# Patient Record
Sex: MALE | Race: WHITE | NOT HISPANIC OR LATINO | Employment: OTHER | URBAN - METROPOLITAN AREA
[De-identification: names, ages, dates, MRNs, and addresses within clinical notes are randomized per-mention and may not be internally consistent; named-entity substitution may affect disease eponyms.]

---

## 2023-10-02 ENCOUNTER — HOSPITAL ENCOUNTER (EMERGENCY)
Facility: HOSPITAL | Age: 83
Discharge: LEFT BEFORE DISCHARGE | End: 2023-10-02
Attending: EMERGENCY MEDICINE
Payer: MEDICARE

## 2023-10-02 VITALS
DIASTOLIC BLOOD PRESSURE: 83 MMHG | OXYGEN SATURATION: 92 % | RESPIRATION RATE: 20 BRPM | SYSTOLIC BLOOD PRESSURE: 148 MMHG | HEIGHT: 68 IN | HEART RATE: 71 BPM | TEMPERATURE: 97.5 F | BODY MASS INDEX: 23.49 KG/M2 | WEIGHT: 155 LBS

## 2023-10-02 DIAGNOSIS — R68.83 CHILLS: Primary | ICD-10-CM

## 2023-10-02 DIAGNOSIS — M54.50 MIDLINE LOW BACK PAIN WITHOUT SCIATICA, UNSPECIFIED CHRONICITY: ICD-10-CM

## 2023-10-02 PROCEDURE — 99281 EMR DPT VST MAYX REQ PHY/QHP: CPT | Performed by: EMERGENCY MEDICINE

## 2023-10-02 PROCEDURE — 99284 EMERGENCY DEPT VISIT MOD MDM: CPT | Performed by: EMERGENCY MEDICINE

## 2023-10-02 PROCEDURE — 99283 EMERGENCY DEPT VISIT LOW MDM: CPT | Mod: 25

## 2023-10-02 ASSESSMENT — COLUMBIA-SUICIDE SEVERITY RATING SCALE - C-SSRS
1. IN THE PAST MONTH, HAVE YOU WISHED YOU WERE DEAD OR WISHED YOU COULD GO TO SLEEP AND NOT WAKE UP?: NO
2. HAVE YOU ACTUALLY HAD ANY THOUGHTS OF KILLING YOURSELF?: NO
6. HAVE YOU EVER DONE ANYTHING, STARTED TO DO ANYTHING, OR PREPARED TO DO ANYTHING TO END YOUR LIFE?: NO

## 2023-10-02 ASSESSMENT — PAIN DESCRIPTION - DESCRIPTORS: DESCRIPTORS: ACHING

## 2023-10-02 ASSESSMENT — PAIN - FUNCTIONAL ASSESSMENT: PAIN_FUNCTIONAL_ASSESSMENT: 0-10

## 2023-10-02 ASSESSMENT — PAIN SCALES - GENERAL
PAINLEVEL_OUTOF10: 3
PAINLEVEL_OUTOF10: 0 - NO PAIN

## 2023-10-02 ASSESSMENT — LIFESTYLE VARIABLES
EVER FELT BAD OR GUILTY ABOUT YOUR DRINKING: NO
HAVE PEOPLE ANNOYED YOU BY CRITICIZING YOUR DRINKING: NO
EVER HAD A DRINK FIRST THING IN THE MORNING TO STEADY YOUR NERVES TO GET RID OF A HANGOVER: NO
HAVE YOU EVER FELT YOU SHOULD CUT DOWN ON YOUR DRINKING: NO

## 2023-10-02 ASSESSMENT — PAIN DESCRIPTION - LOCATION: LOCATION: HEAD

## 2023-10-02 NOTE — ED PROVIDER NOTES
"HPI   Chief Complaint   Patient presents with    Chills     Generalized weakness       HPI     Jose Guardado is a 83-year-old male with a past medical history of hypertension, type 2 diabetes mellitus, stroke currently on blood thinners who presents to the ED via EMS with complaints of chills.  En route to the ED, EMS reports that patient was in no acute distress and vitals were stable.  BP was 148/98.  Patient reports that while going to bed last night he experienced episodes of chills followed by episodes of feeling too hot.  Patient also reports a \"funny feeling\" in his low-back.  States the sensation in his low back feels like cold pressure is being applied. Sensation does not radiate. Reports 2 episodes of diarrhea that occurred last night.  No blood noted in stool.  Denies headaches, fevers, nausea, vomiting, chest pain, shortness of breath, dizziness and swelling in legs and ankles.  Patient denies any urinary symptoms.  Following his arrival, patient reports that he is feeling a lot better.          No data recorded                Patient History   Past Medical History:   Diagnosis Date    Dizziness and giddiness 12/21/2021    Episodic lightheadedness    Other chest pain 12/21/2021    Atypical chest pain    Other chest pain 12/21/2021    Chest pain, musculoskeletal    Personal history of other diseases of male genital organs 11/30/2021    History of erectile dysfunction    Personal history of other specified conditions 05/14/2018    History of urinary incontinence     Past Surgical History:   Procedure Laterality Date    MR HEAD ANGIO WO IV CONTRAST  2/24/2021    MR HEAD ANGIO WO IV CONTRAST 2/24/2021 [facility]    MR HEAD ANGIO WO IV CONTRAST  4/24/2021    MR HEAD ANGIO WO IV CONTRAST 4/24/2021 [facility]    MR HEAD ANGIO WO IV CONTRAST  11/18/2022    MR HEAD ANGIO WO IV CONTRAST 11/18/2022 [facility]    MR HEAD ANGIO WO IV CONTRAST  3/22/2023    MR HEAD ANGIO WO IV CONTRAST [facility] "    MR NECK ANGIO WO IV CONTRAST  2/24/2021    MR NECK ANGIO WO IV CONTRAST 2/24/2021 [facility]    MR NECK ANGIO WO IV CONTRAST  4/24/2021    MR NECK ANGIO WO IV CONTRAST 4/24/2021 [facility]    MR NECK ANGIO WO IV CONTRAST  11/18/2022    MR NECK ANGIO WO IV CONTRAST 11/18/2022 [facility]    MR NECK ANGIO WO IV CONTRAST  3/22/2023    MR NECK ANGIO WO IV CONTRAST [facility]    OTHER SURGICAL HISTORY  05/14/2018    Incision Of Mastoid    OTHER SURGICAL HISTORY  11/30/2021    Complete colonoscopy    OTHER SURGICAL HISTORY  11/30/2021    Esophagogastroduodenoscopy    OTHER SURGICAL HISTORY  11/30/2021    Mastoidectomy    OTHER SURGICAL HISTORY  11/30/2021    Renal lithotripsy    OTHER SURGICAL HISTORY  11/30/2021    Tonsillectomy with adenoidectomy    TONSILLECTOMY  05/14/2018    Tonsillectomy     No family history on file.  Social History     Tobacco Use    Smoking status: Not on file    Smokeless tobacco: Not on file   Substance Use Topics    Alcohol use: Not on file    Drug use: Not on file       Physical Exam   ED Triage Vitals [10/02/23 0912]   Temp Heart Rate Resp BP   36.4 °C (97.5 °F) 72 18 156/77      SpO2 Temp Source Heart Rate Source Patient Position   95 % Temporal Monitor --      BP Location FiO2 (%)     -- --       REVIEW OF SYSTEMS    Constitutional:      Denies: Fever, fatigue     Reports: None  Neuro:      Denies: Headache     Reports: None  HEENT:      Denies: vision change     Reports: None  Cardiovascular:      Denies: Chest pain     Reports: None  Pulmonary:      Denies: shortness of breath, cough     Reports: None  Gastrointestinal:      Denies: Abdominal pain, nausea, vomiting, constipation, bright red stools     Reports: diarrhea,   Genitourinary:      Denies: Flank pain, painful urination, frequent urination, discharge     Reports: None  Musculoskeletal:     Denies: None      Reports: back pain  I    Physical Exam  Constitutional:       General: He is not in acute  distress.     Appearance: Normal appearance. He is not ill-appearing.   HENT:      Head: Normocephalic and atraumatic.      Right Ear: Tympanic membrane, ear canal and external ear normal.      Left Ear: Tympanic membrane, ear canal and external ear normal.      Nose: Nose normal.      Mouth/Throat:      Mouth: Mucous membranes are moist.      Pharynx: No oropharyngeal exudate or posterior oropharyngeal erythema.   Eyes:      Extraocular Movements: Extraocular movements intact.      Conjunctiva/sclera: Conjunctivae normal.      Pupils: Pupils are equal, round, and reactive to light.   Cardiovascular:      Rate and Rhythm: Normal rate and regular rhythm.      Pulses: Normal pulses.      Heart sounds: No murmur heard.     No gallop.   Pulmonary:      Effort: Pulmonary effort is normal.      Breath sounds: No wheezing, rhonchi or rales.   Abdominal:      General: Bowel sounds are normal. There is no distension.      Palpations: Abdomen is soft. There is no mass.      Tenderness: There is no abdominal tenderness. There is no guarding or rebound.   Musculoskeletal:      Cervical back: Neck supple.   Lymphadenopathy:      Cervical: No cervical adenopathy.   Skin:     General: Skin is warm and dry.      Capillary Refill: Capillary refill takes less than 2 seconds.   Neurological:      Mental Status: He is alert. Mental status is at baseline.   Psychiatric:         Mood and Affect: Mood normal.         Behavior: Behavior normal.         ED Course & MDM   Diagnoses as of 10/02/23 1105   Chills   Midline low back pain without sciatica, unspecified chronicity       Medical Decision Making  Jose Guardado is a 44-year-old male with a past medical history of hypertension, type 2 diabetes mellitus, stroke currently on blood thinners who presents to the ED via EMS with complaints of chills.  Patient also reports midline low back pain that does not radiate.  Upon arrival to the ED, patient was in no acute distress and stable.   Patient denies any other symptoms.  We did discuss the need to order CBC, CMP, urinalysis, blood culture sepsis rule out.  COVID swab was also ordered.  Discussed need for chest x-ray, troponin, Mg for cardiac rule out.  Patient reports he is feeling better and does not want to undergo labs or imaging at this time.  Patient left AMA.    The patient was seen by the resident/fellow.  I have personally performed a substantive portion of the encounter.  I have seen and examined the patient; agree with the workup, evaluation, MDM, management and diagnosis.  The care plan has been discussed with the resident; I have reviewed the resident's note and agree with the documented findings.      EKG: Ventricular rate 73.  .  QTc 409.  No ST changes.    83-year-old male to emerged part with chief complaint of chills and back pain that started last night.  He had a mild headache.  He did after episodes of diarrhea.  No blood.  No fevers.  No nausea or vomiting.  No chest pain or shortness of breath.  No urinary issues.  He has had multiple episodes in the past.  Attributes them to anxiety.  He does have hypertension, stroke, diabetes.  He is on Plavix.    Heart is regular lungs are clear.  Abdomen is soft and nontender.  He is awake and alert and oriented to person place and time.  He is in no distress.    We did discuss doing lab work and imaging to work him up for his symptoms and the patient decided he does not want to do this.  Patient states that he feels much better now and does not want evaluated.    Patient is awake and alert and has decisional capacity and comprehension.  He is signing out AGAINST MEDICAL ADVICE.  His family member is here with him.      Dispo: Patient reports he Is feeling better and was discharged AMA.       Procedure  Procedures     Peyton Mackey MD  Resident  10/02/23 1325       Rajiv Joy, DO  10/04/23 1215       Rajiv Joy, DO  10/04/23 1215

## 2023-10-08 PROBLEM — N52.9 ERECTILE DYSFUNCTION: Status: ACTIVE | Noted: 2023-10-08

## 2023-10-08 PROBLEM — D22.5 MELANOCYTIC NEVI OF TRUNK: Status: ACTIVE | Noted: 2022-09-06

## 2023-10-08 PROBLEM — R47.01: Status: ACTIVE | Noted: 2023-10-08

## 2023-10-08 PROBLEM — R90.82 WHITE MATTER DISEASE, UNSPECIFIED: Status: ACTIVE | Noted: 2023-10-08

## 2023-10-08 PROBLEM — I10 BENIGN ESSENTIAL HYPERTENSION: Status: ACTIVE | Noted: 2023-10-08

## 2023-10-08 PROBLEM — G40.909 SEIZURE DISORDER (MULTI): Status: ACTIVE | Noted: 2023-10-08

## 2023-10-08 PROBLEM — R06.02 SHORTNESS OF BREATH: Status: ACTIVE | Noted: 2023-10-08

## 2023-10-08 PROBLEM — E78.5 HYPERLIPIDEMIA: Status: ACTIVE | Noted: 2023-10-08

## 2023-10-08 PROBLEM — I10 HTN (HYPERTENSION): Status: ACTIVE | Noted: 2023-10-08

## 2023-10-08 PROBLEM — Z86.73 OLD CEREBELLAR INFARCT WITHOUT LATE EFFECT: Status: ACTIVE | Noted: 2023-10-08

## 2023-10-08 PROBLEM — K57.32 DIVERTICULITIS, COLON: Status: ACTIVE | Noted: 2023-10-08

## 2023-10-08 PROBLEM — N20.0 CALCIUM KIDNEY STONE: Status: ACTIVE | Noted: 2023-10-08

## 2023-10-08 PROBLEM — I67.9 CEREBROVASCULAR SMALL VESSEL DISEASE: Status: ACTIVE | Noted: 2023-10-08

## 2023-10-08 PROBLEM — E11.9 DM TYPE 2 (DIABETES MELLITUS, TYPE 2) (MULTI): Status: ACTIVE | Noted: 2023-10-08

## 2023-10-08 PROBLEM — L82.1 OTHER SEBORRHEIC KERATOSIS: Status: ACTIVE | Noted: 2022-09-06

## 2023-10-08 PROBLEM — Z86.79 HISTORY OF ORTHOSTATIC HYPOTENSION: Status: ACTIVE | Noted: 2023-10-08

## 2023-10-08 PROBLEM — G89.29 CHRONIC LOW BACK PAIN: Status: ACTIVE | Noted: 2023-10-08

## 2023-10-08 PROBLEM — L81.4 OTHER MELANIN HYPERPIGMENTATION: Status: ACTIVE | Noted: 2022-09-06

## 2023-10-08 PROBLEM — M19.90 OSTEOARTHRITIS: Status: ACTIVE | Noted: 2023-10-08

## 2023-10-08 PROBLEM — M54.50 CHRONIC LOW BACK PAIN: Status: ACTIVE | Noted: 2023-10-08

## 2023-10-08 PROBLEM — J92.0 ASBESTOS-INDUCED PLEURAL PLAQUE: Status: ACTIVE | Noted: 2023-02-23

## 2023-10-08 PROBLEM — I51.89 DIASTOLIC DYSFUNCTION: Status: ACTIVE | Noted: 2023-10-08

## 2023-10-08 PROBLEM — K21.9 GASTROESOPHAGEAL REFLUX DISEASE: Status: ACTIVE | Noted: 2023-10-08

## 2023-10-08 PROBLEM — R41.3 MEMORY IMPAIRMENT: Status: ACTIVE | Noted: 2023-10-08

## 2023-10-08 PROBLEM — M19.90 ARTHRITIS: Status: ACTIVE | Noted: 2023-10-08

## 2023-10-08 PROBLEM — E03.9 HYPOTHYROIDISM: Status: ACTIVE | Noted: 2023-10-08

## 2023-10-08 PROBLEM — D18.01 HEMANGIOMA OF SKIN AND SUBCUTANEOUS TISSUE: Status: ACTIVE | Noted: 2022-09-06

## 2023-10-08 PROBLEM — K63.5 COLON POLYP: Status: ACTIVE | Noted: 2023-10-08

## 2023-10-08 PROBLEM — B02.9 SHINGLES: Status: ACTIVE | Noted: 2023-10-08

## 2023-10-08 PROBLEM — E55.9 VITAMIN D DEFICIENCY: Status: ACTIVE | Noted: 2023-02-23

## 2023-10-08 PROBLEM — G25.0 TREMOR, ESSENTIAL: Status: ACTIVE | Noted: 2023-02-23

## 2023-10-08 PROBLEM — M70.71 BURSITIS OF RIGHT HIP: Status: ACTIVE | Noted: 2023-10-08

## 2023-10-08 PROBLEM — R41.0 CONFUSION AND DISORIENTATION: Status: ACTIVE | Noted: 2023-10-08

## 2023-10-08 PROBLEM — Z86.73 HISTORY OF TIA (TRANSIENT ISCHEMIC ATTACK): Status: ACTIVE | Noted: 2023-10-08

## 2023-10-08 PROBLEM — E66.3 OVERWEIGHT WITH BODY MASS INDEX (BMI) OF 25 TO 25.9 IN ADULT: Status: ACTIVE | Noted: 2023-10-08

## 2023-10-08 PROBLEM — N40.0 BPH (BENIGN PROSTATIC HYPERPLASIA): Status: ACTIVE | Noted: 2023-10-08

## 2023-10-08 PROBLEM — R47.01 EXPRESSIVE APHASIA: Status: ACTIVE | Noted: 2023-10-08

## 2023-10-08 PROBLEM — I83.899 VARICOSE VEINS OF LEG WITH SWELLING: Status: ACTIVE | Noted: 2023-10-08

## 2023-10-08 PROBLEM — R56.9: Status: ACTIVE | Noted: 2023-10-08

## 2023-10-08 PROBLEM — M54.12 CERVICAL RADICULOPATHY: Status: ACTIVE | Noted: 2023-10-08

## 2023-10-08 RX ORDER — PANTOPRAZOLE SODIUM 40 MG/1
1 TABLET, DELAYED RELEASE ORAL EVERY OTHER DAY
COMMUNITY
End: 2023-10-23 | Stop reason: ALTCHOICE

## 2023-10-08 RX ORDER — ATORVASTATIN CALCIUM 80 MG/1
1 TABLET, FILM COATED ORAL NIGHTLY
COMMUNITY
End: 2023-10-09 | Stop reason: SDUPTHER

## 2023-10-08 RX ORDER — LOSARTAN POTASSIUM 25 MG/1
1 TABLET ORAL DAILY
COMMUNITY
End: 2023-10-09 | Stop reason: SDUPTHER

## 2023-10-08 RX ORDER — BLOOD SUGAR DIAGNOSTIC
STRIP MISCELLANEOUS 2 TIMES DAILY
COMMUNITY
End: 2024-01-11 | Stop reason: WASHOUT

## 2023-10-08 RX ORDER — LANCETS
2 EACH MISCELLANEOUS DAILY
COMMUNITY
End: 2024-02-02 | Stop reason: ENTERED-IN-ERROR

## 2023-10-08 RX ORDER — THERMOMETER, TEMPLE ELECTRONIC
EACH MISCELLANEOUS
COMMUNITY
End: 2024-01-11 | Stop reason: WASHOUT

## 2023-10-08 RX ORDER — PIOGLITAZONEHYDROCHLORIDE 15 MG/1
1 TABLET ORAL DAILY
COMMUNITY
End: 2023-10-09 | Stop reason: SDUPTHER

## 2023-10-08 RX ORDER — BUSPIRONE HYDROCHLORIDE 15 MG/1
1 TABLET ORAL 3 TIMES DAILY PRN
COMMUNITY
End: 2023-12-15 | Stop reason: SDUPTHER

## 2023-10-08 RX ORDER — LEVOTHYROXINE SODIUM 100 UG/1
1 TABLET ORAL
COMMUNITY
End: 2023-10-09 | Stop reason: SDUPTHER

## 2023-10-08 RX ORDER — GLIMEPIRIDE 1 MG/1
1 TABLET ORAL
COMMUNITY
End: 2023-10-09

## 2023-10-08 RX ORDER — FINASTERIDE 5 MG/1
1 TABLET, FILM COATED ORAL NIGHTLY
COMMUNITY
End: 2023-10-09 | Stop reason: SDUPTHER

## 2023-10-08 RX ORDER — CLOPIDOGREL BISULFATE 75 MG/1
1 TABLET ORAL DAILY
Status: ON HOLD | COMMUNITY
End: 2024-05-02

## 2023-10-08 RX ORDER — BLOOD-GLUCOSE,RECEIVER,CONT
EACH MISCELLANEOUS ONCE
COMMUNITY
End: 2024-02-02 | Stop reason: ENTERED-IN-ERROR

## 2023-10-08 RX ORDER — FLASH GLUCOSE SCANNING READER
EACH MISCELLANEOUS
COMMUNITY
End: 2024-01-11 | Stop reason: WASHOUT

## 2023-10-08 RX ORDER — METFORMIN HYDROCHLORIDE 500 MG/1
1 TABLET ORAL
COMMUNITY
End: 2024-01-11 | Stop reason: SDUPTHER

## 2023-10-08 RX ORDER — FLUTICASONE PROPIONATE 50 MCG
1 SPRAY, SUSPENSION (ML) NASAL 2 TIMES DAILY PRN
COMMUNITY
End: 2024-04-23 | Stop reason: ENTERED-IN-ERROR

## 2023-10-08 RX ORDER — PEN NEEDLE, DIABETIC 30 GX3/16"
NEEDLE, DISPOSABLE MISCELLANEOUS DAILY
COMMUNITY
End: 2024-02-02 | Stop reason: ENTERED-IN-ERROR

## 2023-10-09 ENCOUNTER — OFFICE VISIT (OUTPATIENT)
Dept: PRIMARY CARE | Facility: CLINIC | Age: 83
End: 2023-10-09
Payer: MEDICARE

## 2023-10-09 VITALS
TEMPERATURE: 96.5 F | HEART RATE: 76 BPM | SYSTOLIC BLOOD PRESSURE: 128 MMHG | WEIGHT: 165 LBS | HEIGHT: 67 IN | DIASTOLIC BLOOD PRESSURE: 58 MMHG | BODY MASS INDEX: 25.9 KG/M2

## 2023-10-09 DIAGNOSIS — E55.9 VITAMIN D DEFICIENCY: ICD-10-CM

## 2023-10-09 DIAGNOSIS — I10 BENIGN ESSENTIAL HYPERTENSION: Primary | ICD-10-CM

## 2023-10-09 DIAGNOSIS — F41.9 ANXIETY: ICD-10-CM

## 2023-10-09 DIAGNOSIS — E78.5 HYPERLIPIDEMIA, UNSPECIFIED HYPERLIPIDEMIA TYPE: ICD-10-CM

## 2023-10-09 DIAGNOSIS — Z79.4 TYPE 2 DIABETES MELLITUS WITHOUT COMPLICATION, WITH LONG-TERM CURRENT USE OF INSULIN (MULTI): ICD-10-CM

## 2023-10-09 DIAGNOSIS — E11.9 TYPE 2 DIABETES MELLITUS WITHOUT COMPLICATION, WITH LONG-TERM CURRENT USE OF INSULIN (MULTI): ICD-10-CM

## 2023-10-09 DIAGNOSIS — E03.9 HYPOTHYROIDISM, UNSPECIFIED TYPE: ICD-10-CM

## 2023-10-09 PROCEDURE — 1160F RVW MEDS BY RX/DR IN RCRD: CPT | Performed by: FAMILY MEDICINE

## 2023-10-09 PROCEDURE — 1159F MED LIST DOCD IN RCRD: CPT | Performed by: FAMILY MEDICINE

## 2023-10-09 PROCEDURE — 1036F TOBACCO NON-USER: CPT | Performed by: FAMILY MEDICINE

## 2023-10-09 PROCEDURE — 3078F DIAST BP <80 MM HG: CPT | Performed by: FAMILY MEDICINE

## 2023-10-09 PROCEDURE — 1126F AMNT PAIN NOTED NONE PRSNT: CPT | Performed by: FAMILY MEDICINE

## 2023-10-09 PROCEDURE — 99214 OFFICE O/P EST MOD 30 MIN: CPT | Performed by: FAMILY MEDICINE

## 2023-10-09 PROCEDURE — 3074F SYST BP LT 130 MM HG: CPT | Performed by: FAMILY MEDICINE

## 2023-10-09 RX ORDER — QUETIAPINE FUMARATE 25 MG/1
25 TABLET, FILM COATED ORAL NIGHTLY
COMMUNITY
Start: 2023-01-09 | End: 2023-10-19 | Stop reason: SDUPTHER

## 2023-10-09 RX ORDER — ESOMEPRAZOLE MAGNESIUM 40 MG/1
40 CAPSULE, DELAYED RELEASE ORAL
COMMUNITY
End: 2024-02-02 | Stop reason: ENTERED-IN-ERROR

## 2023-10-09 RX ORDER — SILDENAFIL 50 MG/1
50 TABLET, FILM COATED ORAL AS NEEDED
COMMUNITY
Start: 2023-07-24 | End: 2024-03-18 | Stop reason: ENTERED-IN-ERROR

## 2023-10-09 RX ORDER — GLIMEPIRIDE 2 MG/1
2 TABLET ORAL DAILY
Qty: 90 TABLET | Refills: 3 | Status: SHIPPED | OUTPATIENT
Start: 2023-10-09 | End: 2024-04-17 | Stop reason: SDUPTHER

## 2023-10-09 RX ORDER — LOSARTAN POTASSIUM 25 MG/1
25 TABLET ORAL DAILY
Qty: 90 TABLET | Refills: 3 | Status: ON HOLD | OUTPATIENT
Start: 2023-10-09 | End: 2024-05-02

## 2023-10-09 RX ORDER — MUPIROCIN CALCIUM 20 MG/G
1 CREAM TOPICAL
COMMUNITY
Start: 2012-02-02 | End: 2023-10-23 | Stop reason: ALTCHOICE

## 2023-10-09 RX ORDER — PIOGLITAZONEHYDROCHLORIDE 15 MG/1
15 TABLET ORAL DAILY
Qty: 90 TABLET | Refills: 3 | Status: SHIPPED | OUTPATIENT
Start: 2023-10-09 | End: 2024-04-17 | Stop reason: SDUPTHER

## 2023-10-09 RX ORDER — DOXYCYCLINE 100 MG/1
100 CAPSULE ORAL EVERY 12 HOURS
COMMUNITY
Start: 2023-06-30 | End: 2023-10-23 | Stop reason: ALTCHOICE

## 2023-10-09 RX ORDER — METFORMIN HYDROCHLORIDE 500 MG/1
500 TABLET, EXTENDED RELEASE ORAL DAILY
COMMUNITY
Start: 2022-12-01 | End: 2023-10-23 | Stop reason: SDUPTHER

## 2023-10-09 RX ORDER — HYDROXYZINE HYDROCHLORIDE 25 MG/1
25 TABLET, FILM COATED ORAL 3 TIMES DAILY PRN
COMMUNITY
Start: 2022-12-08 | End: 2024-02-02 | Stop reason: ENTERED-IN-ERROR

## 2023-10-09 RX ORDER — INSULIN GLARGINE 100 [IU]/ML
12 INJECTION, SOLUTION SUBCUTANEOUS NIGHTLY PRN
COMMUNITY
Start: 2022-12-23 | End: 2024-02-02 | Stop reason: ENTERED-IN-ERROR

## 2023-10-09 RX ORDER — ALBUTEROL SULFATE 90 UG/1
2 AEROSOL, METERED RESPIRATORY (INHALATION) EVERY 4 HOURS PRN
COMMUNITY
Start: 2023-06-30 | End: 2024-03-18 | Stop reason: ENTERED-IN-ERROR

## 2023-10-09 RX ORDER — ATORVASTATIN CALCIUM 80 MG/1
80 TABLET, FILM COATED ORAL NIGHTLY
Qty: 90 TABLET | Refills: 3 | Status: ON HOLD | OUTPATIENT
Start: 2023-10-09 | End: 2024-05-02

## 2023-10-09 RX ORDER — LEVOTHYROXINE SODIUM 100 UG/1
100 TABLET ORAL
Qty: 90 TABLET | Refills: 3 | Status: ON HOLD | OUTPATIENT
Start: 2023-10-09 | End: 2024-05-02

## 2023-10-09 RX ORDER — BRIVARACETAM 100 MG/1
100 TABLET, FILM COATED ORAL 2 TIMES DAILY
COMMUNITY
Start: 2022-12-16 | End: 2023-10-19 | Stop reason: ALTCHOICE

## 2023-10-09 RX ORDER — DICLOFENAC SODIUM 75 MG/1
75 TABLET, DELAYED RELEASE ORAL DAILY
COMMUNITY
Start: 2022-10-13 | End: 2024-01-11 | Stop reason: WASHOUT

## 2023-10-09 RX ORDER — FINASTERIDE 5 MG/1
5 TABLET, FILM COATED ORAL NIGHTLY
Qty: 90 TABLET | Refills: 3 | Status: ON HOLD | OUTPATIENT
Start: 2023-10-09 | End: 2024-05-02

## 2023-10-09 RX ORDER — NAPROXEN SODIUM 220 MG/1
81 TABLET, FILM COATED ORAL DAILY
COMMUNITY
Start: 2023-06-30 | End: 2024-01-11 | Stop reason: WASHOUT

## 2023-10-09 RX ORDER — TRAZODONE HYDROCHLORIDE 50 MG/1
0.5 TABLET ORAL NIGHTLY
COMMUNITY
Start: 2022-12-30 | End: 2023-10-19 | Stop reason: ALTCHOICE

## 2023-10-09 RX ORDER — FLASH GLUCOSE SENSOR
1 KIT MISCELLANEOUS
COMMUNITY
Start: 2023-07-01 | End: 2024-01-11 | Stop reason: WASHOUT

## 2023-10-09 RX ORDER — GLIMEPIRIDE 2 MG/1
2 TABLET ORAL DAILY
COMMUNITY
Start: 2023-04-30 | End: 2023-10-09

## 2023-10-09 NOTE — PROGRESS NOTES
"     /58 (BP Location: Left arm, Patient Position: Sitting, BP Cuff Size: Adult)   Pulse 76   Temp 35.8 °C (96.5 °F) (Skin)   Ht 1.702 m (5' 7\")   Wt 74.8 kg (165 lb)   BMI 25.84 kg/m²     Past Medical History:   Diagnosis Date    Dizziness and giddiness 12/21/2021    Episodic lightheadedness    Other chest pain 12/21/2021    Atypical chest pain    Other chest pain 12/21/2021    Chest pain, musculoskeletal    Personal history of other diseases of male genital organs 11/30/2021    History of erectile dysfunction    Personal history of other specified conditions 05/14/2018    History of urinary incontinence       Patient Active Problem List   Diagnosis    Aphasic seizure (CMS/HCC)    Arthritis    Osteoarthritis    Asbestos-induced pleural plaque    Benign essential hypertension    BPH (benign prostatic hyperplasia)    Bursitis of right hip    Calcium kidney stone    Cerebrovascular small vessel disease    Cervical radiculopathy    Cervical spondylosis without myelopathy    Chronic low back pain    Colon polyp    Diverticulitis, colon    Confusion and disorientation    Diastolic dysfunction    HTN (hypertension)    History of TIA (transient ischemic attack)    History of orthostatic hypotension    Hemangioma of skin and subcutaneous tissue    Gastroesophageal reflux disease    Expressive aphasia    Erectile dysfunction    DM type 2 (diabetes mellitus, type 2) (CMS/HCC)    Disorder of bursae of shoulder region    Memory impairment    Hyperlipidemia    Hypothyroidism    Melanocytic nevi of trunk    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    Other seborrheic keratosis    Other melanin hyperpigmentation    Seizure disorder (CMS/HCC)    Shingles    Shortness of breath    Tremor, essential    Varicose veins of leg with swelling    Vitamin D deficiency    White matter disease, unspecified    Overweight with body mass index (BMI) of 25 to 25.9 in adult       Current Outpatient Medications   Medication Sig " Dispense Refill    atorvastatin (Lipitor) 80 mg tablet Take 1 tablet (80 mg) by mouth once daily at bedtime.      blood-glucose sensor (DEXCOM G7 SENSOR MISC) 1 sensor every 10 days      brivaracetam (Briviact) 50 mg tablet tablet Take 1 tablet (50 mg) by mouth 2 times a day.      busPIRone (Buspar) 15 mg tablet Take 1 tablet (15 mg) by mouth 3 times a day as needed (anger).      clopidogrel (Plavix) 75 mg tablet Take 1 tablet (75 mg) by mouth once daily.      Dexcom G4 platinum  (Dexcom G7 ) misc 1 time.      diclofenac (Voltaren) 75 mg EC tablet Take 1 tablet (75 mg) by mouth once daily.      esomeprazole (NexIUM) 40 mg DR capsule Take 1 capsule (40 mg) by mouth once daily.      finasteride (Proscar) 5 mg tablet Take 1 tablet (5 mg) by mouth once daily at bedtime.      fluticasone (Flonase) 50 mcg/actuation nasal spray Administer 1 spray into each nostril 2 times a day.      FreeStyle Doug 2 Sensor kit Inject 1 each under the skin twice daily.      FreeStyle Doug reader (FreeStyle Doug 2 [place]) misc Use device to take blood glucose levels 10 times daily      glimepiride (Amaryl) 2 mg tablet Take 1 tablet (2 mg) by mouth once daily.      insulin glargine (Lantus) 100 unit/mL (3 mL) pen Inject 300 Units under the skin once daily at bedtime.      insulin glargine,hum.rec.anlog (LANTUS SOLOSTAR U-100 INSULIN SUBQ) Inject 16 Units under the skin once daily.      lancets (Accu-Chek Fastclix Lancet Drum) misc 2 Lancets once daily.      levothyroxine (Synthroid, Levoxyl) 100 mcg tablet Take 1 tablet (100 mcg) by mouth once daily in the morning. Take before meals.      losartan (Cozaar) 25 mg tablet Take 1 tablet (25 mg) by mouth once daily.      metFORMIN (Glucophage) 500 mg tablet Take 1 tablet (500 mg) by mouth. With breakfast      pantoprazole (ProtoNix) 40 mg EC tablet Take 1 tablet (40 mg) by mouth every other day.      pioglitazone (Actos) 15 mg tablet Take 1 tablet (15 mg) by mouth once daily.    "   albuterol 90 mcg/actuation inhaler Inhale 2 puffs every 4 hours if needed for wheezing.      aspirin 81 mg chewable tablet Chew 1 tablet (81 mg) once daily.      blood sugar diagnostic (Accu-Chek Guide test strips) strip 2 times a day.      blood sugar diagnostic (FreeStyle Precision Esvin Strips) strip 2 times a day. When prompted to      Briviact 100 mg tablet tablet Take 1 tablet (100 mg) by mouth 2 times a day.      doxycycline (Vibramycin) 100 mg capsule Take 1 capsule (100 mg) by mouth every 12 hours.      flash glucose sensor (FREESTYLE BUSHRA 2 SENSOR [facility] [place], [place]) Use one sensor for 14 days for blood glucose readings      glimepiride (Amaryl) 1 mg tablet Take 1 tablet (1 mg) by mouth. Before breakfast      guaiFENesin (Mucus Relief ER) 1,200 mg tablet extended release 12hr Take by mouth.      hydrOXYzine HCL (Atarax) 25 mg tablet Take 1 tablet (25 mg) by mouth 3 times a day as needed.      metFORMIN  mg 24 hr tablet Take 1 tablet (500 mg) by mouth once daily.      mupirocin (Bactroban) 2 % cream Apply 1 Application topically once daily.      pen needle, diabetic 32 gauge x 5/32\" needle once daily. Use to inject lantus for diabetes mellitus      QUEtiapine (SEROquel) 25 mg tablet Take 1 tablet (25 mg) by mouth once daily at bedtime. Take 1 to 2 Tablets by mouth at Bedtime if needed for sleep.      sildenafil (Viagra) 50 mg tablet Take 1 tablet (50 mg) by mouth if needed.      traZODone (Desyrel) 50 mg tablet Take 0.5 tablets (25 mg) by mouth once daily at bedtime.       No current facility-administered medications for this visit.       CC/HPI/ASSESSMENT/PLAN    CC follow-up medication    HPI patient 83-year-old male suffers of hypertension hyperlipidemia hypothyroidism vitamin D deficiency type 2 diabetes, patient had a stroke last year causing difficulty with his speech.  Patient experiencing more anxiety as well as bad dreams since the stroke.  He is seeing neurology next week.  He was given medicine " previously by neurology that seemed to help with the dreams.  With respect to anxiety patient's son notes if he takes BuSpar his anxiety is improved and his overall mood is better.  Patient had diabetic like to have blood work done.  Medications reviewed.  Denies chest pain short of breath.  Blood pressure under good control.  Medication list reviewed.    Exam calm eyes no jaundice neck supple lungs CTA CV RRR Ext no edema    A/P 1.  Hypertension stable 2 hyperlipidemia stable 3 hypothyroidism stable 4 vitamin D deficiency stable 5 type 2 diabetes stable.  Extensive blood work is ordered.  Medicines are refilled.  #6 anxiety.  I encouraged patient to take the buspirone twice daily.  Extensive blood work is ordered.  Medicines refilled.  Follow-up 3 months    There are no diagnoses linked to this encounter.

## 2023-10-10 LAB
NON-UH HIE A/G RATIO: 1.3
NON-UH HIE ALB: 3.9 G/DL (ref 3.4–5)
NON-UH HIE ALK PHOS: 105 UNIT/L (ref 46–116)
NON-UH HIE BASO COUNT: 0.01 X1000 (ref 0–0.2)
NON-UH HIE BASOS %: 0.2 %
NON-UH HIE BILIRUBIN, TOTAL: 0.2 MG/DL (ref 0.2–1)
NON-UH HIE BUN/CREAT RATIO: 15.5
NON-UH HIE BUN: 17 MG/DL (ref 9–23)
NON-UH HIE CALCIUM: 9.3 MG/DL (ref 8.7–10.4)
NON-UH HIE CALCULATED LDL CHOLESTEROL: 41 MG/DL (ref 60–130)
NON-UH HIE CALCULATED OSMOLALITY: 289 MOSM/KG (ref 275–295)
NON-UH HIE CHLORIDE: 101 MMOL/L (ref 98–107)
NON-UH HIE CHOLESTEROL: 127 MG/DL (ref 100–200)
NON-UH HIE CO2, VENOUS: 30 MMOL/L (ref 20–31)
NON-UH HIE CREATININE: 1.1 MG/DL (ref 0.6–1.1)
NON-UH HIE DIFF?: NO
NON-UH HIE EOS COUNT: 0.32 X1000 (ref 0–0.5)
NON-UH HIE EOSIN %: 5.9 %
NON-UH HIE GFR AA: >60
NON-UH HIE GLOBULIN: 3 G/DL
NON-UH HIE GLOMERULAR FILTRATION RATE: >60 ML/MIN/1.73M?
NON-UH HIE GLUCOSE: 308 MG/DL (ref 74–106)
NON-UH HIE GOT: 22 UNIT/L (ref 15–37)
NON-UH HIE GPT: 18 UNIT/L (ref 10–49)
NON-UH HIE HCT: 40.9 % (ref 41–52)
NON-UH HIE HDL CHOLESTEROL: 38 MG/DL (ref 40–60)
NON-UH HIE HGB A1C: 7.8 %
NON-UH HIE HGB: 13.7 G/DL (ref 13.5–17.5)
NON-UH HIE INSTR WBC: 5.5
NON-UH HIE K: 4.5 MMOL/L (ref 3.5–5.1)
NON-UH HIE LYMPH %: 29.1 %
NON-UH HIE LYMPH COUNT: 1.6 X1000 (ref 1.2–4.8)
NON-UH HIE MCH: 30.3 PG (ref 27–34)
NON-UH HIE MCHC: 33.4 G/DL (ref 32–37)
NON-UH HIE MCV: 90.8 FL (ref 80–100)
NON-UH HIE MONO %: 9 %
NON-UH HIE MONO COUNT: 0.49 X1000 (ref 0.1–1)
NON-UH HIE MPV: 7.6 FL (ref 7.4–10.4)
NON-UH HIE NA: 138 MMOL/L (ref 135–145)
NON-UH HIE NEUTROPHIL %: 55.8 %
NON-UH HIE NEUTROPHIL COUNT (ANC): 3.06 X1000 (ref 1.4–8.8)
NON-UH HIE NUCLEATED RBC: 0 /100WBC
NON-UH HIE PLATELET: 201 X10 (ref 150–450)
NON-UH HIE RBC: 4.51 X10 (ref 4.7–6.1)
NON-UH HIE RDW: 15.5 % (ref 11.5–14.5)
NON-UH HIE TOTAL CHOL/HDL CHOL RATIO: 3.3
NON-UH HIE TOTAL PROTEIN: 6.9 G/DL (ref 5.7–8.2)
NON-UH HIE TRIGLYCERIDES: 239 MG/DL (ref 30–150)
NON-UH HIE TSH: 2.4 UIU/ML (ref 0.55–4.78)
NON-UH HIE VIT D 25: 28 NG/ML
NON-UH HIE WBC: 5.5 X10 (ref 4.5–11)

## 2023-10-11 DIAGNOSIS — Z86.73 HISTORY OF TIA (TRANSIENT ISCHEMIC ATTACK): ICD-10-CM

## 2023-10-11 DIAGNOSIS — Z95.818 STATUS POST PLACEMENT OF IMPLANTABLE LOOP RECORDER: Primary | ICD-10-CM

## 2023-10-12 ENCOUNTER — HOSPITAL ENCOUNTER (OUTPATIENT)
Dept: CARDIOLOGY | Facility: HOSPITAL | Age: 83
Discharge: HOME | End: 2023-10-12
Payer: MEDICARE

## 2023-10-12 ENCOUNTER — TELEPHONE (OUTPATIENT)
Dept: PRIMARY CARE | Facility: CLINIC | Age: 83
End: 2023-10-12
Payer: MEDICARE

## 2023-10-12 DIAGNOSIS — Z86.73 HISTORY OF TIA (TRANSIENT ISCHEMIC ATTACK): ICD-10-CM

## 2023-10-12 DIAGNOSIS — Z95.818 STATUS POST PLACEMENT OF IMPLANTABLE LOOP RECORDER: ICD-10-CM

## 2023-10-12 PROBLEM — F41.9 ANXIETY: Status: ACTIVE | Noted: 2023-10-12

## 2023-10-12 LAB
ATRIAL RATE: 73 BPM
P AXIS: 55 DEGREES
P OFFSET: 196 MS
P ONSET: 144 MS
PR INTERVAL: 158 MS
Q ONSET: 223 MS
QRS COUNT: 12 BEATS
QRS DURATION: 76 MS
QT INTERVAL: 372 MS
QTC CALCULATION(BAZETT): 409 MS
QTC FREDERICIA: 397 MS
R AXIS: 60 DEGREES
T AXIS: 45 DEGREES
T OFFSET: 409 MS
VENTRICULAR RATE: 73 BPM

## 2023-10-12 PROCEDURE — 93298 REM INTERROG DEV EVAL SCRMS: CPT | Performed by: INTERNAL MEDICINE

## 2023-10-12 PROCEDURE — 93005 ELECTROCARDIOGRAM TRACING: CPT

## 2023-10-12 NOTE — TELEPHONE ENCOUNTER
A1c 7.8.  Thyroid level good.  Liver kidney cholesterol sugar levels look good.  Continue current medicines     Pt son was informed

## 2023-10-19 ENCOUNTER — LAB (OUTPATIENT)
Dept: LAB | Facility: LAB | Age: 83
End: 2023-10-19
Payer: MEDICARE

## 2023-10-19 ENCOUNTER — OFFICE VISIT (OUTPATIENT)
Dept: NEUROLOGY | Facility: CLINIC | Age: 83
End: 2023-10-19
Payer: MEDICARE

## 2023-10-19 DIAGNOSIS — R56.9 SEIZURE (MULTI): ICD-10-CM

## 2023-10-19 DIAGNOSIS — R51.9 LEFT TEMPORAL HEADACHE: ICD-10-CM

## 2023-10-19 DIAGNOSIS — R47.01 APHASIA: ICD-10-CM

## 2023-10-19 DIAGNOSIS — R47.01 APHASIA: Primary | ICD-10-CM

## 2023-10-19 LAB
AMPHETAMINES UR QL SCN: NORMAL
BARBITURATES UR QL SCN: NORMAL
BENZODIAZ UR QL SCN: NORMAL
BZE UR QL SCN: NORMAL
CANNABINOIDS UR QL SCN: NORMAL
CRP SERPL-MCNC: 0.12 MG/DL
ERYTHROCYTE [SEDIMENTATION RATE] IN BLOOD BY WESTERGREN METHOD: 13 MM/H (ref 0–20)
FENTANYL+NORFENTANYL UR QL SCN: NORMAL
OPIATES UR QL SCN: NORMAL
OXYCODONE+OXYMORPHONE UR QL SCN: NORMAL
PCP UR QL SCN: NORMAL

## 2023-10-19 PROCEDURE — 86140 C-REACTIVE PROTEIN: CPT

## 2023-10-19 PROCEDURE — 1126F AMNT PAIN NOTED NONE PRSNT: CPT | Performed by: STUDENT IN AN ORGANIZED HEALTH CARE EDUCATION/TRAINING PROGRAM

## 2023-10-19 PROCEDURE — 1036F TOBACCO NON-USER: CPT | Performed by: STUDENT IN AN ORGANIZED HEALTH CARE EDUCATION/TRAINING PROGRAM

## 2023-10-19 PROCEDURE — 85652 RBC SED RATE AUTOMATED: CPT

## 2023-10-19 PROCEDURE — 1160F RVW MEDS BY RX/DR IN RCRD: CPT | Performed by: STUDENT IN AN ORGANIZED HEALTH CARE EDUCATION/TRAINING PROGRAM

## 2023-10-19 PROCEDURE — 99214 OFFICE O/P EST MOD 30 MIN: CPT | Performed by: STUDENT IN AN ORGANIZED HEALTH CARE EDUCATION/TRAINING PROGRAM

## 2023-10-19 PROCEDURE — 1159F MED LIST DOCD IN RCRD: CPT | Performed by: STUDENT IN AN ORGANIZED HEALTH CARE EDUCATION/TRAINING PROGRAM

## 2023-10-19 PROCEDURE — 36415 COLL VENOUS BLD VENIPUNCTURE: CPT

## 2023-10-19 PROCEDURE — 80307 DRUG TEST PRSMV CHEM ANLYZR: CPT

## 2023-10-19 RX ORDER — BRIVARACETAM 50 MG/1
50 TABLET, FILM COATED ORAL 2 TIMES DAILY
Qty: 180 TABLET | Refills: 1 | Status: SHIPPED | OUTPATIENT
Start: 2023-10-19 | End: 2024-03-11

## 2023-10-19 RX ORDER — PREDNISONE 20 MG/1
40 TABLET ORAL DAILY
Qty: 20 TABLET | Refills: 0 | Status: SHIPPED | OUTPATIENT
Start: 2023-10-19 | End: 2023-10-23 | Stop reason: ALTCHOICE

## 2023-10-19 RX ORDER — QUETIAPINE FUMARATE 25 MG/1
25 TABLET, FILM COATED ORAL NIGHTLY
Qty: 30 TABLET | Refills: 0 | Status: SHIPPED | OUTPATIENT
Start: 2023-10-19 | End: 2023-12-18 | Stop reason: SDUPTHER

## 2023-10-19 ASSESSMENT — ENCOUNTER SYMPTOMS
LOSS OF SENSATION IN FEET: 0
OCCASIONAL FEELINGS OF UNSTEADINESS: 0
DEPRESSION: 0

## 2023-10-19 NOTE — PROGRESS NOTES
"Subjective     Jose Guardado is a 83 y.o. year old male who presents for follow-up of stroke and seizure.    He was last seen 3/20/2023.     He has a history of focal seizures with recurrent episodes despite negative stroke workups. He was tried on Keppra which caused the episodes to stop but had side effects of behavioral disturbances. He was transitioned to Briviact 50mg BID which he has tolerated very well.     11/17-22/2022 - presented to [facility] with sudden onset aphasia. MRI showed embolic L MCA infarcts. Etiology cryptogenic but concerning for a proximal source as he had no significant intracranial or extracranial atherosclerosis. He was discharged on DAPT, statin and with follow-up with Cardiology. He has since had an ILR that has showed no atrial fibrillation to date.     10/19/2023 - outpatient follow-up. Over the last 3 weeks he has had a new onset left temporal headache. This is now nearly constant. He has left sided scalp tenderness. His aphasia has been getting worse over the last 3 weeks. He is waking up in the middle of the night with bad dreams. He is depressed, expresses that he \"feels he is at the end.\" He has significant anxiety. He dislikes taking his sleep related medications. He does not sleep well, wakes up frequently throughout the night then is tired during the day and naps frequently. He has had no further seizures though has had occasional episodes of aphasia that resolve with taking his Briviact.        Patient Active Problem List   Diagnosis    Aphasic seizure (CMS/HCC)    Arthritis    Osteoarthritis    Asbestos-induced pleural plaque    Benign essential hypertension    BPH (benign prostatic hyperplasia)    Bursitis of right hip    Calcium kidney stone    Cerebrovascular small vessel disease    Cervical radiculopathy    Cervical spondylosis without myelopathy    Chronic low back pain    Colon polyp    Diverticulitis, colon    Confusion and disorientation    Diastolic dysfunction    HTN " (hypertension)    History of TIA (transient ischemic attack)    History of orthostatic hypotension    Hemangioma of skin and subcutaneous tissue    Gastroesophageal reflux disease    Expressive aphasia    Erectile dysfunction    DM type 2 (diabetes mellitus, type 2) (CMS/HCC)    Disorder of bursae of shoulder region    Memory impairment    Hyperlipidemia    Hypothyroidism    Melanocytic nevi of trunk    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    Other seborrheic keratosis    Other melanin hyperpigmentation    Seizure disorder (CMS/HCC)    Shingles    Shortness of breath    Tremor, essential    Varicose veins of leg with swelling    Vitamin D deficiency    White matter disease, unspecified    Overweight with body mass index (BMI) of 25 to 25.9 in adult    Anxiety         Objective   Neurological Exam  Mental Status   Oriented only to person, place and time.  Fluent speech  Occasional word finding difficulty or paraphasic error   Able to name glasses, lenses. Had difficulty with provider name  .    Cranial Nerves  CN II: Visual fields full to confrontation.  CN III, IV, VI: Abnormal extraocular movements: Left CN III palsy.  CN V: Facial sensation is normal.  CN VII: Full and symmetric facial movement.  CN VIII: Hearing is normal.  CN IX, X: Palate elevates symmetrically  CN XI: Shoulder shrug strength is normal.  CN XII: Tongue midline without atrophy or fasciculations.    Motor   Strength is 5/5 throughout all four extremities.    Sensory  Light touch is normal in upper and lower extremities.     Gait    Antalgic, ambulates unassisted .    Physical Exam  Eyes:      Extraocular Movements: EOM normal  Neurological:      Motor: Motor strength is normal.        Assessment/Plan   Diagnoses and all orders for this visit:  Aphasia  -     Sedimentation rate, automated; Future  -     C-Reactive Protein; Future  -     Referral to Vascular Surgery; Future  -     MR brain wo IV contrast neuroquant protocol; Future  Left  temporal headache  -     Sedimentation rate, automated; Future  -     C-Reactive Protein; Future  -     Referral to Vascular Surgery; Future  -     MR brain wo IV contrast neuroquant protocol; Future  -     predniSONE (Deltasone) 20 mg tablet; Take 2 tablets (40 mg) by mouth once daily for 10 days.  Seizure (CMS/HCC)  -     QUEtiapine (SEROquel) 25 mg tablet; Take 1 tablet (25 mg) by mouth once daily at bedtime. Take 1 to 2 Tablets by mouth at Bedtime if needed for sleep.  -     brivaracetam (Briviact) 50 mg tablet tablet; Take 1 tablet (50 mg) by mouth 2 times a day.  -     Drug Screen, Urine With Reflex to Confirmation; Future  -     Briviact 50 mg tablet tablet; Take 1 tablet (50 mg) by mouth 2 times a day.    New headache. Left temporal. Scalp tenderness on exam on the L side. Will check ESR, CRP, refer for temporal artery biopsy. 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 Will repeat MRI brain without contrast    Focal seizures. Stable. Doing well on Briviact 50mg BID. OARRs checked, no concerns. CSA signed today. UDS ordered     Follow-up in 6 months

## 2023-10-20 ENCOUNTER — TELEPHONE (OUTPATIENT)
Dept: ENDOCRINOLOGY | Facility: CLINIC | Age: 83
End: 2023-10-20
Payer: MEDICARE

## 2023-10-20 DIAGNOSIS — M79.5 RETAINED BULLET: Primary | ICD-10-CM

## 2023-10-20 NOTE — TELEPHONE ENCOUNTER
Mrs. Lawson called with [place] sugar readings for the past 3 days.                                  Breakfast                Lunch                 Dinner        Bed      10-17-23                                                  230                   189             190  10-18-23                      144                      212                   240              180  10-19-23                      170                      312                   219     302  10-20-23                      158    He is taking 8 units of Lantus at bedtime.  Also on Metformin.
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
## 2023-10-23 ENCOUNTER — TELEPHONE (OUTPATIENT)
Dept: NEUROLOGY | Facility: CLINIC | Age: 83
End: 2023-10-23

## 2023-10-23 ENCOUNTER — APPOINTMENT (OUTPATIENT)
Dept: PRIMARY CARE | Facility: CLINIC | Age: 83
End: 2023-10-23
Payer: MEDICARE

## 2023-10-23 ENCOUNTER — OFFICE VISIT (OUTPATIENT)
Dept: VASCULAR SURGERY | Facility: HOSPITAL | Age: 83
End: 2023-10-23
Payer: MEDICARE

## 2023-10-23 VITALS
DIASTOLIC BLOOD PRESSURE: 79 MMHG | WEIGHT: 165.8 LBS | BODY MASS INDEX: 25.13 KG/M2 | SYSTOLIC BLOOD PRESSURE: 135 MMHG | OXYGEN SATURATION: 96 % | HEIGHT: 68 IN | HEART RATE: 73 BPM

## 2023-10-23 DIAGNOSIS — R47.01 APHASIA: ICD-10-CM

## 2023-10-23 DIAGNOSIS — R51.9 LEFT TEMPORAL HEADACHE: ICD-10-CM

## 2023-10-23 PROCEDURE — 1160F RVW MEDS BY RX/DR IN RCRD: CPT | Performed by: SURGERY

## 2023-10-23 PROCEDURE — 99213 OFFICE O/P EST LOW 20 MIN: CPT | Performed by: SURGERY

## 2023-10-23 PROCEDURE — 1159F MED LIST DOCD IN RCRD: CPT | Performed by: SURGERY

## 2023-10-23 PROCEDURE — 1036F TOBACCO NON-USER: CPT | Performed by: SURGERY

## 2023-10-23 PROCEDURE — 3075F SYST BP GE 130 - 139MM HG: CPT | Performed by: SURGERY

## 2023-10-23 PROCEDURE — 1126F AMNT PAIN NOTED NONE PRSNT: CPT | Performed by: SURGERY

## 2023-10-23 PROCEDURE — 99203 OFFICE O/P NEW LOW 30 MIN: CPT | Performed by: SURGERY

## 2023-10-23 PROCEDURE — 3078F DIAST BP <80 MM HG: CPT | Performed by: SURGERY

## 2023-10-23 ASSESSMENT — ENCOUNTER SYMPTOMS
SHORTNESS OF BREATH: 0
DEPRESSION: 0
OCCASIONAL FEELINGS OF UNSTEADINESS: 0
LOSS OF SENSATION IN FEET: 1
UNEXPECTED WEIGHT CHANGE: 0
SINUS PRESSURE: 0
ABDOMINAL PAIN: 0
PHOTOPHOBIA: 0
SINUS PAIN: 0
SPEECH DIFFICULTY: 1
RHINORRHEA: 0

## 2023-10-23 NOTE — TELEPHONE ENCOUNTER
Patient son called in stating he spoke with someone on Friday regarding an xray that his Dad needs to have done. I do not see any saved messages regarding this, not sure if this is something Asmita Headley was working on. Please advise.     [phone]

## 2023-10-23 NOTE — PROGRESS NOTES
Vascular Surgery Consult/Clinic Note    CC: Left frontal headache    HPI:  Jose Guardado is 83 y.o. male with history of seizure disorder and with remote history of L MCA infarct where the work up included MRA neck which revealed no flow limiting B ICA.   He reports about 3 week duration of the left frontal head ache above his left eye brow.   He has expressive aphagia but able to carry on most of the conversation.       Meds:   Current Outpatient Medications on File Prior to Visit   Medication Sig Dispense Refill    albuterol 90 mcg/actuation inhaler Inhale 2 puffs every 4 hours if needed for wheezing.      atorvastatin (Lipitor) 80 mg tablet Take 1 tablet (80 mg) by mouth once daily at bedtime. 90 tablet 3    brivaracetam (Briviact) 50 mg tablet tablet Take 1 tablet (50 mg) by mouth 2 times a day. 60 tablet 0    Briviact 50 mg tablet tablet Take 1 tablet (50 mg) by mouth 2 times a day. 180 tablet 1    busPIRone (Buspar) 15 mg tablet Take 1 tablet (15 mg) by mouth 3 times a day as needed (anger).      clopidogrel (Plavix) 75 mg tablet Take 1 tablet (75 mg) by mouth once daily.      diclofenac (Voltaren) 75 mg EC tablet Take 1 tablet (75 mg) by mouth once daily.      esomeprazole (NexIUM) 40 mg DR capsule Take 1 capsule (40 mg) by mouth once daily.      finasteride (Proscar) 5 mg tablet Take 1 tablet (5 mg) by mouth once daily at bedtime. 90 tablet 3    fluticasone (Flonase) 50 mcg/actuation nasal spray Administer 1 spray into each nostril 2 times a day.      glimepiride (Amaryl) 2 mg tablet Take 1 tablet (2 mg) by mouth once daily. 90 tablet 3    insulin glargine (Lantus) 100 unit/mL (3 mL) pen Inject 300 Units under the skin once daily at bedtime.      insulin glargine,hum.rec.anlog (LANTUS SOLOSTAR U-100 INSULIN SUBQ) Inject 16 Units under the skin once daily.      levothyroxine (Synthroid, Levoxyl) 100 mcg tablet Take 1 tablet (100 mcg) by mouth once daily in the morning. Take before meals. 90 tablet 3     "losartan (Cozaar) 25 mg tablet Take 1 tablet (25 mg) by mouth once daily. 90 tablet 3    metFORMIN (Glucophage) 500 mg tablet Take 1 tablet (500 mg) by mouth. With breakfast      pioglitazone (Actos) 15 mg tablet Take 1 tablet (15 mg) by mouth once daily. 90 tablet 3    QUEtiapine (SEROquel) 25 mg tablet Take 1 tablet (25 mg) by mouth once daily at bedtime. Take 1 to 2 Tablets by mouth at Bedtime if needed for sleep. 30 tablet 0    aspirin 81 mg chewable tablet Chew 1 tablet (81 mg) once daily.      blood sugar diagnostic (Accu-Chek Guide test strips) strip 2 times a day.      blood sugar diagnostic (FreeStyle Precision Esvin Strips) strip 2 times a day. When prompted to      blood-glucose sensor (DEXCOM G7 SENSOR MISC) 1 sensor every 10 days      Dexcom G4 platinum  (Dexcom G7 ) misc 1 time.      flash glucose sensor (FREESTYLE BUSHRA 2 SENSOR [facility] [place]) Use one sensor for 14 days for blood glucose readings      FreeStyle Bushra 2 Sensor kit Inject 1 each under the skin twice daily.      FreeStyle Bushra reader (FreeStyle Bushra 2 [place]) misc Use device to take blood glucose levels 10 times daily      guaiFENesin (Mucus Relief ER) 1,200 mg tablet extended release 12hr Take by mouth.      hydrOXYzine HCL (Atarax) 25 mg tablet Take 1 tablet (25 mg) by mouth 3 times a day as needed.      lancets (Accu-Chek Fastclix Lancet Drum) misc 2 Lancets once daily.      pen needle, diabetic 32 gauge x 5/32\" needle once daily. Use to inject lantus for diabetes mellitus      sildenafil (Viagra) 50 mg tablet Take 1 tablet (50 mg) by mouth if needed.      [DISCONTINUED] brivaracetam (Briviact) 50 mg tablet tablet Take 1 tablet (50 mg) by mouth 2 times a day.      [DISCONTINUED] Briviact 100 mg tablet tablet Take 1 tablet (100 mg) by mouth 2 times a day.      [DISCONTINUED] doxycycline (Vibramycin) 100 mg capsule Take 1 capsule (100 mg) by mouth every 12 hours.      [DISCONTINUED] metFORMIN  mg 24 hr tablet Take 1 tablet " (500 mg) by mouth once daily.      [DISCONTINUED] mupirocin (Bactroban) 2 % cream Apply 1 Application topically once daily.      [DISCONTINUED] pantoprazole (ProtoNix) 40 mg EC tablet Take 1 tablet (40 mg) by mouth every other day.      [DISCONTINUED] predniSONE (Deltasone) 20 mg tablet Take 2 tablets (40 mg) by mouth once daily for 10 days. 20 tablet 0    [DISCONTINUED] QUEtiapine (SEROquel) 25 mg tablet Take 1 tablet (25 mg) by mouth once daily at bedtime. Take 1 to 2 Tablets by mouth at Bedtime if needed for sleep.      [DISCONTINUED] traZODone (Desyrel) 50 mg tablet Take 0.5 tablets (25 mg) by mouth once daily at bedtime.       No current facility-administered medications on file prior to visit.        Allergies:   No Known Allergies    SH:    Social Determinants of Health     Tobacco Use: Medium Risk (10/12/2023)    Patient History     Smoking Tobacco Use: Former     Smokeless Tobacco Use: Never     Passive Exposure: Not on file   Alcohol Use: Not on file   Financial Resource Strain: Not on file   Food Insecurity: Not on file   Transportation Needs: Not on file   Physical Activity: Not on file   Stress: Not on file   Social Connections: Not on file   Intimate Partner Violence: Not on file   Depression: Not on file   Housing Stability: Not on file   Utilities: Not on file   Digital Equity: Not on file        FH:  Family History   Problem Relation Name Age of Onset    Arthritis Father      Hypertension Father      Diabetes Brother      Other (Stroke syndrome) Brother          ROS:  Review of Systems   Constitutional:  Negative for unexpected weight change.   HENT:  Negative for drooling, ear pain, postnasal drip, rhinorrhea, sinus pressure, sinus pain and tinnitus.    Eyes:  Negative for photophobia.   Respiratory:  Negative for shortness of breath.    Cardiovascular:  Negative for chest pain.   Gastrointestinal:  Negative for abdominal pain.   Neurological:  Positive for speech difficulty.         Objective:  Vitals:  Vitals:    10/23/23 0948   BP: 135/79   Pulse: 73   SpO2: 96%        Exam:  Gen: in NAD  Head: B temporal pulses present and non-tender.   Left frontal area above the eye brow where patient complains of pain but non-tender.   GI: Soft, ND/NT  Ext:  BUE and BLE with gross motor str intact (right less so than the left).     Lab:   10/19/2023:  Sed rate: 13  CRP: 0.12      Assessment & Plan:  Jose Guardado is 83 y.o. male with history of left frontal headache and history of stroke.    - Patient has low suspicion for GCA given his lab findings, and clinical exam/history.    - Would recommend work up for other cause for his headache.     Santy Kahn M.D.  Clinical   [facility] [employer]  Co-Director, Aortic Center  [facility] [place]

## 2023-10-26 ENCOUNTER — HOSPITAL ENCOUNTER (OUTPATIENT)
Dept: RADIOLOGY | Facility: HOSPITAL | Age: 83
Discharge: HOME | End: 2023-10-26
Payer: MEDICARE

## 2023-10-26 DIAGNOSIS — R51.9 LEFT TEMPORAL HEADACHE: ICD-10-CM

## 2023-10-26 DIAGNOSIS — R47.01 APHASIA: ICD-10-CM

## 2023-10-26 PROCEDURE — 70551 MRI BRAIN STEM W/O DYE: CPT | Performed by: STUDENT IN AN ORGANIZED HEALTH CARE EDUCATION/TRAINING PROGRAM

## 2023-10-26 PROCEDURE — 70551 MRI BRAIN STEM W/O DYE: CPT | Mod: MG

## 2023-10-31 ENCOUNTER — TELEPHONE (OUTPATIENT)
Dept: CARDIOLOGY | Facility: CLINIC | Age: 83
End: 2023-10-31
Payer: MEDICARE

## 2023-10-31 NOTE — TELEPHONE ENCOUNTER
Pt's wife called and is wondering if it is safe for pt to receive the RSV shot. Please advise. Thank you

## 2023-11-17 ENCOUNTER — HOSPITAL ENCOUNTER (OUTPATIENT)
Dept: CARDIOLOGY | Facility: HOSPITAL | Age: 83
Discharge: HOME | End: 2023-11-17
Payer: MEDICARE

## 2023-11-17 DIAGNOSIS — Z86.73 HISTORY OF TIA (TRANSIENT ISCHEMIC ATTACK): ICD-10-CM

## 2023-11-17 DIAGNOSIS — Z95.818 STATUS POST PLACEMENT OF IMPLANTABLE LOOP RECORDER: ICD-10-CM

## 2023-11-20 PROCEDURE — 93298 REM INTERROG DEV EVAL SCRMS: CPT | Performed by: INTERNAL MEDICINE

## 2023-12-15 DIAGNOSIS — F41.9 ANXIETY: ICD-10-CM

## 2023-12-15 DIAGNOSIS — R56.9 SEIZURE (MULTI): ICD-10-CM

## 2023-12-18 RX ORDER — QUETIAPINE FUMARATE 25 MG/1
25 TABLET, FILM COATED ORAL NIGHTLY
Qty: 30 TABLET | Refills: 0 | Status: SHIPPED | OUTPATIENT
Start: 2023-12-18 | End: 2024-04-04 | Stop reason: WASHOUT

## 2023-12-18 RX ORDER — BUSPIRONE HYDROCHLORIDE 15 MG/1
15 TABLET ORAL 3 TIMES DAILY PRN
Qty: 90 TABLET | Refills: 1 | Status: SHIPPED | OUTPATIENT
Start: 2023-12-18 | End: 2024-01-22 | Stop reason: SDUPTHER

## 2023-12-18 NOTE — TELEPHONE ENCOUNTER
Pt is requesting a refill of Seroquel but it looks as if someone else prescribed this medication for pt in November. Please Advise

## 2023-12-22 ENCOUNTER — HOSPITAL ENCOUNTER (OUTPATIENT)
Dept: CARDIOLOGY | Facility: HOSPITAL | Age: 83
Discharge: HOME | End: 2023-12-22
Payer: MEDICARE

## 2023-12-22 DIAGNOSIS — Z86.73 HISTORY OF TIA (TRANSIENT ISCHEMIC ATTACK): ICD-10-CM

## 2023-12-22 DIAGNOSIS — Z95.818 STATUS POST PLACEMENT OF IMPLANTABLE LOOP RECORDER: ICD-10-CM

## 2024-01-11 ENCOUNTER — OFFICE VISIT (OUTPATIENT)
Dept: PRIMARY CARE | Facility: CLINIC | Age: 84
End: 2024-01-11
Payer: MEDICARE

## 2024-01-11 VITALS
BODY MASS INDEX: 25.46 KG/M2 | WEIGHT: 168 LBS | SYSTOLIC BLOOD PRESSURE: 110 MMHG | HEART RATE: 77 BPM | HEIGHT: 68 IN | DIASTOLIC BLOOD PRESSURE: 58 MMHG | TEMPERATURE: 98 F

## 2024-01-11 DIAGNOSIS — E78.5 HYPERLIPIDEMIA, UNSPECIFIED HYPERLIPIDEMIA TYPE: ICD-10-CM

## 2024-01-11 DIAGNOSIS — E11.9 TYPE 2 DIABETES MELLITUS WITHOUT COMPLICATION, WITHOUT LONG-TERM CURRENT USE OF INSULIN (MULTI): Primary | ICD-10-CM

## 2024-01-11 DIAGNOSIS — F41.9 ANXIETY: ICD-10-CM

## 2024-01-11 DIAGNOSIS — I10 PRIMARY HYPERTENSION: ICD-10-CM

## 2024-01-11 PROCEDURE — 1160F RVW MEDS BY RX/DR IN RCRD: CPT | Performed by: FAMILY MEDICINE

## 2024-01-11 PROCEDURE — 99214 OFFICE O/P EST MOD 30 MIN: CPT | Performed by: FAMILY MEDICINE

## 2024-01-11 PROCEDURE — 1126F AMNT PAIN NOTED NONE PRSNT: CPT | Performed by: FAMILY MEDICINE

## 2024-01-11 PROCEDURE — 1159F MED LIST DOCD IN RCRD: CPT | Performed by: FAMILY MEDICINE

## 2024-01-11 PROCEDURE — 1036F TOBACCO NON-USER: CPT | Performed by: FAMILY MEDICINE

## 2024-01-11 PROCEDURE — 3074F SYST BP LT 130 MM HG: CPT | Performed by: FAMILY MEDICINE

## 2024-01-11 PROCEDURE — 3078F DIAST BP <80 MM HG: CPT | Performed by: FAMILY MEDICINE

## 2024-01-22 DIAGNOSIS — F41.9 ANXIETY: ICD-10-CM

## 2024-01-22 RX ORDER — BUSPIRONE HYDROCHLORIDE 15 MG/1
15 TABLET ORAL 3 TIMES DAILY PRN
Qty: 90 TABLET | Refills: 5 | Status: SHIPPED | OUTPATIENT
Start: 2024-01-22 | End: 2024-05-07 | Stop reason: WASHOUT

## 2024-01-26 ENCOUNTER — HOSPITAL ENCOUNTER (OUTPATIENT)
Dept: CARDIOLOGY | Facility: HOSPITAL | Age: 84
Discharge: HOME | End: 2024-01-26
Payer: MEDICARE

## 2024-01-26 DIAGNOSIS — Z95.818 STATUS POST PLACEMENT OF IMPLANTABLE LOOP RECORDER: ICD-10-CM

## 2024-01-26 DIAGNOSIS — Z86.73 HISTORY OF TIA (TRANSIENT ISCHEMIC ATTACK): ICD-10-CM

## 2024-01-26 PROCEDURE — 93298 REM INTERROG DEV EVAL SCRMS: CPT | Performed by: INTERNAL MEDICINE

## 2024-01-26 PROCEDURE — 93297 REM INTERROG DEV EVAL ICPMS: CPT

## 2024-01-30 ENCOUNTER — TELEPHONE (OUTPATIENT)
Dept: NEUROLOGY | Facility: CLINIC | Age: 84
End: 2024-01-30
Payer: MEDICARE

## 2024-02-01 RX ORDER — METFORMIN HYDROCHLORIDE 500 MG/1
500 TABLET ORAL
Qty: 180 TABLET | Refills: 3 | Status: SHIPPED | OUTPATIENT
Start: 2024-02-01 | End: 2024-04-17 | Stop reason: SDUPTHER

## 2024-02-02 ENCOUNTER — APPOINTMENT (OUTPATIENT)
Dept: CARDIOLOGY | Facility: HOSPITAL | Age: 84
End: 2024-02-02
Payer: MEDICARE

## 2024-02-02 ENCOUNTER — APPOINTMENT (OUTPATIENT)
Dept: RADIOLOGY | Facility: HOSPITAL | Age: 84
End: 2024-02-02
Payer: MEDICARE

## 2024-02-02 ENCOUNTER — TELEPHONE (OUTPATIENT)
Dept: PRIMARY CARE | Facility: CLINIC | Age: 84
End: 2024-02-02

## 2024-02-02 ENCOUNTER — HOSPITAL ENCOUNTER (OUTPATIENT)
Facility: HOSPITAL | Age: 84
Setting detail: OBSERVATION
Discharge: HOME | End: 2024-02-03
Attending: STUDENT IN AN ORGANIZED HEALTH CARE EDUCATION/TRAINING PROGRAM | Admitting: STUDENT IN AN ORGANIZED HEALTH CARE EDUCATION/TRAINING PROGRAM
Payer: MEDICARE

## 2024-02-02 DIAGNOSIS — R07.81 RIB PAIN: ICD-10-CM

## 2024-02-02 DIAGNOSIS — R07.9 CHEST PAIN, UNSPECIFIED TYPE: Primary | ICD-10-CM

## 2024-02-02 PROBLEM — R07.89 OTHER CHEST PAIN: Status: ACTIVE | Noted: 2024-02-02

## 2024-02-02 LAB
ALBUMIN SERPL BCP-MCNC: 4.1 G/DL (ref 3.4–5)
ALP SERPL-CCNC: 66 U/L (ref 33–136)
ALT SERPL W P-5'-P-CCNC: 12 U/L (ref 10–52)
ANION GAP SERPL CALC-SCNC: 11 MMOL/L (ref 10–20)
AST SERPL W P-5'-P-CCNC: 19 U/L (ref 9–39)
BASOPHILS # BLD AUTO: 0.01 X10*3/UL (ref 0–0.1)
BASOPHILS NFR BLD AUTO: 0.2 %
BILIRUB SERPL-MCNC: 0.7 MG/DL (ref 0–1.2)
BUN SERPL-MCNC: 17 MG/DL (ref 6–23)
CALCIUM SERPL-MCNC: 9.2 MG/DL (ref 8.6–10.3)
CARDIAC TROPONIN I PNL SERPL HS: 4 NG/L (ref 0–20)
CARDIAC TROPONIN I PNL SERPL HS: 4 NG/L (ref 0–20)
CHLORIDE SERPL-SCNC: 101 MMOL/L (ref 98–107)
CO2 SERPL-SCNC: 30 MMOL/L (ref 21–32)
CREAT SERPL-MCNC: 0.93 MG/DL (ref 0.5–1.3)
D DIMER PPP FEU-MCNC: 460 NG/ML FEU
EGFRCR SERPLBLD CKD-EPI 2021: 81 ML/MIN/1.73M*2
EOSINOPHIL # BLD AUTO: 0.33 X10*3/UL (ref 0–0.4)
EOSINOPHIL NFR BLD AUTO: 8.1 %
ERYTHROCYTE [DISTWIDTH] IN BLOOD BY AUTOMATED COUNT: 14.2 % (ref 11.5–14.5)
GLUCOSE BLD MANUAL STRIP-MCNC: 150 MG/DL (ref 74–99)
GLUCOSE BLD MANUAL STRIP-MCNC: 152 MG/DL (ref 74–99)
GLUCOSE SERPL-MCNC: 169 MG/DL (ref 74–99)
HCT VFR BLD AUTO: 41.3 % (ref 41–52)
HGB BLD-MCNC: 13.2 G/DL (ref 13.5–17.5)
IMM GRANULOCYTES # BLD AUTO: 0.01 X10*3/UL (ref 0–0.5)
IMM GRANULOCYTES NFR BLD AUTO: 0.2 % (ref 0–0.9)
LYMPHOCYTES # BLD AUTO: 1.22 X10*3/UL (ref 0.8–3)
LYMPHOCYTES NFR BLD AUTO: 30 %
MAGNESIUM SERPL-MCNC: 1.63 MG/DL (ref 1.6–2.4)
MCH RBC QN AUTO: 29.3 PG (ref 26–34)
MCHC RBC AUTO-ENTMCNC: 32 G/DL (ref 32–36)
MCV RBC AUTO: 92 FL (ref 80–100)
MONOCYTES # BLD AUTO: 0.27 X10*3/UL (ref 0.05–0.8)
MONOCYTES NFR BLD AUTO: 6.7 %
NEUTROPHILS # BLD AUTO: 2.22 X10*3/UL (ref 1.6–5.5)
NEUTROPHILS NFR BLD AUTO: 54.8 %
NRBC BLD-RTO: 0 /100 WBCS (ref 0–0)
PLATELET # BLD AUTO: 189 X10*3/UL (ref 150–450)
POTASSIUM SERPL-SCNC: 4.2 MMOL/L (ref 3.5–5.3)
PROT SERPL-MCNC: 6.6 G/DL (ref 6.4–8.2)
RBC # BLD AUTO: 4.5 X10*6/UL (ref 4.5–5.9)
SODIUM SERPL-SCNC: 138 MMOL/L (ref 136–145)
WBC # BLD AUTO: 4.1 X10*3/UL (ref 4.4–11.3)

## 2024-02-02 PROCEDURE — 85025 COMPLETE CBC W/AUTO DIFF WBC: CPT | Performed by: STUDENT IN AN ORGANIZED HEALTH CARE EDUCATION/TRAINING PROGRAM

## 2024-02-02 PROCEDURE — 99285 EMERGENCY DEPT VISIT HI MDM: CPT | Performed by: STUDENT IN AN ORGANIZED HEALTH CARE EDUCATION/TRAINING PROGRAM

## 2024-02-02 PROCEDURE — 2500000004 HC RX 250 GENERAL PHARMACY W/ HCPCS (ALT 636 FOR OP/ED): Performed by: NURSE PRACTITIONER

## 2024-02-02 PROCEDURE — 74150 CT ABDOMEN W/O CONTRAST: CPT

## 2024-02-02 PROCEDURE — 2500000005 HC RX 250 GENERAL PHARMACY W/O HCPCS: Performed by: NURSE PRACTITIONER

## 2024-02-02 PROCEDURE — 2500000004 HC RX 250 GENERAL PHARMACY W/ HCPCS (ALT 636 FOR OP/ED): Performed by: STUDENT IN AN ORGANIZED HEALTH CARE EDUCATION/TRAINING PROGRAM

## 2024-02-02 PROCEDURE — 2500000001 HC RX 250 WO HCPCS SELF ADMINISTERED DRUGS (ALT 637 FOR MEDICARE OP): Performed by: STUDENT IN AN ORGANIZED HEALTH CARE EDUCATION/TRAINING PROGRAM

## 2024-02-02 PROCEDURE — G0378 HOSPITAL OBSERVATION PER HR: HCPCS

## 2024-02-02 PROCEDURE — 2500000002 HC RX 250 W HCPCS SELF ADMINISTERED DRUGS (ALT 637 FOR MEDICARE OP, ALT 636 FOR OP/ED): Performed by: STUDENT IN AN ORGANIZED HEALTH CARE EDUCATION/TRAINING PROGRAM

## 2024-02-02 PROCEDURE — 36415 COLL VENOUS BLD VENIPUNCTURE: CPT | Performed by: STUDENT IN AN ORGANIZED HEALTH CARE EDUCATION/TRAINING PROGRAM

## 2024-02-02 PROCEDURE — 82947 ASSAY GLUCOSE BLOOD QUANT: CPT

## 2024-02-02 PROCEDURE — 84484 ASSAY OF TROPONIN QUANT: CPT | Performed by: STUDENT IN AN ORGANIZED HEALTH CARE EDUCATION/TRAINING PROGRAM

## 2024-02-02 PROCEDURE — 71046 X-RAY EXAM CHEST 2 VIEWS: CPT | Mod: FR

## 2024-02-02 PROCEDURE — 83735 ASSAY OF MAGNESIUM: CPT | Performed by: STUDENT IN AN ORGANIZED HEALTH CARE EDUCATION/TRAINING PROGRAM

## 2024-02-02 PROCEDURE — 93005 ELECTROCARDIOGRAM TRACING: CPT

## 2024-02-02 PROCEDURE — 2500000001 HC RX 250 WO HCPCS SELF ADMINISTERED DRUGS (ALT 637 FOR MEDICARE OP): Performed by: INTERNAL MEDICINE

## 2024-02-02 PROCEDURE — 71046 X-RAY EXAM CHEST 2 VIEWS: CPT | Mod: FOREIGN READ | Performed by: RADIOLOGY

## 2024-02-02 PROCEDURE — 2500000001 HC RX 250 WO HCPCS SELF ADMINISTERED DRUGS (ALT 637 FOR MEDICARE OP)

## 2024-02-02 PROCEDURE — 84075 ASSAY ALKALINE PHOSPHATASE: CPT | Performed by: STUDENT IN AN ORGANIZED HEALTH CARE EDUCATION/TRAINING PROGRAM

## 2024-02-02 PROCEDURE — 36415 COLL VENOUS BLD VENIPUNCTURE: CPT

## 2024-02-02 PROCEDURE — 85379 FIBRIN DEGRADATION QUANT: CPT

## 2024-02-02 PROCEDURE — 74150 CT ABDOMEN W/O CONTRAST: CPT | Mod: FOREIGN READ | Performed by: RADIOLOGY

## 2024-02-02 PROCEDURE — 99223 1ST HOSP IP/OBS HIGH 75: CPT | Performed by: NURSE PRACTITIONER

## 2024-02-02 RX ORDER — LOSARTAN POTASSIUM 25 MG/1
25 TABLET ORAL DAILY
Status: DISCONTINUED | OUTPATIENT
Start: 2024-02-02 | End: 2024-02-03 | Stop reason: HOSPADM

## 2024-02-02 RX ORDER — INSULIN GLARGINE 100 [IU]/ML
12 INJECTION, SOLUTION SUBCUTANEOUS NIGHTLY
Status: DISCONTINUED | OUTPATIENT
Start: 2024-02-02 | End: 2024-02-03 | Stop reason: HOSPADM

## 2024-02-02 RX ORDER — FLUTICASONE PROPIONATE 50 MCG
1 SPRAY, SUSPENSION (ML) NASAL 2 TIMES DAILY PRN
Status: DISCONTINUED | OUTPATIENT
Start: 2024-02-02 | End: 2024-02-03 | Stop reason: HOSPADM

## 2024-02-02 RX ORDER — POLYMYXIN B SULFATE AND TRIMETHOPRIM 1; 10000 MG/ML; [USP'U]/ML
1 SOLUTION OPHTHALMIC EVERY 6 HOURS SCHEDULED
Status: DISCONTINUED | OUTPATIENT
Start: 2024-02-02 | End: 2024-02-03 | Stop reason: HOSPADM

## 2024-02-02 RX ORDER — LEVOTHYROXINE SODIUM 100 UG/1
100 TABLET ORAL
Status: DISCONTINUED | OUTPATIENT
Start: 2024-02-03 | End: 2024-02-03 | Stop reason: HOSPADM

## 2024-02-02 RX ORDER — FINASTERIDE 5 MG/1
5 TABLET, FILM COATED ORAL NIGHTLY
Status: DISCONTINUED | OUTPATIENT
Start: 2024-02-02 | End: 2024-02-03 | Stop reason: HOSPADM

## 2024-02-02 RX ORDER — NAPROXEN SODIUM 220 MG/1
81 TABLET, FILM COATED ORAL DAILY
Status: DISCONTINUED | OUTPATIENT
Start: 2024-02-03 | End: 2024-02-02

## 2024-02-02 RX ORDER — DEXTROSE MONOHYDRATE 100 MG/ML
0.3 INJECTION, SOLUTION INTRAVENOUS ONCE AS NEEDED
Status: DISCONTINUED | OUTPATIENT
Start: 2024-02-02 | End: 2024-02-03 | Stop reason: HOSPADM

## 2024-02-02 RX ORDER — ONDANSETRON HYDROCHLORIDE 2 MG/ML
4 INJECTION, SOLUTION INTRAVENOUS EVERY 8 HOURS PRN
Status: DISCONTINUED | OUTPATIENT
Start: 2024-02-02 | End: 2024-02-03 | Stop reason: HOSPADM

## 2024-02-02 RX ORDER — PANTOPRAZOLE SODIUM 40 MG/1
40 TABLET, DELAYED RELEASE ORAL EVERY OTHER DAY
Status: DISCONTINUED | OUTPATIENT
Start: 2024-02-03 | End: 2024-02-03 | Stop reason: HOSPADM

## 2024-02-02 RX ORDER — INSULIN GLARGINE 100 [IU]/ML
10-12 INJECTION, SOLUTION SUBCUTANEOUS NIGHTLY PRN
COMMUNITY
End: 2024-04-04 | Stop reason: SDUPTHER

## 2024-02-02 RX ORDER — ATORVASTATIN CALCIUM 40 MG/1
40 TABLET, FILM COATED ORAL NIGHTLY
Status: DISCONTINUED | OUTPATIENT
Start: 2024-02-02 | End: 2024-02-02

## 2024-02-02 RX ORDER — CLOPIDOGREL BISULFATE 75 MG/1
75 TABLET ORAL DAILY
Status: DISCONTINUED | OUTPATIENT
Start: 2024-02-02 | End: 2024-02-03 | Stop reason: HOSPADM

## 2024-02-02 RX ORDER — DEXTROSE 50 % IN WATER (D50W) INTRAVENOUS SYRINGE
25
Status: DISCONTINUED | OUTPATIENT
Start: 2024-02-02 | End: 2024-02-03 | Stop reason: HOSPADM

## 2024-02-02 RX ORDER — INSULIN LISPRO 100 [IU]/ML
0-10 INJECTION, SOLUTION INTRAVENOUS; SUBCUTANEOUS
Status: DISCONTINUED | OUTPATIENT
Start: 2024-02-02 | End: 2024-02-03 | Stop reason: HOSPADM

## 2024-02-02 RX ORDER — HEPARIN SODIUM 5000 [USP'U]/ML
5000 INJECTION, SOLUTION INTRAVENOUS; SUBCUTANEOUS EVERY 8 HOURS SCHEDULED
Status: DISCONTINUED | OUTPATIENT
Start: 2024-02-02 | End: 2024-02-03 | Stop reason: HOSPADM

## 2024-02-02 RX ORDER — ALBUTEROL SULFATE 90 UG/1
2 AEROSOL, METERED RESPIRATORY (INHALATION) EVERY 4 HOURS PRN
Status: DISCONTINUED | OUTPATIENT
Start: 2024-02-02 | End: 2024-02-02

## 2024-02-02 RX ORDER — ATORVASTATIN CALCIUM 80 MG/1
80 TABLET, FILM COATED ORAL NIGHTLY
Status: DISCONTINUED | OUTPATIENT
Start: 2024-02-02 | End: 2024-02-03 | Stop reason: HOSPADM

## 2024-02-02 RX ORDER — ASPIRIN 325 MG
325 TABLET ORAL ONCE
Status: COMPLETED | OUTPATIENT
Start: 2024-02-02 | End: 2024-02-02

## 2024-02-02 RX ORDER — POLYETHYLENE GLYCOL 3350 17 G/17G
17 POWDER, FOR SOLUTION ORAL DAILY PRN
Status: DISCONTINUED | OUTPATIENT
Start: 2024-02-02 | End: 2024-02-03 | Stop reason: HOSPADM

## 2024-02-02 RX ORDER — ACETAMINOPHEN 160 MG/5ML
650 SOLUTION ORAL EVERY 4 HOURS PRN
Status: DISCONTINUED | OUTPATIENT
Start: 2024-02-02 | End: 2024-02-03 | Stop reason: HOSPADM

## 2024-02-02 RX ORDER — ACETAMINOPHEN 650 MG/1
650 SUPPOSITORY RECTAL EVERY 4 HOURS PRN
Status: DISCONTINUED | OUTPATIENT
Start: 2024-02-02 | End: 2024-02-03 | Stop reason: HOSPADM

## 2024-02-02 RX ORDER — ACETAMINOPHEN 325 MG/1
650 TABLET ORAL EVERY 4 HOURS PRN
Status: DISCONTINUED | OUTPATIENT
Start: 2024-02-02 | End: 2024-02-03 | Stop reason: HOSPADM

## 2024-02-02 RX ORDER — ALBUTEROL SULFATE 0.83 MG/ML
2.5 SOLUTION RESPIRATORY (INHALATION) EVERY 4 HOURS PRN
Status: DISCONTINUED | OUTPATIENT
Start: 2024-02-02 | End: 2024-02-03 | Stop reason: HOSPADM

## 2024-02-02 RX ORDER — LIDOCAINE 560 MG/1
1 PATCH PERCUTANEOUS; TOPICAL; TRANSDERMAL DAILY
Status: DISCONTINUED | OUTPATIENT
Start: 2024-02-02 | End: 2024-02-03 | Stop reason: HOSPADM

## 2024-02-02 RX ORDER — QUETIAPINE FUMARATE 25 MG/1
25 TABLET, FILM COATED ORAL NIGHTLY
Status: DISCONTINUED | OUTPATIENT
Start: 2024-02-02 | End: 2024-02-03 | Stop reason: HOSPADM

## 2024-02-02 RX ORDER — TAMSULOSIN HYDROCHLORIDE 0.4 MG/1
0.4 CAPSULE ORAL DAILY
COMMUNITY
End: 2024-03-18 | Stop reason: ENTERED-IN-ERROR

## 2024-02-02 RX ORDER — BUSPIRONE HYDROCHLORIDE 15 MG/1
7.5 TABLET ORAL 2 TIMES DAILY
Status: DISCONTINUED | OUTPATIENT
Start: 2024-02-02 | End: 2024-02-03 | Stop reason: HOSPADM

## 2024-02-02 RX ORDER — ONDANSETRON 4 MG/1
4 TABLET, ORALLY DISINTEGRATING ORAL EVERY 8 HOURS PRN
Status: DISCONTINUED | OUTPATIENT
Start: 2024-02-02 | End: 2024-02-03 | Stop reason: HOSPADM

## 2024-02-02 RX ORDER — TAMSULOSIN HYDROCHLORIDE 0.4 MG/1
0.4 CAPSULE ORAL DAILY
Status: DISCONTINUED | OUTPATIENT
Start: 2024-02-02 | End: 2024-02-03 | Stop reason: HOSPADM

## 2024-02-02 RX ADMIN — HEPARIN SODIUM 5000 UNITS: 5000 INJECTION INTRAVENOUS; SUBCUTANEOUS at 21:40

## 2024-02-02 RX ADMIN — HEPARIN SODIUM 5000 UNITS: 5000 INJECTION INTRAVENOUS; SUBCUTANEOUS at 15:30

## 2024-02-02 RX ADMIN — INSULIN GLARGINE 12 UNITS: 100 INJECTION, SOLUTION SUBCUTANEOUS at 20:27

## 2024-02-02 RX ADMIN — POLYMYXIN B SULFATE AND TRIMETHOPRIM 1 DROP: 10000; 1 SOLUTION OPHTHALMIC at 22:27

## 2024-02-02 RX ADMIN — QUETIAPINE FUMARATE 25 MG: 25 TABLET ORAL at 20:22

## 2024-02-02 RX ADMIN — TAMSULOSIN HYDROCHLORIDE 0.4 MG: 0.4 CAPSULE ORAL at 15:47

## 2024-02-02 RX ADMIN — LIDOCAINE 1 PATCH: 4 PATCH TOPICAL at 15:31

## 2024-02-02 RX ADMIN — ASPIRIN 325 MG ORAL TABLET 325 MG: 325 PILL ORAL at 15:31

## 2024-02-02 RX ADMIN — FINASTERIDE 5 MG: 5 TABLET, FILM COATED ORAL at 20:22

## 2024-02-02 RX ADMIN — CLOPIDOGREL BISULFATE 75 MG: 75 TABLET ORAL at 17:06

## 2024-02-02 RX ADMIN — BUSPIRONE HYDROCHLORIDE 7.5 MG: 15 TABLET ORAL at 20:21

## 2024-02-02 RX ADMIN — ATORVASTATIN CALCIUM 80 MG: 80 TABLET, FILM COATED ORAL at 20:21

## 2024-02-02 RX ADMIN — LOSARTAN POTASSIUM 25 MG: 25 TABLET, FILM COATED ORAL at 15:47

## 2024-02-02 RX ADMIN — POLYETHYLENE GLYCOL 3350 17 G: 17 POWDER, FOR SOLUTION ORAL at 15:47

## 2024-02-02 SDOH — SOCIAL STABILITY: SOCIAL INSECURITY: HAVE YOU HAD THOUGHTS OF HARMING ANYONE ELSE?: NO

## 2024-02-02 SDOH — SOCIAL STABILITY: SOCIAL INSECURITY: DO YOU FEEL UNSAFE GOING BACK TO THE PLACE WHERE YOU ARE LIVING?: NO

## 2024-02-02 SDOH — SOCIAL STABILITY: SOCIAL INSECURITY: DOES ANYONE TRY TO KEEP YOU FROM HAVING/CONTACTING OTHER FRIENDS OR DOING THINGS OUTSIDE YOUR HOME?: NO

## 2024-02-02 SDOH — SOCIAL STABILITY: SOCIAL INSECURITY: DO YOU FEEL ANYONE HAS EXPLOITED OR TAKEN ADVANTAGE OF YOU FINANCIALLY OR OF YOUR PERSONAL PROPERTY?: NO

## 2024-02-02 SDOH — SOCIAL STABILITY: SOCIAL INSECURITY: ABUSE: ADULT

## 2024-02-02 SDOH — SOCIAL STABILITY: SOCIAL INSECURITY: ARE YOU OR HAVE YOU BEEN THREATENED OR ABUSED PHYSICALLY, EMOTIONALLY, OR SEXUALLY BY ANYONE?: NO

## 2024-02-02 SDOH — SOCIAL STABILITY: SOCIAL INSECURITY: ARE THERE ANY APPARENT SIGNS OF INJURIES/BEHAVIORS THAT COULD BE RELATED TO ABUSE/NEGLECT?: NO

## 2024-02-02 SDOH — SOCIAL STABILITY: SOCIAL INSECURITY: HAS ANYONE EVER THREATENED TO HURT YOUR FAMILY OR YOUR PETS?: NO

## 2024-02-02 SDOH — SOCIAL STABILITY: SOCIAL INSECURITY: WERE YOU ABLE TO COMPLETE ALL THE BEHAVIORAL HEALTH SCREENINGS?: YES

## 2024-02-02 ASSESSMENT — COGNITIVE AND FUNCTIONAL STATUS - GENERAL
HELP NEEDED FOR BATHING: A LITTLE
TURNING FROM BACK TO SIDE WHILE IN FLAT BAD: A LITTLE
DAILY ACTIVITIY SCORE: 20
HELP NEEDED FOR BATHING: A LITTLE
DRESSING REGULAR LOWER BODY CLOTHING: A LITTLE
CLIMB 3 TO 5 STEPS WITH RAILING: A LITTLE
TOILETING: A LITTLE
MOVING TO AND FROM BED TO CHAIR: A LITTLE
MOVING TO AND FROM BED TO CHAIR: A LITTLE
TURNING FROM BACK TO SIDE WHILE IN FLAT BAD: A LITTLE
DRESSING REGULAR UPPER BODY CLOTHING: A LITTLE
DRESSING REGULAR LOWER BODY CLOTHING: A LITTLE
PATIENT BASELINE BEDBOUND: NO
TOILETING: A LITTLE
DAILY ACTIVITIY SCORE: 20
DRESSING REGULAR UPPER BODY CLOTHING: A LITTLE
WALKING IN HOSPITAL ROOM: A LITTLE
MOBILITY SCORE: 18
STANDING UP FROM CHAIR USING ARMS: A LITTLE
MOVING FROM LYING ON BACK TO SITTING ON SIDE OF FLAT BED WITH BEDRAILS: A LITTLE
WALKING IN HOSPITAL ROOM: A LITTLE
MOVING FROM LYING ON BACK TO SITTING ON SIDE OF FLAT BED WITH BEDRAILS: A LITTLE
CLIMB 3 TO 5 STEPS WITH RAILING: A LITTLE
STANDING UP FROM CHAIR USING ARMS: A LITTLE
MOBILITY SCORE: 18

## 2024-02-02 ASSESSMENT — PAIN DESCRIPTION - ORIENTATION: ORIENTATION: LEFT

## 2024-02-02 ASSESSMENT — LIFESTYLE VARIABLES
EVER FELT BAD OR GUILTY ABOUT YOUR DRINKING: NO
AUDIT-C TOTAL SCORE: 1
PRESCIPTION_ABUSE_PAST_12_MONTHS: NO
HOW OFTEN DO YOU HAVE A DRINK CONTAINING ALCOHOL: MONTHLY OR LESS
HOW MANY STANDARD DRINKS CONTAINING ALCOHOL DO YOU HAVE ON A TYPICAL DAY: 1 OR 2
SKIP TO QUESTIONS 9-10: 1
HAVE YOU EVER FELT YOU SHOULD CUT DOWN ON YOUR DRINKING: NO
HOW OFTEN DO YOU HAVE 6 OR MORE DRINKS ON ONE OCCASION: NEVER
SUBSTANCE_ABUSE_PAST_12_MONTHS: NO
AUDIT-C TOTAL SCORE: 1
HAVE PEOPLE ANNOYED YOU BY CRITICIZING YOUR DRINKING: NO
EVER HAD A DRINK FIRST THING IN THE MORNING TO STEADY YOUR NERVES TO GET RID OF A HANGOVER: NO

## 2024-02-02 ASSESSMENT — ACTIVITIES OF DAILY LIVING (ADL)
HEARING - LEFT EAR: FUNCTIONAL
WALKS IN HOME: INDEPENDENT
TOILETING: NEEDS ASSISTANCE
JUDGMENT_ADEQUATE_SAFELY_COMPLETE_DAILY_ACTIVITIES: YES
FEEDING YOURSELF: INDEPENDENT
DRESSING YOURSELF: NEEDS ASSISTANCE
GROOMING: INDEPENDENT
LACK_OF_TRANSPORTATION: NO
HEARING - RIGHT EAR: FUNCTIONAL
ADEQUATE_TO_COMPLETE_ADL: YES
BATHING: NEEDS ASSISTANCE
PATIENT'S MEMORY ADEQUATE TO SAFELY COMPLETE DAILY ACTIVITIES?: YES

## 2024-02-02 ASSESSMENT — COLUMBIA-SUICIDE SEVERITY RATING SCALE - C-SSRS
6. HAVE YOU EVER DONE ANYTHING, STARTED TO DO ANYTHING, OR PREPARED TO DO ANYTHING TO END YOUR LIFE?: NO
2. HAVE YOU ACTUALLY HAD ANY THOUGHTS OF KILLING YOURSELF?: NO
1. IN THE PAST MONTH, HAVE YOU WISHED YOU WERE DEAD OR WISHED YOU COULD GO TO SLEEP AND NOT WAKE UP?: NO

## 2024-02-02 ASSESSMENT — PAIN SCALES - GENERAL
PAINLEVEL_OUTOF10: 8
PAINLEVEL_OUTOF10: 0 - NO PAIN
PAINLEVEL_OUTOF10: 0 - NO PAIN
PAINLEVEL_OUTOF10: 8

## 2024-02-02 ASSESSMENT — PAIN DESCRIPTION - LOCATION: LOCATION: RIB CAGE

## 2024-02-02 ASSESSMENT — PAIN - FUNCTIONAL ASSESSMENT
PAIN_FUNCTIONAL_ASSESSMENT: 0-10

## 2024-02-02 ASSESSMENT — PATIENT HEALTH QUESTIONNAIRE - PHQ9
SUM OF ALL RESPONSES TO PHQ9 QUESTIONS 1 & 2: 0
2. FEELING DOWN, DEPRESSED OR HOPELESS: NOT AT ALL
1. LITTLE INTEREST OR PLEASURE IN DOING THINGS: NOT AT ALL

## 2024-02-02 NOTE — ED PROVIDER NOTES
EMERGENCY DEPARTMENT ENCOUNTER      Pt Name: Jose Guardado  MRN: [id number]  Birthdate 1940  Date of evaluation: 2/2/2024  Provider: Stefano iHckman DO    CHIEF COMPLAINT       Chief Complaint   Patient presents with    Chest Pain     Pain in Rib, Non-traumatic radiating to chest         HISTORY OF PRESENT ILLNESS    83-year-old male with PMH CVA in November 2022 with residual expressive aphasia, seizure disorder, hypertension, hyperlipidemia,  hypothyroidism, asthma, insulin-dependent diabetes mellitus, GERD, BPH, history of implantable loop recorder after the stroke presenting with left sided rib pain that radiates into his chest.  He says that this pain started approximately an hour ago no identifiable trigger.  Says that he has had this pain before and it typically goes away on its own and is not sure what is causing it.  He denies any trauma to the area, dyspneic chest pain, shortness of breath, nausea, vomiting, diarrhea, constipation, abdominal pain.  Says that he has never had a heart attack but has had several strokes in the past.      History provided by:  Patient   used: No        Nursing Notes were reviewed.    PAST MEDICAL HISTORY     Past Medical History:   Diagnosis Date    Dizziness and giddiness 12/21/2021    Episodic lightheadedness    Other chest pain 12/21/2021    Atypical chest pain    Other chest pain 12/21/2021    Chest pain, musculoskeletal    Personal history of other diseases of male genital organs 11/30/2021    History of erectile dysfunction    Personal history of other specified conditions 05/14/2018    History of urinary incontinence         SURGICAL HISTORY       Past Surgical History:   Procedure Laterality Date    MR HEAD ANGIO WO IV CONTRAST  2/24/2021    MR HEAD ANGIO WO IV CONTRAST 2/24/2021 [facility]    MR HEAD ANGIO WO IV CONTRAST  4/24/2021    MR HEAD ANGIO WO IV CONTRAST 4/24/2021 [facility]    MR HEAD ANGIO WO IV CONTRAST  11/18/2022     MR HEAD ANGIO WO IV CONTRAST 11/18/2022 [facility]    MR HEAD ANGIO WO IV CONTRAST  3/22/2023    MR HEAD ANGIO WO IV CONTRAST [facility]    MR NECK ANGIO WO IV CONTRAST  2/24/2021    MR NECK ANGIO WO IV CONTRAST 2/24/2021 [facility]    MR NECK ANGIO WO IV CONTRAST  4/24/2021    MR NECK ANGIO WO IV CONTRAST 4/24/2021 [facility]    MR NECK ANGIO WO IV CONTRAST  11/18/2022    MR NECK ANGIO WO IV CONTRAST 11/18/2022 [facility]    MR NECK ANGIO WO IV CONTRAST  3/22/2023    MR NECK ANGIO WO IV CONTRAST [facility]    OTHER SURGICAL HISTORY  05/14/2018    Incision Of Mastoid    OTHER SURGICAL HISTORY  11/30/2021    Complete colonoscopy    OTHER SURGICAL HISTORY  11/30/2021    Esophagogastroduodenoscopy    OTHER SURGICAL HISTORY  11/30/2021    Mastoidectomy    OTHER SURGICAL HISTORY  11/30/2021    Renal lithotripsy    OTHER SURGICAL HISTORY  11/30/2021    Tonsillectomy with adenoidectomy    TONSILLECTOMY  05/14/2018    Tonsillectomy         CURRENT MEDICATIONS       Discharge Medication List as of 2/3/2024 11:48 AM        CONTINUE these medications which have NOT CHANGED    Details   albuterol 90 mcg/actuation inhaler Inhale 2 puffs every 4 hours if needed for wheezing., Starting Fri 6/30/2023, Historical Med      atorvastatin (Lipitor) 80 mg tablet Take 1 tablet (80 mg) by mouth once daily at bedtime., Starting Mon 10/9/2023, Until Tue 10/8/2024, Normal      Briviact 50 mg tablet tablet Take 1 tablet (50 mg) by mouth 2 times a day., Starting Thu 10/19/2023, Until Tue 4/16/2024, Normal      busPIRone (Buspar) 15 mg tablet Take 1 tablet (15 mg) by mouth 3 times a day as needed (anger)., Starting Mon 1/22/2024, Until Tue 1/21/2025 at 2359, Normal      clopidogrel (Plavix) 75 mg tablet Take 1 tablet (75 mg) by mouth once daily., Historical Med      finasteride (Proscar) 5 mg tablet Take 1 tablet (5 mg) by mouth once daily at bedtime., Starting Mon 10/9/2023, Until Tue 10/8/2024, Normal       fluticasone (Flonase) 50 mcg/actuation nasal spray Administer 1 spray into each nostril 2 times a day as needed., Historical Med      glimepiride (Amaryl) 2 mg tablet Take 1 tablet (2 mg) by mouth once daily., Starting Mon 10/9/2023, Until Tue 10/8/2024, Normal      insulin glargine (Lantus U-100 Insulin) 100 unit/mL injection Inject 12 Units under the skin as needed at bedtime. Take as directed per insulin instructions., Historical Med      levothyroxine (Synthroid, Levoxyl) 100 mcg tablet Take 1 tablet (100 mcg) by mouth once daily in the morning. Take before meals., Starting Mon 10/9/2023, Until Tue 10/8/2024, Normal      losartan (Cozaar) 25 mg tablet Take 1 tablet (25 mg) by mouth once daily., Starting Mon 10/9/2023, Until Tue 10/8/2024, Normal      metFORMIN (Glucophage) 500 mg tablet Take 1 tablet (500 mg) by mouth 2 times a day with meals., Starting Thu 2/1/2024, Until Fri 1/31/2025, Normal      pantoprazole (ProtoNix) 40 mg EC tablet TAKE 1 TABLET BY MOUTH DAILY, Starting Wed 11/1/2023, Normal      pioglitazone (Actos) 15 mg tablet Take 1 tablet (15 mg) by mouth once daily., Starting Mon 10/9/2023, Until Tue 10/8/2024, Normal      QUEtiapine (SEROquel) 25 mg tablet Take 1 tablet (25 mg) by mouth once daily at bedtime. Take 1 to 2 Tablets by mouth at Bedtime if needed for sleep., Starting Mon 12/18/2023, Until Wed 1/17/2024, Normal      sildenafil (Viagra) 50 mg tablet Take 1 tablet (50 mg) by mouth if needed., Starting Mon 7/24/2023, Historical Med      tamsulosin (Flomax) 0.4 mg 24 hr capsule Take 1 capsule (0.4 mg) by mouth once daily., Historical Med             ALLERGIES     Hydromorphone    FAMILY HISTORY       Family History   Problem Relation Name Age of Onset    Arthritis Father      Hypertension Father      Diabetes Brother      Other (Stroke syndrome) Brother            SOCIAL HISTORY       Social History     Socioeconomic History    Marital status:      Spouse name: None    Number of  children: None    Years of education: None    Highest education level: None   Occupational History    None   Tobacco Use    Smoking status: Former     Types: Cigarettes    Smokeless tobacco: Never   Substance and Sexual Activity    Alcohol use: None    Drug use: None    Sexual activity: None   Other Topics Concern    None   Social History Narrative    None     Social Determinants of Health     Financial Resource Strain: Low Risk  (2/2/2024)    Overall Financial Resource Strain (CARDIA)     Difficulty of Paying Living Expenses: Not hard at all   Food Insecurity: Not on file   Transportation Needs: No Transportation Needs (2/2/2024)    PRAPARE - Transportation     Lack of Transportation (Medical): No     Lack of Transportation (Non-Medical): No   Physical Activity: Not on file   Stress: Not on file   Social Connections: Not on file   Intimate Partner Violence: Not on file   Housing Stability: Low Risk  (2/2/2024)    Housing Stability Vital Sign     Unable to Pay for Housing in the Last Year: No     Number of Places Lived in the Last Year: 1     Unstable Housing in the Last Year: No       SCREENINGS                        PHYSICAL EXAM    (up to 7 for level 4, 8 or more for level 5)     ED Triage Vitals [02/02/24 0951]   Temperature Heart Rate Respirations BP   36.1 °C (97 °F) 71 18 153/71      Pulse Ox Temp src Heart Rate Source Patient Position   96 % -- -- --      BP Location FiO2 (%)     -- --       Physical Exam  Vitals reviewed.   Constitutional:       General: He is not in acute distress.     Appearance: Normal appearance. He is not toxic-appearing.   HENT:      Head: Normocephalic and atraumatic.      Nose: Nose normal.   Eyes:      General:         Right eye: Discharge present.         Left eye: Discharge present.     Extraocular Movements: Extraocular movements intact.   Cardiovascular:      Rate and Rhythm: Normal rate and regular rhythm.      Heart sounds: No murmur heard.     No friction rub. No gallop.    Pulmonary:      Effort: Pulmonary effort is normal. No respiratory distress.      Breath sounds: Normal breath sounds. No wheezing, rhonchi or rales.   Chest:      Chest wall: No tenderness.   Abdominal:      Palpations: Abdomen is soft. There is no mass.      Tenderness: There is no abdominal tenderness. There is no guarding or rebound.   Musculoskeletal:      Right lower leg: No tenderness. No edema.      Left lower leg: No tenderness. No edema.   Skin:     General: Skin is warm and dry.   Neurological:      General: No focal deficit present.      Mental Status: He is alert.   Psychiatric:         Mood and Affect: Mood normal.         Behavior: Behavior normal.          DIAGNOSTIC RESULTS     LABS:  Labs Reviewed   CBC WITH AUTO DIFFERENTIAL - Abnormal       Result Value    WBC 4.1 (*)     nRBC 0.0      RBC 4.50      Hemoglobin 13.2 (*)     Hematocrit 41.3      MCV 92      MCH 29.3      MCHC 32.0      RDW 14.2      Platelets 189      Neutrophils % 54.8      Immature Granulocytes %, Automated 0.2      Lymphocytes % 30.0      Monocytes % 6.7      Eosinophils % 8.1      Basophils % 0.2      Neutrophils Absolute 2.22      Immature Granulocytes Absolute, Automated 0.01      Lymphocytes Absolute 1.22      Monocytes Absolute 0.27      Eosinophils Absolute 0.33      Basophils Absolute 0.01     COMPREHENSIVE METABOLIC PANEL - Abnormal    Glucose 169 (*)     Sodium 138      Potassium 4.2      Chloride 101      Bicarbonate 30      Anion Gap 11      Urea Nitrogen 17      Creatinine 0.93      eGFR 81      Calcium 9.2      Albumin 4.1      Alkaline Phosphatase 66      Total Protein 6.6      AST 19      Bilirubin, Total 0.7      ALT 12     CBC - Abnormal    WBC 4.7      nRBC 0.0      RBC 4.23 (*)     Hemoglobin 12.5 (*)     Hematocrit 38.6 (*)     MCV 91      MCH 29.6      MCHC 32.4      RDW 14.3      Platelets 179     BASIC METABOLIC PANEL - Abnormal    Glucose 107 (*)     Sodium 139      Potassium 3.8      Chloride 105       Bicarbonate 28      Anion Gap 10      Urea Nitrogen 18      Creatinine 0.85      eGFR 86      Calcium 8.5 (*)    POCT GLUCOSE - Abnormal    POCT Glucose 150 (*)    POCT GLUCOSE - Abnormal    POCT Glucose 152 (*)    POCT GLUCOSE - Abnormal    POCT Glucose 115 (*)    MAGNESIUM - Normal    Magnesium 1.63     SERIAL TROPONIN-INITIAL - Normal    Troponin I, High Sensitivity 4      Narrative:     Less than 99th percentile of normal range cutoff-  Female and children under 18 years old <14 ng/L; Male <21 ng/L: Negative  Repeat testing should be performed if clinically indicated.     Female and children under 18 years old 14-50 ng/L; Male 21-50 ng/L:  Consistent with possible cardiac damage and possible increased clinical   risk. Serial measurements may help to assess extent of myocardial damage.     >50 ng/L: Consistent with cardiac damage, increased clinical risk and  myocardial infarction. Serial measurements may help assess extent of   myocardial damage.      NOTE: Children less than 1 year old may have higher baseline troponin   levels and results should be interpreted in conjunction with the overall   clinical context.     NOTE: Troponin I testing is performed using a different   testing methodology at [facility] than at [facility]. Direct result comparisons should only   be made within the same method.   SERIAL TROPONIN, 1 HOUR - Normal    Troponin I, High Sensitivity 4      Narrative:     Less than 99th percentile of normal range cutoff-  Female and children under 18 years old <14 ng/L; Male <21 ng/L: Negative  Repeat testing should be performed if clinically indicated.     Female and children under 18 years old 14-50 ng/L; Male 21-50 ng/L:  Consistent with possible cardiac damage and possible increased clinical   risk. Serial measurements may help to assess extent of myocardial damage.     >50 ng/L: Consistent with cardiac damage, increased clinical risk and  myocardial infarction.  Serial measurements may help assess extent of   myocardial damage.      NOTE: Children less than 1 year old may have higher baseline troponin   levels and results should be interpreted in conjunction with the overall   clinical context.     NOTE: Troponin I testing is performed using a different   testing methodology at [facility] than at other   [facility]. Direct result comparisons should only   be made within the same method.   D-DIMER, NON VTE - Normal    D-Dimer Non VTE, Quant (ng/mL FEU) 460      Narrative:     The D-Dimer assay is reported in ng/mL Fibrinogen Equivalent Units (FEU). The results of this assay should NOT be used for the exclusion of Deep Vein Thrombosis and/or Pulmonary Embolism.   TROPONIN SERIES- (INITIAL, 1 HR)    Narrative:     The following orders were created for panel order Troponin I Series, High Sensitivity (0, 1 HR).  Procedure                               Abnormality         Status                     ---------                               -----------         ------                     Troponin I, High Sensiti...[[id number]]  Normal              Final result               Troponin, High Sensitivi...[[id number]]  Normal              Final result                 Please view results for these tests on the individual orders.   POCT GLUCOSE METER   POCT GLUCOSE METER   POCT GLUCOSE METER   POCT GLUCOSE METER       All other labs were within normal range or not returned as of this dictation.    Imaging  CT abdomen wo IV contrast   Final Result   Bilateral nonobstructing renal calculi.  There is no evidence ureteral   calculi or hydronephrosis.   Diverticulosis.   Signed by Peyman Villarreal MD      XR chest 2 views   Final Result   1. Chest shows bilateral minimal chronic changes, no acute   consolidation.   2. Chest, frontal versus the presence of juxtapleural parenchymal   scarring.   3. No pulmonary venous congestion or cardiomegaly.   Signed by Jose Rubin MD            Procedures  Procedures     EMERGENCY DEPARTMENT COURSE/MDM:     ED Course as of 02/05/24 1108   Fri Feb 02, 2024   1044 CBC significant for leukopenia of 4.1 and H&H of 13.2/41.3.  Platelets 189.  CMP largely unremarkable D-dimer 460.  Magnesium 1.63 [BL]   1047 Initial troponin 4.  Based on patient's history, risk factors, and initial troponin his heart score is 5.  Plan for admission for ACS rule out. [BL]   1228 XR chest 2 views  Bilateral mid chest juxtapleural calcifications, likely chronic in nature.      No cardiopulmonary congestion.  No acute pulmonary consolidation. [BL]   1325 Patient to be admitted to Dr. Billings's service for ACS rule out further workup of his left-sided rib pain. [BL]      ED Course User Index  [BL] Stefano Hickman DO         Diagnoses as of 02/05/24 1108   Chest pain, unspecified type        Medical Decision Making      Patient and or family in agreement and understanding of treatment plan.  All questions answered.      I reviewed the case with the attending ED physician. The attending ED physician agrees with the plan. Patient and/or patient´s representative was counseled regarding labs, imaging, likely diagnosis, and plan. All questions were answered.    ED Medications administered this visit:    Medications   aspirin tablet 325 mg (325 mg oral Given 2/2/24 1531)       New Prescriptions from this visit:    Discharge Medication List as of 2/3/2024 11:48 AM        START taking these medications    Details   lidocaine 4 % patch Place 1 patch over 12 hours on the skin once daily for 5 days. Remove & discard patch within 12 hours or as directed by MD. Do not start before February 4, 2024., Starting Sun 2/4/2024, Until Fri 2/9/2024, Normal             Follow-up:  Reid Diana MD  [id number]  [facility] [address]  [phone]    Follow up in 1 week(s)      Primary care provider (PCP)          Jose Sabillon DO  [id number]  [facility] [address]  [phone]               Final Impression:   1. Chest pain, unspecified type    2. Rib pain          (Please note that portions of this note were completed with a voice recognition program.  Efforts were made to edit the dictations but occasionally words are mis-transcribed.)     DO Scooby Matias  02/02/24 1326       DO Scooby Matias  02/05/24 1102

## 2024-02-02 NOTE — CARE PLAN
The clinical goals for the shift include Patient will have no c/o abdominal pain through end of shift.      Problem: Safety  Goal: Patient will be injury free during hospitalization  Outcome: Progressing  Goal: I will remain free of falls  Outcome: Progressing     Problem: Daily Care  Goal: Daily care needs are met  Outcome: Progressing     Problem: Psychosocial Needs  Goal: Demonstrates ability to cope with hospitalization/illness  Outcome: Progressing  Goal: Collaborate with me, my family, and caregiver to identify my specific goals  Outcome: Progressing  Flowsheets (Taken 2/2/2024 3323)  Cultural Requests During Hospitalization: n/a  Spiritual Requests During Hospitalization: n/a     Problem: Discharge Barriers  Goal: My discharge needs are met  Outcome: Progressing

## 2024-02-02 NOTE — H&P
History Of Present Illness  Jose Guardado is a 83 y.o. 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 States started this am and got intense so called the ambulance to get checked out. Patient states pain resolved on its own a time of arrival to ED although in the past 30 minutes son at bedside indicates his dad was starting to have complaints of the pain again. He does endorse his father has early dementia and has been forgetful at times. Son indicates father told him he had fallen in the past three days. Er wanted to admit patient due to heart score > 4, they reached out to cardiology, nothing further from cardiology standpoint. Patient will have CT abdomen completed, if negative likely can be discharged to home.     Past Medical History   Aphasic seizure (CMS/HCC)    Arthritis    Osteoarthritis    Asbestos-induced pleural plaque    Benign essential hypertension    BPH (benign prostatic hyperplasia)    Bursitis of right hip    Calcium kidney stone    Cerebrovascular small vessel disease    Cervical radiculopathy    Cervical spondylosis without myelopathy    Chronic low back pain    Colon polyp    Diverticulitis, colon    Confusion and disorientation    Diastolic dysfunction    HTN (hypertension)    History of TIA (transient ischemic attack)    History of orthostatic hypotension    Hemangioma of skin and subcutaneous tissue    Gastroesophageal reflux disease    Expressive aphasia    Erectile dysfunction    DM type 2 (diabetes mellitus, type 2) (CMS/HCC)    Disorder of bursae of shoulder region    Memory impairment    Hyperlipidemia    Hypothyroidism    Melanocytic nevi of trunk    Old cerebellar infarct without  "late effect    Other seborrheic keratosis    Other melanin hyperpigmentation    Seizure disorder (CMS/HCC)    Shingles    Shortness of breath    Tremor, essential    Varicose veins of leg with swelling    Vitamin D deficiency    White matter disease, unspecified    Overweight with body mass index (BMI) of 25 to 25.9 in adult    Anxiety        Social History  He reports that he has quit smoking. His smoking use included cigarettes. He has never used smokeless tobacco. No history on file for alcohol use and drug use.    Family History  Family History   Problem Relation Name Age of Onset    Arthritis Father      Hypertension Father      Diabetes Brother      Other (Stroke syndrome) Brother          Allergies  Hydromorphone    Review of Systems   ROS: 12 systems reviewed and negative except per HPI above     Physical Exam by System:     Constitutional: Well developed, awake/alert/oriented x3, no distress, alert and cooperative   ENMT: mucous membranes moist   Head/Neck: Neck supple   Respiratory/Thorax: Patent airways, CTAB, normal breath sounds with good chest expansion, thorax symmetric   Cardiovascular: Regular, rate and rhythm, no murmurs, 2+ equal pulses of the extremities, normal S 1and S 2   Gastrointestinal: Distended, soft, non-tender, no rebound tenderness or guarding, no masses palpable, no organomegaly, +BS, no bruits   Musculoskeletal: ROM intact, no joint swelling, normal strength   Extremities: normal extremities, no cyanosis edema, contusions or wounds, no clubbing   Neurological: alert and oriented x3, intact senses, motor, response and reflexes, normal strength       Last Recorded Vitals  Blood pressure (!) 187/82, pulse 64, temperature 36.1 °C (97 °F), resp. rate 17, height 1.727 m (5' 8\"), weight 74.8 kg (165 lb), SpO2 96 %.    Relevant Results  Results for orders placed or performed during the hospital encounter of 02/02/24 (from the past 24 hour(s))   ECG 12 lead   Result Value Ref Range    " Ventricular Rate 69 BPM    Atrial Rate 69 BPM    NE Interval 154 ms    QRS Duration 82 ms    QT Interval 384 ms    QTC Calculation(Bazett) 411 ms    P Axis 43 degrees    R Axis 40 degrees    T Axis 48 degrees    QRS Count 11 beats    Q Onset 217 ms    P Onset 140 ms    P Offset 195 ms    T Offset 409 ms    QTC Fredericia 402 ms   CBC and Auto Differential   Result Value Ref Range    WBC 4.1 (L) 4.4 - 11.3 x10*3/uL    nRBC 0.0 0.0 - 0.0 /100 WBCs    RBC 4.50 4.50 - 5.90 x10*6/uL    Hemoglobin 13.2 (L) 13.5 - 17.5 g/dL    Hematocrit 41.3 41.0 - 52.0 %    MCV 92 80 - 100 fL    MCH 29.3 26.0 - 34.0 pg    MCHC 32.0 32.0 - 36.0 g/dL    RDW 14.2 11.5 - 14.5 %    Platelets 189 150 - 450 x10*3/uL    Neutrophils % 54.8 40.0 - 80.0 %    Immature Granulocytes %, Automated 0.2 0.0 - 0.9 %    Lymphocytes % 30.0 13.0 - 44.0 %    Monocytes % 6.7 2.0 - 10.0 %    Eosinophils % 8.1 0.0 - 6.0 %    Basophils % 0.2 0.0 - 2.0 %    Neutrophils Absolute 2.22 1.60 - 5.50 x10*3/uL    Immature Granulocytes Absolute, Automated 0.01 0.00 - 0.50 x10*3/uL    Lymphocytes Absolute 1.22 0.80 - 3.00 x10*3/uL    Monocytes Absolute 0.27 0.05 - 0.80 x10*3/uL    Eosinophils Absolute 0.33 0.00 - 0.40 x10*3/uL    Basophils Absolute 0.01 0.00 - 0.10 x10*3/uL   Comprehensive Metabolic Panel   Result Value Ref Range    Glucose 169 (H) 74 - 99 mg/dL    Sodium 138 136 - 145 mmol/L    Potassium 4.2 3.5 - 5.3 mmol/L    Chloride 101 98 - 107 mmol/L    Bicarbonate 30 21 - 32 mmol/L    Anion Gap 11 10 - 20 mmol/L    Urea Nitrogen 17 6 - 23 mg/dL    Creatinine 0.93 0.50 - 1.30 mg/dL    eGFR 81 >60 mL/min/1.73m*2    Calcium 9.2 8.6 - 10.3 mg/dL    Albumin 4.1 3.4 - 5.0 g/dL    Alkaline Phosphatase 66 33 - 136 U/L    Total Protein 6.6 6.4 - 8.2 g/dL    AST 19 9 - 39 U/L    Bilirubin, Total 0.7 0.0 - 1.2 mg/dL    ALT 12 10 - 52 U/L   Magnesium   Result Value Ref Range    Magnesium 1.63 1.60 - 2.40 mg/dL   Troponin I, High Sensitivity, Initial   Result Value Ref Range     Troponin I, High Sensitivity 4 0 - 20 ng/L   D-dimer, quantitative   Result Value Ref Range    D-Dimer Non VTE, Quant (ng/mL FEU) 460 <=500 ng/mL FEU   Troponin, High Sensitivity, 1 Hour   Result Value Ref Range    Troponin I, High Sensitivity 4 0 - 20 ng/L   ECG 12 lead   Result Value Ref Range    Ventricular Rate 61 BPM    Atrial Rate 61 BPM    LA Interval 156 ms    QRS Duration 78 ms    QT Interval 398 ms    QTC Calculation(Bazett) 400 ms    P Axis 67 degrees    R Axis 35 degrees    T Axis 42 degrees    QRS Count 10 beats    Q Onset 217 ms    P Onset 139 ms    P Offset 192 ms    T Offset 416 ms    QTC Fredericia 399 ms      Scheduled medications  aspirin, 325 mg, oral, Once      Continuous medications     PRN medications       XR chest 2 views    Result Date: 2/2/2024  STUDY: Chest Radiographs;  2/2/2024 at 11:10 AM. INDICATION: Chest pain. COMPARISON: XR chest 7/15/2023, 6/28/2023 ACCESSION NUMBER(S): [id number] ORDERING CLINICIAN: LEONARDO ZEPEDA TECHNIQUE:  Frontal and lateral chest. FINDINGS: CARDIOMEDIASTINAL SILHOUETTE: Normal size/contour, no cardiomegaly. Cardiac pacer device overlies upper left heart. PULMONARY VASCULATURE: No pulmonary vascular fullness; pulmonary vessels adequately defined, not congested. LUNGS: No lung consolidation, lungs with chronic markings. PLEURA/HEMIDIAPHRAGM: No pneumothorax, no juxtapleural mass. No hemidiaphragm elevation. Bilateral mid chest juxtapleural calcifications, likely chronic in nature. -=- ABDOMEN: Upper abdomen unremarkable. SKELETON: No fracture, no osseous lytic/blastic lesion.    1. Chest shows bilateral minimal chronic changes, no acute consolidation. 2. Chest, frontal versus the presence of juxtapleural parenchymal scarring. 3. No pulmonary venous congestion or cardiomegaly. Signed by Jose Rubin MD    ECG 12 lead    Result Date: 2/2/2024  Normal sinus rhythm Anterior infarct (cited on or before 02-FEB-2024) Abnormal ECG When compared with ECG of  02-FEB-2024 09:48, (unconfirmed) No significant change was found    ECG 12 lead    Result Date: 2/2/2024  Normal sinus rhythm Cannot rule out Anterior infarct , age undetermined Abnormal ECG When compared with ECG of 02-OCT-2023 09:19, No significant change was found       Assessment/Plan   Active Problems:    Aphasic seizure (CMS/HCC)    Benign essential hypertension    BPH (benign prostatic hyperplasia)    Diastolic dysfunction    HTN (hypertension)    Expressive aphasia    DM type 2 (diabetes mellitus, type 2) (CMS/HCC)    Seizure disorder (CMS/HCC)    Anxiety  Left abd pain    Plan:  Admit to observation with tele  On room air  Will obtain CT abdomen  Tylenol PRN for pain  Lidocaine patch for left ribs  Troponin 4, delta troponin 4  Cardiology not indicated at this time  Maintain potassium > 4.0, magnesium > 2.0  D dimer 460  EKG NSR  Cardiac device check 1/26/2024  Resume patient medication  Cardiac diabetic diet  POC glucose monitoring AC HS  Insulin sliding scale, hold oral diabetic medications  Am labs including cbc, bmp, mag  POC discussed with patient and attending  Dispo: Pending CT abd, if nothing acute likely can dc to home, son indicates patient resides with his wife        Code status: Full Code, discussed with patient and patient's son at bedside    I spent >55 minutes in the professional and overall care of this patient.    SIGNATURE: CRUZ Cruz-LUIS PATIENT NAME: Jose Guardado   DATE: February 2, 2024 MRN: [id number]   TIME: 1:05 PM    Barbara Jean CNP  [facility]  [address] [address]  Phone: [phone] Fax: [phone]

## 2024-02-02 NOTE — CARE PLAN
Problem: Psychosocial Needs  Goal: Collaborate with me, my family, and caregiver to identify my specific goals  Recent Flowsheet Documentation  Taken 2/2/2024 [id number] by Carmen Breaux RN  Cultural Requests During Hospitalization: n/a  Spiritual Requests During Hospitalization: n/a

## 2024-02-03 VITALS
DIASTOLIC BLOOD PRESSURE: 64 MMHG | SYSTOLIC BLOOD PRESSURE: 112 MMHG | WEIGHT: 161.82 LBS | OXYGEN SATURATION: 93 % | HEART RATE: 58 BPM | TEMPERATURE: 97 F | HEIGHT: 67 IN | BODY MASS INDEX: 25.4 KG/M2 | RESPIRATION RATE: 16 BRPM

## 2024-02-03 LAB
ANION GAP SERPL CALC-SCNC: 10 MMOL/L (ref 10–20)
ATRIAL RATE: 61 BPM
ATRIAL RATE: 69 BPM
BUN SERPL-MCNC: 18 MG/DL (ref 6–23)
CALCIUM SERPL-MCNC: 8.5 MG/DL (ref 8.6–10.3)
CHLORIDE SERPL-SCNC: 105 MMOL/L (ref 98–107)
CO2 SERPL-SCNC: 28 MMOL/L (ref 21–32)
CREAT SERPL-MCNC: 0.85 MG/DL (ref 0.5–1.3)
EGFRCR SERPLBLD CKD-EPI 2021: 86 ML/MIN/1.73M*2
ERYTHROCYTE [DISTWIDTH] IN BLOOD BY AUTOMATED COUNT: 14.3 % (ref 11.5–14.5)
GLUCOSE BLD MANUAL STRIP-MCNC: 115 MG/DL (ref 74–99)
GLUCOSE SERPL-MCNC: 107 MG/DL (ref 74–99)
HCT VFR BLD AUTO: 38.6 % (ref 41–52)
HGB BLD-MCNC: 12.5 G/DL (ref 13.5–17.5)
MCH RBC QN AUTO: 29.6 PG (ref 26–34)
MCHC RBC AUTO-ENTMCNC: 32.4 G/DL (ref 32–36)
MCV RBC AUTO: 91 FL (ref 80–100)
NRBC BLD-RTO: 0 /100 WBCS (ref 0–0)
P AXIS: 43 DEGREES
P AXIS: 67 DEGREES
P OFFSET: 192 MS
P OFFSET: 195 MS
P ONSET: 139 MS
P ONSET: 140 MS
PLATELET # BLD AUTO: 179 X10*3/UL (ref 150–450)
POTASSIUM SERPL-SCNC: 3.8 MMOL/L (ref 3.5–5.3)
PR INTERVAL: 154 MS
PR INTERVAL: 156 MS
Q ONSET: 217 MS
Q ONSET: 217 MS
QRS COUNT: 10 BEATS
QRS COUNT: 11 BEATS
QRS DURATION: 78 MS
QRS DURATION: 82 MS
QT INTERVAL: 384 MS
QT INTERVAL: 398 MS
QTC CALCULATION(BAZETT): 400 MS
QTC CALCULATION(BAZETT): 411 MS
QTC FREDERICIA: 399 MS
QTC FREDERICIA: 402 MS
R AXIS: 35 DEGREES
R AXIS: 40 DEGREES
RBC # BLD AUTO: 4.23 X10*6/UL (ref 4.5–5.9)
SODIUM SERPL-SCNC: 139 MMOL/L (ref 136–145)
T AXIS: 42 DEGREES
T AXIS: 48 DEGREES
T OFFSET: 409 MS
T OFFSET: 416 MS
VENTRICULAR RATE: 61 BPM
VENTRICULAR RATE: 69 BPM
WBC # BLD AUTO: 4.7 X10*3/UL (ref 4.4–11.3)

## 2024-02-03 PROCEDURE — 2500000004 HC RX 250 GENERAL PHARMACY W/ HCPCS (ALT 636 FOR OP/ED): Mod: MUE | Performed by: STUDENT IN AN ORGANIZED HEALTH CARE EDUCATION/TRAINING PROGRAM

## 2024-02-03 PROCEDURE — 2500000001 HC RX 250 WO HCPCS SELF ADMINISTERED DRUGS (ALT 637 FOR MEDICARE OP): Performed by: STUDENT IN AN ORGANIZED HEALTH CARE EDUCATION/TRAINING PROGRAM

## 2024-02-03 PROCEDURE — 36415 COLL VENOUS BLD VENIPUNCTURE: CPT | Performed by: NURSE PRACTITIONER

## 2024-02-03 PROCEDURE — 2500000004 HC RX 250 GENERAL PHARMACY W/ HCPCS (ALT 636 FOR OP/ED): Performed by: NURSE PRACTITIONER

## 2024-02-03 PROCEDURE — 85027 COMPLETE CBC AUTOMATED: CPT | Performed by: NURSE PRACTITIONER

## 2024-02-03 PROCEDURE — G0378 HOSPITAL OBSERVATION PER HR: HCPCS

## 2024-02-03 PROCEDURE — 99232 SBSQ HOSP IP/OBS MODERATE 35: CPT | Performed by: NURSE PRACTITIONER

## 2024-02-03 PROCEDURE — 2500000005 HC RX 250 GENERAL PHARMACY W/O HCPCS: Performed by: NURSE PRACTITIONER

## 2024-02-03 PROCEDURE — 82374 ASSAY BLOOD CARBON DIOXIDE: CPT | Performed by: NURSE PRACTITIONER

## 2024-02-03 PROCEDURE — 82947 ASSAY GLUCOSE BLOOD QUANT: CPT | Mod: 59

## 2024-02-03 RX ORDER — LIDOCAINE 560 MG/1
1 PATCH PERCUTANEOUS; TOPICAL; TRANSDERMAL DAILY
Qty: 5 PATCH | Refills: 0 | Status: SHIPPED | OUTPATIENT
Start: 2024-02-04 | End: 2024-02-09

## 2024-02-03 RX ADMIN — CLOPIDOGREL BISULFATE 75 MG: 75 TABLET ORAL at 09:12

## 2024-02-03 RX ADMIN — LIDOCAINE 1 PATCH: 4 PATCH TOPICAL at 09:14

## 2024-02-03 RX ADMIN — HEPARIN SODIUM 5000 UNITS: 5000 INJECTION INTRAVENOUS; SUBCUTANEOUS at 06:04

## 2024-02-03 RX ADMIN — PANTOPRAZOLE SODIUM 40 MG: 40 TABLET, DELAYED RELEASE ORAL at 09:13

## 2024-02-03 RX ADMIN — LOSARTAN POTASSIUM 25 MG: 25 TABLET, FILM COATED ORAL at 09:12

## 2024-02-03 RX ADMIN — POLYMYXIN B SULFATE AND TRIMETHOPRIM 1 DROP: 10000; 1 SOLUTION OPHTHALMIC at 06:04

## 2024-02-03 RX ADMIN — TAMSULOSIN HYDROCHLORIDE 0.4 MG: 0.4 CAPSULE ORAL at 09:12

## 2024-02-03 RX ADMIN — LEVOTHYROXINE SODIUM 100 MCG: 0.1 TABLET ORAL at 06:05

## 2024-02-03 RX ADMIN — BUSPIRONE HYDROCHLORIDE 7.5 MG: 15 TABLET ORAL at 09:13

## 2024-02-03 ASSESSMENT — COGNITIVE AND FUNCTIONAL STATUS - GENERAL
DAILY ACTIVITIY SCORE: 20
TOILETING: A LITTLE
MOVING FROM LYING ON BACK TO SITTING ON SIDE OF FLAT BED WITH BEDRAILS: A LITTLE
MOVING TO AND FROM BED TO CHAIR: A LITTLE
HELP NEEDED FOR BATHING: A LITTLE
DRESSING REGULAR UPPER BODY CLOTHING: A LITTLE
CLIMB 3 TO 5 STEPS WITH RAILING: A LITTLE
TURNING FROM BACK TO SIDE WHILE IN FLAT BAD: A LITTLE
MOBILITY SCORE: 18
DRESSING REGULAR LOWER BODY CLOTHING: A LITTLE
STANDING UP FROM CHAIR USING ARMS: A LITTLE
WALKING IN HOSPITAL ROOM: A LITTLE

## 2024-02-03 ASSESSMENT — PAIN SCALES - GENERAL
PAINLEVEL_OUTOF10: 0 - NO PAIN
PAINLEVEL_OUTOF10: 0 - NO PAIN

## 2024-02-03 ASSESSMENT — PAIN - FUNCTIONAL ASSESSMENT: PAIN_FUNCTIONAL_ASSESSMENT: 0-10

## 2024-02-03 NOTE — CARE PLAN
The patient's goals for the shift include to go home.     The clinical goals for the shift include Patient will have no c/o abdominal pain through end of shift.      Problem: Safety  Goal: Patient will be injury free during hospitalization  Outcome: Met  Goal: I will remain free of falls  Outcome: Met     Problem: Daily Care  Goal: Daily care needs are met  Outcome: Met     Problem: Psychosocial Needs  Goal: Demonstrates ability to cope with hospitalization/illness  Outcome: Met  Goal: Collaborate with me, my family, and caregiver to identify my specific goals  Outcome: Met     Problem: Discharge Barriers  Goal: My discharge needs are met  Outcome: Met     Problem: Fall/Injury  Goal: Not fall by end of shift  Outcome: Met  Goal: Be free from injury by end of the shift  Outcome: Met  Goal: Verbalize understanding of personal risk factors for fall in the hospital  Outcome: Met  Goal: Verbalize understanding of risk factor reduction measures to prevent injury from fall in the home  Outcome: Met  Goal: Use assistive devices by end of the shift  Outcome: Progressing  Goal: Pace activities to prevent fatigue by end of the shift  Outcome: Progressing

## 2024-02-03 NOTE — CARE PLAN
The patient's goals for the shift include Good night's sleep    The clinical goals for the shift include Comfort and rest    Over the shift, the patient did not make progress toward the following goals. Barriers to progression include chronic illness. Recommendations to address these barriers include administer sleeping aids and provide a quiet environment.    Problem: Safety  Goal: Patient will be injury free during hospitalization  Outcome: Not Progressing  Goal: I will remain free of falls  Outcome: Not Progressing     Problem: Daily Care  Goal: Daily care needs are met  Outcome: Not Progressing     Problem: Psychosocial Needs  Goal: Demonstrates ability to cope with hospitalization/illness  Outcome: Not Progressing  Goal: Collaborate with me, my family, and caregiver to identify my specific goals  Outcome: Not Progressing     Problem: Discharge Barriers  Goal: My discharge needs are met  Outcome: Not Progressing     Problem: Fall/Injury  Goal: Not fall by end of shift  Outcome: Not Progressing  Goal: Be free from injury by end of the shift  Outcome: Not Progressing  Goal: Verbalize understanding of personal risk factors for fall in the hospital  Outcome: Not Progressing  Goal: Verbalize understanding of risk factor reduction measures to prevent injury from fall in the home  Outcome: Not Progressing  Goal: Use assistive devices by end of the shift  Outcome: Not Progressing  Goal: Pace activities to prevent fatigue by end of the shift  Outcome: Not Progressing

## 2024-02-03 NOTE — DISCHARGE SUMMARY
Discharge Diagnosis  Other chest pain    Issues Requiring Follow-Up  Follow up with PCP within 1 week of discharge  Lidocaine patch for 5 days    Discharge Meds     Your medication list        ASK your doctor about these medications        Instructions Last Dose Given Next Dose Due   albuterol 90 mcg/actuation inhaler           atorvastatin 80 mg tablet  Commonly known as: Lipitor      Take 1 tablet (80 mg) by mouth once daily at bedtime.       Briviact 50 mg tablet tablet  Generic drug: brivaracetam      Take 1 tablet (50 mg) by mouth 2 times a day.       busPIRone 15 mg tablet  Commonly known as: Buspar      Take 1 tablet (15 mg) by mouth 3 times a day as needed (anger).       clopidogrel 75 mg tablet  Commonly known as: Plavix           finasteride 5 mg tablet  Commonly known as: Proscar      Take 1 tablet (5 mg) by mouth once daily at bedtime.       fluticasone 50 mcg/actuation nasal spray  Commonly known as: Flonase           glimepiride 2 mg tablet  Commonly known as: Amaryl      Take 1 tablet (2 mg) by mouth once daily.       Lantus U-100 Insulin 100 unit/mL injection  Generic drug: insulin glargine  Ask about: Which instructions should I use?           levothyroxine 100 mcg tablet  Commonly known as: Synthroid, Levoxyl      Take 1 tablet (100 mcg) by mouth once daily in the morning. Take before meals.       losartan 25 mg tablet  Commonly known as: Cozaar      Take 1 tablet (25 mg) by mouth once daily.       metFORMIN 500 mg tablet  Commonly known as: Glucophage      Take 1 tablet (500 mg) by mouth 2 times a day with meals.       pantoprazole 40 mg EC tablet  Commonly known as: ProtoNix      TAKE 1 TABLET BY MOUTH DAILY       pioglitazone 15 mg tablet  Commonly known as: Actos      Take 1 tablet (15 mg) by mouth once daily.       QUEtiapine 25 mg tablet  Commonly known as: SEROquel      Take 1 tablet (25 mg) by mouth once daily at bedtime. Take 1 to 2 Tablets by mouth at Bedtime if needed for sleep.        sildenafil 50 mg tablet  Commonly known as: Viagra           tamsulosin 0.4 mg 24 hr capsule  Commonly known as: Flomax                    Test Results Pending At Discharge  Pending Labs       No current pending labs.            Hospital Course  Jose Guardado is a 83 y.o. 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 States started this am and got intense so called the ambulance to get checked out. Patient states pain resolved on its own a time of arrival to ED although in the past 30 minutes son at bedside indicates his dad was starting to have complaints of the pain again. He does endorse his father has early dementia and has been forgetful at times. Son indicates father told him he had fallen in the past three days. Er wanted to admit patient due to heart score > 4, they reached out to cardiology, nothing further from cardiology standpoint. Patient will have CT abdomen completed, if negative likely can be discharged to home.   Course: CT abd completed, no acute findings. Patient presses on left rib/side and reproduces some mild discomfort. Lidocaine patch can be applied on discharge, follow up with PCP. He wants to return to home where he resides with his wife. States will call son to pick him up.     Pertinent Physical Exam At Time of Discharge  Physical Exam  Constitutional: Well developed, awake/alert/oriented x3, no distress, alert and cooperative   ENMT: mucous membranes moist   Head/Neck: Neck supple   Respiratory/Thorax: Patent airways, CTAB, normal breath sounds with good chest expansion, thorax symmetric   Cardiovascular: Regular, rate and rhythm, no murmurs, 2+ equal pulses of the extremities,  normal S 1and S 2   Gastrointestinal: Distended, soft, non-tender, no rebound tenderness or guarding, no masses palpable, no organomegaly, +BS, no bruits   Musculoskeletal: ROM intact, no joint swelling, normal strength   Extremities: normal extremities, no cyanosis edema, contusions or wounds, no clubbing   Neurological: alert and oriented x3, intact senses, motor, response and reflexes, normal strength      Outpatient Follow-Up  Future Appointments   Date Time Provider Department Center   4/23/2024  1:00 PM June Simon MD [id number] [place]   5/7/2024  4:00 PM [facility] [id number] [place]   5/7/2024  4:20 PM Vinayak Christopher MD [id number] [place]       Barbara Jean [facility]  [address] [address]  Phone: [phone] Fax: [phone]  Barbara Jean APRLORNE-CNP

## 2024-02-03 NOTE — NURSING NOTE
Patient discharrgedto home with son to transport via private car. Patient is aware that he has a script that he will need to  from his pharmacy and is also aware that he will need to follow up with his PCP in 1 week.

## 2024-02-05 ENCOUNTER — PATIENT OUTREACH (OUTPATIENT)
Dept: PRIMARY CARE | Facility: CLINIC | Age: 84
End: 2024-02-05
Payer: MEDICARE

## 2024-02-05 DIAGNOSIS — R07.9 CHEST PAIN, UNSPECIFIED TYPE: ICD-10-CM

## 2024-02-05 NOTE — PROGRESS NOTES
Discharge Facility:  [facility] [place] [facility]     Discharge Diagnosis:  Chest Pain     Admission Date:2/2/24  Discharge Date: 2/3/24    PCP Appointment Date:TBD-Message sent to office staff requesting assistance. As well as encourged patient to call today to schedule on voicemails.     Specialist Appointment Date:   MAR 21  2024 01:45 PM - Office Visit  [facility] - Joey Mccloud DPM     APR 23 2024 01:00 PM - NEUROLOGY ESTABLISHED PATIENT VISIT  [facility] - June Simon MD     MAY 7  2024 04:20 PM - Cardiology Established Patient Visit  [facility] Dr Amira Christopher MD       Hospital Encounter and Summary: Linked    I left voicemail to introduce myself and the TCM program to Jose Guardado. I encouraged patient to contact PCP for follow up appt. I gave my contact information to return my call so we can go over discharge instructions and see if I can assist in anyway.  Outreach x2

## 2024-02-14 DIAGNOSIS — E11.9 TYPE 2 DIABETES MELLITUS WITHOUT COMPLICATION, UNSPECIFIED WHETHER LONG TERM INSULIN USE (MULTI): ICD-10-CM

## 2024-02-14 DIAGNOSIS — Z86.73 HISTORY OF TIA (TRANSIENT ISCHEMIC ATTACK): Primary | ICD-10-CM

## 2024-02-19 ENCOUNTER — TELEPHONE (OUTPATIENT)
Dept: NEUROLOGY | Facility: CLINIC | Age: 84
End: 2024-02-19
Payer: MEDICARE

## 2024-02-19 NOTE — TELEPHONE ENCOUNTER
Jose's son called and left a voicemail about him having some issues and wanted to know if his medication needs to be changed.    Please advise,  Thanks

## 2024-02-20 DIAGNOSIS — F03.918 DEMENTIA WITH AGGRESSIVE BEHAVIOR (MULTI): ICD-10-CM

## 2024-02-20 DIAGNOSIS — R56.9 SEIZURE (MULTI): Primary | ICD-10-CM

## 2024-02-20 RX ORDER — DIVALPROEX SODIUM 125 MG/1
CAPSULE, COATED PELLETS ORAL
Qty: 60 CAPSULE | Refills: 11 | Status: SHIPPED | OUTPATIENT
Start: 2024-02-20 | End: 2024-05-02 | Stop reason: HOSPADM

## 2024-02-20 NOTE — PROGRESS NOTES
Having worsening confusion, cognitive decline, aggressive behavior  Refusing to take medications and is very difficult to do  Will start depakote 125mg nightly x3-5 days and then increase to 250mg nightly  Stop the seroquel and use only PRN

## 2024-02-20 NOTE — TELEPHONE ENCOUNTER
Sj called again to tell us he's really just looking for advice. Jose has gotten argumentative and borderline violent at times. He wanted to discuss this behavior with you.    Thanks    Sj  [phone]

## 2024-03-01 ENCOUNTER — PATIENT OUTREACH (OUTPATIENT)
Dept: PRIMARY CARE | Facility: CLINIC | Age: 84
End: 2024-03-01
Payer: MEDICARE

## 2024-03-01 ENCOUNTER — HOSPITAL ENCOUNTER (OUTPATIENT)
Dept: CARDIOLOGY | Facility: HOSPITAL | Age: 84
Discharge: HOME | End: 2024-03-01
Payer: MEDICARE

## 2024-03-01 DIAGNOSIS — Z95.818 STATUS POST PLACEMENT OF IMPLANTABLE LOOP RECORDER: ICD-10-CM

## 2024-03-01 DIAGNOSIS — Z86.73 HISTORY OF TIA (TRANSIENT ISCHEMIC ATTACK): ICD-10-CM

## 2024-03-01 PROCEDURE — 93298 REM INTERROG DEV EVAL SCRMS: CPT | Performed by: INTERNAL MEDICINE

## 2024-03-01 PROCEDURE — 93298 REM INTERROG DEV EVAL SCRMS: CPT

## 2024-03-01 NOTE — PROGRESS NOTES
Call regarding appt. with PCP on after hospitalization.  At time of outreach call the patient's Son feels as if their condition has returned to baseline since last visit.  Patient has not had PCP appointment since hospital stay but going to specialist appts. Son (Jose)  has no questions or concerns at this time. I encouraged him to please reach out to Dr Diana office with any questions or for follow up appt.

## 2024-03-05 ENCOUNTER — TELEPHONE (OUTPATIENT)
Dept: PRIMARY CARE | Facility: CLINIC | Age: 84
End: 2024-03-05
Payer: MEDICARE

## 2024-03-05 NOTE — TELEPHONE ENCOUNTER
Pt left message stating that he would like to talk to you about his fathers mental state. He would like to know if he should make an appt or if you could write a letter of expert evaluation. Please advise.

## 2024-03-09 DIAGNOSIS — R56.9 SEIZURE (MULTI): ICD-10-CM

## 2024-03-11 RX ORDER — BRIVARACETAM 50 MG/1
50 TABLET, FILM COATED ORAL 2 TIMES DAILY
Qty: 180 TABLET | Refills: 1 | Status: SHIPPED | OUTPATIENT
Start: 2024-03-11 | End: 2024-05-15 | Stop reason: SDUPTHER

## 2024-03-18 ENCOUNTER — HOSPITAL ENCOUNTER (OUTPATIENT)
Facility: HOSPITAL | Age: 84
Setting detail: OBSERVATION
Discharge: HOME | End: 2024-03-19
Attending: EMERGENCY MEDICINE | Admitting: INTERNAL MEDICINE
Payer: MEDICARE

## 2024-03-18 ENCOUNTER — APPOINTMENT (OUTPATIENT)
Dept: CARDIOLOGY | Facility: HOSPITAL | Age: 84
End: 2024-03-18
Payer: MEDICARE

## 2024-03-18 ENCOUNTER — APPOINTMENT (OUTPATIENT)
Dept: RADIOLOGY | Facility: HOSPITAL | Age: 84
End: 2024-03-18
Payer: MEDICARE

## 2024-03-18 DIAGNOSIS — R47.01 EXPRESSIVE APHASIA: ICD-10-CM

## 2024-03-18 DIAGNOSIS — G45.9 TRANSIENT ISCHEMIC ATTACK (TIA): ICD-10-CM

## 2024-03-18 DIAGNOSIS — I67.9 CEREBROVASCULAR SMALL VESSEL DISEASE: ICD-10-CM

## 2024-03-18 DIAGNOSIS — R29.90 STROKE-LIKE SYMPTOMS: Primary | ICD-10-CM

## 2024-03-18 DIAGNOSIS — Z86.73 HISTORY OF TIA (TRANSIENT ISCHEMIC ATTACK): ICD-10-CM

## 2024-03-18 LAB
ALBUMIN SERPL BCP-MCNC: 3.6 G/DL (ref 3.4–5)
ALBUMIN SERPL BCP-MCNC: 3.9 G/DL (ref 3.4–5)
ALP SERPL-CCNC: 53 U/L (ref 33–136)
ALP SERPL-CCNC: 62 U/L (ref 33–136)
ALT SERPL W P-5'-P-CCNC: 11 U/L (ref 10–52)
ALT SERPL W P-5'-P-CCNC: 13 U/L (ref 10–52)
ANION GAP SERPL CALC-SCNC: 13 MMOL/L (ref 10–20)
AORTIC VALVE MEAN GRADIENT: 2 MMHG
AORTIC VALVE PEAK VELOCITY: 1.02 M/S
APTT PPP: 27 SECONDS (ref 27–38)
AST SERPL W P-5'-P-CCNC: 17 U/L (ref 9–39)
AST SERPL W P-5'-P-CCNC: 24 U/L (ref 9–39)
AV PEAK GRADIENT: 4.2 MMHG
AVA (PEAK VEL): 2.86 CM2
AVA (VTI): 2.58 CM2
BASOPHILS # BLD AUTO: 0.01 X10*3/UL (ref 0–0.1)
BASOPHILS NFR BLD AUTO: 0.2 %
BILIRUB DIRECT SERPL-MCNC: 0.1 MG/DL (ref 0–0.3)
BILIRUB SERPL-MCNC: 0.4 MG/DL (ref 0–1.2)
BILIRUB SERPL-MCNC: 0.4 MG/DL (ref 0–1.2)
BNP SERPL-MCNC: 26 PG/ML (ref 0–99)
BUN SERPL-MCNC: 16 MG/DL (ref 6–23)
CALCIUM SERPL-MCNC: 8.6 MG/DL (ref 8.6–10.3)
CARDIAC TROPONIN I PNL SERPL HS: 4 NG/L (ref 0–20)
CHLORIDE SERPL-SCNC: 104 MMOL/L (ref 98–107)
CHOLEST SERPL-MCNC: 151 MG/DL (ref 0–199)
CHOLESTEROL/HDL RATIO: 4.5
CO2 SERPL-SCNC: 25 MMOL/L (ref 21–32)
CREAT SERPL-MCNC: 0.91 MG/DL (ref 0.5–1.3)
EGFRCR SERPLBLD CKD-EPI 2021: 83 ML/MIN/1.73M*2
EJECTION FRACTION APICAL 4 CHAMBER: 67.1
EJECTION FRACTION: 66 %
EOSINOPHIL # BLD AUTO: 0.44 X10*3/UL (ref 0–0.4)
EOSINOPHIL NFR BLD AUTO: 7.2 %
ERYTHROCYTE [DISTWIDTH] IN BLOOD BY AUTOMATED COUNT: 14.6 % (ref 11.5–14.5)
EST. AVERAGE GLUCOSE BLD GHB EST-MCNC: 180 MG/DL
GLUCOSE BLD MANUAL STRIP-MCNC: 206 MG/DL (ref 74–99)
GLUCOSE SERPL-MCNC: 97 MG/DL (ref 74–99)
HBA1C MFR BLD: 7.9 %
HCT VFR BLD AUTO: 41.8 % (ref 41–52)
HDLC SERPL-MCNC: 33.9 MG/DL
HGB BLD-MCNC: 13.1 G/DL (ref 13.5–17.5)
IMM GRANULOCYTES # BLD AUTO: 0.01 X10*3/UL (ref 0–0.5)
IMM GRANULOCYTES NFR BLD AUTO: 0.2 % (ref 0–0.9)
INR PPP: 1 (ref 0.9–1.1)
LDLC SERPL CALC-MCNC: 88 MG/DL
LEFT VENTRICLE INTERNAL DIMENSION DIASTOLE: 3.96 CM (ref 3.5–6)
LEFT VENTRICULAR OUTFLOW TRACT DIAMETER: 2.1 CM
LYMPHOCYTES # BLD AUTO: 2.48 X10*3/UL (ref 0.8–3)
LYMPHOCYTES NFR BLD AUTO: 40.8 %
MCH RBC QN AUTO: 29.2 PG (ref 26–34)
MCHC RBC AUTO-ENTMCNC: 31.3 G/DL (ref 32–36)
MCV RBC AUTO: 93 FL (ref 80–100)
MITRAL VALVE E/A RATIO: 0.42
MITRAL VALVE E/E' RATIO: 5.44
MONOCYTES # BLD AUTO: 0.57 X10*3/UL (ref 0.05–0.8)
MONOCYTES NFR BLD AUTO: 9.4 %
NEUTROPHILS # BLD AUTO: 2.57 X10*3/UL (ref 1.6–5.5)
NEUTROPHILS NFR BLD AUTO: 42.2 %
NON HDL CHOLESTEROL: 117 MG/DL (ref 0–149)
NRBC BLD-RTO: 0 /100 WBCS (ref 0–0)
PLATELET # BLD AUTO: 186 X10*3/UL (ref 150–450)
POTASSIUM SERPL-SCNC: 5.6 MMOL/L (ref 3.5–5.3)
PROT SERPL-MCNC: 6.5 G/DL (ref 6.4–8.2)
PROT SERPL-MCNC: 6.6 G/DL (ref 6.4–8.2)
PROTHROMBIN TIME: 11.2 SECONDS (ref 9.8–12.8)
RBC # BLD AUTO: 4.49 X10*6/UL (ref 4.5–5.9)
RIGHT VENTRICLE FREE WALL PEAK S': 13.3 CM/S
RIGHT VENTRICLE PEAK SYSTOLIC PRESSURE: 7.8 MMHG
SODIUM SERPL-SCNC: 136 MMOL/L (ref 136–145)
TRICUSPID ANNULAR PLANE SYSTOLIC EXCURSION: 1.9 CM
TRIGL SERPL-MCNC: 145 MG/DL (ref 0–149)
VLDL: 29 MG/DL (ref 0–40)
WBC # BLD AUTO: 6.1 X10*3/UL (ref 4.4–11.3)

## 2024-03-18 PROCEDURE — 80061 LIPID PANEL: CPT

## 2024-03-18 PROCEDURE — 36415 COLL VENOUS BLD VENIPUNCTURE: CPT

## 2024-03-18 PROCEDURE — 80053 COMPREHEN METABOLIC PANEL: CPT

## 2024-03-18 PROCEDURE — 99222 1ST HOSP IP/OBS MODERATE 55: CPT | Performed by: PSYCHIATRY & NEUROLOGY

## 2024-03-18 PROCEDURE — 93005 ELECTROCARDIOGRAM TRACING: CPT

## 2024-03-18 PROCEDURE — G0378 HOSPITAL OBSERVATION PER HR: HCPCS

## 2024-03-18 PROCEDURE — 2500000002 HC RX 250 W HCPCS SELF ADMINISTERED DRUGS (ALT 637 FOR MEDICARE OP, ALT 636 FOR OP/ED): Performed by: STUDENT IN AN ORGANIZED HEALTH CARE EDUCATION/TRAINING PROGRAM

## 2024-03-18 PROCEDURE — 99236 HOSP IP/OBS SAME DATE HI 85: CPT

## 2024-03-18 PROCEDURE — 92507 TX SP LANG VOICE COMM INDIV: CPT | Mod: GN | Performed by: SPEECH-LANGUAGE PATHOLOGIST

## 2024-03-18 PROCEDURE — 92610 EVALUATE SWALLOWING FUNCTION: CPT | Mod: GN | Performed by: SPEECH-LANGUAGE PATHOLOGIST

## 2024-03-18 PROCEDURE — 82947 ASSAY GLUCOSE BLOOD QUANT: CPT

## 2024-03-18 PROCEDURE — 83880 ASSAY OF NATRIURETIC PEPTIDE: CPT

## 2024-03-18 PROCEDURE — 92523 SPEECH SOUND LANG COMPREHEN: CPT | Mod: GN | Performed by: SPEECH-LANGUAGE PATHOLOGIST

## 2024-03-18 PROCEDURE — 2500000004 HC RX 250 GENERAL PHARMACY W/ HCPCS (ALT 636 FOR OP/ED): Performed by: STUDENT IN AN ORGANIZED HEALTH CARE EDUCATION/TRAINING PROGRAM

## 2024-03-18 PROCEDURE — 2500000002 HC RX 250 W HCPCS SELF ADMINISTERED DRUGS (ALT 637 FOR MEDICARE OP, ALT 636 FOR OP/ED)

## 2024-03-18 PROCEDURE — 85610 PROTHROMBIN TIME: CPT

## 2024-03-18 PROCEDURE — 99285 EMERGENCY DEPT VISIT HI MDM: CPT | Mod: 25

## 2024-03-18 PROCEDURE — 2500000001 HC RX 250 WO HCPCS SELF ADMINISTERED DRUGS (ALT 637 FOR MEDICARE OP)

## 2024-03-18 PROCEDURE — 70450 CT HEAD/BRAIN W/O DYE: CPT | Performed by: RADIOLOGY

## 2024-03-18 PROCEDURE — 83036 HEMOGLOBIN GLYCOSYLATED A1C: CPT | Mod: STJLAB | Performed by: STUDENT IN AN ORGANIZED HEALTH CARE EDUCATION/TRAINING PROGRAM

## 2024-03-18 PROCEDURE — 93306 TTE W/DOPPLER COMPLETE: CPT | Performed by: INTERNAL MEDICINE

## 2024-03-18 PROCEDURE — 93010 ELECTROCARDIOGRAM REPORT: CPT | Performed by: EMERGENCY MEDICINE

## 2024-03-18 PROCEDURE — 93306 TTE W/DOPPLER COMPLETE: CPT

## 2024-03-18 PROCEDURE — 80076 HEPATIC FUNCTION PANEL: CPT | Mod: CCI

## 2024-03-18 PROCEDURE — 84484 ASSAY OF TROPONIN QUANT: CPT

## 2024-03-18 PROCEDURE — 85730 THROMBOPLASTIN TIME PARTIAL: CPT

## 2024-03-18 PROCEDURE — 85025 COMPLETE CBC W/AUTO DIFF WBC: CPT

## 2024-03-18 PROCEDURE — 2500000004 HC RX 250 GENERAL PHARMACY W/ HCPCS (ALT 636 FOR OP/ED)

## 2024-03-18 PROCEDURE — 99222 1ST HOSP IP/OBS MODERATE 55: CPT | Performed by: NURSE PRACTITIONER

## 2024-03-18 PROCEDURE — 99285 EMERGENCY DEPT VISIT HI MDM: CPT | Performed by: EMERGENCY MEDICINE

## 2024-03-18 PROCEDURE — 70450 CT HEAD/BRAIN W/O DYE: CPT

## 2024-03-18 RX ORDER — FINASTERIDE 5 MG/1
5 TABLET, FILM COATED ORAL NIGHTLY
Status: DISCONTINUED | OUTPATIENT
Start: 2024-03-18 | End: 2024-03-19 | Stop reason: HOSPADM

## 2024-03-18 RX ORDER — DEXTROSE 50 % IN WATER (D50W) INTRAVENOUS SYRINGE
12.5
Status: DISCONTINUED | OUTPATIENT
Start: 2024-03-18 | End: 2024-03-19 | Stop reason: HOSPADM

## 2024-03-18 RX ORDER — BUSPIRONE HYDROCHLORIDE 15 MG/1
7.5 TABLET ORAL 2 TIMES DAILY
Status: DISCONTINUED | OUTPATIENT
Start: 2024-03-18 | End: 2024-03-19 | Stop reason: HOSPADM

## 2024-03-18 RX ORDER — INSULIN LISPRO 100 [IU]/ML
0-10 INJECTION, SOLUTION INTRAVENOUS; SUBCUTANEOUS
Status: DISCONTINUED | OUTPATIENT
Start: 2024-03-18 | End: 2024-03-19 | Stop reason: HOSPADM

## 2024-03-18 RX ORDER — ATORVASTATIN CALCIUM 80 MG/1
80 TABLET, FILM COATED ORAL NIGHTLY
Status: DISCONTINUED | OUTPATIENT
Start: 2024-03-18 | End: 2024-03-19 | Stop reason: HOSPADM

## 2024-03-18 RX ORDER — QUETIAPINE FUMARATE 25 MG/1
25 TABLET, FILM COATED ORAL ONCE
Status: COMPLETED | OUTPATIENT
Start: 2024-03-18 | End: 2024-03-18

## 2024-03-18 RX ORDER — POLYETHYLENE GLYCOL 3350 17 G/17G
17 POWDER, FOR SOLUTION ORAL DAILY
Status: DISCONTINUED | OUTPATIENT
Start: 2024-03-18 | End: 2024-03-19 | Stop reason: HOSPADM

## 2024-03-18 RX ORDER — DIVALPROEX SODIUM 125 MG/1
125 CAPSULE, COATED PELLETS ORAL NIGHTLY
Status: DISCONTINUED | OUTPATIENT
Start: 2024-03-18 | End: 2024-03-19

## 2024-03-18 RX ORDER — PANTOPRAZOLE SODIUM 40 MG/1
40 TABLET, DELAYED RELEASE ORAL EVERY OTHER DAY
Status: DISCONTINUED | OUTPATIENT
Start: 2024-03-19 | End: 2024-03-19 | Stop reason: HOSPADM

## 2024-03-18 RX ORDER — CLOPIDOGREL BISULFATE 75 MG/1
75 TABLET ORAL DAILY
Status: DISCONTINUED | OUTPATIENT
Start: 2024-03-18 | End: 2024-03-18

## 2024-03-18 RX ORDER — HALOPERIDOL 5 MG/ML
2 INJECTION INTRAMUSCULAR ONCE
Status: COMPLETED | OUTPATIENT
Start: 2024-03-18 | End: 2024-03-18

## 2024-03-18 RX ORDER — ATORVASTATIN CALCIUM 40 MG/1
40 TABLET, FILM COATED ORAL NIGHTLY
Status: DISCONTINUED | OUTPATIENT
Start: 2024-03-18 | End: 2024-03-18

## 2024-03-18 RX ORDER — CLOPIDOGREL BISULFATE 75 MG/1
75 TABLET ORAL DAILY
Status: DISCONTINUED | OUTPATIENT
Start: 2024-03-18 | End: 2024-03-19 | Stop reason: HOSPADM

## 2024-03-18 RX ORDER — DEXTROSE 50 % IN WATER (D50W) INTRAVENOUS SYRINGE
25
Status: DISCONTINUED | OUTPATIENT
Start: 2024-03-18 | End: 2024-03-19 | Stop reason: HOSPADM

## 2024-03-18 RX ORDER — FLUTICASONE PROPIONATE 50 MCG
1 SPRAY, SUSPENSION (ML) NASAL 2 TIMES DAILY PRN
Status: DISCONTINUED | OUTPATIENT
Start: 2024-03-18 | End: 2024-03-19 | Stop reason: HOSPADM

## 2024-03-18 RX ORDER — LOSARTAN POTASSIUM 25 MG/1
25 TABLET ORAL DAILY
Status: DISCONTINUED | OUTPATIENT
Start: 2024-03-18 | End: 2024-03-19 | Stop reason: HOSPADM

## 2024-03-18 RX ORDER — INSULIN GLARGINE 100 [IU]/ML
10 INJECTION, SOLUTION SUBCUTANEOUS NIGHTLY
Status: DISCONTINUED | OUTPATIENT
Start: 2024-03-18 | End: 2024-03-19 | Stop reason: HOSPADM

## 2024-03-18 RX ORDER — LEVOTHYROXINE SODIUM 100 UG/1
100 TABLET ORAL
Status: DISCONTINUED | OUTPATIENT
Start: 2024-03-19 | End: 2024-03-19 | Stop reason: HOSPADM

## 2024-03-18 RX ADMIN — PERFLUTREN 9 ML OF DILUTION: 6.52 INJECTION, SUSPENSION INTRAVENOUS at 14:02

## 2024-03-18 RX ADMIN — ATORVASTATIN CALCIUM 80 MG: 80 TABLET, FILM COATED ORAL at 20:44

## 2024-03-18 RX ADMIN — INSULIN GLARGINE 10 UNITS: 100 INJECTION, SOLUTION SUBCUTANEOUS at 20:44

## 2024-03-18 RX ADMIN — POLYETHYLENE GLYCOL 3350 17 G: 17 POWDER, FOR SOLUTION ORAL at 17:35

## 2024-03-18 RX ADMIN — LOSARTAN POTASSIUM 25 MG: 25 TABLET, FILM COATED ORAL at 17:34

## 2024-03-18 RX ADMIN — DIVALPROEX SODIUM 125 MG: 125 CAPSULE, COATED PELLETS ORAL at 20:44

## 2024-03-18 RX ADMIN — FINASTERIDE 5 MG: 5 TABLET, FILM COATED ORAL at 20:44

## 2024-03-18 RX ADMIN — HALOPERIDOL LACTATE 2 MG: 5 INJECTION, SOLUTION INTRAMUSCULAR at 18:15

## 2024-03-18 RX ADMIN — QUETIAPINE FUMARATE 25 MG: 25 TABLET ORAL at 17:34

## 2024-03-18 RX ADMIN — BUSPIRONE HYDROCHLORIDE 7.5 MG: 15 TABLET ORAL at 18:06

## 2024-03-18 SDOH — ECONOMIC STABILITY: HOUSING INSECURITY: IN THE LAST 12 MONTHS, HOW MANY PLACES HAVE YOU LIVED?: 1

## 2024-03-18 SDOH — SOCIAL STABILITY: SOCIAL INSECURITY: ARE YOU OR HAVE YOU BEEN THREATENED OR ABUSED PHYSICALLY, EMOTIONALLY, OR SEXUALLY BY ANYONE?: NO

## 2024-03-18 SDOH — ECONOMIC STABILITY: INCOME INSECURITY: IN THE LAST 12 MONTHS, WAS THERE A TIME WHEN YOU WERE NOT ABLE TO PAY THE MORTGAGE OR RENT ON TIME?: NO

## 2024-03-18 SDOH — SOCIAL STABILITY: SOCIAL INSECURITY: WERE YOU ABLE TO COMPLETE ALL THE BEHAVIORAL HEALTH SCREENINGS?: YES

## 2024-03-18 SDOH — SOCIAL STABILITY: SOCIAL INSECURITY: HAVE YOU HAD THOUGHTS OF HARMING ANYONE ELSE?: NO

## 2024-03-18 SDOH — SOCIAL STABILITY: SOCIAL INSECURITY: DO YOU FEEL ANYONE HAS EXPLOITED OR TAKEN ADVANTAGE OF YOU FINANCIALLY OR OF YOUR PERSONAL PROPERTY?: NO

## 2024-03-18 SDOH — ECONOMIC STABILITY: INCOME INSECURITY: HOW HARD IS IT FOR YOU TO PAY FOR THE VERY BASICS LIKE FOOD, HOUSING, MEDICAL CARE, AND HEATING?: NOT HARD AT ALL

## 2024-03-18 SDOH — SOCIAL STABILITY: SOCIAL INSECURITY: DOES ANYONE TRY TO KEEP YOU FROM HAVING/CONTACTING OTHER FRIENDS OR DOING THINGS OUTSIDE YOUR HOME?: NO

## 2024-03-18 SDOH — ECONOMIC STABILITY: HOUSING INSECURITY
IN THE LAST 12 MONTHS, WAS THERE A TIME WHEN YOU DID NOT HAVE A STEADY PLACE TO SLEEP OR SLEPT IN A SHELTER (INCLUDING NOW)?: NO

## 2024-03-18 SDOH — SOCIAL STABILITY: SOCIAL INSECURITY: HAS ANYONE EVER THREATENED TO HURT YOUR FAMILY OR YOUR PETS?: NO

## 2024-03-18 SDOH — ECONOMIC STABILITY: TRANSPORTATION INSECURITY
IN THE PAST 12 MONTHS, HAS THE LACK OF TRANSPORTATION KEPT YOU FROM MEDICAL APPOINTMENTS OR FROM GETTING MEDICATIONS?: NO

## 2024-03-18 SDOH — SOCIAL STABILITY: SOCIAL INSECURITY: ABUSE: ADULT

## 2024-03-18 SDOH — SOCIAL STABILITY: SOCIAL INSECURITY: DO YOU FEEL UNSAFE GOING BACK TO THE PLACE WHERE YOU ARE LIVING?: NO

## 2024-03-18 SDOH — ECONOMIC STABILITY: TRANSPORTATION INSECURITY
IN THE PAST 12 MONTHS, HAS LACK OF TRANSPORTATION KEPT YOU FROM MEETINGS, WORK, OR FROM GETTING THINGS NEEDED FOR DAILY LIVING?: NO

## 2024-03-18 SDOH — SOCIAL STABILITY: SOCIAL INSECURITY: ARE THERE ANY APPARENT SIGNS OF INJURIES/BEHAVIORS THAT COULD BE RELATED TO ABUSE/NEGLECT?: NO

## 2024-03-18 ASSESSMENT — ACTIVITIES OF DAILY LIVING (ADL)
HEARING - LEFT EAR: FUNCTIONAL
BATHING: INDEPENDENT
WALKS IN HOME: INDEPENDENT
JUDGMENT_ADEQUATE_SAFELY_COMPLETE_DAILY_ACTIVITIES: YES
FEEDING YOURSELF: INDEPENDENT
HEARING - RIGHT EAR: FUNCTIONAL
GROOMING: INDEPENDENT
TOILETING: INDEPENDENT
PATIENT'S MEMORY ADEQUATE TO SAFELY COMPLETE DAILY ACTIVITIES?: YES
ADEQUATE_TO_COMPLETE_ADL: YES
LACK_OF_TRANSPORTATION: NO
DRESSING YOURSELF: INDEPENDENT

## 2024-03-18 ASSESSMENT — LIFESTYLE VARIABLES
EVER FELT BAD OR GUILTY ABOUT YOUR DRINKING: NO
HOW OFTEN DO YOU HAVE 6 OR MORE DRINKS ON ONE OCCASION: NEVER
AUDIT-C TOTAL SCORE: 0
SKIP TO QUESTIONS 9-10: 1
HAVE YOU EVER FELT YOU SHOULD CUT DOWN ON YOUR DRINKING: NO
HAVE PEOPLE ANNOYED YOU BY CRITICIZING YOUR DRINKING: NO
EVER HAD A DRINK FIRST THING IN THE MORNING TO STEADY YOUR NERVES TO GET RID OF A HANGOVER: NO
HOW MANY STANDARD DRINKS CONTAINING ALCOHOL DO YOU HAVE ON A TYPICAL DAY: PATIENT DOES NOT DRINK
HOW OFTEN DO YOU HAVE A DRINK CONTAINING ALCOHOL: NEVER
AUDIT-C TOTAL SCORE: 0

## 2024-03-18 ASSESSMENT — PATIENT HEALTH QUESTIONNAIRE - PHQ9
2. FEELING DOWN, DEPRESSED OR HOPELESS: NOT AT ALL
SUM OF ALL RESPONSES TO PHQ9 QUESTIONS 1 & 2: 0
1. LITTLE INTEREST OR PLEASURE IN DOING THINGS: NOT AT ALL

## 2024-03-18 ASSESSMENT — ENCOUNTER SYMPTOMS
SPEECH DIFFICULTY: 1
EYES NEGATIVE: 1
CARDIOVASCULAR NEGATIVE: 1
WEAKNESS: 1
HEMATOLOGIC/LYMPHATIC NEGATIVE: 1
GASTROINTESTINAL NEGATIVE: 1
PSYCHIATRIC NEGATIVE: 1
ALLERGIC/IMMUNOLOGIC NEGATIVE: 1
RESPIRATORY NEGATIVE: 1
MUSCULOSKELETAL NEGATIVE: 1
ENDOCRINE NEGATIVE: 1
CONSTITUTIONAL NEGATIVE: 1

## 2024-03-18 ASSESSMENT — COGNITIVE AND FUNCTIONAL STATUS - GENERAL
CLIMB 3 TO 5 STEPS WITH RAILING: A LITTLE
TOILETING: A LITTLE
MOVING TO AND FROM BED TO CHAIR: A LITTLE
DAILY ACTIVITIY SCORE: 20
DRESSING REGULAR UPPER BODY CLOTHING: A LITTLE
DAILY ACTIVITIY SCORE: 20
MOVING TO AND FROM BED TO CHAIR: A LITTLE
TOILETING: A LITTLE
MOBILITY SCORE: 20
WALKING IN HOSPITAL ROOM: A LITTLE
DRESSING REGULAR LOWER BODY CLOTHING: A LITTLE
PATIENT BASELINE BEDBOUND: NO
STANDING UP FROM CHAIR USING ARMS: A LITTLE
STANDING UP FROM CHAIR USING ARMS: A LITTLE
HELP NEEDED FOR BATHING: A LITTLE
MOBILITY SCORE: 20
WALKING IN HOSPITAL ROOM: A LITTLE
HELP NEEDED FOR BATHING: A LITTLE
CLIMB 3 TO 5 STEPS WITH RAILING: A LITTLE
DRESSING REGULAR LOWER BODY CLOTHING: A LITTLE
DRESSING REGULAR UPPER BODY CLOTHING: A LITTLE

## 2024-03-18 ASSESSMENT — COLUMBIA-SUICIDE SEVERITY RATING SCALE - C-SSRS
2. HAVE YOU ACTUALLY HAD ANY THOUGHTS OF KILLING YOURSELF?: NO
1. IN THE PAST MONTH, HAVE YOU WISHED YOU WERE DEAD OR WISHED YOU COULD GO TO SLEEP AND NOT WAKE UP?: NO
6. HAVE YOU EVER DONE ANYTHING, STARTED TO DO ANYTHING, OR PREPARED TO DO ANYTHING TO END YOUR LIFE?: NO

## 2024-03-18 ASSESSMENT — PAIN - FUNCTIONAL ASSESSMENT: PAIN_FUNCTIONAL_ASSESSMENT: 0-10

## 2024-03-18 ASSESSMENT — PAIN SCALES - WONG BAKER: WONGBAKER_NUMERICALRESPONSE: NO HURT

## 2024-03-18 ASSESSMENT — PAIN SCALES - GENERAL
PAINLEVEL_OUTOF10: 0 - NO PAIN
PAINLEVEL_OUTOF10: 0 - NO PAIN

## 2024-03-18 NOTE — SIGNIFICANT EVENT
"Capacity Evaluation    \"Capacity\" is the \"ability\" to make a decision.  The decision in question must be specific (one decision), relevant to a patient's current condition (appropriate), and timely (neither prospective nor retrospective).    Capacity varies based on knowledge base (explanation/understanding of clinical information), cognitive processing, acute psychiatric illness, and other clinical conditions.    In order to be deemed \"capacitated\" to make a single decision at one point in time, a patient must demonstrate all 4 of the following elements:    *Ability to consistently communicate a choice (consistent over time with adequate information)  *Ability to understand the relevant information (accurate knowledge of condition)  *Ability to appreciate the situation and its consequences (risks/benefits, pros/cons)  *Ability to reason about treatment options (without undue influence of a person or condition, eg. suicidality or acute psychosis)      Current Decision    Clinical issue:   Delirium, refusal to cooperate with care    Did the appropriate team address relevant information with the patient:  Yes    Date: 3/18/2024    If \"NO\" is selected for appropriate team, then please discuss with the appropriate team.  The appropriate team should be encouraged to address relevant information with the patient AND reevaluate capacity when appropriate.    Capacity Evaluation    Patient demonstrates ability to consistently communicate choice:  No     Patient demonstrates ability to understand the relevant information:  No     Patient demonstrates ability to appreciate the situation and its consequences:  No     Patient demonstrates ability to reason about treatment options:  No     If ANY of the above items are answered \"NO,\" the patient LACKS CAPACITY for that specific decision at hand, at that specific time.  Further capacity evaluations can be done as needed.    Rajiv Crystal, DO  Internal Medicine PGY-3   "

## 2024-03-18 NOTE — CONSULTS
"Inpatient consult to Neurology  Consult performed by: Melaine Mauricio, CRUZ-CNP  Consult ordered by: Rajiv Joy, DO          History Of Present Illness  Jose Guardado is a 83 y.o. male presenting with RLE weakness/numbness (although pt denies that he had right leg weakness). I spoke to patient's wife Kerri on the telephone as she is currently receiving dialysis in an outpatient setting. She states that pt went to bed feeling normal last night and that he has had no recent illnesses or infectious symptoms. He woke up this morning stating that his entire right leg felt \"asleep\" and that he could not feel it. She believes that he was able to move the leg because he was able to get out of the bed and walk to the EMS cot when they arrived. She was told by EMS personnel later that pt's sensation in his leg was back to normal. They were able to talk pt in to coming to the ED. In the ED, his NIHSS was 0 and initial CT head showed remote strokes but nothing acute. His blood pressure was 159/82. He was admitted for further work up after discussion with neurology. His children are at bedside and feel that patient has not been taking his Plavix everyday and in fact, the patient also admitted that he takes his pills when he feels like it. Pt follows with DR. Simon in the outpatient setting and last appointment was October 2023. 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 Currently, pt reports that the feeling in his right leg has fully returned. He denies any new symptoms.     Past Medical History  Past Medical History:   Diagnosis Date    Dizziness and giddiness 12/21/2021    Episodic lightheadedness    Other chest pain 12/21/2021    Atypical chest pain    Other chest pain 12/21/2021    Chest pain, musculoskeletal    Personal history of other diseases of male genital organs 11/30/2021    History of erectile dysfunction    Personal history of other specified conditions " 05/14/2018    History of urinary incontinence    Stroke (CMS/HCC)      Surgical History  Past Surgical History:   Procedure Laterality Date    MR HEAD ANGIO WO IV CONTRAST  2/24/2021    MR HEAD ANGIO WO IV CONTRAST 2/24/2021 [facility]    MR HEAD ANGIO WO IV CONTRAST  4/24/2021    MR HEAD ANGIO WO IV CONTRAST 4/24/2021 [facility]    MR HEAD ANGIO WO IV CONTRAST  11/18/2022    MR HEAD ANGIO WO IV CONTRAST 11/18/2022 [facility]    MR HEAD ANGIO WO IV CONTRAST  3/22/2023    MR HEAD ANGIO WO IV CONTRAST [facility]    MR NECK ANGIO WO IV CONTRAST  2/24/2021    MR NECK ANGIO WO IV CONTRAST 2/24/2021 [facility]    MR NECK ANGIO WO IV CONTRAST  4/24/2021    MR NECK ANGIO WO IV CONTRAST 4/24/2021 [facility]    MR NECK ANGIO WO IV CONTRAST  11/18/2022    MR NECK ANGIO WO IV CONTRAST 11/18/2022 [facility]    MR NECK ANGIO WO IV CONTRAST  3/22/2023    MR NECK ANGIO WO IV CONTRAST [facility]    OTHER SURGICAL HISTORY  05/14/2018    Incision Of Mastoid    OTHER SURGICAL HISTORY  11/30/2021    Complete colonoscopy    OTHER SURGICAL HISTORY  11/30/2021    Esophagogastroduodenoscopy    OTHER SURGICAL HISTORY  11/30/2021    Mastoidectomy    OTHER SURGICAL HISTORY  11/30/2021    Renal lithotripsy    OTHER SURGICAL HISTORY  11/30/2021    Tonsillectomy with adenoidectomy    TONSILLECTOMY  05/14/2018    Tonsillectomy     Social History  Social History     Tobacco Use    Smoking status: Former     Types: Cigarettes    Smokeless tobacco: Never     Allergies  Patient has no known allergies.  Medications Prior to Admission   Medication Sig Dispense Refill Last Dose    atorvastatin (Lipitor) 80 mg tablet Take 1 tablet (80 mg) by mouth once daily at bedtime. 90 tablet 3     Briviact 50 mg tablet tablet TAKE 1 TABLET BY MOUTH TWICE  DAILY 180 tablet 1     busPIRone (Buspar) 15 mg tablet Take 1 tablet (15 mg) by mouth 3 times a day as needed (anger). (Patient taking differently: Take 0.5 tablets (7.5 mg)  by mouth 2 times a day.) 90 tablet 5     clopidogrel (Plavix) 75 mg tablet Take 1 tablet (75 mg) by mouth once daily.       divalproex sprinkle (Depakote Sprinkles) 125 mg DR capsule Start 1 tablet nightly for 3-5 days then increase to 2 tablets nightly (Patient taking differently: Take 1 capsule (125 mg) by mouth once daily at bedtime. Start 1 tablet nightly for 3-5 days then increase to 2 tablets nightly) 60 capsule 11     finasteride (Proscar) 5 mg tablet Take 1 tablet (5 mg) by mouth once daily at bedtime. 90 tablet 3     fluticasone (Flonase) 50 mcg/actuation nasal spray Administer 1 spray into each nostril 2 times a day as needed.       glimepiride (Amaryl) 2 mg tablet Take 1 tablet (2 mg) by mouth once daily. 90 tablet 3     insulin glargine (Lantus U-100 Insulin) 100 unit/mL injection Inject 10-12 Units under the skin as needed at bedtime. BS >120       levothyroxine (Synthroid, Levoxyl) 100 mcg tablet Take 1 tablet (100 mcg) by mouth once daily in the morning. Take before meals. 90 tablet 3     losartan (Cozaar) 25 mg tablet Take 1 tablet (25 mg) by mouth once daily. 90 tablet 3     metFORMIN (Glucophage) 500 mg tablet Take 1 tablet (500 mg) by mouth 2 times a day with meals. 180 tablet 3     pantoprazole (ProtoNix) 40 mg EC tablet TAKE 1 TABLET BY MOUTH DAILY (Patient taking differently: Take 1 tablet (40 mg) by mouth every other day.) 90 tablet 3 3/17/2024    pioglitazone (Actos) 15 mg tablet Take 1 tablet (15 mg) by mouth once daily. 90 tablet 3     QUEtiapine (SEROquel) 25 mg tablet Take 1 tablet (25 mg) by mouth once daily at bedtime. Take 1 to 2 Tablets by mouth at Bedtime if needed for sleep. (Patient not taking: Reported on 3/18/2024) 30 tablet 0 Not Taking       Review of Systems  ROS: 12 systems reviewed and negative except per HPI above    Neurological Exam  Physical Exam  Mental Status: Awake and alert. Oriented to person, place and time. Speech was fluent to history. Naming, repetition and  "comprehension were intact.   CN: (CN2)- No blink to threat on right side. (CN 2,3) PERRL. (CN 3,4,6) Dysconjugate gaze (lazy eye on left with ptosis-chronic per family). (CN 5)Facial sensation was intact to light touch bilaterally. (CN 7)Facial expression was symmetric (CN 8)- hearing decreased. (CN 12) Tongue protruded midline.   Motor: Normal muscle bulk and tone. Strength (confrontation testing) was (R/L) 5/5 shoulder abduction, elbow flexion/extension,  strenght, hip flexion, knee flexion/extension, ankle dorsi- and plantar flexion. There were no abnormal movements. Sensory: Intact to light touch in all 4 extremities. Coordination (cerebellar function): Finger to nose and heel to shin were intact with no dysmetria.   Last Recorded Vitals  Blood pressure 141/73, pulse 84, temperature 36 °C (96.8 °F), resp. rate 18, height 1.702 m (5' 7\"), weight 75.6 kg (166 lb 10.7 oz), SpO2 94 %.    Relevant Results  Scheduled medications  insulin lispro, 0-10 Units, subcutaneous, TID with meals  perflutren lipid microspheres, 0.5-10 mL of dilution, intravenous, Once in imaging  perflutren protein A microsphere, 0.5 mL, intravenous, Once in imaging  sulfur hexafluoride microsphr, 2 mL, intravenous, Once in imaging      Continuous medications     PRN medications  PRN medications: dextrose, dextrose, glucagon  Results for orders placed or performed during the hospital encounter of 03/18/24 (from the past 24 hour(s))   ECG 12 lead   Result Value Ref Range    Ventricular Rate 71 BPM    Atrial Rate 71 BPM    WA Interval 142 ms    QRS Duration 74 ms    QT Interval 382 ms    QTC Calculation(Bazett) 415 ms    P Axis 74 degrees    R Axis 26 degrees    T Axis 49 degrees    QRS Count 12 beats    Q Onset 217 ms    P Onset 146 ms    P Offset 182 ms    T Offset 408 ms    QTC Fredericia 404 ms   Comprehensive metabolic panel   Result Value Ref Range    Glucose 97 74 - 99 mg/dL    Sodium 136 136 - 145 mmol/L    Potassium 5.6 (H) 3.5 - 5.3 " mmol/L    Chloride 104 98 - 107 mmol/L    Bicarbonate 25 21 - 32 mmol/L    Anion Gap 13 10 - 20 mmol/L    Urea Nitrogen 16 6 - 23 mg/dL    Creatinine 0.91 0.50 - 1.30 mg/dL    eGFR 83 >60 mL/min/1.73m*2    Calcium 8.6 8.6 - 10.3 mg/dL    Albumin 3.6 3.4 - 5.0 g/dL    Alkaline Phosphatase 53 33 - 136 U/L    Total Protein 6.5 6.4 - 8.2 g/dL    AST 24 9 - 39 U/L    Bilirubin, Total 0.4 0.0 - 1.2 mg/dL    ALT 11 10 - 52 U/L   CBC and Auto Differential   Result Value Ref Range    WBC 6.1 4.4 - 11.3 x10*3/uL    nRBC 0.0 0.0 - 0.0 /100 WBCs    RBC 4.49 (L) 4.50 - 5.90 x10*6/uL    Hemoglobin 13.1 (L) 13.5 - 17.5 g/dL    Hematocrit 41.8 41.0 - 52.0 %    MCV 93 80 - 100 fL    MCH 29.2 26.0 - 34.0 pg    MCHC 31.3 (L) 32.0 - 36.0 g/dL    RDW 14.6 (H) 11.5 - 14.5 %    Platelets 186 150 - 450 x10*3/uL    Neutrophils % 42.2 40.0 - 80.0 %    Immature Granulocytes %, Automated 0.2 0.0 - 0.9 %    Lymphocytes % 40.8 13.0 - 44.0 %    Monocytes % 9.4 2.0 - 10.0 %    Eosinophils % 7.2 0.0 - 6.0 %    Basophils % 0.2 0.0 - 2.0 %    Neutrophils Absolute 2.57 1.60 - 5.50 x10*3/uL    Immature Granulocytes Absolute, Automated 0.01 0.00 - 0.50 x10*3/uL    Lymphocytes Absolute 2.48 0.80 - 3.00 x10*3/uL    Monocytes Absolute 0.57 0.05 - 0.80 x10*3/uL    Eosinophils Absolute 0.44 (H) 0.00 - 0.40 x10*3/uL    Basophils Absolute 0.01 0.00 - 0.10 x10*3/uL   Troponin I, High Sensitivity   Result Value Ref Range    Troponin I, High Sensitivity 4 0 - 20 ng/L   Protime-INR   Result Value Ref Range    Protime 11.2 9.8 - 12.8 seconds    INR 1.0 0.9 - 1.1   APTT   Result Value Ref Range    aPTT 27 27 - 38 seconds   Hepatic Function Panel   Result Value Ref Range    Albumin 3.9 3.4 - 5.0 g/dL    Bilirubin, Total 0.4 0.0 - 1.2 mg/dL    Bilirubin, Direct 0.1 0.0 - 0.3 mg/dL    Alkaline Phosphatase 62 33 - 136 U/L    ALT 13 10 - 52 U/L    AST 17 9 - 39 U/L    Total Protein 6.6 6.4 - 8.2 g/dL   B-Type Natriuretic Peptide   Result Value Ref Range    BNP 26 0 - 99  pg/mL   Lipid Panel   Result Value Ref Range    Cholesterol 151 0 - 199 mg/dL    HDL-Cholesterol 33.9 mg/dL    Cholesterol/HDL Ratio 4.5     LDL Calculated 88 <=99 mg/dL    VLDL 29 0 - 40 mg/dL    Triglycerides 145 0 - 149 mg/dL    Non HDL Cholesterol 117 0 - 149 mg/dL   POCT GLUCOSE   Result Value Ref Range    POCT Glucose 206 (H) 74 - 99 mg/dL         NIH Stroke Scale  1A. Level of Consciousness: Alert, Keenly Responsive  1B. Ask Month and Age: Both Questions Right  1C. Blink Eyes & Squeeze Hands: Performs Both Tasks  2. Best Gaze: Normal  3. Visual: No Visual Loss  4. Facial Palsy: Normal Symmetrical Movements  5A. Motor - Left Arm: No Drift  5B. Motor - Right Arm: No Drift  6A. Motor - Left Leg: No Drift  6B. Motor - Right Leg: No Drift  7. Limb Ataxia: Absent  8. Sensory Loss: Normal  9. Best Language: No Aphasia  10. Dysarthria: Normal  11. Extinction and Inattention: No Abnormality  NIH Stroke Scale: 0        I have personally reviewed the following imaging results ECG 12 lead    Result Date: 3/18/2024  Normal sinus rhythm Possible Anterior infarct (cited on or before 02-FEB-2024) Abnormal ECG When compared with ECG of 02-FEB-2024 11:32, No significant change was found    CT head wo IV contrast    Result Date: 3/18/2024  Interpreted By:  Sirena Raphael, STUDY: CT HEAD WO IV CONTRAST;  3/18/2024 8:59 am   INDICATION: Signs/Symptoms:Right lower extremity weakness, now resolved.  History of prior stroke.   COMPARISON: 07/15/2023   ACCESSION NUMBER(S): [id number]   ORDERING CLINICIAN: RADHA FISCHER   TECHNIQUE: Examination was performed in the axial plane with sagittal and coronal reconstructions.   FINDINGS: INTRACRANIAL: There is prominence of the ventricular system and cerebral sulci consistent with cerebral atrophy. No mass or mass effect is identified. There is no hemorrhage or subdural fluid collection. There is no acute infarct. There is a left parietal infarct. There are small remote bilateral cerebellar  hemispheric infarcts.   EXTRACRANIAL: There is mild mucosal thickening of the maxillary sinuses. There is mild mucosal thickening of the frontal sinuses. There is marked mucosal thickening and opacification of ethmoid air cells.       1. No acute intracranial pathology. 2. 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   MACRO: None   Signed by: Sirena Raphael 3/18/2024 9:06 AM Dictation workstation:   [id number]  .     Assessment/Plan   Principal Problem:    Transient ischemic attack (TIA)    - Right leg weakness/sensory loss- returned to baseline with no focal neurological symptoms  - TIA vs acute ischemic stroke    Recommend:    Complete stroke work up with MRI brain, MRA H/N, echo  Cardiac telemetry  Continue Plavix for now; continue statin  Neuro checks every 4 hours  PT/OT/ST  Discussed importance of medication compliance for stroke prevention.    Case/plan discussed and pt seen with Dr. Edwards.       I spent 55 minutes in the professional and overall care of this patient.      CRUZ Deal-CNP    I saw and evaluated the patient.  I obtained the key portions of the history and examination.  I reviewed the residents/APNs note, discussed the patient and supervised treatment plan formulation.    Briefly, this is an 83 year old male with a prior stroke presenting with an episode of right leg numbness.  Apparently, he woke up and noted that his right leg was numb.  Often times, he gets numbness in his feet when waking up.  Today, it involved his whole leg.  In addition, it appeared that he had difficulty moving his right leg.  His wife called the ambulance.  By the time the ambulance had arrived, symptoms had dissipated.  He denies any double vision, slurred speech, facial droop, or weakness/numbness in his arms.    Objective:  Gen: NAD  Neuro:  --HIF: A&O X 3, repetition and naming intact  --CN:  PERRLA, dysconjugate gaze (left eye deviated to the left), VF - no blink to threa  ton the right, no visible facial asymmetry, facial sensation intact, no tongue or palatal deviation, SCM intact  --Motor: Moves all 4 extremities equally; no focal deficits  --Sensory: Intact to light touch, intact to pinprick  --Reflex: 1+ symmetric, toes down  --Cerebellum: FTN and HTS intact  --Gait: Deferred     CT Brain (I personally reviewed the images/tracings with the following interpretation)  No acute changes  Chronic infarct noted in the left parietal region    Assessment:   Right Leg Weakness/Numbness  - ddx includes TIA  - recommend MRI Brain, MRA head/neck  - Echocardiogram  - resume Clopidogrel 75 mg/day for now  - consider adding ASA  - stroke labs: Lipid Panel, Hemoglobin A1C  - neuro checks  - cardiac telemetry    Huber Edwards MD  [facility]  Department of Neurology

## 2024-03-18 NOTE — PROGRESS NOTES
Speech-Language Pathology     Inpatient Speech-Language Pathology Clinical Swallow Evaluation       Patient Name: Jose Guardado  MRN: [id number]  Today's Date: 3/18/2024   Start Time: 1534  Stop Time: 1545  Time Calculation (min): 11    Current Problem:  1. Stroke-like symptoms        2. Cerebrovascular small vessel disease  Transthoracic Echo (TTE) Complete    Transthoracic Echo (TTE) Complete      3. History of TIA (transient ischemic attack)  Transthoracic Echo (TTE) Complete    Transthoracic Echo (TTE) Complete      4. Transient ischemic attack (TIA)  CANCELED: Transthoracic Echo Complete    CANCELED: Transthoracic Echo Complete        Recommendations:  Diet: Regular with thin liquids; no straws  Medication Administration: Whole with small, single sips thin liquid via cup  Strategies: Sit upright in 90 degree position&maintain upright posture during/after eating for 30 mins&slow rate&strict oral care&no straws&assistance/supervision with PO as needed& small sips/bites&alternate between bites and sips&refrain from speaking during PO.     Assessment:  Consistencies Trialed: Pureed, Regular; Thin Liquids   Oral motor exam: WFL with exception of slightly decreased labial strength. Pt with upper/lower dentures.  Oral phase of swallowing was WFL, with functional oral preparation and clearance of all textures. Pt noted to be  speaking during mastication of regular consistency.  Pt with suspect pharyngeal dysphagia, characterized by coughing during the [place] Swallow Protocol (i.e., 3 oz water challenge). No other S/S pharyngeal dysphagia were evident (including with single, small sips thin liquid). Of note, coughing noted prior to PO.   Pt was oriented with exception of place and specific date.   Due to overt S/S pharyngeal dysphagia, pt is considered to be at risk for aspiration.   Per the results of this assessment, patient is appropriate to continue PO with recommendations and precautions as noted above. ST to  "continue to follow patient during inpatient stay.    Plan:  Skilled speech therapy for dysphagia treatment is warranted in order to provide training and instruction regarding the use of compensatory swallow strategies and pt/caregiver education in order to reduce risk of aspiration, dehydration and malnutrition.  Frequency: 2x per week  Duration: 1 week    SLP Discharge Recommendations: SNF     Goals:  1. Patient will tolerate baseline diet with no overt s/s of aspiration in 90% of observed therapeutic trials.  2. Patient will implement safe swallowing strategies to reduce risk of aspiration in 90% of trials given caregiver assistance/cueing as needed.  3. Determine candidacy for a modified barium swallow study (MBSS) for objective assessment of swallowing function, to assess for aspiration, and to guide further recommendations and treatment plan.                Subjective   Pt seen at bedside for clinical swallow evaluation. He was sitting at the EOB and appeared to be agitated. PCA in room, stating the plan was to take pt to nsg station 2/2 agitation. Per nsg, SLP may proceed with clinical swallowing evaluation in room. Pt able to redirect to task given min verbal cues, at which point he was pleasant and cooperative. Vocal intensity was WFL.        Baseline Assessment:  O2 use: Room air     General Visit Information:  Clinical Swallow Evaluation ordered d/t concern for dysphagia. Current diet level is Regular with thin liquids.  SELENA Lozada reports pt is sundowning and trying to get up. Pt reports no difficulty with swallowing.      History Of Present Illness:       Per EMR, \"Jose Guardado is a 83 y.o. male presenting with right lower extremity weakness. Past medical history is significant for CVA in November 2022 with residual expressive aphasia, seizure disorder, hypertension, hyperlipidemia,  hypothyroidism, asthma, insulin-dependent diabetes mellitus, GERD, BPH, history of implantable loop recorder, left " "strabismus from childhood polio. The patient reports a episode of right lower extremity weakness this morning.  He was hesitant to call 911 so his wife called for assistance.  By the time EMS had showed up his symptoms had completely resolved.  He does have residual weakness and dysarthria from his CVA and has worked with physical therapy.  Denies headache, change in vision, chest pain, change in bowel or bladder.  Reports no recent falls.      Son and daughter were at bedside.  His son reports assisting him with his medication.  The patient states that he does not take his pills often and he \"throws them away\" if he is feeling okay.  His daughter did disclose that she is currently petitioning for guardianship of both her father and mother.  She also stated her sister was in town and assisted her father with medications while she was visiting and his mentation and overall demeanor was much better when medically compliant.     ED Course  Vitals: Temperature 35.9, heart rate 70, respiratory rate 18 /82, 96% on room air.  Labs: CBC and CMP unremarkable.  EKG: NSR, HR 71, Qtc = 415  Imaging: CT negative for acute pathology, remote infarcts left parietal region of both hemispheres  Interventions: Dr. Wolfe was consulted in the emergency department and it was recommended that the patient be admitted for a TIA/stroke rule out. Instructed ED team to continue plavix and hold ASA.        Past Medical History  He has a past medical history of Dizziness and giddiness (12/21/2021), Other chest pain (12/21/2021), Other chest pain (12/21/2021), Personal history of other diseases of male genital organs (11/30/2021), Personal history of other specified conditions (05/14/2018), and Stroke (CMS/[facility]).     Surgical History  He has a past surgical history that includes Other surgical history (05/14/2018); Tonsillectomy (05/14/2018); Other surgical history (11/30/2021); MR angio head wo IV contrast (2/24/2021); MR angio neck wo IV " "contrast (2/24/2021); MR angio head wo IV contrast (4/24/2021); MR angio neck wo IV contrast (4/24/2021); MR angio head wo IV contrast (11/18/2022); MR angio neck wo IV contrast (11/18/2022); MR angio neck wo IV contrast (3/22/2023); and MR angio head wo IV contrast (3/22/2023).\"    Pain:  Rating scale: 0-10   Pt report: 0     Treatment: No     Education:   Learner pt; RN Kierra  Barriers to Learning Acuity of illness; cognitive communication limitations; hearing acuity  Method demonstration; verbal  Education-Topic  SLP provided patient/family education regarding role of SLP, purpose of assessment and clinical impressions/recommendations. Patient verbalized questionable full comprehension, consistent with cognitive status. SLP further coordinated with RN regarding recommendations and precautions per this assessment, with RN verbalizing understanding.  Outcome Needs review/reinforcement       "

## 2024-03-18 NOTE — ED PROVIDER NOTES
HPI   No chief complaint on file.      Patient is an 83-year-old male with hyperlipidemia, hypertension, diabetes, seizure disorder, essential tremor, history of prior stroke presenting to the emergency department for right lower extremity weakness.  He woke up with this weakness and called 911.  However, his symptoms resolved by the time EMS arrived at his home.  He is otherwise been in his normal state of health and did not have any other deficits.                          No data recorded                   Patient History   Past Medical History:   Diagnosis Date    Dizziness and giddiness 12/21/2021    Episodic lightheadedness    Other chest pain 12/21/2021    Atypical chest pain    Other chest pain 12/21/2021    Chest pain, musculoskeletal    Personal history of other diseases of male genital organs 11/30/2021    History of erectile dysfunction    Personal history of other specified conditions 05/14/2018    History of urinary incontinence     Past Surgical History:   Procedure Laterality Date    MR HEAD ANGIO WO IV CONTRAST  2/24/2021    MR HEAD ANGIO WO IV CONTRAST 2/24/2021 [facility]    MR HEAD ANGIO WO IV CONTRAST  4/24/2021    MR HEAD ANGIO WO IV CONTRAST 4/24/2021 [facility]    MR HEAD ANGIO WO IV CONTRAST  11/18/2022    MR HEAD ANGIO WO IV CONTRAST 11/18/2022 [facility]    MR HEAD ANGIO WO IV CONTRAST  3/22/2023    MR HEAD ANGIO WO IV CONTRAST [facility]    MR NECK ANGIO WO IV CONTRAST  2/24/2021    MR NECK ANGIO WO IV CONTRAST 2/24/2021 [facility]    MR NECK ANGIO WO IV CONTRAST  4/24/2021    MR NECK ANGIO WO IV CONTRAST 4/24/2021 [facility]    MR NECK ANGIO WO IV CONTRAST  11/18/2022    MR NECK ANGIO WO IV CONTRAST 11/18/2022 [facility]    MR NECK ANGIO WO IV CONTRAST  3/22/2023    MR NECK ANGIO WO IV CONTRAST [facility]    OTHER SURGICAL HISTORY  05/14/2018    Incision Of Mastoid    OTHER SURGICAL HISTORY  11/30/2021    Complete colonoscopy    OTHER SURGICAL  HISTORY  11/30/2021    Esophagogastroduodenoscopy    OTHER SURGICAL HISTORY  11/30/2021    Mastoidectomy    OTHER SURGICAL HISTORY  11/30/2021    Renal lithotripsy    OTHER SURGICAL HISTORY  11/30/2021    Tonsillectomy with adenoidectomy    TONSILLECTOMY  05/14/2018    Tonsillectomy     Family History   Problem Relation Name Age of Onset    Arthritis Father      Hypertension Father      Diabetes Brother      Other (Stroke syndrome) Brother       Social History     Tobacco Use    Smoking status: Former     Types: Cigarettes    Smokeless tobacco: Never   Substance Use Topics    Alcohol use: Not on file    Drug use: Not on file       Physical Exam   ED Triage Vitals   Temp Pulse Resp BP   -- -- -- --      SpO2 Temp src Heart Rate Source Patient Position   -- -- -- --      BP Location FiO2 (%)     -- --       Physical Exam  Vitals and nursing note reviewed.   Constitutional:       General: He is not in acute distress.     Appearance: He is well-developed.   HENT:      Head: Normocephalic and atraumatic.      Right Ear: External ear normal.      Left Ear: External ear normal.      Nose: Nose normal.      Mouth/Throat:      Mouth: Mucous membranes are moist.   Eyes:      General: No scleral icterus.     Conjunctiva/sclera: Conjunctivae normal.      Pupils: Pupils are equal, round, and reactive to light.      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   Cardiovascular:      Rate and Rhythm: Normal rate and regular rhythm.      Heart sounds: No murmur heard.  Pulmonary:      Effort: Pulmonary effort is normal. No respiratory distress.      Breath sounds: Normal breath sounds.   Abdominal:      Palpations: Abdomen is soft.      Tenderness: There is no abdominal tenderness.   Musculoskeletal:         General: No swelling.      Cervical back: Neck supple. No rigidity.   Skin:     General: Skin is warm and dry.   Neurological:      General: No focal  deficit present.      Mental Status: He is alert and oriented to person, place, and time. Mental status is at baseline.      Cranial Nerves: No cranial nerve deficit.      Sensory: No sensory deficit.      Motor: No weakness.      Coordination: Coordination normal.      Gait: Gait normal.      Comments: NIHSS is 0.    Psychiatric:         Mood and Affect: Mood normal.         ED Course & MDM   Diagnoses as of 03/18/24 1114   Stroke-like symptoms       Medical Decision Making  Patient is an 83-year-old male with hypertension, hyperlipidemia, diabetes, seizure disorder, history of prior stroke presents emergency for right lower extremity weakness that has resolved by the time he arrives to the emergency department.  NIHSS 0.  We will obtain CT imaging of his head as well as to evaluate for a possible TIA.    CT imaging shows findings of his prior, remote infarcts.  CBC is negative for leukocytosis.  He does have a hemoglobin of 13.1, which is similar to prior lab work.  CMP shows a mildly hemolyzed potassium 5.6, likely within normal limits if corrected.  Otherwise unremarkable.  INR is within normal limits.    Patient has a moderate risk ABCD2 score.  His neurologist is Dr. Simon.  We spoke with Dr. Edwards, who is on-call.  After discussion of patient's case, he recommends admission for further evaluation of stroke.  Patient is already on clopidogrel.  Dr. Edwards recommends deferring aspirin at this time.  This was discussed with the patient and her son.  Although the patient is unhappy, he agrees to staying.  This was discussed with teaching service and patient is admitted for further evaluation.    Víctor Bartlett DO, PGY 3  Emergency Medicine Resident    =================Attending note===============    The patient was seen by the resident/fellow.  I have personally performed a substantive portion of the encounter.  I have seen and examined the patient; agree with the workup, evaluation, MDM,   management and  diagnosis.  The care plan has been discussed with the resident; I have reviewed the resident's note and agree with the documented findings.      This is a 83 y.o. male who presents to ER with right leg weakness.  He woke up with this weakness and that he called 911.  By the time the ambulance got there his weakness had resolved.  He does have a prior stroke from November 2022.  He had some residual speech issues from that.  He has no neurologic complaints currently.    Heart is regular.  Lungs are clear.  Abdomen is soft and nontender.  NIH stroke scale is a 0.  He was able to get up and ambulate off the EMS cart to the bed on arrival.  Patient states his symptoms are resolved and he would like to go home.    EKG: Normal sinus rhythm with a ventricular rate of 71.  .  QTc 415.  No ST changes.    ==========================================      Amount and/or Complexity of Data Reviewed  Labs: ordered.  Radiology: ordered.  ECG/medicine tests: ordered.        Procedure  Procedures     Víctor Bartlett DO  Resident  03/18/24 6251

## 2024-03-18 NOTE — ED NOTES
83yr old M margo from home with stroke like symptoms. Pt placed on monitor. Call bell within reach.      Michelle Phipps RN  03/18/24 1050    Daughter at bedside updated on plan of care.        Michelle Phipps RN  03/18/24 1051    Wife Kerri on the phone states she is POA and wanted to report increased confusion with pt.        Michelle Phipps RN  03/18/24 0114    Report called to EMERITA Hancock.        Michelle Phipps RN  03/18/24 [id number]

## 2024-03-18 NOTE — PROGRESS NOTES
Speech-Language Pathology    SLP Adult Inpatient Speech-Language Cognition    Patient Name: Jose Guardado  MRN: [id number]  Today's Date: 3/18/2024   Start Time: 1546  Stop Time: 1617  Time Calculation (min): 31     Current Problem:   1. Stroke-like symptoms        2. Cerebrovascular small vessel disease  Transthoracic Echo (TTE) Complete    Transthoracic Echo (TTE) Complete      3. History of TIA (transient ischemic attack)  Transthoracic Echo (TTE) Complete    Transthoracic Echo (TTE) Complete      4. Transient ischemic attack (TIA)  CANCELED: Transthoracic Echo Complete    CANCELED: Transthoracic Echo Complete        SLP Assessment:  Pt presents with moderately severe cognitive communication impairment per evaluation thus far, characterized by deficits in the following areas: Orientation, immediate/delayed recall, and auditory/thought/verbal processing (of note, pt with word retrieval difficulty PTA d/t H/O TIA). Pt also has a H/O decreased hearing acuity, reporting that he has hearing aids at home he chooses not to wear. RN reports pt's cognitive ability is becoming worse. Speech intelligibility/voicing were WFL at bedside.      SLP Plan:  Skilled SLP is warranted for improvement of cognitive linguistic function.  Frequency: 2x per week  Duration: 2 weeks       Goals:  1. Pt will respond to orientation ?'s with 80% acc given min to mod verbal cues.  2. Pt/caregiver will recall and implement 3 compensatory strategies to improve environmental awareness and use of external memory aids with 80% accuracy given min-mod assist.   3. Further cognitive communication assessment (i.e., reading comprehension/written language expression) is warranted with development of goal(s) as appropriate.     Subjective:  Patient was seen at [facility]. He was pleasant and cooperative throughout this evaluation. Vocal intensity was WFL.      General Visit Information:  Pt's speech/language evaluation was ordered d/t concern for cognitive  "impairment. Of note, pt with word retrieval impairment PTA d/t a TIA.   RN Kierra reports that pt's cognitive ability is getting worse.   Pt endorses difficulty \"coming up with the right words.\"       History Of Present Illness: Per EMR, “Jose Guardado is a 83 y.o. male presenting with right lower extremity weakness. 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 The patient reports a episode of right lower extremity weakness this morning.  He was hesitant to call 911 so his wife called for assistance.  By the time EMS had showed up his symptoms had completely resolved.  He does have residual weakness and dysarthria from his CVA and has worked with physical therapy.  Denies headache, change in vision, chest pain, change in bowel or bladder.  Reports no recent falls.      Son and daughter were at bedside.  His son reports assisting him with his medication.  The patient states that he does not take his pills often and he \"throws them away\" if he is feeling okay.  His daughter did disclose that she is currently petitioning for guardianship of both her father and mother.  She also stated her sister was in town and assisted her father with medications while she was visiting and his mentation and overall demeanor was much better when medically compliant.     ED Course  Vitals: Temperature 35.9, heart rate 70, respiratory rate 18 /82, 96% on room air.  Labs: CBC and CMP unremarkable.  EKG: NSR, HR 71, Qtc = 415  Imaging: CT negative for acute pathology, remote infarcts left parietal region of both hemispheres  Interventions: Dr. Wolfe was consulted in the emergency department and it was recommended that the patient be admitted for a TIA/stroke rule out. Instructed ED team " "to continue plavix and hold ASA.        Past Medical History  He has a past medical history of Dizziness and giddiness (2021), Other chest pain (2021), Other chest pain (2021), Personal history of other diseases of male genital organs (2021), Personal history of other specified conditions (2018), and Stroke (CMS/[facility]).     Surgical History  He has a past surgical history that includes Other surgical history (2018); Tonsillectomy (2018); Other surgical history (2021); MR angio head wo IV contrast (2021); MR angio neck wo IV contrast (2021); MR angio head wo IV contrast (2021); MR angio neck wo IV contrast (2021); MR angio head wo IV contrast (2022); MR angio neck wo IV contrast (2022); MR angio neck wo IV contrast (3/22/2023); and MR angio head wo IV contrast (3/22/2023).\"                 Pain:  Scale: 0-10   Pt ratin      Cognition:  Assessment of orientation was conducted in the setting of expressive aphasia (i.e., in the area of word retrieval difficulty PTA), therefore, full conclusion was precluded. Pt provided responses as follows: He could state self/person and his . Pt was unsure re: his age, stating, \"83 or 84?\" He knew general place only (hospital). Pt able  to indicate reason for hospitalization. He could state day/wk, month and year, but not specific date. Pt indicated 1 meal he's had this date. He DNK which floor he's on, or room number.                  The BIMS (Brief Interview for Mental Status) was conducted in the setting of expressive aphasia PTA. Pt received a score of 11 out of 15. He was able to imitate 2 words that had just been presented. Pt could state year, month and day/wk. He was able to recall 1 out of 3 words presented to him at the beginning of this assessment without a cue, and 1 with a cue.      Receptive Language:   Moderate level yes/no ?'s-100% acc.  1 step commands-100% acc, 2 step-50% acc, and 3 " step-50% acc. Complex directions-66% acc.     Expressive Language:  Pt counted to 10 and named the days of week/months of year-100% acc  Imitation of 1 word to 6 word sentences-83% acc  Responsive naming, simple to moderate level-100% acc  Sentence completion-66% acc  Confrontation Naming of pictured objects-100% acc.   Word Fluency-Given a single category, pt able to name 10 items belonging to same (20=WNL).                 Motor Speech Production:   Appeared to be WFL for utterances pt able to produce. Unable to exclude verbal apraxia at this time.      Treatment:   Pt was seen bedside for cognitive linguistic tx. Session focused on improvement of environmental awareness and use of external memory aids for improvement of orientation. In addition, given forced choice cues, pt's responses to orientation ?'s were notably improved (i.e., with respect to age and specific place). Pt benefited from mod/max visual/verbal cues to locate the whiteboard in his room and orally read identifying information (the latter was with 80% acc when SLP pointed to each). Further reading comprehension (and written language assessment) is warranted. Tx to continue per POC.     Education:  Learner  patient; RN Kierra  Barriers to Learning  acuteness of illness barrier; cognitive communication limitations, hearing acuity   Method  demonstration; verbal; written   Education - Topic  SLP provided patient education regarding role of SLP, purpose of assessment, clinical impressions, goals of treatment, and plan of care. Patient verbalized questionable full comprehension, consistent with cognitive communication status. Education will be reinforced. ST further coordinated with RN regarding recommendations and precautions per this assessment, with RN verbalizing understanding.  Outcome  needs review/reinforcement
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
## 2024-03-19 ENCOUNTER — APPOINTMENT (OUTPATIENT)
Dept: RADIOLOGY | Facility: HOSPITAL | Age: 84
End: 2024-03-19
Payer: MEDICARE

## 2024-03-19 VITALS
OXYGEN SATURATION: 97 % | TEMPERATURE: 97.9 F | SYSTOLIC BLOOD PRESSURE: 111 MMHG | HEIGHT: 67 IN | DIASTOLIC BLOOD PRESSURE: 67 MMHG | WEIGHT: 164.68 LBS | HEART RATE: 81 BPM | RESPIRATION RATE: 17 BRPM | BODY MASS INDEX: 25.85 KG/M2

## 2024-03-19 LAB
GLUCOSE BLD MANUAL STRIP-MCNC: 146 MG/DL (ref 74–99)
GLUCOSE BLD MANUAL STRIP-MCNC: 210 MG/DL (ref 74–99)
GLUCOSE BLD MANUAL STRIP-MCNC: 90 MG/DL (ref 74–99)

## 2024-03-19 PROCEDURE — 70544 MR ANGIOGRAPHY HEAD W/O DYE: CPT | Performed by: RADIOLOGY

## 2024-03-19 PROCEDURE — 70547 MR ANGIOGRAPHY NECK W/O DYE: CPT | Performed by: RADIOLOGY

## 2024-03-19 PROCEDURE — 99231 SBSQ HOSP IP/OBS SF/LOW 25: CPT | Performed by: NURSE PRACTITIONER

## 2024-03-19 PROCEDURE — 70551 MRI BRAIN STEM W/O DYE: CPT

## 2024-03-19 PROCEDURE — 2500000001 HC RX 250 WO HCPCS SELF ADMINISTERED DRUGS (ALT 637 FOR MEDICARE OP)

## 2024-03-19 PROCEDURE — G0378 HOSPITAL OBSERVATION PER HR: HCPCS

## 2024-03-19 PROCEDURE — 70547 MR ANGIOGRAPHY NECK W/O DYE: CPT

## 2024-03-19 PROCEDURE — 82947 ASSAY GLUCOSE BLOOD QUANT: CPT

## 2024-03-19 PROCEDURE — 99232 SBSQ HOSP IP/OBS MODERATE 35: CPT | Performed by: PSYCHIATRY & NEUROLOGY

## 2024-03-19 PROCEDURE — 70551 MRI BRAIN STEM W/O DYE: CPT | Performed by: RADIOLOGY

## 2024-03-19 PROCEDURE — 70544 MR ANGIOGRAPHY HEAD W/O DYE: CPT

## 2024-03-19 PROCEDURE — 2500000002 HC RX 250 W HCPCS SELF ADMINISTERED DRUGS (ALT 637 FOR MEDICARE OP, ALT 636 FOR OP/ED)

## 2024-03-19 PROCEDURE — 97166 OT EVAL MOD COMPLEX 45 MIN: CPT | Mod: GO

## 2024-03-19 RX ORDER — DIVALPROEX SODIUM 125 MG/1
250 CAPSULE, COATED PELLETS ORAL NIGHTLY
Status: DISCONTINUED | OUTPATIENT
Start: 2024-03-19 | End: 2024-03-19 | Stop reason: HOSPADM

## 2024-03-19 RX ADMIN — CLOPIDOGREL BISULFATE 75 MG: 75 TABLET ORAL at 08:57

## 2024-03-19 RX ADMIN — LOSARTAN POTASSIUM 25 MG: 25 TABLET, FILM COATED ORAL at 08:57

## 2024-03-19 RX ADMIN — PANTOPRAZOLE SODIUM 40 MG: 40 TABLET, DELAYED RELEASE ORAL at 08:57

## 2024-03-19 RX ADMIN — INSULIN LISPRO 4 UNITS: 100 INJECTION, SOLUTION INTRAVENOUS; SUBCUTANEOUS at 12:43

## 2024-03-19 RX ADMIN — LEVOTHYROXINE SODIUM 100 MCG: 0.1 TABLET ORAL at 06:06

## 2024-03-19 RX ADMIN — BUSPIRONE HYDROCHLORIDE 7.5 MG: 15 TABLET ORAL at 08:57

## 2024-03-19 ASSESSMENT — COGNITIVE AND FUNCTIONAL STATUS - GENERAL
MOVING TO AND FROM BED TO CHAIR: A LITTLE
CLIMB 3 TO 5 STEPS WITH RAILING: A LITTLE
DRESSING REGULAR LOWER BODY CLOTHING: A LITTLE
DAILY ACTIVITIY SCORE: 24
WALKING IN HOSPITAL ROOM: A LITTLE
DAILY ACTIVITIY SCORE: 23
MOBILITY SCORE: 20
STANDING UP FROM CHAIR USING ARMS: A LITTLE

## 2024-03-19 ASSESSMENT — PAIN - FUNCTIONAL ASSESSMENT
PAIN_FUNCTIONAL_ASSESSMENT: 0-10
PAIN_FUNCTIONAL_ASSESSMENT: 0-10

## 2024-03-19 ASSESSMENT — ACTIVITIES OF DAILY LIVING (ADL)
LACK_OF_TRANSPORTATION: NO
BATHING_ASSISTANCE: INDEPENDENT

## 2024-03-19 ASSESSMENT — PAIN SCALES - GENERAL
PAINLEVEL_OUTOF10: 0 - NO PAIN
PAINLEVEL_OUTOF10: 0 - NO PAIN

## 2024-03-19 NOTE — CARE PLAN
The patient's goals for the shift include      The clinical goals for the shift include no further neuro defs      Problem: Pain  Goal: My pain/discomfort is manageable  Outcome: Progressing     Problem: Safety  Goal: Patient will be injury free during hospitalization  Outcome: Progressing  Goal: I will remain free of falls  Outcome: Progressing     Problem: Daily Care  Goal: Daily care needs are met  Outcome: Progressing     Problem: Psychosocial Needs  Goal: Demonstrates ability to cope with hospitalization/illness  Outcome: Progressing  Goal: Collaborate with me, my family, and caregiver to identify my specific goals  Outcome: Progressing     Problem: Discharge Barriers  Goal: My discharge needs are met  Outcome: Progressing

## 2024-03-19 NOTE — NURSING NOTE
Patient was calm and cooperative most of my shift. Family states he starts to sundown really bad around 2am however patient was very aggressive for day shift and was given haldol and Seroquel right before I came on. Patients bed alarm on consistently. Patient walked to bathroom with assistance with no issues a couple times through out the night. Patient refusing to wear gown but allows this RN to put telemetry on him. Safety maintained throughout shift.

## 2024-03-19 NOTE — DISCHARGE INSTRUCTIONS
Please attend your established appointment with your primary care physician, Dr. Diana, on 4/4/202024.    Please attend your established neurology appointment with your neurologist, Dr. Simon, on 4/23/2024.    You were evaluated by speech language pathologist and it was recommended to have further cognitive assessment.  A referral has been provided for you.    There have been no changes to your home medications.     Thank you for allowing us to participate in your care.  [facility]

## 2024-03-19 NOTE — PROGRESS NOTES
Physical Therapy                 Therapy Communication Note    Patient Name: Jose Guardado  MRN: [id number]  Today's Date: 3/19/2024     Discipline: Physical Therapy    Comment: PT order received, chart reviewed, pt is independent with functional mobility and has no skilled PT needs at this time.  Discharge PT.

## 2024-03-19 NOTE — HOSPITAL COURSE
Jose Guardado is an 83 yo m who Jose Guardado is a 83 y.o. male presenting with right lower extremity weakness. 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 He reported an episode of R LE weakness the day of admission. He was hesitant to medical assistance but his wife called 911. By the time EMS had arrived his symptoms had resolved. Upon his arrival, neurology was consulted and recommended a stroke work up given his prior history. Initial CT of the head showed no acute pathology and remote infarcts in the left parietal region of both hemispheres. His CBC, BMP and lipid panel were unremarkable. Echocardiogram showed EF 65-70% EF, relaxation pattern of left ventricular diastolic filling. Follow up MRI and MRA were not concerning for any acute pathology. Speech evaluated the patient and found him appropriate for further cognitive assessment and a referral was placed on the patients behalf. PT/OT evaluated him and found that he did not have further recommendations at discharge.     He has an established appointment with both his PCP and neurologist. No changes were made to his medications.    #CVA vs TIA  - negative for acute pathology  - continue lifestyle modifications of diet, exercise as tolerated and abstain from tobacco and alcohol.

## 2024-03-19 NOTE — PROGRESS NOTES
"Jose Guardado is a 83 y.o. male on day 0 of admission presenting with Transient ischemic attack (TIA).      Subjective   Symptoms have resolved.       Objective     Last Recorded Vitals  Blood pressure 111/67, pulse 81, temperature 36.6 °C (97.9 °F), temperature source Temporal, resp. rate 17, height 1.702 m (5' 7\"), weight 74.7 kg (164 lb 10.9 oz), SpO2 97 %.    Physical Exam  Neurological Exam  Mental Status: Awake and alert. Oriented to person, place and time. Speech was fluent to history. Naming, repetition and comprehension  were intact.   CN: (CN2)-  VFF to finger counting. (CN 2,3) PERRL. (CN 3,4,6) Chronic dysconjugate gaze on left. (CN 5)Facial sensation was intact to light touch bilaterally. (CN 7)Facial expression was symmetric;  (CN 8)- hearing decreased. (CN 12) Tongue protruded midline.   Motor: Normal muscle bulk and tone. Strength (confrontation testing) was (R/L) 5/5 shoulder abduction, elbow flexion/extension,  strenght, hip flexion, knee flexion/extension, ankle dorsi- and plantar flexion. There were no abnormal movements. Sensory: Intact to light touch in all 4 extremities. Coordination (cerebellar function): Finger to nose and heel to shin were intact with no dysmetria.   Relevant Results  Scheduled medications  atorvastatin, 80 mg, oral, Nightly  brivaracetam, 50 mg, oral, BID  busPIRone, 7.5 mg, oral, BID  clopidogrel, 75 mg, oral, Daily  divalproex sprinkle, 250 mg, oral, Nightly  finasteride, 5 mg, oral, Nightly  insulin glargine, 10 Units, subcutaneous, Nightly  insulin lispro, 0-10 Units, subcutaneous, TID with meals  levothyroxine, 100 mcg, oral, Daily before breakfast  losartan, 25 mg, oral, Daily  pantoprazole, 40 mg, oral, Every other day  perflutren protein A microsphere, 0.5 mL, intravenous, Once in imaging  polyethylene glycol, 17 g, oral, Daily  sulfur hexafluoride microsphr, 2 mL, intravenous, Once in imaging      Continuous medications     PRN medications  PRN medications: " dextrose, dextrose, fluticasone, glucagon  Results for orders placed or performed during the hospital encounter of 03/18/24 (from the past 96 hour(s))   ECG 12 lead   Result Value Ref Range    Ventricular Rate 71 BPM    Atrial Rate 71 BPM    OK Interval 142 ms    QRS Duration 74 ms    QT Interval 382 ms    QTC Calculation(Bazett) 415 ms    P Axis 74 degrees    R Axis 26 degrees    T Axis 49 degrees    QRS Count 12 beats    Q Onset 217 ms    P Onset 146 ms    P Offset 182 ms    T Offset 408 ms    QTC Fredericia 404 ms   Comprehensive metabolic panel   Result Value Ref Range    Glucose 97 74 - 99 mg/dL    Sodium 136 136 - 145 mmol/L    Potassium 5.6 (H) 3.5 - 5.3 mmol/L    Chloride 104 98 - 107 mmol/L    Bicarbonate 25 21 - 32 mmol/L    Anion Gap 13 10 - 20 mmol/L    Urea Nitrogen 16 6 - 23 mg/dL    Creatinine 0.91 0.50 - 1.30 mg/dL    eGFR 83 >60 mL/min/1.73m*2    Calcium 8.6 8.6 - 10.3 mg/dL    Albumin 3.6 3.4 - 5.0 g/dL    Alkaline Phosphatase 53 33 - 136 U/L    Total Protein 6.5 6.4 - 8.2 g/dL    AST 24 9 - 39 U/L    Bilirubin, Total 0.4 0.0 - 1.2 mg/dL    ALT 11 10 - 52 U/L   CBC and Auto Differential   Result Value Ref Range    WBC 6.1 4.4 - 11.3 x10*3/uL    nRBC 0.0 0.0 - 0.0 /100 WBCs    RBC 4.49 (L) 4.50 - 5.90 x10*6/uL    Hemoglobin 13.1 (L) 13.5 - 17.5 g/dL    Hematocrit 41.8 41.0 - 52.0 %    MCV 93 80 - 100 fL    MCH 29.2 26.0 - 34.0 pg    MCHC 31.3 (L) 32.0 - 36.0 g/dL    RDW 14.6 (H) 11.5 - 14.5 %    Platelets 186 150 - 450 x10*3/uL    Neutrophils % 42.2 40.0 - 80.0 %    Immature Granulocytes %, Automated 0.2 0.0 - 0.9 %    Lymphocytes % 40.8 13.0 - 44.0 %    Monocytes % 9.4 2.0 - 10.0 %    Eosinophils % 7.2 0.0 - 6.0 %    Basophils % 0.2 0.0 - 2.0 %    Neutrophils Absolute 2.57 1.60 - 5.50 x10*3/uL    Immature Granulocytes Absolute, Automated 0.01 0.00 - 0.50 x10*3/uL    Lymphocytes Absolute 2.48 0.80 - 3.00 x10*3/uL    Monocytes Absolute 0.57 0.05 - 0.80 x10*3/uL    Eosinophils Absolute 0.44 (H) 0.00 -  0.40 x10*3/uL    Basophils Absolute 0.01 0.00 - 0.10 x10*3/uL   Troponin I, High Sensitivity   Result Value Ref Range    Troponin I, High Sensitivity 4 0 - 20 ng/L   Protime-INR   Result Value Ref Range    Protime 11.2 9.8 - 12.8 seconds    INR 1.0 0.9 - 1.1   APTT   Result Value Ref Range    aPTT 27 27 - 38 seconds   Hemoglobin A1c   Result Value Ref Range    Hemoglobin A1C 7.9 (H) see below %    Estimated Average Glucose 180 Not Established mg/dL   Hepatic Function Panel   Result Value Ref Range    Albumin 3.9 3.4 - 5.0 g/dL    Bilirubin, Total 0.4 0.0 - 1.2 mg/dL    Bilirubin, Direct 0.1 0.0 - 0.3 mg/dL    Alkaline Phosphatase 62 33 - 136 U/L    ALT 13 10 - 52 U/L    AST 17 9 - 39 U/L    Total Protein 6.6 6.4 - 8.2 g/dL   B-Type Natriuretic Peptide   Result Value Ref Range    BNP 26 0 - 99 pg/mL   Lipid Panel   Result Value Ref Range    Cholesterol 151 0 - 199 mg/dL    HDL-Cholesterol 33.9 mg/dL    Cholesterol/HDL Ratio 4.5     LDL Calculated 88 <=99 mg/dL    VLDL 29 0 - 40 mg/dL    Triglycerides 145 0 - 149 mg/dL    Non HDL Cholesterol 117 0 - 149 mg/dL   POCT GLUCOSE   Result Value Ref Range    POCT Glucose 206 (H) 74 - 99 mg/dL   Transthoracic Echo (TTE) Complete   Result Value Ref Range    AV pk fidencio 1.02 m/s    LV biplane EF 66 %    LVOT diam 2.10 cm    MV E/A ratio 0.42     MV avg E/e' ratio 5.44     Tricuspid annular plane systolic excursion 1.9 cm    AV mn grad 2.0 mmHg    RV free wall pk S' 13.30 cm/s    RVSP 7.8 mmHg    LVIDd 3.96 cm    Aortic Valve Area by Continuity of Peak Velocity 2.86 cm2    AV pk grad 4.2 mmHg    Aortic Valve Area by Continuity of VTI 2.58 cm2    LV A4C EF 67.1    POCT GLUCOSE   Result Value Ref Range    POCT Glucose 146 (H) 74 - 99 mg/dL   POCT GLUCOSE   Result Value Ref Range    POCT Glucose 90 74 - 99 mg/dL   POCT GLUCOSE   Result Value Ref Range    POCT Glucose 210 (H) 74 - 99 mg/dL    MR angio head wo IV contrast    Result Date: 3/19/2024  Interpreted By:  Joseph Escalante,  STUDY: MR BRAIN WO IV CONTRAST; MR ANGIO NECK WO IV CONTRAST; MR ANGIO HEAD WO IV CONTRAST; ;  3/19/2024 12:32 pm   INDICATION: Signs/Symptoms:stroke symptoms.   COMPARISON: CT head from 03/18/2024.   ACCESSION NUMBER(S): [id number]; [id number]; [id number]   ORDERING CLINICIAN: SHRADDHA CERVANTES   TECHNIQUE: MRI of the brain was performed with the acquisition of axial diffusion-weighted, sagittal T1, axial FLAIR, axial T2 fat saturated, coronal T1, and axial T2 gradient echo sequences.   MRA of the head and neck was performed with the acquisition of axial 3D time-of-flight sequences. Segmented MIPs are provided.   FINDINGS: MRI brain:   There is no acute intracranial hemorrhage or infarct. There is no abnormal extra-axial fluid collection or mass effect.   The ventricles, sulci, and basilar cisterns are overall prominent in size due to age related diffuse cerebral volume loss.   There is an old infarct in the posterior left temporoparietal lobes. There are multiple small old infarcts in the cerebellum.   There are scattered foci of T2 hyperintensity within the cerebral hemispheric white matter which are probably sequela of chronic small vessel ischemic changes.   There is mild mucosal thickening located within the frontal sinuses and moderate mucosal thickening located within the ethmoid air cells. There is mild mucosal thickening located within the maxillary and sphenoid sinuses. The mastoid air cells are essentially clear.   MRA head:   Bilateral internal carotid arteries are normal in course and caliber. There is no narrowing of the visualized portions of the bilateral anterior cerebral arteries or the right middle cerebral artery. There is mild focal narrowing within the distal M1 segment of the left MCA with reconstitution of luminal caliber distally. Otherwise, there is no narrowing of the visualized portions of the left middle cerebral artery.   Bilateral intradural vertebral arteries and basilar artery in the  visualized portions of the bilateral posterior cerebral arteries are normal in course and caliber. There is fetal origin of the right posterior cerebral artery.   There are no aneurysms.   MRA neck:   There is no striking narrowing of the visualized portions of the bilateral common carotid arteries, carotid bulbs, or internal carotid arteries, noting that evaluation, especially of the right carotid bulb is degraded due to motion artifact. There is no narrowing of the visualized portions of the bilateral vertebral arteries.       MRI brain:   1. No acute intracranial abnormality or mass effect.   2. Old infarct in the left temporoparietal lobes. Multiple small old infarcts in the cerebellum.   3. Findings of probable chronic small vessel ischemic changes.   MRA head:   Mild focal narrowing within the distal M1 segment of the left MCA. Otherwise, no striking narrowing of the visualized arteries in the head.   MRA neck:   No striking narrowing of the visualized arteries in the neck, noting that evaluation is somewhat degraded due to patient motion.   This study was interpreted at [facility].   MACRO: None   Signed by: Joseph Escalante 3/19/2024 12:54 PM Dictation workstation:   [id number]    MR angio neck wo IV contrast    Result Date: 3/19/2024  Interpreted By:  Joseph Escalante, STUDY: MR BRAIN WO IV CONTRAST; MR ANGIO NECK WO IV CONTRAST; MR ANGIO HEAD WO IV CONTRAST; ;  3/19/2024 12:32 pm   INDICATION: Signs/Symptoms:stroke symptoms.   COMPARISON: CT head from 03/18/2024.   ACCESSION NUMBER(S): [id number]; [id number]; [id number]   ORDERING CLINICIAN: SHRADDHA CERVANTES   TECHNIQUE: MRI of the brain was performed with the acquisition of axial diffusion-weighted, sagittal T1, axial FLAIR, axial T2 fat saturated, coronal T1, and axial T2 gradient echo sequences.   MRA of the head and neck was performed with the acquisition of axial 3D time-of-flight sequences. Segmented MIPs are provided.    FINDINGS: MRI brain:   There is no acute intracranial hemorrhage or infarct. There is no abnormal extra-axial fluid collection or mass effect.   The ventricles, sulci, and basilar cisterns are overall prominent in size due to age related diffuse cerebral volume loss.   There is an old infarct in the posterior left temporoparietal lobes. There are multiple small old infarcts in the cerebellum.   There are scattered foci of T2 hyperintensity within the cerebral hemispheric white matter which are probably sequela of chronic small vessel ischemic changes.   There is mild mucosal thickening located within the frontal sinuses and moderate mucosal thickening located within the ethmoid air cells. There is mild mucosal thickening located within the maxillary and sphenoid sinuses. The mastoid air cells are essentially clear.   MRA head:   Bilateral internal carotid arteries are normal in course and caliber. There is no narrowing of the visualized portions of the bilateral anterior cerebral arteries or the right middle cerebral artery. There is mild focal narrowing within the distal M1 segment of the left MCA with reconstitution of luminal caliber distally. Otherwise, there is no narrowing of the visualized portions of the left middle cerebral artery.   Bilateral intradural vertebral arteries and basilar artery in the visualized portions of the bilateral posterior cerebral arteries are normal in course and caliber. There is fetal origin of the right posterior cerebral artery.   There are no aneurysms.   MRA neck:   There is no striking narrowing of the visualized portions of the bilateral common carotid arteries, carotid bulbs, or internal carotid arteries, noting that evaluation, especially of the right carotid bulb is degraded due to motion artifact. There is no narrowing of the visualized portions of the bilateral vertebral arteries.       MRI brain:   1. No acute intracranial abnormality or mass effect.   2. Old infarct in  the left temporoparietal lobes. Multiple small old infarcts in the cerebellum.   3. Findings of probable chronic small vessel ischemic changes.   MRA head:   Mild focal narrowing within the distal M1 segment of the left MCA. Otherwise, no striking narrowing of the visualized arteries in the head.   MRA neck:   No striking narrowing of the visualized arteries in the neck, noting that evaluation is somewhat degraded due to patient motion.   This study was interpreted at [facility].   MACRO: None   Signed by: Joseph Escalante 3/19/2024 12:54 PM Dictation workstation:   [id number]    MR brain wo IV contrast    Result Date: 3/19/2024  Interpreted By:  Joseph Escalante, STUDY: MR BRAIN WO IV CONTRAST; MR ANGIO NECK WO IV CONTRAST; MR ANGIO HEAD WO IV CONTRAST; ;  3/19/2024 12:32 pm   INDICATION: Signs/Symptoms:stroke symptoms.   COMPARISON: CT head from 03/18/2024.   ACCESSION NUMBER(S): [id number]; [id number]; [id number]   ORDERING CLINICIAN: SHRADDHA CERVANTES   TECHNIQUE: MRI of the brain was performed with the acquisition of axial diffusion-weighted, sagittal T1, axial FLAIR, axial T2 fat saturated, coronal T1, and axial T2 gradient echo sequences.   MRA of the head and neck was performed with the acquisition of axial 3D time-of-flight sequences. Segmented MIPs are provided.   FINDINGS: MRI brain:   There is no acute intracranial hemorrhage or infarct. There is no abnormal extra-axial fluid collection or mass effect.   The ventricles, sulci, and basilar cisterns are overall prominent in size due to age related diffuse cerebral volume loss.   There is an old infarct in the posterior left temporoparietal lobes. There are multiple small old infarcts in the cerebellum.   There are scattered foci of T2 hyperintensity within the cerebral hemispheric white matter which are probably sequela of chronic small vessel ischemic changes.   There is mild mucosal thickening located within the frontal sinuses  and moderate mucosal thickening located within the ethmoid air cells. There is mild mucosal thickening located within the maxillary and sphenoid sinuses. The mastoid air cells are essentially clear.   MRA head:   Bilateral internal carotid arteries are normal in course and caliber. There is no narrowing of the visualized portions of the bilateral anterior cerebral arteries or the right middle cerebral artery. There is mild focal narrowing within the distal M1 segment of the left MCA with reconstitution of luminal caliber distally. Otherwise, there is no narrowing of the visualized portions of the left middle cerebral artery.   Bilateral intradural vertebral arteries and basilar artery in the visualized portions of the bilateral posterior cerebral arteries are normal in course and caliber. There is fetal origin of the right posterior cerebral artery.   There are no aneurysms.   MRA neck:   There is no striking narrowing of the visualized portions of the bilateral common carotid arteries, carotid bulbs, or internal carotid arteries, noting that evaluation, especially of the right carotid bulb is degraded due to motion artifact. There is no narrowing of the visualized portions of the bilateral vertebral arteries.       MRI brain:   1. No acute intracranial abnormality or mass effect.   2. Old infarct in the left temporoparietal lobes. Multiple small old infarcts in the cerebellum.   3. Findings of probable chronic small vessel ischemic changes.   MRA head:   Mild focal narrowing within the distal M1 segment of the left MCA. Otherwise, no striking narrowing of the visualized arteries in the head.   MRA neck:   No striking narrowing of the visualized arteries in the neck, noting that evaluation is somewhat degraded due to patient motion.   This study was interpreted at [facility].   MACRO: None   Signed by: Joseph Escalante 3/19/2024 12:54 PM Dictation workstation:    [id number]    Transthoracic Echo (TTE) Complete    Result Date: 3/18/2024            [facility] [id number] [address] [facility], [place] [id number]    Tel [phone] Fax [phone] TRANSTHORACIC ECHOCARDIOGRAM REPORT  Patient Name:     MATTHEW HARTLEY       Reading Physician:   [id number] Cecil Baker MD Study Date:       3/18/2024            Ordering Provider:   [id number] SHRADDHA COLEMAN MRN/PID:          [id number]             Fellow: Accession#:       [id number]         Nurse: Date of           1940 / 83 years Sonographer:         Dolores Griggs RDCS Birth/Age: Gender:           M                    Additional Staff: Height:           170.18 cm            Admit Date:          3/17/2024 Weight:           75.30 kg             Admission Status:    Inpatient - Routine BSA / BMI:        1.87 m2 / 26.00      Department Location: [facility]                   kg/m2 Blood Pressure: 156 /71 mmHg Study Type:    TRANSTHORACIC ECHO (TTE) COMPLETE Diagnosis/ICD: Transient cerebral ischemic attack, unspecified (NOT on                LCD)-G45.9 Indication:    Transischemic Attack CPT Codes:     Echo Complete w Full [id number]  Study Detail: The following Echo studies were performed: 2D, M-Mode, Doppler and               color flow. Technically challenging study due to poor acoustic               windows and prominent lung artifact. Definity used as a contrast               agent for endocardial border definition and agitated saline used               as a contrast agent for intraseptal flow evaluation. Total               contrast used for this procedure was 2 mL via IV push.  PHYSICIAN INTERPRETATION: Left Ventricle: Left ventricular systolic function is normal, with an estimated ejection fraction of 65-70%. There are no regional wall motion abnormalities. The left ventricular cavity size is normal.  Spectral Doppler shows an impaired relaxation pattern of left ventricular diastolic filling. Left Atrium: The left atrium is normal in size. There is no evidence of a patent foramen ovale. A bubble study using agitated saline was performed. Bubble study is negative. Right Ventricle: The right ventricle is normal in size. There is normal right ventricular global systolic function. Right Atrium: The right atrium is normal in size. Aortic Valve: The aortic valve appears structurally normal. There is no evidence of aortic valve regurgitation. The peak instantaneous gradient of the aortic valve is 4.2 mmHg. The mean gradient of the aortic valve is 2.0 mmHg. Mitral Valve: The mitral valve is normal in structure. There is no evidence of mitral valve regurgitation. Tricuspid Valve: The tricuspid valve is structurally normal. No evidence of tricuspid regurgitation. Pulmonic Valve: The pulmonic valve is structurally normal. There is no indication of pulmonic valve regurgitation. Pericardium: There is no pericardial effusion noted. Aorta: The aortic root is normal.  CONCLUSIONS:  1. Left ventricular systolic function is normal with a 65-70% estimated ejection fraction.  2. Spectral Doppler shows an impaired relaxation pattern of left ventricular diastolic filling.  3. There is no evidence of a patent foramen ovale. QUANTITATIVE DATA SUMMARY: 2D MEASUREMENTS:                          Normal Ranges: IVSd:          1.07 cm   (0.6-1.1cm) LVPWd:         1.30 cm   (0.6-1.1cm) LVIDd:         3.96 cm   (3.9-5.9cm) LVIDs:         2.41 cm LV Mass Index: 85.6 g/m2 LV % FS        39.1 % LA VOLUME:                             Normal Ranges: LA Area A4C:     16.6 cm2 LA Area A2C:     14.4 cm2 LA Volume Index: 20.3 ml/m2 LA Vol A4C:      38.0 ml LA Vol A2C:      32.0 ml LA Vol BP:       38.0 ml AORTA MEASUREMENTS:                    Normal Ranges: Asc Ao, d: 3.40 cm (2.1-3.4cm) LV SYSTOLIC FUNCTION BY 2D PLANIMETRY (MOD):                      Normal Ranges: EF-A4C View: 67.1 % (>=55%) EF-A2C View: 58.8 % EF-Biplane:  66.0 % LV DIASTOLIC FUNCTION:                        Normal Ranges: MV Peak E:    0.39 m/s (0.7-1.2 m/s) MV Peak A:    0.93 m/s (0.42-0.7 m/s) E/A Ratio:    0.42     (1.0-2.2) MV e'         0.07 m/s (>8.0) MV lateral e' 0.09 m/s MV medial e'  0.05 m/s E/e' Ratio:   5.44     (<8.0) MITRAL VALVE:                 Normal Ranges: MV DT: 306 msec (150-240msec) AORTIC VALVE:                                   Normal Ranges: AoV Vmax:                1.02 m/s (<=1.7m/s) AoV Peak P.2 mmHg (<20mmHg) AoV Mean P.0 mmHg (1.7-11.5mmHg) LVOT Max Rowdy:            0.84 m/s (<=1.1m/s) AoV VTI:                 20.10 cm (18-25cm) LVOT VTI:                15.00 cm LVOT Diameter:           2.10 cm  (1.8-2.4cm) AoV Area, VTI:           2.58 cm2 (2.5-5.5cm2) AoV Area,Vmax:           2.86 cm2 (2.5-4.5cm2) AoV Dimensionless Index: 0.75  RIGHT VENTRICLE: TAPSE: 18.7 mm RV s'  0.13 m/s TRICUSPID VALVE/RVSP:                            Normal Ranges: Peak TR Velocity: 1.09 m/s RV Syst Pressure: 7.8 mmHg (< 30mmHg)  [id number] Cecil Baker MD Electronically signed on 3/18/2024 at 4:51:23 PM  ** Final **     ECG 12 lead    Result Date: 3/18/2024  Normal sinus rhythm Possible Anterior infarct (cited on or before 2024) Abnormal ECG When compared with ECG of 2024 11:32, No significant change was found    CT head wo IV contrast    Result Date: 3/18/2024  Interpreted By:  Sirena Raphael, STUDY: CT HEAD WO IV CONTRAST;  3/18/2024 8:59 am   INDICATION: Signs/Symptoms:Right lower extremity weakness, now resolved.  History of prior stroke.   COMPARISON: 07/15/2023   ACCESSION NUMBER(S): [id number]   ORDERING CLINICIAN: RADHA FISCHER   TECHNIQUE: Examination was performed in the axial plane with sagittal and coronal reconstructions.   FINDINGS: INTRACRANIAL: There is prominence of the ventricular system and cerebral sulci consistent with  "cerebral atrophy. No mass or mass effect is identified. There is no hemorrhage or subdural fluid collection. There is no acute infarct. There is a left parietal infarct. There are small remote bilateral cerebellar hemispheric infarcts.   EXTRACRANIAL: There is mild mucosal thickening of the maxillary sinuses. There is mild mucosal thickening of the frontal sinuses. There is marked mucosal thickening and opacification of ethmoid air cells.       1. No acute intracranial pathology. 2. 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   MACRO: None   Signed by: Sirena Raphael 3/18/2024 9:06 AM Dictation workstation:   [id number]        Assessment/Plan      Principal Problem:    Transient ischemic attack (TIA)    Right leg weakness/numbness- resolved  - Suspect TIA  - MRI/MRA reviewed with Dr. Edwards    Recommend:    - Continue Plavix  - PT/OT/ST  - Follow up with Dr. Simon after discharge as scheduled.    Continue aggressive mgmt of vascular risk factors.  Pt counseled and educated about his/her underlying neurological condition and management plan, including stroke/vascular risk factors, recognizing stroke symptoms and calling 911, secondary stroke prevention, risk factor modification, bleeding risk, medication compliance, and follow up appointments.    Case/plan discussed and pt seen with Dr. Edwards.         I spent 30 minutes in the professional and overall care of this patient.      CRUZ Deal-CNP      I saw and evaluated the patient.  I obtained the key portions of the history and examination.  I reviewed the residents/APNs note, discussed the patient and supervised treatment plan formulation.    Subjective:  No overnight events    Objective:  /67   Pulse 81   Temp 36.6 °C (97.9 °F) (Temporal)   Resp 17   Ht 1.702 m (5' 7\")   Wt 74.7 kg (164 lb 10.9 oz)   SpO2 97%   BMI 25.79 kg/m²     Gen: NAD  Neuro:  --HIF: A&O X 3, repetition and naming intact  --CN:  [place], " EOMI, VFF, no visible facial asymmetry, facial sensation intact, no tongue or palatal deviation, SCM intact  --Motor: Moves all 4 extremities equally; no focal deficits  --Sensory: Intact to light touch, intact to pinprick  --Reflex: 1+ in UE ,absent in LE  --Cerebellum: FTN and HTS intact  --Gait: Deferred     MRI Brain (I personally reviewed the images/tracings with the following interpretation)  No evidence of acute stroke  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 Aurora Edwards MD  [facility]  Department of Neurology

## 2024-03-19 NOTE — CARE PLAN
Problem: Pain  Goal: My pain/discomfort is manageable  Outcome: Progressing     Problem: Safety  Goal: Patient will be injury free during hospitalization  Outcome: Progressing  Goal: I will remain free of falls  Outcome: Progressing     Problem: Daily Care  Goal: Daily care needs are met  Outcome: Progressing     Problem: Psychosocial Needs  Goal: Demonstrates ability to cope with hospitalization/illness  Outcome: Progressing  Goal: Collaborate with me, my family, and caregiver to identify my specific goals  Outcome: Progressing     Problem: Discharge Barriers  Goal: My discharge needs are met  Outcome: Progressing     Problem: Skin  Goal: Participates in plan/prevention/treatment measures  Flowsheets (Taken 3/18/2024 2240)  Participates in plan/prevention/treatment measures: Elevate heels   The patient's goals for the shift include      The clinical goals for the shift include no further neuro defs

## 2024-03-19 NOTE — NURSING NOTE
Patient confused and stating he wants to leave, attempting to put coat on. Patient refusing to take medications. Patient getting loud and verbally aggressive. Patient on phone with wife telling her to call the police. Wife spoke with this nurse and stated please don't let him leave. Security was called and attempted to calm patient down. Daughter and son pino came up and talked to patient in room. Haldol I'm ordered and given. Patient daughter convinced patient to take po medications. Patient now resting in bed seems to be more calm and cooperative. Family brought patients coat home.

## 2024-03-19 NOTE — NURSING NOTE
1355: Pt hep lock/tele removed prior to discharge. Instructed on discharge papers. Home meds returned. Pt discharge home with no needs at this time.

## 2024-03-19 NOTE — PROGRESS NOTES
03/19/24 1335   Discharge Planning   Living Arrangements Spouse/significant other   Support Systems Spouse/significant other   Assistance Needed None   Type of Residence Private residence   Home or Post Acute Services None   Patient expects to be discharged to: Home   Does the patient need discharge transport arranged? No   Financial Resource Strain   How hard is it for you to pay for the very basics like food, housing, medical care, and heating? Not hard   Housing Stability   In the last 12 months, was there a time when you were not able to pay the mortgage or rent on time? N   In the last 12 months, how many places have you lived? 1   In the last 12 months, was there a time when you did not have a steady place to sleep or slept in a shelter (including now)? N   Transportation Needs   In the past 12 months, has lack of transportation kept you from medical appointments or from getting medications? no   In the past 12 months, has lack of transportation kept you from meetings, work, or from getting things needed for daily living? No     Called into patient's room. Confirmed address and contacts. Patient lives at home with his wife. States he is independent with all care and ADLs, drives. PCP is Dr Diana and he has an appointment with him in a couple weeks to get his medications straightened out. Denies any dc needs.

## 2024-03-19 NOTE — PROGRESS NOTES
Occupational Therapy    Evaluation    Patient Name: Jose Guardado  MRN: [id number]  Today's Date: 3/19/2024       Assessment  IP OT Assessment  OT Assessment: Patient is independent with all adls and ambulation. Son in room agrees that patient is ambulating at baseline level. Patient has no acute OT needs at this time and will be discharged from OT services at this time.  End of Session Patient Position: Bed, 2 rail up, Alarm on (Sitting at eob.)  Plan:  No Skilled OT: At baseline function  OT Discharge Recommendations: No OT needed after discharge  OT - OK to Discharge: Yes (to next level of care when cleared by medical team.)    Subjective   Current Problem:  1. Stroke-like symptoms        2. Cerebrovascular small vessel disease  Transthoracic Echo (TTE) Complete    Transthoracic Echo (TTE) Complete      3. History of TIA (transient ischemic attack)  Transthoracic Echo (TTE) Complete    Transthoracic Echo (TTE) Complete      4. Transient ischemic attack (TIA)  CANCELED: Transthoracic Echo Complete    CANCELED: Transthoracic Echo Complete        General:  General  Reason for Referral: R LE weakness, TIA  Referred By: Kristel  Past Medical History Relevant to Rehab: asthma, HTN, hypothyroidism, DM, GERD, CVA with aphasia, seizures, HLD, BPH, h/o loop recorder  Family/Caregiver Present: Yes (Son, appears very supportive.)  Patient Position Received: Bed, 2 rail up, Alarm on  Preferred Learning Style: verbal  Precautions:     Vital Signs:     Pain:  Pain Assessment  Pain Assessment: 0-10  Pain Score: 0 - No pain    Objective   Cognition:  Orientation Level:  (Oriented person, place and time)           Home Living:  Type of Home: House  Lives With: Spouse (Per patient and son report, lives with spouse in a one floor home, 1 entry step, no rails, walk in shower with seat and bars. No device to ambulate.)   Prior Function:  Prior Function Comments: Per patient report, pta was independent with all adls, shares home  management with spouse, family assists with driving and shopping pta.  IADL History:     ADL:  Eating Assistance: Independent  Grooming Assistance: Independent  Bathing Assistance: Independent  UE Dressing Assistance: Independent  LE Dressing Assistance: Independent  Activity Tolerance:  Endurance: Endurance does not limit participation in activity  Bed Mobility/Transfers: Bed Mobility  Bed Mobility: Yes (Independent supine to sit eob. Independent sit to stand. Independent ambulate without device from bed to doorway of room, to sink for standing dressing task, and back to bed to sit eob. Sitting eob, bed check on at end. Nursing aware.)      Vision: Vision - Basic Assessment  Current Vision: No visual deficits   and    Sensation:  Light Touch: No apparent deficits  Strength:  Strength Comments: B UE strength 4/5 overall  Perception:     Coordination:  Movements are Fluid and Coordinated: Yes   Hand Function:  Hand Function  Gross Grasp: Functional (bilateral hands)    Outcome Measures: [facility] Daily Activity  Putting on and taking off regular lower body clothing: None  Bathing (including washing, rinsing, drying): None  Putting on and taking off regular upper body clothing: None  Toileting, which includes using toilet, bedpan or urinal: None  Taking care of personal grooming such as brushing teeth: None  Eating Meals: None  Daily Activity - Total Score: 24         and    Education Documentation  No documentation found.  Education Comments  No comments found.      Goals:

## 2024-03-19 NOTE — DISCHARGE SUMMARY
Discharge Diagnosis  Transient ischemic attack (TIA)    Issues Requiring Follow-Up  No changes to medication  Neurology (Aurora) - stroke work up  PCP (Kevan) - post hospital follow up, referral for functional cognition evaluation     Discharge Meds     Your medication list        ASK your doctor about these medications        Instructions Last Dose Given Next Dose Due   atorvastatin 80 mg tablet  Commonly known as: Lipitor      Take 1 tablet (80 mg) by mouth once daily at bedtime.       Briviact 50 mg tablet tablet  Generic drug: brivaracetam      TAKE 1 TABLET BY MOUTH TWICE  DAILY       busPIRone 15 mg tablet  Commonly known as: Buspar      Take 1 tablet (15 mg) by mouth 3 times a day as needed (anger).       clopidogrel 75 mg tablet  Commonly known as: Plavix           divalproex sprinkle 125 mg DR capsule  Commonly known as: Depakote Sprinkles      Start 1 tablet nightly for 3-5 days then increase to 2 tablets nightly       finasteride 5 mg tablet  Commonly known as: Proscar      Take 1 tablet (5 mg) by mouth once daily at bedtime.       fluticasone 50 mcg/actuation nasal spray  Commonly known as: Flonase           glimepiride 2 mg tablet  Commonly known as: Amaryl      Take 1 tablet (2 mg) by mouth once daily.       Lantus U-100 Insulin 100 unit/mL injection  Generic drug: insulin glargine           levothyroxine 100 mcg tablet  Commonly known as: Synthroid, Levoxyl      Take 1 tablet (100 mcg) by mouth once daily in the morning. Take before meals.       losartan 25 mg tablet  Commonly known as: Cozaar      Take 1 tablet (25 mg) by mouth once daily.       metFORMIN 500 mg tablet  Commonly known as: Glucophage      Take 1 tablet (500 mg) by mouth 2 times a day with meals.       pantoprazole 40 mg EC tablet  Commonly known as: ProtoNix      TAKE 1 TABLET BY MOUTH DAILY       pioglitazone 15 mg tablet  Commonly known as: Actos      Take 1 tablet (15 mg) by mouth once daily.       QUEtiapine 25 mg  tablet  Commonly known as: SEROquel      Take 1 tablet (25 mg) by mouth once daily at bedtime. Take 1 to 2 Tablets by mouth at Bedtime if needed for sleep.                Test Results Pending At Discharge  Pending Labs       No current pending labs.            Hospital Course  Jose Guardado is an 85 yo m who Jose Guardado is a 83 y.o. male presenting with right lower extremity weakness. 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 He reported an episode of R LE weakness the day of admission. He was hesitant to medical assistance but his wife called 911. By the time EMS had arrived his symptoms had resolved. Upon his arrival, neurology was consulted and recommended a stroke work up given his prior history. Initial CT of the head showed no acute pathology and remote infarcts in the left parietal region of both hemispheres. His CBC, BMP and lipid panel were unremarkable. Echocardiogram showed EF 65-70% EF, relaxation pattern of left ventricular diastolic filling. Follow up MRI and MRA were not concerning for any acute pathology. Speech evaluated the patient and found him appropriate for further cognitive assessment and a referral was placed on the patients behalf. PT/OT evaluated him and found that he did not have further recommendations at discharge.     He has an established appointment with both his PCP and neurologist. No changes were made to his medications.    #CVA vs TIA  - negative for acute pathology  - continue lifestyle modifications of diet, exercise as tolerated and abstain from tobacco and alcohol.     Pertinent Physical Exam At Time of Discharge  Physical Exam  Constitutional:       Appearance: He is normal weight.   HENT:      Mouth/Throat:      Mouth: Mucous  membranes are moist.      Pharynx: Oropharynx is clear.   Eyes:      Comments: L Strabismus    Cardiovascular:      Rate and Rhythm: Normal rate and regular rhythm.      Heart sounds: Normal heart sounds.   Pulmonary:      Effort: Pulmonary effort is normal.      Breath sounds: Normal breath sounds.   Abdominal:      General: Abdomen is flat. Bowel sounds are normal.      Palpations: Abdomen is soft.   Musculoskeletal:      Right lower leg: No edema.      Left lower leg: No edema.   Neurological:      General: No focal deficit present.      Mental Status: He is alert. Mental status is at baseline.   Psychiatric:         Mood and Affect: Mood normal.         Behavior: Behavior normal.         Thought Content: Thought content normal.         Judgment: Judgment normal.         Outpatient Follow-Up  Future Appointments   Date Time Provider Department Center   4/4/2024 12:30 PM Reid Diana MD [id number] [facility]   4/23/2024  1:00 PM June Simon MD [id number] [facility]   5/7/2024  4:00 PM [facility] [id number] [facility]   5/7/2024  4:20 PM Vinayak Christopher MD [id number] [facility]         Taylor Flores DO

## 2024-03-20 ENCOUNTER — PATIENT OUTREACH (OUTPATIENT)
Dept: CARDIOLOGY | Facility: CLINIC | Age: 84
End: 2024-03-20
Payer: MEDICARE

## 2024-03-20 ENCOUNTER — DOCUMENTATION (OUTPATIENT)
Dept: PRIMARY CARE | Facility: CLINIC | Age: 84
End: 2024-03-20
Payer: MEDICARE

## 2024-03-20 LAB
ATRIAL RATE: 71 BPM
P AXIS: 74 DEGREES
P OFFSET: 182 MS
P ONSET: 146 MS
PR INTERVAL: 142 MS
Q ONSET: 217 MS
QRS COUNT: 12 BEATS
QRS DURATION: 74 MS
QT INTERVAL: 382 MS
QTC CALCULATION(BAZETT): 415 MS
QTC FREDERICIA: 404 MS
R AXIS: 26 DEGREES
T AXIS: 49 DEGREES
T OFFSET: 408 MS
VENTRICULAR RATE: 71 BPM

## 2024-03-20 NOTE — PROGRESS NOTES
Discharge Facility: [facility]  Discharge Diagnosis: TIA  Admission Date:  3/18/24  Discharge Date:  3/19/24    PCP Appointment Date:  Dr Diana 4/4/24--sending a task for a sooner appt  Specialist Appointment Date:  Dr Grant 4/23/24  Hospital Encounter and Summary: Linked   See discharge assessment below for further details     Engagement  Call Start Time: 1352 (spoke with patient son) (3/20/2024  1:56 PM)    Medications  Medications reviewed with patient/caregiver?: Not applicable (3/20/2024  1:56 PM)  Is the patient having any side effects they believe may be caused by any medication additions or changes?: No (3/20/2024  1:56 PM)  Does the patient have all medications ordered at discharge?: Not applicable (3/20/2024  1:56 PM)    Appointments  Does the patient have a primary care provider?: Yes (3/20/2024  1:56 PM)  Care Management Interventions: Verified appointment date/time/provider (3/20/2024  1:56 PM)  Care Management Interventions: Advised patient to keep appointment (3/20/2024  1:56 PM)  Follow Up Tasks: Appointments (3/20/2024  1:56 PM)    Self Management  What is the home health agency?: n/a-- patient is refusing ST (3/20/2024  1:56 PM)    Patient Teaching  What is the patient's perception of their health status since discharge?: Improving (3/20/2024  1:56 PM)  Is the patient/caregiver able to teach back the hierarchy of who to call/visit for symptoms/problems? PCP, Specialist, Home Health nurse, Urgent Care, ED, 911: Yes (3/20/2024  1:56 PM)    Wrap Up  Wrap Up Additional Comments: Patients son is aware of follow up appts and he does take his father to appts. They are aware that this CM will send a task to Dr Diana's office for sooner appt. Patient has had no further weakness they believe it was his leg fell asleep becasue how he was laying on it. There are no new medications at this time (3/20/2024  1:56 PM)  Call End Time: 1359 (3/20/2024  1:56 PM)

## 2024-04-03 ENCOUNTER — PATIENT OUTREACH (OUTPATIENT)
Dept: CARDIOLOGY | Facility: CLINIC | Age: 84
End: 2024-04-03
Payer: MEDICARE

## 2024-04-03 PROBLEM — R29.90 NEUROLOGICAL SYMPTOMS: Status: ACTIVE | Noted: 2024-04-03

## 2024-04-03 PROBLEM — R13.12 OROPHARYNGEAL DYSPHAGIA: Status: ACTIVE | Noted: 2023-06-08

## 2024-04-03 PROBLEM — R32 URINARY INCONTINENCE: Status: ACTIVE | Noted: 2024-04-03

## 2024-04-03 PROBLEM — Z20.822 CONTACT WITH AND (SUSPECTED) EXPOSURE TO COVID-19: Status: ACTIVE | Noted: 2023-03-23

## 2024-04-03 PROBLEM — R41.82 ALTERED MENTAL STATUS: Status: ACTIVE | Noted: 2024-04-03

## 2024-04-03 PROBLEM — Z98.890 HISTORY OF CARDIOVASCULAR SURGERY: Status: ACTIVE | Noted: 2024-04-03

## 2024-04-03 PROBLEM — R47.89 WORD FINDING DIFFICULTY: Status: ACTIVE | Noted: 2023-06-30

## 2024-04-03 PROBLEM — E87.6 HYPOKALEMIA: Status: ACTIVE | Noted: 2024-04-03
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
## 2024-04-03 PROBLEM — R68.83 CHILLS: Status: ACTIVE | Noted: 2024-04-03

## 2024-04-03 PROBLEM — R51.9 HEADACHE: Status: ACTIVE | Noted: 2024-04-03

## 2024-04-03 PROBLEM — R47.01 APHASIA: Status: ACTIVE | Noted: 2023-06-08

## 2024-04-03 PROBLEM — F05 DELIRIUM DUE TO KNOWN PHYSIOLOGICAL CONDITION: Status: ACTIVE | Noted: 2022-11-22

## 2024-04-03 PROBLEM — J30.9 ALLERGIC RHINITIS: Status: ACTIVE | Noted: 2024-04-03

## 2024-04-03 PROBLEM — N40.0 BENIGN PROSTATIC HYPERPLASIA: Status: ACTIVE | Noted: 2023-06-30

## 2024-04-03 PROBLEM — L98.9 SKIN LESION OF FACE: Status: ACTIVE | Noted: 2024-04-03

## 2024-04-03 PROBLEM — R19.7 DIARRHEA: Status: ACTIVE | Noted: 2024-04-03

## 2024-04-03 PROBLEM — R25.2 CRAMPS OF LOWER EXTREMITY: Status: ACTIVE | Noted: 2024-04-03

## 2024-04-03 PROBLEM — R56.9 FOCAL SEIZURE (MULTI): Status: ACTIVE | Noted: 2023-03-23

## 2024-04-03 PROBLEM — R27.0 ATAXIA: Status: ACTIVE | Noted: 2024-04-03

## 2024-04-03 PROBLEM — Z95.818 IMPLANTABLE LOOP RECORDER PRESENT: Status: ACTIVE | Noted: 2023-08-20

## 2024-04-03 PROBLEM — J02.9 ACUTE PHARYNGITIS: Status: ACTIVE | Noted: 2024-04-03

## 2024-04-03 PROBLEM — M25.569 KNEE PAIN: Status: ACTIVE | Noted: 2024-04-03

## 2024-04-03 PROBLEM — S60.569A INSECT BITE OF HAND: Status: ACTIVE | Noted: 2024-04-03

## 2024-04-03 RX ORDER — TRAZODONE HYDROCHLORIDE 50 MG/1
TABLET ORAL
COMMUNITY
Start: 2022-12-30 | End: 2024-04-23 | Stop reason: ENTERED-IN-ERROR

## 2024-04-03 RX ORDER — MELOXICAM 15 MG/1
TABLET ORAL
COMMUNITY
End: 2024-04-23 | Stop reason: ENTERED-IN-ERROR

## 2024-04-03 RX ORDER — FLASH GLUCOSE SENSOR
KIT MISCELLANEOUS
COMMUNITY
Start: 2024-01-14 | End: 2024-04-17 | Stop reason: SDUPTHER

## 2024-04-03 RX ORDER — MONTELUKAST SODIUM 10 MG/1
TABLET ORAL
COMMUNITY
Start: 2017-04-03 | End: 2024-04-23 | Stop reason: ENTERED-IN-ERROR

## 2024-04-03 NOTE — PROGRESS NOTES
Call regarding appt. with PCP tomorrow  At time of outreach call the patient feels as if their condition has improved since last visit.  Reviewed the PCP appointment with the pt and addressed any questions or concerns.

## 2024-04-04 ENCOUNTER — OFFICE VISIT (OUTPATIENT)
Dept: PRIMARY CARE | Facility: CLINIC | Age: 84
End: 2024-04-04
Payer: MEDICARE

## 2024-04-04 VITALS
HEIGHT: 67 IN | SYSTOLIC BLOOD PRESSURE: 106 MMHG | TEMPERATURE: 97.7 F | OXYGEN SATURATION: 93 % | WEIGHT: 163 LBS | BODY MASS INDEX: 25.58 KG/M2 | DIASTOLIC BLOOD PRESSURE: 50 MMHG

## 2024-04-04 DIAGNOSIS — E11.9 TYPE 2 DIABETES MELLITUS WITHOUT COMPLICATION, WITH LONG-TERM CURRENT USE OF INSULIN (MULTI): Primary | ICD-10-CM

## 2024-04-04 DIAGNOSIS — Z79.4 TYPE 2 DIABETES MELLITUS WITHOUT COMPLICATION, WITH LONG-TERM CURRENT USE OF INSULIN (MULTI): Primary | ICD-10-CM

## 2024-04-04 DIAGNOSIS — F41.9 ANXIETY: ICD-10-CM

## 2024-04-04 DIAGNOSIS — H10.30 ACUTE CONJUNCTIVITIS, UNSPECIFIED ACUTE CONJUNCTIVITIS TYPE, UNSPECIFIED LATERALITY: ICD-10-CM

## 2024-04-04 PROCEDURE — 3074F SYST BP LT 130 MM HG: CPT | Performed by: FAMILY MEDICINE

## 2024-04-04 PROCEDURE — 3078F DIAST BP <80 MM HG: CPT | Performed by: FAMILY MEDICINE

## 2024-04-04 PROCEDURE — 1159F MED LIST DOCD IN RCRD: CPT | Performed by: FAMILY MEDICINE

## 2024-04-04 PROCEDURE — 1160F RVW MEDS BY RX/DR IN RCRD: CPT | Performed by: FAMILY MEDICINE

## 2024-04-04 PROCEDURE — 99214 OFFICE O/P EST MOD 30 MIN: CPT | Performed by: FAMILY MEDICINE

## 2024-04-04 NOTE — PROGRESS NOTES
"    /50   Temp 36.5 °C (97.7 °F)   Ht 1.702 m (5' 7\")   Wt 73.9 kg (163 lb)   SpO2 93%   BMI 25.53 kg/m²     Past Medical History:   Diagnosis Date    Dizziness and giddiness 12/21/2021    Episodic lightheadedness    Other chest pain 12/21/2021    Atypical chest pain    Other chest pain 12/21/2021    Chest pain, musculoskeletal    Personal history of other diseases of male genital organs 11/30/2021    History of erectile dysfunction    Personal history of other specified conditions 05/14/2018    History of urinary incontinence    Stroke (CMS/HCC)        Patient Active Problem List   Diagnosis    Focal seizure (CMS/HCC)    Arthritis    Osteoarthritis    Pleural plaque due to asbestos exposure    Benign essential hypertension    Benign prostatic hyperplasia    Bursitis of right hip    Calcium kidney stone    Cerebrovascular small vessel disease    Cervical radiculopathy    Spondylosis of cervical spine without myelopathy    Chronic low back pain    Colon polyp    Diverticulitis, colon    Confusion and disorientation    Diastolic dysfunction    HTN (hypertension)    History of TIA (transient ischemic attack)    History of orthostatic hypotension    Hemangioma of skin and subcutaneous tissue    Gastroesophageal reflux disease    Expressive aphasia    Erectile dysfunction    DM type 2 (diabetes mellitus, type 2) (CMS/HCC)    Disorder of bursae of shoulder region    Memory impairment    Hyperlipidemia    Hypothyroidism    Melanocytic nevi of trunk    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    Other seborrheic keratosis    Other melanin hyperpigmentation    Seizure disorder (CMS/HCC)    Shingles    Shortness of breath    Tremor, essential    Varicose veins of leg with swelling    Vitamin D deficiency    White matter disease, unspecified    Overweight with body mass index (BMI) of 25 to 25.9 in adult    Anxiety    Other chest pain    Chest pain    Transient ischemic attack (TIA)    Acute pharyngitis    " Allergic rhinitis    Altered mental status    Aphasia    Ataxia    Chills    Contact with and (suspected) exposure to covid-19    Cramps of lower extremity    Delirium due to known physiological condition    Diarrhea    Headache    History of cardiovascular surgery    Hypokalemia    Implantable loop recorder present    Insect bite of hand    Knee pain    Neurological symptoms    Oropharyngeal dysphagia    Urinary incontinence    Skin lesion of face    Word finding difficulty       Current Outpatient Medications   Medication Sig Dispense Refill    atorvastatin (Lipitor) 80 mg tablet Take 1 tablet (80 mg) by mouth once daily at bedtime. 90 tablet 3    Briviact 50 mg tablet tablet TAKE 1 TABLET BY MOUTH TWICE  DAILY 180 tablet 1    busPIRone (Buspar) 15 mg tablet Take 1 tablet (15 mg) by mouth 3 times a day as needed (anger). (Patient taking differently: Take 0.5 tablets (7.5 mg) by mouth 2 times a day.) 90 tablet 5    clopidogrel (Plavix) 75 mg tablet Take 1 tablet (75 mg) by mouth once daily.      divalproex sprinkle (Depakote Sprinkles) 125 mg DR capsule Start 1 tablet nightly for 3-5 days then increase to 2 tablets nightly (Patient taking differently: Take 1 capsule (125 mg) by mouth once daily at bedtime. Start 1 tablet nightly for 3-5 days then increase to 2 tablets nightly) 60 capsule 11    finasteride (Proscar) 5 mg tablet Take 1 tablet (5 mg) by mouth once daily at bedtime. 90 tablet 3    fluticasone (Flonase) 50 mcg/actuation nasal spray Administer 1 spray into each nostril 2 times a day as needed.      FreeStyle Doug 2 Sensor kit USE ONE SENSOR FOR 14 DAYS FOR BLOOD GLUCOSE READINGS      glimepiride (Amaryl) 2 mg tablet Take 1 tablet (2 mg) by mouth once daily. 90 tablet 3    insulin glargine (Lantus U-100 Insulin) 100 unit/mL injection Inject 10-12 Units under the skin as needed at bedtime. BS >120      levothyroxine (Synthroid, Levoxyl) 100 mcg tablet Take 1 tablet (100 mcg) by mouth once daily in the  morning. Take before meals. 90 tablet 3    losartan (Cozaar) 25 mg tablet Take 1 tablet (25 mg) by mouth once daily. 90 tablet 3    meloxicam (Mobic) 15 mg tablet Take by mouth.      metFORMIN (Glucophage) 500 mg tablet Take 1 tablet (500 mg) by mouth 2 times a day with meals. 180 tablet 3    montelukast (Singulair) 10 mg tablet Take by mouth once daily.      pantoprazole (ProtoNix) 40 mg EC tablet TAKE 1 TABLET BY MOUTH DAILY (Patient taking differently: Take 1 tablet (40 mg) by mouth every other day.) 90 tablet 3    pioglitazone (Actos) 15 mg tablet Take 1 tablet (15 mg) by mouth once daily. 90 tablet 3    traZODone (Desyrel) 50 mg tablet Take by mouth.      QUEtiapine (SEROquel) 25 mg tablet Take 1 tablet (25 mg) by mouth once daily at bedtime. Take 1 to 2 Tablets by mouth at Bedtime if needed for sleep. (Patient not taking: Reported on 3/18/2024) 30 tablet 0     No current facility-administered medications for this visit.       CC/HPI/ASSESSMENT/PLAN    CC follow-up medication    HPI patient admission diabetes anxiety hypertension hyperlipidemia, patient is here with his son and daughter who would like to discuss changing his medications.  Patient has started to refuse taking some of his medications including his diabetes medications.  He used to use freestyle sensor kit but stopped using it.  He does take Lantus 12 units at bedtime.  Blood pressure is under good control.  The daughter notes she is in the process of getting power of  for her father.  He does have some anxiety issues and uses BuSpar occasionally with good results.  Patient notes his eyes have been red and irritated the last 2 days    Exam calm eyes bilateral conjunctival injection noted.  Neck supple lungs CTA CV RRR skin no rash    A/P 1 type 2 diabetes.  2 conjunctivitis 3 anxiety.  Antibiotic eyedrops ordered.  We went over her medications.  I recommended stopping glimepiride metformin and Actos and increasing Lantus to 16 units.   Monitor blood sugar every couple days increasing Lantus by 2 units at bedtime until blood sugars are consistently under 200.  Continue other medications.  Patient is agreeable.  Patient has appointment with neurology in the near future.    There are no diagnoses linked to this encounter.

## 2024-04-05 ENCOUNTER — HOSPITAL ENCOUNTER (OUTPATIENT)
Dept: CARDIOLOGY | Facility: HOSPITAL | Age: 84
Discharge: HOME | End: 2024-04-05
Payer: MEDICARE

## 2024-04-05 DIAGNOSIS — Z95.818 STATUS POST PLACEMENT OF IMPLANTABLE LOOP RECORDER: ICD-10-CM

## 2024-04-05 DIAGNOSIS — Z86.73 HISTORY OF TIA (TRANSIENT ISCHEMIC ATTACK): ICD-10-CM

## 2024-04-05 PROCEDURE — 93298 REM INTERROG DEV EVAL SCRMS: CPT | Performed by: INTERNAL MEDICINE

## 2024-04-05 PROCEDURE — 93298 REM INTERROG DEV EVAL SCRMS: CPT

## 2024-04-08 PROBLEM — H10.30 ACUTE CONJUNCTIVITIS: Status: ACTIVE | Noted: 2024-04-08

## 2024-04-08 RX ORDER — TOBRAMYCIN 3 MG/ML
2 SOLUTION/ DROPS OPHTHALMIC
Qty: 5 ML | Refills: 1 | Status: SHIPPED | OUTPATIENT
Start: 2024-04-08 | End: 2024-05-02 | Stop reason: HOSPADM

## 2024-04-08 RX ORDER — INSULIN GLARGINE 100 [IU]/ML
16 INJECTION, SOLUTION SUBCUTANEOUS NIGHTLY
Qty: 10 ML | Refills: 11
Start: 2024-04-08 | End: 2024-04-17 | Stop reason: SDUPTHER

## 2024-04-09 DIAGNOSIS — E11.9 TYPE 2 DIABETES MELLITUS WITHOUT COMPLICATION, WITHOUT LONG-TERM CURRENT USE OF INSULIN (MULTI): ICD-10-CM

## 2024-04-09 DIAGNOSIS — E11.9 TYPE 2 DIABETES MELLITUS WITHOUT COMPLICATION, WITH LONG-TERM CURRENT USE OF INSULIN (MULTI): ICD-10-CM

## 2024-04-09 DIAGNOSIS — Z79.4 TYPE 2 DIABETES MELLITUS WITHOUT COMPLICATION, WITH LONG-TERM CURRENT USE OF INSULIN (MULTI): ICD-10-CM

## 2024-04-09 DIAGNOSIS — F41.9 ANXIETY DISORDER, UNSPECIFIED: ICD-10-CM

## 2024-04-10 RX ORDER — QUETIAPINE FUMARATE 25 MG/1
TABLET, FILM COATED ORAL
Qty: 180 TABLET | Refills: 0 | Status: SHIPPED | OUTPATIENT
Start: 2024-04-10 | End: 2024-05-07 | Stop reason: HOSPADM

## 2024-04-17 RX ORDER — INSULIN GLARGINE 100 [IU]/ML
16 INJECTION, SOLUTION SUBCUTANEOUS NIGHTLY
Qty: 10 ML | Refills: 11 | Status: SHIPPED | OUTPATIENT
Start: 2024-04-17 | End: 2024-05-02 | Stop reason: HOSPADM

## 2024-04-17 RX ORDER — METFORMIN HYDROCHLORIDE 500 MG/1
500 TABLET ORAL
Qty: 180 TABLET | Refills: 3 | Status: ON HOLD | OUTPATIENT
Start: 2024-04-17 | End: 2024-05-02

## 2024-04-17 RX ORDER — GLIMEPIRIDE 2 MG/1
2 TABLET ORAL DAILY
Qty: 90 TABLET | Refills: 3 | Status: SHIPPED | OUTPATIENT
Start: 2024-04-17 | End: 2024-05-02 | Stop reason: HOSPADM

## 2024-04-17 RX ORDER — FLASH GLUCOSE SENSOR
KIT MISCELLANEOUS
Qty: 1 EACH | Refills: 11 | Status: SHIPPED | OUTPATIENT
Start: 2024-04-17

## 2024-04-17 RX ORDER — PIOGLITAZONEHYDROCHLORIDE 15 MG/1
15 TABLET ORAL DAILY
Qty: 90 TABLET | Refills: 3 | Status: SHIPPED | OUTPATIENT
Start: 2024-04-17 | End: 2024-05-07 | Stop reason: WASHOUT

## 2024-04-23 ENCOUNTER — APPOINTMENT (OUTPATIENT)
Dept: CARDIOLOGY | Facility: HOSPITAL | Age: 84
End: 2024-04-23
Payer: MEDICARE

## 2024-04-23 ENCOUNTER — HOSPITAL ENCOUNTER (EMERGENCY)
Facility: HOSPITAL | Age: 84
Discharge: OTHER INSTITUTION | End: 2024-04-24
Attending: EMERGENCY MEDICINE
Payer: MEDICARE

## 2024-04-23 ENCOUNTER — HOSPITAL ENCOUNTER (EMERGENCY)
Facility: HOSPITAL | Age: 84
Discharge: HOME | End: 2024-04-23
Attending: STUDENT IN AN ORGANIZED HEALTH CARE EDUCATION/TRAINING PROGRAM
Payer: MEDICARE

## 2024-04-23 ENCOUNTER — TELEPHONE (OUTPATIENT)
Dept: NEUROLOGY | Facility: CLINIC | Age: 84
End: 2024-04-23

## 2024-04-23 ENCOUNTER — APPOINTMENT (OUTPATIENT)
Dept: NEUROLOGY | Facility: CLINIC | Age: 84
End: 2024-04-23
Payer: MEDICARE

## 2024-04-23 VITALS
OXYGEN SATURATION: 97 % | WEIGHT: 160 LBS | BODY MASS INDEX: 25.71 KG/M2 | HEIGHT: 66 IN | DIASTOLIC BLOOD PRESSURE: 86 MMHG | HEART RATE: 76 BPM | RESPIRATION RATE: 18 BRPM | TEMPERATURE: 98.4 F | SYSTOLIC BLOOD PRESSURE: 177 MMHG

## 2024-04-23 DIAGNOSIS — F01.B2 MODERATE VASCULAR DEMENTIA WITH PSYCHOTIC DISTURBANCE (MULTI): Primary | ICD-10-CM

## 2024-04-23 DIAGNOSIS — G40.909 SEIZURE DISORDER (MULTI): ICD-10-CM

## 2024-04-23 DIAGNOSIS — R45.850 HOMICIDAL BEHAVIOR: Primary | ICD-10-CM

## 2024-04-23 DIAGNOSIS — R46.89 AGGRESSIVE BEHAVIOR OF ADULT: Primary | ICD-10-CM

## 2024-04-23 PROBLEM — F05 DELIRIUM DUE TO KNOWN PHYSIOLOGICAL CONDITION: Status: RESOLVED | Noted: 2022-11-22 | Resolved: 2024-04-23

## 2024-04-23 LAB
ALBUMIN SERPL BCP-MCNC: 4.6 G/DL (ref 3.4–5)
ALP SERPL-CCNC: 68 U/L (ref 33–136)
ALT SERPL W P-5'-P-CCNC: 15 U/L (ref 10–52)
ANION GAP SERPL CALC-SCNC: 11 MMOL/L (ref 10–20)
APAP SERPL-MCNC: <10 UG/ML
AST SERPL W P-5'-P-CCNC: 20 U/L (ref 9–39)
BASOPHILS # BLD AUTO: 0.01 X10*3/UL (ref 0–0.1)
BASOPHILS NFR BLD AUTO: 0.2 %
BILIRUB SERPL-MCNC: 0.6 MG/DL (ref 0–1.2)
BUN SERPL-MCNC: 20 MG/DL (ref 6–23)
CALCIUM SERPL-MCNC: 9.4 MG/DL (ref 8.6–10.3)
CHLORIDE SERPL-SCNC: 101 MMOL/L (ref 98–107)
CO2 SERPL-SCNC: 31 MMOL/L (ref 21–32)
CREAT SERPL-MCNC: 0.87 MG/DL (ref 0.5–1.3)
EGFRCR SERPLBLD CKD-EPI 2021: 85 ML/MIN/1.73M*2
EOSINOPHIL # BLD AUTO: 0.5 X10*3/UL (ref 0–0.4)
EOSINOPHIL NFR BLD AUTO: 8 %
ERYTHROCYTE [DISTWIDTH] IN BLOOD BY AUTOMATED COUNT: 14.6 % (ref 11.5–14.5)
ETHANOL SERPL-MCNC: <10 MG/DL
FLUAV RNA RESP QL NAA+PROBE: NOT DETECTED
FLUBV RNA RESP QL NAA+PROBE: NOT DETECTED
GLUCOSE SERPL-MCNC: 114 MG/DL (ref 74–99)
HCT VFR BLD AUTO: 45.9 % (ref 41–52)
HGB BLD-MCNC: 14.8 G/DL (ref 13.5–17.5)
IMM GRANULOCYTES # BLD AUTO: 0.01 X10*3/UL (ref 0–0.5)
IMM GRANULOCYTES NFR BLD AUTO: 0.2 % (ref 0–0.9)
LYMPHOCYTES # BLD AUTO: 1.78 X10*3/UL (ref 0.8–3)
LYMPHOCYTES NFR BLD AUTO: 28.5 %
MAGNESIUM SERPL-MCNC: 1.76 MG/DL (ref 1.6–2.4)
MCH RBC QN AUTO: 29.4 PG (ref 26–34)
MCHC RBC AUTO-ENTMCNC: 32.2 G/DL (ref 32–36)
MCV RBC AUTO: 91 FL (ref 80–100)
MONOCYTES # BLD AUTO: 0.52 X10*3/UL (ref 0.05–0.8)
MONOCYTES NFR BLD AUTO: 8.3 %
NEUTROPHILS # BLD AUTO: 3.43 X10*3/UL (ref 1.6–5.5)
NEUTROPHILS NFR BLD AUTO: 54.8 %
NRBC BLD-RTO: 0 /100 WBCS (ref 0–0)
PLATELET # BLD AUTO: 217 X10*3/UL (ref 150–450)
POTASSIUM SERPL-SCNC: 4.3 MMOL/L (ref 3.5–5.3)
PROT SERPL-MCNC: 7.3 G/DL (ref 6.4–8.2)
RBC # BLD AUTO: 5.03 X10*6/UL (ref 4.5–5.9)
SALICYLATES SERPL-MCNC: <3 MG/DL
SARS-COV-2 RNA RESP QL NAA+PROBE: NOT DETECTED
SODIUM SERPL-SCNC: 139 MMOL/L (ref 136–145)
WBC # BLD AUTO: 6.3 X10*3/UL (ref 4.4–11.3)

## 2024-04-23 PROCEDURE — 99285 EMERGENCY DEPT VISIT HI MDM: CPT

## 2024-04-23 PROCEDURE — 83735 ASSAY OF MAGNESIUM: CPT | Performed by: STUDENT IN AN ORGANIZED HEALTH CARE EDUCATION/TRAINING PROGRAM

## 2024-04-23 PROCEDURE — 80179 DRUG ASSAY SALICYLATE: CPT | Performed by: STUDENT IN AN ORGANIZED HEALTH CARE EDUCATION/TRAINING PROGRAM

## 2024-04-23 PROCEDURE — 93005 ELECTROCARDIOGRAM TRACING: CPT

## 2024-04-23 PROCEDURE — 99285 EMERGENCY DEPT VISIT HI MDM: CPT | Performed by: EMERGENCY MEDICINE

## 2024-04-23 PROCEDURE — 36415 COLL VENOUS BLD VENIPUNCTURE: CPT | Performed by: STUDENT IN AN ORGANIZED HEALTH CARE EDUCATION/TRAINING PROGRAM

## 2024-04-23 PROCEDURE — 99283 EMERGENCY DEPT VISIT LOW MDM: CPT

## 2024-04-23 PROCEDURE — 80053 COMPREHEN METABOLIC PANEL: CPT | Performed by: STUDENT IN AN ORGANIZED HEALTH CARE EDUCATION/TRAINING PROGRAM

## 2024-04-23 PROCEDURE — 87636 SARSCOV2 & INF A&B AMP PRB: CPT | Performed by: STUDENT IN AN ORGANIZED HEALTH CARE EDUCATION/TRAINING PROGRAM

## 2024-04-23 PROCEDURE — 85025 COMPLETE CBC W/AUTO DIFF WBC: CPT | Performed by: STUDENT IN AN ORGANIZED HEALTH CARE EDUCATION/TRAINING PROGRAM

## 2024-04-23 PROCEDURE — 80143 DRUG ASSAY ACETAMINOPHEN: CPT | Performed by: STUDENT IN AN ORGANIZED HEALTH CARE EDUCATION/TRAINING PROGRAM

## 2024-04-23 PROCEDURE — 2500000001 HC RX 250 WO HCPCS SELF ADMINISTERED DRUGS (ALT 637 FOR MEDICARE OP)

## 2024-04-23 RX ORDER — HYDROXYZINE HYDROCHLORIDE 25 MG/1
25 TABLET, FILM COATED ORAL ONCE
Status: DISCONTINUED | OUTPATIENT
Start: 2024-04-23 | End: 2024-04-23

## 2024-04-23 RX ORDER — HYDROXYZINE HYDROCHLORIDE 25 MG/1
50 TABLET, FILM COATED ORAL ONCE
Status: COMPLETED | OUTPATIENT
Start: 2024-04-23 | End: 2024-04-23

## 2024-04-23 RX ADMIN — HYDROXYZINE HYDROCHLORIDE 50 MG: 25 TABLET ORAL at 23:10

## 2024-04-23 SDOH — HEALTH STABILITY: MENTAL HEALTH: HAVE YOU ACTUALLY HAD ANY THOUGHTS OF KILLING YOURSELF?: NO

## 2024-04-23 SDOH — HEALTH STABILITY: MENTAL HEALTH

## 2024-04-23 SDOH — HEALTH STABILITY: MENTAL HEALTH: DELUSIONS: SOMATIC

## 2024-04-23 SDOH — HEALTH STABILITY: MENTAL HEALTH: CONTENT: BLAMING OTHERS

## 2024-04-23 SDOH — HEALTH STABILITY: MENTAL HEALTH: HAVE YOU WISHED YOU WERE DEAD OR WISHED YOU COULD GO TO SLEEP AND NOT WAKE UP?: NO

## 2024-04-23 SDOH — HEALTH STABILITY: MENTAL HEALTH: BEHAVIORS/MOOD: AGITATED;COOPERATIVE

## 2024-04-23 SDOH — HEALTH STABILITY: MENTAL HEALTH: DEPRESSION SYMPTOMS: NO PROBLEMS REPORTED OR OBSERVED.

## 2024-04-23 SDOH — HEALTH STABILITY: MENTAL HEALTH: WISH TO BE DEAD (PAST 1 MONTH): NO

## 2024-04-23 SDOH — SOCIAL STABILITY: SOCIAL NETWORK: VISITOR BEHAVIORS: UNABLE TO ASSESS

## 2024-04-23 SDOH — HEALTH STABILITY: MENTAL HEALTH: BEHAVIORS/MOOD: CALM;COOPERATIVE

## 2024-04-23 SDOH — HEALTH STABILITY: MENTAL HEALTH: IN THE PAST WEEK, HAVE YOU BEEN HAVING THOUGHTS ABOUT KILLING YOURSELF?: NO

## 2024-04-23 SDOH — HEALTH STABILITY: MENTAL HEALTH: IN THE PAST FEW WEEKS, HAVE YOU WISHED YOU WERE DEAD?: NO

## 2024-04-23 SDOH — HEALTH STABILITY: MENTAL HEALTH: CONTENT: UNREMARKABLE

## 2024-04-23 SDOH — HEALTH STABILITY: MENTAL HEALTH: SUICIDE ASSESSMENT: ADULT (C-SSRS)

## 2024-04-23 SDOH — HEALTH STABILITY: MENTAL HEALTH: HAVE YOU EVER DONE ANYTHING, STARTED TO DO ANYTHING, OR PREPARED TO DO ANYTHING TO END YOUR LIFE?: NO

## 2024-04-23 SDOH — SOCIAL STABILITY: SOCIAL INSECURITY: FAMILY BEHAVIORS: CALM;COOPERATIVE;FLAT AFFECT

## 2024-04-23 SDOH — HEALTH STABILITY: MENTAL HEALTH: ANXIETY SYMPTOMS: GENERALIZED

## 2024-04-23 SDOH — HEALTH STABILITY: MENTAL HEALTH: ARE YOU HAVING THOUGHTS OF KILLING YOURSELF RIGHT NOW?: NO

## 2024-04-23 SDOH — HEALTH STABILITY: MENTAL HEALTH: HAVE YOU EVER TRIED TO KILL YOURSELF?: NO

## 2024-04-23 SDOH — HEALTH STABILITY: MENTAL HEALTH: IN THE PAST FEW WEEKS, HAVE YOU FELT THAT YOU OR YOUR FAMILY WOULD BE BETTER OFF IF YOU WERE DEAD?: NO

## 2024-04-23 SDOH — HEALTH STABILITY: MENTAL HEALTH: SUICIDAL BEHAVIOR (LIFETIME): NO

## 2024-04-23 SDOH — SOCIAL STABILITY: SOCIAL NETWORK: VISITOR BEHAVIORS: ANXIOUS

## 2024-04-23 SDOH — HEALTH STABILITY: MENTAL HEALTH: BEHAVIORS/MOOD: COOPERATIVE

## 2024-04-23 SDOH — HEALTH STABILITY: MENTAL HEALTH: NEEDS EXPRESSED: DENIES

## 2024-04-23 SDOH — SOCIAL STABILITY: SOCIAL NETWORK: PARENT/GUARDIAN/SIGNIFICANT OTHER INVOLVEMENT: SPORADIC INVOLVEMENT

## 2024-04-23 SDOH — SOCIAL STABILITY: SOCIAL INSECURITY: FAMILY BEHAVIORS: ANXIOUS

## 2024-04-23 SDOH — SOCIAL STABILITY: SOCIAL NETWORK: EMOTIONAL SUPPORT GIVEN: REASSURE

## 2024-04-23 SDOH — HEALTH STABILITY: MENTAL HEALTH: SLEEP PATTERN: DIFFICULTY FALLING ASLEEP

## 2024-04-23 SDOH — ECONOMIC STABILITY: HOUSING INSECURITY: FEELS SAFE LIVING IN HOME: YES

## 2024-04-23 SDOH — HEALTH STABILITY: MENTAL HEALTH: NON-SPECIFIC ACTIVE SUICIDAL THOUGHTS (PAST 1 MONTH): NO

## 2024-04-23 SDOH — SOCIAL STABILITY: SOCIAL NETWORK: PARENT/GUARDIAN/SIGNIFICANT OTHER INVOLVEMENT: ATTENTIVE TO PATIENT NEEDS

## 2024-04-23 SDOH — HEALTH STABILITY: MENTAL HEALTH: BEHAVIORS/MOOD: AGITATED;ANGRY;HOSTILE;LABILE

## 2024-04-23 ASSESSMENT — PAIN - FUNCTIONAL ASSESSMENT
PAIN_FUNCTIONAL_ASSESSMENT: 0-10
PAIN_FUNCTIONAL_ASSESSMENT: 0-10

## 2024-04-23 ASSESSMENT — LIFESTYLE VARIABLES
TOTAL SCORE: 0
HAVE YOU EVER FELT YOU SHOULD CUT DOWN ON YOUR DRINKING: NO
PRESCIPTION_ABUSE_PAST_12_MONTHS: NO
SUBSTANCE_ABUSE_PAST_12_MONTHS: NO
PRESCIPTION_ABUSE_PAST_12_MONTHS: NO
EVER FELT BAD OR GUILTY ABOUT YOUR DRINKING: NO
EVER FELT BAD OR GUILTY ABOUT YOUR DRINKING: NO
HAVE PEOPLE ANNOYED YOU BY CRITICIZING YOUR DRINKING: NO
EVER HAD A DRINK FIRST THING IN THE MORNING TO STEADY YOUR NERVES TO GET RID OF A HANGOVER: NO
HAVE YOU EVER FELT YOU SHOULD CUT DOWN ON YOUR DRINKING: NO
TOTAL SCORE: 0
SUBSTANCE_ABUSE_PAST_12_MONTHS: NO
HAVE PEOPLE ANNOYED YOU BY CRITICIZING YOUR DRINKING: NO
EVER HAD A DRINK FIRST THING IN THE MORNING TO STEADY YOUR NERVES TO GET RID OF A HANGOVER: NO

## 2024-04-23 ASSESSMENT — COLUMBIA-SUICIDE SEVERITY RATING SCALE - C-SSRS
1. IN THE PAST MONTH, HAVE YOU WISHED YOU WERE DEAD OR WISHED YOU COULD GO TO SLEEP AND NOT WAKE UP?: NO
6. HAVE YOU EVER DONE ANYTHING, STARTED TO DO ANYTHING, OR PREPARED TO DO ANYTHING TO END YOUR LIFE?: NO
2. HAVE YOU ACTUALLY HAD ANY THOUGHTS OF KILLING YOURSELF?: NO
6. HAVE YOU EVER DONE ANYTHING, STARTED TO DO ANYTHING, OR PREPARED TO DO ANYTHING TO END YOUR LIFE?: NO
1. IN THE PAST MONTH, HAVE YOU WISHED YOU WERE DEAD OR WISHED YOU COULD GO TO SLEEP AND NOT WAKE UP?: NO
2. HAVE YOU ACTUALLY HAD ANY THOUGHTS OF KILLING YOURSELF?: NO
2. HAVE YOU ACTUALLY HAD ANY THOUGHTS OF KILLING YOURSELF?: NO
6. HAVE YOU EVER DONE ANYTHING, STARTED TO DO ANYTHING, OR PREPARED TO DO ANYTHING TO END YOUR LIFE?: NO
1. SINCE LAST CONTACT, HAVE YOU WISHED YOU WERE DEAD OR WISHED YOU COULD GO TO SLEEP AND NOT WAKE UP?: NO

## 2024-04-23 ASSESSMENT — PAIN SCALES - GENERAL
PAINLEVEL_OUTOF10: 0 - NO PAIN

## 2024-04-23 NOTE — DISCHARGE INSTRUCTIONS
Please keep your appointment with the neurologist for today.  You may return to the emergency department at any point in time for repeat/further evaluation if there are any further behavior changes or any other development of signs and symptoms that are concerning to you.  These documents have a lot of useful information! Please read them, so you know what to expect, what you can do for yourself at home, and also to know concerning signs warrant a return to the Emergency Department for additional evaluation.  You are welcome back any time. Thank you for entrusting your care to us, I hope we made your visit as pleasant as possible. Wishing you well!    Dr. Tyler
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
## 2024-04-23 NOTE — PROGRESS NOTES

## 2024-04-23 NOTE — PROGRESS NOTES

## 2024-04-23 NOTE — TELEPHONE ENCOUNTER
Jose's son called the answering service. He is currently in the hospital and his son would like a call when you're available.    Thank you      Jose (Son)  [phone]

## 2024-04-24 ENCOUNTER — TELEPHONE (OUTPATIENT)
Dept: NEUROLOGY | Facility: CLINIC | Age: 84
End: 2024-04-24
Payer: MEDICARE

## 2024-04-24 ENCOUNTER — HOSPITAL ENCOUNTER (INPATIENT)
Facility: HOSPITAL | Age: 84
LOS: 8 days | Discharge: HOME | DRG: 884 | End: 2024-05-02
Attending: PSYCHIATRY & NEUROLOGY | Admitting: PSYCHIATRY & NEUROLOGY
Payer: MEDICARE

## 2024-04-24 VITALS
DIASTOLIC BLOOD PRESSURE: 73 MMHG | HEART RATE: 107 BPM | RESPIRATION RATE: 18 BRPM | OXYGEN SATURATION: 95 % | SYSTOLIC BLOOD PRESSURE: 135 MMHG | HEIGHT: 67 IN | TEMPERATURE: 97.9 F | WEIGHT: 165 LBS | BODY MASS INDEX: 25.9 KG/M2

## 2024-04-24 DIAGNOSIS — G47.00 INSOMNIA, UNSPECIFIED TYPE: ICD-10-CM

## 2024-04-24 DIAGNOSIS — R41.3 MEMORY IMPAIRMENT: ICD-10-CM

## 2024-04-24 DIAGNOSIS — K21.9 GASTROESOPHAGEAL REFLUX DISEASE WITHOUT ESOPHAGITIS: ICD-10-CM

## 2024-04-24 DIAGNOSIS — I10 BENIGN ESSENTIAL HYPERTENSION: ICD-10-CM

## 2024-04-24 DIAGNOSIS — E78.5 HYPERLIPIDEMIA, UNSPECIFIED HYPERLIPIDEMIA TYPE: ICD-10-CM

## 2024-04-24 DIAGNOSIS — E11.9 TYPE 2 DIABETES MELLITUS WITHOUT COMPLICATION, WITHOUT LONG-TERM CURRENT USE OF INSULIN (MULTI): ICD-10-CM

## 2024-04-24 DIAGNOSIS — E11.9 TYPE 2 DIABETES MELLITUS WITHOUT COMPLICATION, WITH LONG-TERM CURRENT USE OF INSULIN (MULTI): ICD-10-CM

## 2024-04-24 DIAGNOSIS — F32.3: ICD-10-CM

## 2024-04-24 DIAGNOSIS — R56.9 SEIZURE (MULTI): ICD-10-CM

## 2024-04-24 DIAGNOSIS — F39 MOOD DISORDER (CMS-HCC): ICD-10-CM

## 2024-04-24 DIAGNOSIS — Z79.4 TYPE 2 DIABETES MELLITUS WITHOUT COMPLICATION, WITH LONG-TERM CURRENT USE OF INSULIN (MULTI): ICD-10-CM

## 2024-04-24 DIAGNOSIS — G45.9 TRANSIENT ISCHEMIC ATTACK (TIA): ICD-10-CM

## 2024-04-24 DIAGNOSIS — E03.9 HYPOTHYROIDISM, UNSPECIFIED TYPE: ICD-10-CM

## 2024-04-24 LAB
AMPHETAMINES UR QL SCN: NORMAL
APPEARANCE UR: CLEAR
BARBITURATES UR QL SCN: NORMAL
BENZODIAZ UR QL SCN: NORMAL
BILIRUB UR STRIP.AUTO-MCNC: NEGATIVE MG/DL
BZE UR QL SCN: NORMAL
CANNABINOIDS UR QL SCN: NORMAL
COLOR UR: YELLOW
FENTANYL+NORFENTANYL UR QL SCN: NORMAL
GLUCOSE BLD MANUAL STRIP-MCNC: 141 MG/DL (ref 74–99)
GLUCOSE BLD MANUAL STRIP-MCNC: 369 MG/DL (ref 74–99)
GLUCOSE UR STRIP.AUTO-MCNC: ABNORMAL MG/DL
HOLD SPECIMEN: NORMAL
KETONES UR STRIP.AUTO-MCNC: NEGATIVE MG/DL
LEUKOCYTE ESTERASE UR QL STRIP.AUTO: NEGATIVE
METHADONE UR QL SCN: NORMAL
NITRITE UR QL STRIP.AUTO: NEGATIVE
OPIATES UR QL SCN: NORMAL
OXYCODONE+OXYMORPHONE UR QL SCN: NORMAL
PCP UR QL SCN: NORMAL
PH UR STRIP.AUTO: 5 [PH]
PROT UR STRIP.AUTO-MCNC: NEGATIVE MG/DL
RBC # UR STRIP.AUTO: NEGATIVE /UL
SP GR UR STRIP.AUTO: 1.02
UROBILINOGEN UR STRIP.AUTO-MCNC: <2 MG/DL

## 2024-04-24 PROCEDURE — 81003 URINALYSIS AUTO W/O SCOPE: CPT | Performed by: EMERGENCY MEDICINE

## 2024-04-24 PROCEDURE — 80307 DRUG TEST PRSMV CHEM ANLYZR: CPT

## 2024-04-24 PROCEDURE — 2500000001 HC RX 250 WO HCPCS SELF ADMINISTERED DRUGS (ALT 637 FOR MEDICARE OP): Performed by: PSYCHIATRY & NEUROLOGY

## 2024-04-24 PROCEDURE — 2500000002 HC RX 250 W HCPCS SELF ADMINISTERED DRUGS (ALT 637 FOR MEDICARE OP, ALT 636 FOR OP/ED)

## 2024-04-24 PROCEDURE — 1140000001 HC PRIVATE PSYCH ROOM DAILY

## 2024-04-24 PROCEDURE — 82947 ASSAY GLUCOSE BLOOD QUANT: CPT

## 2024-04-24 PROCEDURE — 2500000006 HC RX 250 W HCPCS SELF ADMINISTERED DRUGS (ALT 637 FOR ALL PAYERS): Performed by: EMERGENCY MEDICINE

## 2024-04-24 PROCEDURE — 2500000001 HC RX 250 WO HCPCS SELF ADMINISTERED DRUGS (ALT 637 FOR MEDICARE OP): Performed by: EMERGENCY MEDICINE

## 2024-04-24 PROCEDURE — 82947 ASSAY GLUCOSE BLOOD QUANT: CPT | Mod: 91

## 2024-04-24 RX ORDER — ALUMINUM HYDROXIDE, MAGNESIUM HYDROXIDE, AND SIMETHICONE 2400; 240; 2400 MG/30ML; MG/30ML; MG/30ML
30 SUSPENSION ORAL EVERY 6 HOURS PRN
Status: DISCONTINUED | OUTPATIENT
Start: 2024-04-24 | End: 2024-05-02 | Stop reason: HOSPADM

## 2024-04-24 RX ORDER — ATORVASTATIN CALCIUM 80 MG/1
80 TABLET, FILM COATED ORAL NIGHTLY
Status: DISCONTINUED | OUTPATIENT
Start: 2024-04-24 | End: 2024-04-24 | Stop reason: HOSPADM

## 2024-04-24 RX ORDER — METFORMIN HYDROCHLORIDE 500 MG/1
500 TABLET ORAL
Status: DISCONTINUED | OUTPATIENT
Start: 2024-04-24 | End: 2024-04-24 | Stop reason: HOSPADM

## 2024-04-24 RX ORDER — CLOPIDOGREL BISULFATE 75 MG/1
75 TABLET ORAL ONCE
Status: COMPLETED | OUTPATIENT
Start: 2024-04-24 | End: 2024-04-24

## 2024-04-24 RX ORDER — HYDROXYZINE HYDROCHLORIDE 25 MG/1
25 TABLET, FILM COATED ORAL ONCE
Status: COMPLETED | OUTPATIENT
Start: 2024-04-24 | End: 2024-04-24

## 2024-04-24 RX ORDER — QUETIAPINE FUMARATE 25 MG/1
12.5 TABLET, FILM COATED ORAL EVERY 4 HOURS PRN
Status: DISCONTINUED | OUTPATIENT
Start: 2024-04-24 | End: 2024-05-02 | Stop reason: HOSPADM

## 2024-04-24 RX ORDER — TALC
3 POWDER (GRAM) TOPICAL NIGHTLY
Status: DISCONTINUED | OUTPATIENT
Start: 2024-04-24 | End: 2024-05-02 | Stop reason: HOSPADM

## 2024-04-24 RX ORDER — MAGNESIUM HYDROXIDE 2400 MG/10ML
10 SUSPENSION ORAL DAILY PRN
Status: DISCONTINUED | OUTPATIENT
Start: 2024-04-24 | End: 2024-05-02 | Stop reason: HOSPADM

## 2024-04-24 RX ORDER — OLANZAPINE 10 MG/2ML
5 INJECTION, POWDER, FOR SOLUTION INTRAMUSCULAR EVERY 6 HOURS PRN
Status: DISCONTINUED | OUTPATIENT
Start: 2024-04-24 | End: 2024-05-02 | Stop reason: HOSPADM

## 2024-04-24 RX ORDER — FINASTERIDE 5 MG/1
5 TABLET, FILM COATED ORAL DAILY
Status: DISCONTINUED | OUTPATIENT
Start: 2024-04-24 | End: 2024-04-24 | Stop reason: HOSPADM

## 2024-04-24 RX ORDER — BUSPIRONE HYDROCHLORIDE 5 MG/1
15 TABLET ORAL 2 TIMES DAILY
Status: DISCONTINUED | OUTPATIENT
Start: 2024-04-24 | End: 2024-04-24 | Stop reason: HOSPADM

## 2024-04-24 RX ORDER — METFORMIN HYDROCHLORIDE 500 MG/1
500 TABLET ORAL
Status: DISCONTINUED | OUTPATIENT
Start: 2024-04-24 | End: 2024-04-24

## 2024-04-24 RX ORDER — ACETAMINOPHEN 325 MG/1
650 TABLET ORAL EVERY 4 HOURS PRN
Status: DISCONTINUED | OUTPATIENT
Start: 2024-04-24 | End: 2024-05-02 | Stop reason: HOSPADM

## 2024-04-24 RX ORDER — PANTOPRAZOLE SODIUM 40 MG/1
40 TABLET, DELAYED RELEASE ORAL
Status: DISCONTINUED | OUTPATIENT
Start: 2024-04-24 | End: 2024-04-24 | Stop reason: HOSPADM

## 2024-04-24 RX ORDER — LOSARTAN POTASSIUM 50 MG/1
25 TABLET ORAL DAILY
Status: DISCONTINUED | OUTPATIENT
Start: 2024-04-24 | End: 2024-04-24 | Stop reason: HOSPADM

## 2024-04-24 RX ORDER — LEVOTHYROXINE SODIUM 100 UG/1
100 TABLET ORAL DAILY
Status: DISCONTINUED | OUTPATIENT
Start: 2024-04-24 | End: 2024-04-24 | Stop reason: HOSPADM

## 2024-04-24 RX ADMIN — Medication 3 MG: at 21:19

## 2024-04-24 RX ADMIN — LEVOTHYROXINE SODIUM 100 MCG: 0.1 TABLET ORAL at 10:20

## 2024-04-24 RX ADMIN — HYDROXYZINE HYDROCHLORIDE 25 MG: 25 TABLET ORAL at 12:55

## 2024-04-24 RX ADMIN — PANTOPRAZOLE SODIUM 40 MG: 40 TABLET, DELAYED RELEASE ORAL at 10:23

## 2024-04-24 RX ADMIN — METFORMIN HYDROCHLORIDE 500 MG: 500 TABLET ORAL at 10:22

## 2024-04-24 RX ADMIN — LOSARTAN POTASSIUM 25 MG: 50 TABLET, FILM COATED ORAL at 10:22

## 2024-04-24 RX ADMIN — CLOPIDOGREL BISULFATE 75 MG: 75 TABLET ORAL at 10:21

## 2024-04-24 RX ADMIN — BUSPIRONE HYDROCHLORIDE 15 MG: 5 TABLET ORAL at 10:21

## 2024-04-24 RX ADMIN — BRIVARACETAM 50 MG: 50 TABLET, FILM COATED ORAL at 11:04

## 2024-04-24 RX ADMIN — INSULIN HUMAN 5 UNITS: 100 INJECTION, SOLUTION PARENTERAL at 17:15

## 2024-04-24 RX ADMIN — FINASTERIDE 5 MG: 5 TABLET, FILM COATED ORAL at 10:23

## 2024-04-24 RX ADMIN — BRIVARACETAM 50 MG: 50 TABLET, FILM COATED ORAL at 21:19

## 2024-04-24 SDOH — ECONOMIC STABILITY: INCOME INSECURITY: IN THE LAST 12 MONTHS, WAS THERE A TIME WHEN YOU WERE NOT ABLE TO PAY THE MORTGAGE OR RENT ON TIME?: NO

## 2024-04-24 SDOH — SOCIAL STABILITY: SOCIAL INSECURITY: ARE THERE ANY APPARENT SIGNS OF INJURIES/BEHAVIORS THAT COULD BE RELATED TO ABUSE/NEGLECT?: NO

## 2024-04-24 SDOH — HEALTH STABILITY: MENTAL HEALTH: HAVE YOU ACTUALLY HAD ANY THOUGHTS OF KILLING YOURSELF?: NO

## 2024-04-24 SDOH — SOCIAL STABILITY: SOCIAL NETWORK: ARE YOU MARRIED, WIDOWED, DIVORCED, SEPARATED, NEVER MARRIED, OR LIVING WITH A PARTNER?: MARRIED

## 2024-04-24 SDOH — ECONOMIC STABILITY: HOUSING INSECURITY: IN THE LAST 12 MONTHS, HOW MANY PLACES HAVE YOU LIVED?: 1

## 2024-04-24 SDOH — SOCIAL STABILITY: SOCIAL NETWORK: HOW OFTEN DO YOU ATTEND MEETINGS OF THE CLUBS OR ORGANIZATIONS YOU BELONG TO?: NEVER

## 2024-04-24 SDOH — HEALTH STABILITY: MENTAL HEALTH

## 2024-04-24 SDOH — SOCIAL STABILITY: SOCIAL INSECURITY: DO YOU FEEL UNSAFE GOING BACK TO THE PLACE WHERE YOU ARE LIVING?: NO

## 2024-04-24 SDOH — HEALTH STABILITY: PHYSICAL HEALTH: ON AVERAGE, HOW MANY DAYS PER WEEK DO YOU ENGAGE IN MODERATE TO STRENUOUS EXERCISE (LIKE A BRISK WALK)?: 0 DAYS

## 2024-04-24 SDOH — ECONOMIC STABILITY: INCOME INSECURITY: IN THE PAST 12 MONTHS HAS THE ELECTRIC, GAS, OIL, OR WATER COMPANY THREATENED TO SHUT OFF SERVICES IN YOUR HOME?: NO

## 2024-04-24 SDOH — SOCIAL STABILITY: SOCIAL INSECURITY: WITHIN THE LAST YEAR, HAVE YOU BEEN AFRAID OF YOUR PARTNER OR EX-PARTNER?: NO

## 2024-04-24 SDOH — SOCIAL STABILITY: SOCIAL INSECURITY: WITHIN THE LAST YEAR, HAVE YOU BEEN HUMILIATED OR EMOTIONALLY ABUSED IN OTHER WAYS BY YOUR PARTNER OR EX-PARTNER?: NO

## 2024-04-24 SDOH — ECONOMIC STABILITY: FOOD INSECURITY: WITHIN THE PAST 12 MONTHS, THE FOOD YOU BOUGHT JUST DIDN'T LAST AND YOU DIDN'T HAVE MONEY TO GET MORE.: NEVER TRUE

## 2024-04-24 SDOH — SOCIAL STABILITY: SOCIAL NETWORK: HOW OFTEN DO YOU ATTEND CHURCH OR RELIGIOUS SERVICES?: 1 TO 4 TIMES PER YEAR

## 2024-04-24 SDOH — HEALTH STABILITY: MENTAL HEALTH: BEHAVIORS/MOOD: AGITATED;ANXIOUS;ANGRY;COOPERATIVE

## 2024-04-24 SDOH — SOCIAL STABILITY: SOCIAL NETWORK: HOW OFTEN DO YOU GET TOGETHER WITH FRIENDS OR RELATIVES?: MORE THAN THREE TIMES A WEEK

## 2024-04-24 SDOH — HEALTH STABILITY: MENTAL HEALTH
DO YOU FEEL STRESS - TENSE, RESTLESS, NERVOUS, OR ANXIOUS, OR UNABLE TO SLEEP AT NIGHT BECAUSE YOUR MIND IS TROUBLED ALL THE TIME - THESE DAYS?: ONLY A LITTLE

## 2024-04-24 SDOH — ECONOMIC STABILITY: FOOD INSECURITY: WITHIN THE PAST 12 MONTHS, YOU WORRIED THAT YOUR FOOD WOULD RUN OUT BEFORE YOU GOT MONEY TO BUY MORE.: NEVER TRUE

## 2024-04-24 SDOH — HEALTH STABILITY: MENTAL HEALTH
STRESS IS WHEN SOMEONE FEELS TENSE, NERVOUS, ANXIOUS, OR CAN'T SLEEP AT NIGHT BECAUSE THEIR MIND IS TROUBLED. HOW STRESSED ARE YOU?: ONLY A LITTLE

## 2024-04-24 SDOH — SOCIAL STABILITY: SOCIAL NETWORK
DO YOU BELONG TO ANY CLUBS OR ORGANIZATIONS SUCH AS CHURCH GROUPS, UNIONS, FRATERNAL OR ATHLETIC GROUPS, OR SCHOOL GROUPS?: NO

## 2024-04-24 SDOH — SOCIAL STABILITY: SOCIAL INSECURITY
WITHIN THE LAST YEAR, HAVE YOU BEEN KICKED, HIT, SLAPPED, OR OTHERWISE PHYSICALLY HURT BY YOUR PARTNER OR EX-PARTNER?: NO

## 2024-04-24 SDOH — ECONOMIC STABILITY: HOUSING INSECURITY: IN THE LAST 12 MONTHS, WAS THERE A TIME WHEN YOU WERE NOT ABLE TO PAY THE MORTGAGE OR RENT ON TIME?: NO

## 2024-04-24 SDOH — SOCIAL STABILITY: SOCIAL INSECURITY
WITHIN THE LAST YEAR, HAVE YOU BEEN RAPED OR FORCED TO HAVE ANY KIND OF SEXUAL ACTIVITY BY YOUR PARTNER OR EX-PARTNER?: NO

## 2024-04-24 SDOH — SOCIAL STABILITY: SOCIAL NETWORK
DO YOU BELONG TO ANY CLUBS OR ORGANIZATIONS SUCH AS CHURCH GROUPS UNIONS, FRATERNAL OR ATHLETIC GROUPS, OR SCHOOL GROUPS?: NO

## 2024-04-24 SDOH — SOCIAL STABILITY: SOCIAL INSECURITY: WERE YOU ABLE TO COMPLETE ALL THE BEHAVIORAL HEALTH SCREENINGS?: YES

## 2024-04-24 SDOH — HEALTH STABILITY: MENTAL HEALTH: BEHAVIORS/MOOD: COOPERATIVE;ANGRY

## 2024-04-24 SDOH — SOCIAL STABILITY: SOCIAL NETWORK
IN A TYPICAL WEEK, HOW MANY TIMES DO YOU TALK ON THE PHONE WITH FAMILY, FRIENDS, OR NEIGHBORS?: MORE THAN THREE TIMES A WEEK

## 2024-04-24 SDOH — HEALTH STABILITY: MENTAL HEALTH: BEHAVIORS/MOOD: SLEEPING

## 2024-04-24 SDOH — ECONOMIC STABILITY: FOOD INSECURITY: HOW HARD IS IT FOR YOU TO PAY FOR THE VERY BASICS LIKE FOOD, HOUSING, MEDICAL CARE, AND HEATING?: NOT HARD AT ALL

## 2024-04-24 SDOH — SOCIAL STABILITY: SOCIAL INSECURITY: DO YOU FEEL ANYONE HAS EXPLOITED OR TAKEN ADVANTAGE OF YOU FINANCIALLY OR OF YOUR PERSONAL PROPERTY?: NO

## 2024-04-24 SDOH — HEALTH STABILITY: PHYSICAL HEALTH: ON AVERAGE, HOW MANY MINUTES DO YOU ENGAGE IN EXERCISE AT THIS LEVEL?: 0 MIN

## 2024-04-24 SDOH — SOCIAL STABILITY: SOCIAL INSECURITY: HAVE YOU HAD THOUGHTS OF HARMING ANYONE ELSE?: NO

## 2024-04-24 SDOH — ECONOMIC STABILITY: FOOD INSECURITY: WITHIN THE PAST 12 MONTHS, YOU WORRIED THAT YOUR FOOD WOULD RUN OUT BEFORE YOU GOT THE MONEY TO BUY MORE.: NEVER TRUE

## 2024-04-24 SDOH — SOCIAL STABILITY: SOCIAL INSECURITY: ABUSE: ADULT

## 2024-04-24 SDOH — HEALTH STABILITY: MENTAL HEALTH: SUICIDE ASSESSMENT: ADULT (C-SSRS)

## 2024-04-24 SDOH — HEALTH STABILITY: PHYSICAL HEALTH
HOW OFTEN DO YOU NEED TO HAVE SOMEONE HELP YOU WHEN YOU READ INSTRUCTIONS, PAMPHLETS, OR OTHER WRITTEN MATERIAL FROM YOUR DOCTOR OR PHARMACY?: RARELY

## 2024-04-24 SDOH — SOCIAL STABILITY: SOCIAL INSECURITY: ARE YOU MARRIED, WIDOWED, DIVORCED, SEPARATED, NEVER MARRIED, OR LIVING WITH A PARTNER?: MARRIED

## 2024-04-24 SDOH — ECONOMIC STABILITY: INCOME INSECURITY: IN THE PAST 12 MONTHS, HAS THE ELECTRIC, GAS, OIL, OR WATER COMPANY THREATENED TO SHUT OFF SERVICE IN YOUR HOME?: NO

## 2024-04-24 SDOH — HEALTH STABILITY: MENTAL HEALTH: HAVE YOU EVER DONE ANYTHING, STARTED TO DO ANYTHING, OR PREPARED TO DO ANYTHING TO END YOUR LIFE?: NO

## 2024-04-24 SDOH — HEALTH STABILITY: MENTAL HEALTH: BEHAVIORS/MOOD: ANGRY;COOPERATIVE

## 2024-04-24 SDOH — SOCIAL STABILITY: SOCIAL INSECURITY: ARE YOU OR HAVE YOU BEEN THREATENED OR ABUSED PHYSICALLY, EMOTIONALLY, OR SEXUALLY BY ANYONE?: NO

## 2024-04-24 SDOH — SOCIAL STABILITY: SOCIAL INSECURITY
WITHIN THE LAST YEAR, HAVE TO BEEN RAPED OR FORCED TO HAVE ANY KIND OF SEXUAL ACTIVITY BY YOUR PARTNER OR EX-PARTNER?: NO

## 2024-04-24 SDOH — SOCIAL STABILITY: SOCIAL INSECURITY: HAS ANYONE EVER THREATENED TO HURT YOUR FAMILY OR YOUR PETS?: NO

## 2024-04-24 SDOH — HEALTH STABILITY: MENTAL HEALTH: BEHAVIORS/MOOD: ANXIOUS;ANGRY;COOPERATIVE;AGITATED

## 2024-04-24 SDOH — HEALTH STABILITY: MENTAL HEALTH: HAVE YOU WISHED YOU WERE DEAD OR WISHED YOU COULD GO TO SLEEP AND NOT WAKE UP?: NO

## 2024-04-24 SDOH — SOCIAL STABILITY: SOCIAL NETWORK: HOW OFTEN DO YOU ATTENT MEETINGS OF THE CLUB OR ORGANIZATION YOU BELONG TO?: NEVER

## 2024-04-24 SDOH — SOCIAL STABILITY: SOCIAL INSECURITY: DOES ANYONE TRY TO KEEP YOU FROM HAVING/CONTACTING OTHER FRIENDS OR DOING THINGS OUTSIDE YOUR HOME?: NO

## 2024-04-24 SDOH — SOCIAL STABILITY: SOCIAL INSECURITY: HAVE YOU HAD ANY THOUGHTS OF HARMING ANYONE ELSE?: NO

## 2024-04-24 SDOH — ECONOMIC STABILITY: TRANSPORTATION INSECURITY: IN THE PAST 12 MONTHS, HAS LACK OF TRANSPORTATION KEPT YOU FROM MEDICAL APPOINTMENTS OR FROM GETTING MEDICATIONS?: NO

## 2024-04-24 SDOH — ECONOMIC STABILITY: INCOME INSECURITY: HOW HARD IS IT FOR YOU TO PAY FOR THE VERY BASICS LIKE FOOD, HOUSING, MEDICAL CARE, AND HEATING?: NOT HARD AT ALL

## 2024-04-24 ASSESSMENT — LIFESTYLE VARIABLES
PAROXYSMAL SWEATS: NO SWEAT VISIBLE
AGITATION: NORMAL ACTIVITY
HOW OFTEN DO YOU HAVE 6 OR MORE DRINKS ON ONE OCCASION: NEVER
AUDIT-C TOTAL SCORE: 0
SKIP TO QUESTIONS 9-10: 1
TOTAL_SCORE: 0
HEADACHE, FULLNESS IN HEAD: NOT PRESENT
HOW OFTEN DO YOU HAVE A DRINK CONTAINING ALCOHOL: NEVER
ANXIETY: NO ANXIETY, AT EASE
AUDITORY DISTURBANCES: NOT PRESENT
NAUSEA AND VOMITING: NO NAUSEA AND NO VOMITING
ORIENTATION AND CLOUDING OF SENSORIUM: ORIENTED AND CAN DO SERIAL ADDITIONS
AUDIT-C TOTAL SCORE: 0
VISUAL DISTURBANCES: NOT PRESENT
BLOOD PRESSURE: 141/82
TREMOR: NO TREMOR
HOW MANY STANDARD DRINKS CONTAINING ALCOHOL DO YOU HAVE ON A TYPICAL DAY: PATIENT DOES NOT DRINK
CIWA TOTAL SCORE: 0
PULSE: 113

## 2024-04-24 ASSESSMENT — ACTIVITIES OF DAILY LIVING (ADL)
WALKS IN HOME: INDEPENDENT
HEARING - LEFT EAR: FUNCTIONAL
GROOMING: INDEPENDENT
PATIENT'S MEMORY ADEQUATE TO SAFELY COMPLETE DAILY ACTIVITIES?: YES
FEEDING YOURSELF: INDEPENDENT
JUDGMENT_ADEQUATE_SAFELY_COMPLETE_DAILY_ACTIVITIES: YES
HEARING - RIGHT EAR: FUNCTIONAL
LACK_OF_TRANSPORTATION: NO
BATHING: INDEPENDENT
ASSISTIVE_DEVICE: DENTURES UPPER;DENTURES LOWER
DRESSING YOURSELF: INDEPENDENT
TOILETING: INDEPENDENT
ADEQUATE_TO_COMPLETE_ADL: YES

## 2024-04-24 ASSESSMENT — PAIN SCALES - GENERAL
PAINLEVEL_OUTOF10: 0 - NO PAIN

## 2024-04-24 ASSESSMENT — COLUMBIA-SUICIDE SEVERITY RATING SCALE - C-SSRS
2. HAVE YOU ACTUALLY HAD ANY THOUGHTS OF KILLING YOURSELF?: NO
1. SINCE LAST CONTACT, HAVE YOU WISHED YOU WERE DEAD OR WISHED YOU COULD GO TO SLEEP AND NOT WAKE UP?: NO
6. HAVE YOU EVER DONE ANYTHING, STARTED TO DO ANYTHING, OR PREPARED TO DO ANYTHING TO END YOUR LIFE?: NO

## 2024-04-24 ASSESSMENT — PAIN - FUNCTIONAL ASSESSMENT
PAIN_FUNCTIONAL_ASSESSMENT: 0-10
PAIN_FUNCTIONAL_ASSESSMENT: 0-10
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
## 2024-04-24 NOTE — SIGNIFICANT EVENT
Application for Emergency Admission      Ready for Transfer?  Is the patient medically cleared for transfer to inpatient psychiatry: Yes  Has the patient been accepted to an inpatient psychiatric hospital: Yes    Application for Emergency Admission  IN ACCORDANCE WITH [place]  The Chief Clinical Officer of: [facility] 4/24/2024 .12:31 PM    Reason for Hospitalization  The undersigned has reason to believe that: Jose Guardado Is a mentally ill person subject to hospitalization by court order under division B Section 5122.01 of the Revised Code, i.e., this person:    1.Yes  Represents a substantial risk of physical harm to self as manifested by evidence of threats of, or attempts at, suicide or serious self-inflicted bodily harm    2.Yes Represents a substantial risk of physical harm to others as manifested by evidence of recent homicidal or other violent behavior, evidence of recent threats that place another in reasonable fear of violent behavior and serious physical harm, or other evidence of present dangerousness    3.Yes Represents a substantial and immediate risk of serious physical impairment or injury to self as manifested by  evidence that the person is unable to provide for and is not providing for the person's basic physical needs because of the person's mental illness and that appropriate provision for those needs cannot be made  immediately available in the community    4.Yes Would benefit from treatment in a hospital for his mental illness and is in need of such treatment as manifested by evidence of behavior that creates a grave and imminent risk to substantial rights of others or  himself.    5.Yes Would benefit from treatment as manifested by evidence of behavior that indicates all of the following:       (a) The person is unlikely to survive safely in the community without supervision, based on a clinical determination.       (b) The person has a history of lack of compliance with  treatment for mental illness and one of the following applies:      (i) At least twice within the thirty-six months prior to the filing of an affidavit seeking court-ordered treatment of the person under section 5122.111 of the Revised Code, the lack of compliance has been a significant factor in necessitating hospitalization in a hospital or receipt of services in a forensic or other mental health unit of a correctional facility, provided that the thirty-six-month period shall be extended by the length of any hospitalization or incarceration of the person that occurred within the thirty-six-month period.      (ii) Within the forty-eight months prior to the filing of an affidavit seeking court-ordered treatment of the person under section 5122.111 of the Revised Code, the lack of compliance resulted in one or more acts of serious violent behavior toward self or others or threats of, or attempts at, serious physical harm to self or others, provided that the forty-eight-month period shall be extended by the length of any hospitalization or incarceration of the person that occurred within the forty-eight-month period.      (c) The person, as a result of mental illness, is unlikely to voluntarily participate in necessary treatment.       (d) In view of the person's treatment history and current behavior, the person is in need of treatment in order to prevent a relapse or deterioration that would be likely to result in substantial risk of serious harm to the person or others.    (e) Represents a substantial risk of physical harm to self or others if allowed to remain at liberty pending examination.    Therefore, it is requested that said person be admitted to the above named facility.    STATEMENT OF BELIEF    Must be filled out by one of the following: a psychiatrist, licensed physician, licensed clinical psychologist, health or ,  or .  (Statement shall include the circumstances under  which the individual was taken into custody and the reason for the person's belief that hospitalization is necessary. The statement shall also include a reference to efforts made to secure the individual's property at his residence if he was taken into custody there. Every reasonable and appropriate effort should be made to take this person into custody in the least conspicuous manner possible.)    Patient is homicidal as evidenced by actions. I believe inpatient psychiatric is most appropriate for him     Austin Begum DO 4/24/2024     _____________________________________________________________   Place of Employment: [employer]    STATEMENT OF OBSERVATION BY PSYCHIATRIST, LICENSED PHYSICIAN, OR LICENSED CLINICAL PSYCHOLOGIST, IF APPLICABLE    Place of Observation (e.g., [facility], general hospital, office, emergency facility)  (If applicable, please complete)    Austin Begum DO 4/24/2024    _____________________________________________________________
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
## 2024-04-25 LAB
GLUCOSE BLD MANUAL STRIP-MCNC: 190 MG/DL (ref 74–99)
GLUCOSE BLD MANUAL STRIP-MCNC: 322 MG/DL (ref 74–99)

## 2024-04-25 PROCEDURE — 2500000001 HC RX 250 WO HCPCS SELF ADMINISTERED DRUGS (ALT 637 FOR MEDICARE OP): Performed by: PSYCHIATRY & NEUROLOGY

## 2024-04-25 PROCEDURE — 99223 1ST HOSP IP/OBS HIGH 75: CPT

## 2024-04-25 PROCEDURE — 2500000002 HC RX 250 W HCPCS SELF ADMINISTERED DRUGS (ALT 637 FOR MEDICARE OP, ALT 636 FOR OP/ED): Performed by: INTERNAL MEDICINE

## 2024-04-25 PROCEDURE — 2500000006 HC RX 250 W HCPCS SELF ADMINISTERED DRUGS (ALT 637 FOR ALL PAYERS): Mod: MUE | Performed by: INTERNAL MEDICINE

## 2024-04-25 PROCEDURE — 1140000001 HC PRIVATE PSYCH ROOM DAILY

## 2024-04-25 PROCEDURE — 82947 ASSAY GLUCOSE BLOOD QUANT: CPT | Mod: 91

## 2024-04-25 PROCEDURE — 99221 1ST HOSP IP/OBS SF/LOW 40: CPT | Performed by: INTERNAL MEDICINE

## 2024-04-25 PROCEDURE — 82947 ASSAY GLUCOSE BLOOD QUANT: CPT

## 2024-04-25 PROCEDURE — 97161 PT EVAL LOW COMPLEX 20 MIN: CPT | Mod: GP

## 2024-04-25 PROCEDURE — 2500000001 HC RX 250 WO HCPCS SELF ADMINISTERED DRUGS (ALT 637 FOR MEDICARE OP): Performed by: INTERNAL MEDICINE

## 2024-04-25 PROCEDURE — 2500000001 HC RX 250 WO HCPCS SELF ADMINISTERED DRUGS (ALT 637 FOR MEDICARE OP)

## 2024-04-25 PROCEDURE — 2500000006 HC RX 250 W HCPCS SELF ADMINISTERED DRUGS (ALT 637 FOR ALL PAYERS): Performed by: INTERNAL MEDICINE

## 2024-04-25 PROCEDURE — 97530 THERAPEUTIC ACTIVITIES: CPT | Mod: GP

## 2024-04-25 RX ORDER — PANTOPRAZOLE SODIUM 40 MG/1
40 TABLET, DELAYED RELEASE ORAL DAILY
Status: DISCONTINUED | OUTPATIENT
Start: 2024-04-25 | End: 2024-05-02 | Stop reason: HOSPADM

## 2024-04-25 RX ORDER — RISPERIDONE 0.5 MG/1
0.5 TABLET ORAL NIGHTLY
Status: DISCONTINUED | OUTPATIENT
Start: 2024-04-25 | End: 2024-04-29

## 2024-04-25 RX ORDER — PIOGLITAZONEHYDROCHLORIDE 15 MG/1
15 TABLET ORAL DAILY
Status: DISCONTINUED | OUTPATIENT
Start: 2024-04-25 | End: 2024-04-27

## 2024-04-25 RX ORDER — ATORVASTATIN CALCIUM 80 MG/1
80 TABLET, FILM COATED ORAL NIGHTLY
Status: DISCONTINUED | OUTPATIENT
Start: 2024-04-25 | End: 2024-05-02 | Stop reason: HOSPADM

## 2024-04-25 RX ORDER — LOSARTAN POTASSIUM 25 MG/1
25 TABLET ORAL DAILY
Status: DISCONTINUED | OUTPATIENT
Start: 2024-04-25 | End: 2024-05-02 | Stop reason: HOSPADM

## 2024-04-25 RX ORDER — CLOPIDOGREL BISULFATE 75 MG/1
75 TABLET ORAL DAILY
Status: DISCONTINUED | OUTPATIENT
Start: 2024-04-25 | End: 2024-05-02 | Stop reason: HOSPADM

## 2024-04-25 RX ORDER — DIVALPROEX SODIUM 125 MG/1
125 TABLET, DELAYED RELEASE ORAL 2 TIMES DAILY
Status: DISCONTINUED | OUTPATIENT
Start: 2024-04-25 | End: 2024-04-25

## 2024-04-25 RX ORDER — INSULIN GLARGINE 100 [IU]/ML
16 INJECTION, SOLUTION SUBCUTANEOUS NIGHTLY
Status: DISCONTINUED | OUTPATIENT
Start: 2024-04-25 | End: 2024-04-27

## 2024-04-25 RX ORDER — FINASTERIDE 5 MG/1
5 TABLET, FILM COATED ORAL NIGHTLY
Status: DISCONTINUED | OUTPATIENT
Start: 2024-04-25 | End: 2024-05-02 | Stop reason: HOSPADM

## 2024-04-25 RX ORDER — DIVALPROEX SODIUM 125 MG/1
125 CAPSULE, COATED PELLETS ORAL 2 TIMES DAILY
Status: DISCONTINUED | OUTPATIENT
Start: 2024-04-25 | End: 2024-04-29

## 2024-04-25 RX ORDER — LEVOTHYROXINE SODIUM 100 UG/1
100 TABLET ORAL
Status: DISCONTINUED | OUTPATIENT
Start: 2024-04-26 | End: 2024-05-02 | Stop reason: HOSPADM

## 2024-04-25 RX ORDER — METFORMIN HYDROCHLORIDE 500 MG/1
500 TABLET ORAL
Status: DISCONTINUED | OUTPATIENT
Start: 2024-04-25 | End: 2024-05-02 | Stop reason: HOSPADM

## 2024-04-25 RX ORDER — GLIMEPIRIDE 2 MG/1
2 TABLET ORAL DAILY
Status: DISCONTINUED | OUTPATIENT
Start: 2024-04-25 | End: 2024-04-27

## 2024-04-25 RX ADMIN — FINASTERIDE 5 MG: 5 TABLET, FILM COATED ORAL at 20:23

## 2024-04-25 RX ADMIN — INSULIN GLARGINE 16 UNITS: 100 INJECTION, SOLUTION SUBCUTANEOUS at 20:30

## 2024-04-25 RX ADMIN — PANTOPRAZOLE SODIUM 40 MG: 40 TABLET, DELAYED RELEASE ORAL at 08:54

## 2024-04-25 RX ADMIN — METFORMIN HYDROCHLORIDE 500 MG: 500 TABLET ORAL at 16:51

## 2024-04-25 RX ADMIN — METFORMIN HYDROCHLORIDE 500 MG: 500 TABLET ORAL at 08:54

## 2024-04-25 RX ADMIN — GLIMEPIRIDE 2 MG: 2 TABLET ORAL at 08:54

## 2024-04-25 RX ADMIN — BRIVARACETAM 50 MG: 50 TABLET, FILM COATED ORAL at 09:00

## 2024-04-25 RX ADMIN — LOSARTAN POTASSIUM 25 MG: 25 TABLET, FILM COATED ORAL at 08:54

## 2024-04-25 RX ADMIN — BRIVARACETAM 50 MG: 50 TABLET, FILM COATED ORAL at 20:24

## 2024-04-25 RX ADMIN — PIOGLITAZONE 15 MG: 15 TABLET ORAL at 16:51

## 2024-04-25 RX ADMIN — Medication 3 MG: at 20:23

## 2024-04-25 RX ADMIN — CLOPIDOGREL BISULFATE 75 MG: 75 TABLET ORAL at 09:00

## 2024-04-25 RX ADMIN — ATORVASTATIN CALCIUM 80 MG: 80 TABLET, FILM COATED ORAL at 20:23

## 2024-04-25 RX ADMIN — RISPERIDONE 0.5 MG: 0.5 TABLET ORAL at 20:23

## 2024-04-25 RX ADMIN — DIVALPROEX SODIUM 125 MG: 125 CAPSULE ORAL at 20:24

## 2024-04-25 RX ADMIN — DIVALPROEX SODIUM 125 MG: 125 CAPSULE ORAL at 11:37

## 2024-04-25 SDOH — SOCIAL STABILITY: SOCIAL INSECURITY: FEELS SAFE LIVING IN HOME: YES

## 2024-04-25 SDOH — HEALTH STABILITY: MENTAL HEALTH

## 2024-04-25 SDOH — HEALTH STABILITY: MENTAL HEALTH: DEPRESSION SYMPTOMS: NO PROBLEMS REPORTED OR OBSERVED.

## 2024-04-25 SDOH — ECONOMIC STABILITY: HOUSING INSECURITY: FEELS SAFE LIVING IN HOME: YES

## 2024-04-25 SDOH — HEALTH STABILITY: MENTAL HEALTH: ANXIETY SYMPTOMS: GENERALIZED

## 2024-04-25 ASSESSMENT — COGNITIVE AND FUNCTIONAL STATUS - GENERAL
CLIMB 3 TO 5 STEPS WITH RAILING: A LITTLE
MOBILITY SCORE: 23

## 2024-04-25 ASSESSMENT — COLUMBIA-SUICIDE SEVERITY RATING SCALE - C-SSRS
2. HAVE YOU ACTUALLY HAD ANY THOUGHTS OF KILLING YOURSELF?: NO
2. HAVE YOU ACTUALLY HAD ANY THOUGHTS OF KILLING YOURSELF?: NO
6. HAVE YOU EVER DONE ANYTHING, STARTED TO DO ANYTHING, OR PREPARED TO DO ANYTHING TO END YOUR LIFE?: NO
1. SINCE LAST CONTACT, HAVE YOU WISHED YOU WERE DEAD OR WISHED YOU COULD GO TO SLEEP AND NOT WAKE UP?: NO
6. HAVE YOU EVER DONE ANYTHING, STARTED TO DO ANYTHING, OR PREPARED TO DO ANYTHING TO END YOUR LIFE?: NO
1. SINCE LAST CONTACT, HAVE YOU WISHED YOU WERE DEAD OR WISHED YOU COULD GO TO SLEEP AND NOT WAKE UP?: NO

## 2024-04-25 ASSESSMENT — ACTIVITIES OF DAILY LIVING (ADL): ADL_ASSISTANCE: INDEPENDENT

## 2024-04-25 ASSESSMENT — PAIN - FUNCTIONAL ASSESSMENT
PAIN_FUNCTIONAL_ASSESSMENT: 0-10
PAIN_FUNCTIONAL_ASSESSMENT: 0-10

## 2024-04-25 ASSESSMENT — PAIN SCALES - GENERAL
PAINLEVEL_OUTOF10: 0 - NO PAIN

## 2024-04-25 NOTE — CARE PLAN
The patient's goals for the shift include go home    The clinical goals for the shift include Maintain safety    Over the shift, the patient did make progress toward the following goals. Barriers to progression include some mild lack of insight into events that led him here. Recommendations to address these barriers include reorientation and education.      Problem: Thought Disorders  Goal: LTG: Jose will exhibit improved ability to concentrate  Outcome: Progressing     Problem: Safety  Goal: Patient will be injury free during hospitalization  Outcome: Progressing

## 2024-04-25 NOTE — GROUP NOTE
"Group Topic: Self-Care/Wellness   Group Date: 4/25/2024  Start Time: 1000  End Time: 1100  Facilitators: SAFIA Coles   Department: [facility]IRTUAL    Number of Participants: 10   Group Focus: clarity of thought, coping skills, daily focus, feeling awareness/expression, impulsivity, mindfulness, personal responsibility, problem solving, relapse prevention, relaxation, self-awareness, self-esteem, and social skills  Treatment Modality: Other: Recreation therapy  Interventions utilized were clarification, exploration, leisure development, patient education, problem solving, and support  Purpose: coping skills, maladaptive thinking, feelings, irrational fears, communication skills, insight or knowledge, self-worth, self-care, relapse prevention strategies, and trigger / craving management    Name: Jose Guardado YOB: 1940   MR: [id number]      Facilitator: Recreational Therapist  Level of Participation: minimal  Quality of Participation: distractible and restless  Interactions with others: gave feedback  Mood/Affect: anxious and restless  Triggers (if applicable): n/a  Cognition: confused  Progress: Minimal  Comments: pt joins group with little encouragement.  Displays pleasantly confused mood and behaviors. Does not/unable to focus on group topic at this time as he reports he \"needs to call my wife to get me out of here\".  Leaves group to make a phone call.   Plan: continue with services      "

## 2024-04-25 NOTE — NURSING NOTE
"Nurse Admission Note    Reason for admission in patient's own words:  \"I am not totally sure, maybe to get stronger.\" \"My family wanted me here.\"    Patient living arrangements prior to admission:  Pt resides with his wife at home.    Legal status:  involuntarily    Medical consult notification to:  Dr. Beth    Admission folder given to:   N/A    Problems/treatment plans:  Paranoia  Anxiety    Patient requests family to be notified of admission?    Pt spoke with wife  Person notified:  Wife    Strengths:  optimistic and housing stability    Liabilities:  impaired cognition, self-care deficit    Previous mental health treatment:  Past psychiatric history Delirium, anxiety, paranoia, dementia    Preliminary discharge planning needs:  To home   "

## 2024-04-25 NOTE — GROUP NOTE
Group Topic: Goals   Group Date: 4/25/2024  Start Time: 0730  End Time: 0800  Facilitators: Jess Garrett   Department: [facility]    Number of Participants: 7   Group Focus: coping skills, daily focus, goals, and social skills  Treatment Modality: Other: pca  Interventions utilized were assignment  Purpose: coping skills, communication skills, and self-care    Name: Jose Guardado YOB: 1940   MR: [id number]      Facilitator: Mental [facility]  Level of Participation: moderate  Quality of Participation: appropriate/pleasant  Interactions with others: appropriate  Mood/Affect: appropriate  Triggers (if applicable): n/a  Cognition: coherent/clear  Progress: Moderate  Comments: pt has  mood disorder.  Plan: continue with services
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
## 2024-04-25 NOTE — PROGRESS NOTES
LSW met briefly with pt's wife and dtr while on unit. Pt's wife asked about PT and OT. LSW explained that they will work with pt while here and then if recommended he could go to a SNF for a short time before the next step of home or LTC. Pt's wife expressed that she would like for pt to return home. Pt's dtr lives in [place] but plans to move back to help take care of pt. LSW discussed possible assistance in the home such as [employer]. Pt became overwhelmed, tearful and paranoid. BRITW obtained the contact information for pt's dtr Pranay [phone]. LSW will further coordinate pts admission and discharge with wife and dtr.     Glenna Hutchinson, JODIE
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
## 2024-04-25 NOTE — GROUP NOTE
Group Topic: Reminiscence   Group Date: 4/25/2024  Start Time: 1330  End Time: 1420  Facilitators: SAFIA Coles   Department: [facility] REHAB[facility]IRTUAL    Number of Participants: 8   Group Focus: other Values questions  Treatment Modality: Other: Recreation therapy  Interventions utilized were exploration, mental fitness, reminiscence, and story telling  Purpose: feelings, self-worth, self-care, and other: fun, increase socialization, enhance self esteem    Name: Jose Guardado YOB: 1940   MR: [id number]      Facilitator: Recreational Therapist  Level of Participation: did not attend  Quality of Participation:  did not attend  Interactions with others:  did not attend  Mood/Affect:  did not attend  Triggers (if applicable): n/a  Cognition:  did not attend  Progress: None  Comments: pt problem is agitation.  Pt is pleasantly confused, wandering around looking for his wife.  Unable to focus on group at this time.  No handout to offer.  Plan: continue with services

## 2024-04-25 NOTE — NURSING NOTE
FORTINO NOTE     Problem:  Pt. Is continuing their journey on working to improve their depressive Sx.     Behavior:  Pt. Is cooperative w/ Tx. Plan and has been agreeable to talking w/ staff & peers alike. Pt. Has been able to maintain safety.   Appetite/Meals: good        Interventions:  Pt. Was offered groups and their scheduled medications.     Response:  Pt. Has been compliant w/ meds, tolerated them well; and did attend the majority of group sessions w/ good results. Pt. Is uneasy about remaining here but compliant still.     Plan:  Pt. will continue to be monitored for changes in mental status & safety on the unit.
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
## 2024-04-25 NOTE — CONSULTS
"Nutrition Assessement Note    Nutrition Assessment    Reason for Assessment: Admission nursing screening (MST 2)    Reason for Hospital Admission:  Jose Guardado is a 83 y.o. male who is admitted for agitation and aggressive behavior. Hx dementia.     Nutrition History:  Energy Intake: Poor < 50 %    Anthropometrics:  Ht: 170.2 cm (5' 7\"), Wt: 70.2 kg (154 lb 12.8 oz), BMI: 24.24    Weight Change:  Daily Weight  04/24/24 : 70.2 kg (154 lb 12.8 oz)  04/23/24 : 74.8 kg (165 lb)  04/23/24 : 72.6 kg (160 lb)  04/04/24 : 73.9 kg (163 lb)  03/19/24 : 74.7 kg (164 lb 10.9 oz)  02/02/24 : 73.4 kg (161 lb 13.1 oz)  01/11/24 : 76.2 kg (168 lb)  10/23/23 : 75.2 kg (165 lb 12.8 oz)  10/09/23 : 74.8 kg (165 lb)  10/02/23 : 70.3 kg (155 lb)  09/05/23 : 73 kg (161 lb)  07/24/23 : 73.1 kg (161 lb 2 oz)  07/03/23 : 72.6 kg (160 lb)  06/27/23 : 72.6 kg (160 lb)  06/01/23 : 72.1 kg (159 lb)  05/04/23 : 70.9 kg (156 lb 4 oz)  03/20/23 : 70.3 kg (155 lb)  02/15/23 : 72.1 kg (159 lb)  02/01/23 : 72.1 kg (159 lb)  01/24/23 : 73 kg (161 lb)     Weight History / % Weight Change: wt fluctuations noted in wt hx possibly r/t scale/clothing differences. usual wt appears to range mid 150s-mid 160s (current wt within range). possible wt loss over the past ~1 month.  Significant Weight Loss: Yes  Interpretation of Weight Loss:  (10# (6%) wt loss over ~1 month (3/19-4/24))    Nutrition Focused Physical Exam Findings: deferred - not appropriate     Nutrition Significant Labs:  Lab Results   Component Value Date    WBC 6.3 04/23/2024    HGB 14.8 04/23/2024    HCT 45.9 04/23/2024     04/23/2024    CHOL 151 03/18/2024    TRIG 145 03/18/2024    HDL 33.9 03/18/2024    ALT 15 04/23/2024    AST 20 04/23/2024     04/23/2024    K 4.3 04/23/2024     04/23/2024    CREATININE 0.87 04/23/2024    BUN 20 04/23/2024    CO2 31 04/23/2024    TSH 1.52 03/22/2023    INR 1.0 03/18/2024    HGBA1C 7.9 (H) 03/18/2024     Nutrition Specific " Medications:  atorvastatin, 80 mg, oral, Nightly  brivaracetam, 50 mg, oral, BID  clopidogrel, 75 mg, oral, Daily  divalproex sprinkle, 125 mg, oral, BID  finasteride, 5 mg, oral, Nightly  glimepiride, 2 mg, oral, Daily  insulin glargine, 16 Units, subcutaneous, Nightly  [START ON 4/26/2024] levothyroxine, 100 mcg, oral, Daily before breakfast  losartan, 25 mg, oral, Daily  melatonin, 3 mg, oral, Nightly  metFORMIN, 500 mg, oral, BID with meals  pantoprazole, 40 mg, oral, Daily  pioglitazone, 15 mg, oral, Daily  risperiDONE, 0.5 mg, oral, Nightly      Dietary Orders (From admission, onward)       Start     Ordered    04/25/24 1453  Oral nutritional supplements  Until discontinued        Comments: Vanilla   Question Answer Comment   Deliver with All meals    Select supplement: Sugar Free Mighty Shake        04/25/24 1452    04/24/24 2031  Adult diet Carb Controlled; 75 gram carb/meal, 45 gram Carb evening snack  Diet effective now        Question Answer Comment   Diet type Carb Controlled    Carb diet selection: 75 gram carb/meal, 45 gram Carb evening snack        04/24/24 2034                  Estimated Needs:   Estimated Energy Needs  Total Energy Estimated Needs (kCal): 1750 kCal  Total Estimated Energy Need per Day (kCal/kg): 25 kCal/kg  Method for Estimating Needs: actual wt    Estimated Protein Needs  Total Protein Estimated Needs (g): 70 g  Total Protein Estimated Needs (g/kg): 1 g/kg  Method for Estimating Needs: actual wt    Estimated Fluid Needs  Method for Estimating Needs: 1 ml/kcal        Nutrition Diagnosis   Nutrition Diagnosis:  Malnutrition Diagnosis  Patient has Malnutrition Diagnosis: No    Nutrition Diagnosis  Patient has Nutrition Diagnosis: Yes  Diagnosis Status (1): New  Nutrition Diagnosis 1: Unintended weight loss  Related to (1): decreased ability to consume sufficient energy, psychological causes  As Evidenced by (1): 10# (6%) wt loss over ~1 month (3/19-4/24)       Nutrition  Interventions/Recommendations   Nutrition Interventions and Recommendations:    Nutrition Prescription:  Individualized Nutrition Prescription Provided for : 1750 kcals and 70g protein to be provided via diet and supplements    Nutrition Interventions:   Food and/or Nutrient Delivery Interventions  Interventions: Meals and snacks, Medical food supplement  Meals and Snacks: Carbohydrate-modified diet  Goal: provide as ordered  Medical Food Supplement: Modified beverage  Goal: mighty shake TID to provide 200 kcals and 7g protein each    Education Documentation  No documentation found.           Nutrition Monitoring and Evaluation   Monitoring/Evaluation:   Food/Nutrient Related History Monitoring  Monitoring and Evaluation Plan: Energy intake  Energy Intake: Estimated energy intake  Criteria: pt to consume >/= 75% estimated needs    Body Composition/Growth/Weight History  Monitoring and Evaluation Plan: Weight  Weight: Measured weight  Criteria: pt will maintain wt at this time       Time Spent/Follow-up:   Follow Up  Time Spent (min): 25 minutes  Last Date of Nutrition Visit: 04/25/24  Nutrition Follow-Up Needed?: 5-7 days  Follow up Comment: 4/30/24

## 2024-04-26 LAB
GLUCOSE BLD MANUAL STRIP-MCNC: 105 MG/DL (ref 74–99)
GLUCOSE BLD MANUAL STRIP-MCNC: 163 MG/DL (ref 74–99)
GLUCOSE BLD MANUAL STRIP-MCNC: 163 MG/DL (ref 74–99)
GLUCOSE BLD MANUAL STRIP-MCNC: 185 MG/DL (ref 74–99)
GLUCOSE BLD MANUAL STRIP-MCNC: 191 MG/DL (ref 74–99)
GLUCOSE BLD MANUAL STRIP-MCNC: 202 MG/DL (ref 74–99)
GLUCOSE BLD MANUAL STRIP-MCNC: 340 MG/DL (ref 74–99)

## 2024-04-26 PROCEDURE — 99232 SBSQ HOSP IP/OBS MODERATE 35: CPT | Performed by: INTERNAL MEDICINE

## 2024-04-26 PROCEDURE — 2500000001 HC RX 250 WO HCPCS SELF ADMINISTERED DRUGS (ALT 637 FOR MEDICARE OP): Performed by: INTERNAL MEDICINE

## 2024-04-26 PROCEDURE — 2500000001 HC RX 250 WO HCPCS SELF ADMINISTERED DRUGS (ALT 637 FOR MEDICARE OP)

## 2024-04-26 PROCEDURE — 2500000001 HC RX 250 WO HCPCS SELF ADMINISTERED DRUGS (ALT 637 FOR MEDICARE OP): Performed by: PSYCHIATRY & NEUROLOGY

## 2024-04-26 PROCEDURE — 2500000006 HC RX 250 W HCPCS SELF ADMINISTERED DRUGS (ALT 637 FOR ALL PAYERS): Performed by: INTERNAL MEDICINE

## 2024-04-26 PROCEDURE — 2500000006 HC RX 250 W HCPCS SELF ADMINISTERED DRUGS (ALT 637 FOR ALL PAYERS): Mod: MUE | Performed by: INTERNAL MEDICINE

## 2024-04-26 PROCEDURE — 1140000001 HC PRIVATE PSYCH ROOM DAILY

## 2024-04-26 PROCEDURE — 2500000002 HC RX 250 W HCPCS SELF ADMINISTERED DRUGS (ALT 637 FOR MEDICARE OP, ALT 636 FOR OP/ED): Performed by: INTERNAL MEDICINE

## 2024-04-26 PROCEDURE — 99232 SBSQ HOSP IP/OBS MODERATE 35: CPT | Performed by: PSYCHIATRY & NEUROLOGY

## 2024-04-26 PROCEDURE — 82947 ASSAY GLUCOSE BLOOD QUANT: CPT

## 2024-04-26 RX ADMIN — METFORMIN HYDROCHLORIDE 500 MG: 500 TABLET ORAL at 08:25

## 2024-04-26 RX ADMIN — BRIVARACETAM 50 MG: 50 TABLET, FILM COATED ORAL at 08:25

## 2024-04-26 RX ADMIN — PANTOPRAZOLE SODIUM 40 MG: 40 TABLET, DELAYED RELEASE ORAL at 08:25

## 2024-04-26 RX ADMIN — DIVALPROEX SODIUM 125 MG: 125 CAPSULE ORAL at 20:04

## 2024-04-26 RX ADMIN — GLIMEPIRIDE 2 MG: 2 TABLET ORAL at 08:25

## 2024-04-26 RX ADMIN — Medication 3 MG: at 20:04

## 2024-04-26 RX ADMIN — BRIVARACETAM 50 MG: 50 TABLET, FILM COATED ORAL at 20:05

## 2024-04-26 RX ADMIN — INSULIN GLARGINE 16 UNITS: 100 INJECTION, SOLUTION SUBCUTANEOUS at 20:05

## 2024-04-26 RX ADMIN — RISPERIDONE 0.5 MG: 0.5 TABLET ORAL at 20:04

## 2024-04-26 RX ADMIN — FINASTERIDE 5 MG: 5 TABLET, FILM COATED ORAL at 20:05

## 2024-04-26 RX ADMIN — DIVALPROEX SODIUM 125 MG: 125 CAPSULE ORAL at 08:25

## 2024-04-26 RX ADMIN — PIOGLITAZONE 15 MG: 15 TABLET ORAL at 08:25

## 2024-04-26 RX ADMIN — LOSARTAN POTASSIUM 25 MG: 25 TABLET, FILM COATED ORAL at 08:25

## 2024-04-26 RX ADMIN — METFORMIN HYDROCHLORIDE 500 MG: 500 TABLET ORAL at 17:04

## 2024-04-26 RX ADMIN — CLOPIDOGREL BISULFATE 75 MG: 75 TABLET ORAL at 08:25

## 2024-04-26 RX ADMIN — ATORVASTATIN CALCIUM 80 MG: 80 TABLET, FILM COATED ORAL at 20:04

## 2024-04-26 RX ADMIN — LEVOTHYROXINE SODIUM 100 MCG: 100 TABLET ORAL at 06:20

## 2024-04-26 ASSESSMENT — PAIN SCALES - GENERAL
PAINLEVEL_OUTOF10: 0 - NO PAIN
PAINLEVEL_OUTOF10: 0 - NO PAIN

## 2024-04-26 ASSESSMENT — COLUMBIA-SUICIDE SEVERITY RATING SCALE - C-SSRS
6. HAVE YOU EVER DONE ANYTHING, STARTED TO DO ANYTHING, OR PREPARED TO DO ANYTHING TO END YOUR LIFE?: NO
2. HAVE YOU ACTUALLY HAD ANY THOUGHTS OF KILLING YOURSELF?: NO
1. SINCE LAST CONTACT, HAVE YOU WISHED YOU WERE DEAD OR WISHED YOU COULD GO TO SLEEP AND NOT WAKE UP?: NO

## 2024-04-26 ASSESSMENT — PAIN - FUNCTIONAL ASSESSMENT: PAIN_FUNCTIONAL_ASSESSMENT: 0-10

## 2024-04-26 NOTE — NURSING NOTE
"FORTINO NOTE     Problem:  agitation     Behavior:  Pt is ambulating independently in the hallways, asking to contact his wife and daughter, mild hearing impairment is present, mild to moderate anxiety level is noted. Pt is confused about the reason for his admission perhaps is aware of being at the hospital. Pt reported that \"there's no reason to be here\". Pt gets irritable when denied access to the phone after 8 pm, short term memory is impaired - Pt can't recall having phone call with his wife a few hours ago. Pt is compliant with medication although stated that he is not always compliant with it especially the insulins.     Group Participation: n/a  Appetite/Meals: hs snack provided       Interventions:  1:1 intervention with reassurance provided, Pt was oriented to time and situation, scheduled medication administered    Response:  Pt is compliant with medication, disagreed with the reason for current admission, denied having aggressive behaviors toward his family      Plan:  Provide safe environment, reorient frequently  "

## 2024-04-26 NOTE — NURSING NOTE
BIRP NOTE     Problem:  Guarded behavior and increased confusion     Behavior:  Guarded behavior and increased confusion  Group Participation: fair  Appetite/Meals: poor       Interventions:  Encourage nutrition and group participation    Response:  Fair, poor nutrition and fair attendance in group     Plan:  Continue to monitor, encourage nutrition and group therapy participation

## 2024-04-26 NOTE — CARE PLAN
The patient's goals for the shift include go home    The clinical goals for the shift include Maintain safety    Over the shift, the patient did not make progress toward the following goals. Barriers to progression include slight confusion and guarded mood. Recommendations to address these barriers include group therapy and socialization in the [facility].    Problem: Thought Disorders  Goal: LTG: Jose will exhibit improved ability to concentrate  Outcome: Progressing     Problem: Psychosocial Needs  Goal: Demonstrates ability to cope with hospitalization/illness  Outcome: Progressing

## 2024-04-26 NOTE — GROUP NOTE
Group Topic: Stress Reduction/Relaxation   Group Date: 4/26/2024  Start Time: 1330  End Time: 1425  Facilitators: SAFIA Ford   Department: [facility]    Number of Participants: 7   Group Focus: coping skills, mindfulness, relaxation, and self-awareness  Treatment Modality: Other: Recreation Therapy  Interventions utilized were other mindfulness   Purpose: In this therapeutic groups patients engaged in mindfulness and relaxation techniques. Patients first engaged in group discussion brainstorming and sharing ways they utilize relaxation in their daily life, the purpose and benefits of re-focusing and introduction into mindfulness relaxation techniques, including deep breathing and guided imagery, which both aims to promote relaxation, self-awareness and helps to decrease stress and anxiety.     Name: Jose Guardado YOB: 1940   MR: [id number]      Facilitator: Recreational Therapist  Level of Participation: did not attend  Progress: None  Comments: pt problem is agitation.   Plan: continue with services

## 2024-04-26 NOTE — PROGRESS NOTES
[facility]: [facility]  PROGRESS NOTE      Jose Guardado is a 83 y.o. male that is being seen  today for follow up .  Subjective   Patient is being seen for follow-up at [facility].  Patient's blood pressure has been fairly controlled.  Mild elevation of the heart rate.  Patient's blood sugars have been getting better.  Denies any issues at present have been able to walk around in the facility he has been eating well and participating in the physical activities.      ROS  Negative for fever or chills  Negative for sore throat, ear pain, nasal discharge  Negative for cough, shortness of breath or wheezing  Negative for chest pain, palpitations, swelling of legs  Negative for abdominal pain, constipation, diarrhea, blood in the stools  Negative for urinary complaints  Negative for headache, dizziness, weakness or numbness  Negative for joint pain  Negative for depression or anxiety  All other systems reviewed and were negative   Vitals:    04/26/24 0500   BP: 139/76   Pulse: 104   Resp: 16   Temp: 37 °C (98.6 °F)   SpO2: 94%      Vitals:    04/24/24 1857   Weight: 70.2 kg (154 lb 12.8 oz)     Body mass index is 24.25 kg/m².  Physical Exam  Constitutional: Patient does not appear to be in any acute distress  Cardiovascular: RRR, S1/S2, no murmurs, rubs, or gallops, radial pulses +2, no edema of extremities  Pulmonary: CTAB, no respiratory distress.  Abdomen: +BS, soft, non-tender, nondistended, no guarding or rebound, no masses noted  MSK: No joint swelling, normal movements of all extremities. Range of motion- normal.  Skin- No lesions, contusions, or erythema.  Peripheral puslses palpable bilaterally 2+  Neuro: AAO X1, no gross neurological deficit    LABS   [unfilled]  Lab Results   Component Value Date    GLUCOSE 114 (H) 04/23/2024    CALCIUM 9.4 04/23/2024     04/23/2024    K 4.3 04/23/2024    CO2 31 04/23/2024     04/23/2024    BUN 20 04/23/2024    CREATININE 0.87 04/23/2024      Lab Results   Component Value Date    ALT 15 04/23/2024    AST 20 04/23/2024    ALKPHOS 68 04/23/2024    BILITOT 0.6 04/23/2024     Lab Results   Component Value Date    WBC 6.3 04/23/2024    HGB 14.8 04/23/2024    HCT 45.9 04/23/2024    MCV 91 04/23/2024     04/23/2024     Lab Results   Component Value Date    CHOL 151 03/18/2024    CHOL 194 11/18/2022    CHOL 99 08/31/2021     Lab Results   Component Value Date    HDL 33.9 03/18/2024    HDL 27.0 (A) 11/18/2022    HDL 24.0 (A) 08/31/2021     Lab Results   Component Value Date    LDLCALC 88 03/18/2024     Lab Results   Component Value Date    TRIG 145 03/18/2024    TRIG 205 (H) 11/18/2022    TRIG 106 08/31/2021     Lab Results   Component Value Date    HGBA1C 7.9 (H) 03/18/2024     Other labs not included in the list above were reviewed either before or during this encounter.    History    Past Medical History:   Diagnosis Date    Anxiety     Dizziness and giddiness 12/21/2021    Episodic lightheadedness    Other chest pain 12/21/2021    Atypical chest pain    Other chest pain 12/21/2021    Chest pain, musculoskeletal    Personal history of other diseases of male genital organs 11/30/2021    History of erectile dysfunction    Personal history of other specified conditions 05/14/2018    History of urinary incontinence    Seizures (Multi)     Stroke (Multi)      Past Surgical History:   Procedure Laterality Date    MR HEAD ANGIO WO IV CONTRAST  2/24/2021    MR HEAD ANGIO WO IV CONTRAST 2/24/2021 [facility]    MR HEAD ANGIO WO IV CONTRAST  4/24/2021    MR HEAD ANGIO WO IV CONTRAST 4/24/2021 [facility]    MR HEAD ANGIO WO IV CONTRAST  11/18/2022    MR HEAD ANGIO WO IV CONTRAST 11/18/2022 [facility]    MR HEAD ANGIO WO IV CONTRAST  3/22/2023    MR HEAD ANGIO WO IV CONTRAST [facility]    MR NECK ANGIO WO IV CONTRAST  2/24/2021    MR NECK ANGIO WO IV CONTRAST 2/24/2021 [facility]    MR NECK ANGIO WO IV CONTRAST  4/24/2021    MR NECK  ANGIO WO IV CONTRAST 4/24/2021 [facility]    MR NECK ANGIO WO IV CONTRAST  11/18/2022    MR NECK ANGIO WO IV CONTRAST 11/18/2022 [facility]    MR NECK ANGIO WO IV CONTRAST  3/22/2023    MR NECK ANGIO WO IV CONTRAST [facility]    OTHER SURGICAL HISTORY  05/14/2018    Incision Of Mastoid    OTHER SURGICAL HISTORY  11/30/2021    Complete colonoscopy    OTHER SURGICAL HISTORY  11/30/2021    Esophagogastroduodenoscopy    OTHER SURGICAL HISTORY  11/30/2021    Mastoidectomy    OTHER SURGICAL HISTORY  11/30/2021    Renal lithotripsy    OTHER SURGICAL HISTORY  11/30/2021    Tonsillectomy with adenoidectomy    TONSILLECTOMY  05/14/2018    Tonsillectomy     Family History   Problem Relation Name Age of Onset    No Known Problems Mother      Arthritis Father      Hypertension Father      Diabetes Brother      Other (Stroke syndrome) Brother       Allergies   Allergen Reactions    Keppra [Levetiracetam] Agitation     No current facility-administered medications on file prior to encounter.     Current Outpatient Medications on File Prior to Encounter   Medication Sig Dispense Refill    Briviact 50 mg tablet tablet TAKE 1 TABLET BY MOUTH TWICE  DAILY 180 tablet 1    busPIRone (Buspar) 15 mg tablet Take 1 tablet (15 mg) by mouth 3 times a day as needed (anger). (Patient taking differently: Take 1 tablet (15 mg) by mouth 2 times a day.) 90 tablet 5    clopidogrel (Plavix) 75 mg tablet Take 1 tablet (75 mg) by mouth once daily.      finasteride (Proscar) 5 mg tablet Take 1 tablet (5 mg) by mouth once daily at bedtime. (Patient taking differently: Take 1 tablet (5 mg) by mouth once daily.) 90 tablet 3    levothyroxine (Synthroid, Levoxyl) 100 mcg tablet Take 1 tablet (100 mcg) by mouth once daily in the morning. Take before meals. 90 tablet 3    losartan (Cozaar) 25 mg tablet Take 1 tablet (25 mg) by mouth once daily. 90 tablet 3    metFORMIN (Glucophage) 500 mg tablet Take 1 tablet (500 mg) by mouth 2 times a day  with meals. (Patient taking differently: Take 1 tablet (500 mg) by mouth once daily with breakfast.) 180 tablet 3    pantoprazole (ProtoNix) 40 mg EC tablet TAKE 1 TABLET BY MOUTH DAILY (Patient taking differently: Take by mouth every other day.) 90 tablet 3    atorvastatin (Lipitor) 80 mg tablet Take 1 tablet (80 mg) by mouth once daily at bedtime. 90 tablet 3    divalproex sprinkle (Depakote Sprinkles) 125 mg DR capsule Start 1 tablet nightly for 3-5 days then increase to 2 tablets nightly (Patient not taking: Reported on 2024) 60 capsule 11    FreeStyle Doug 2 Sensor kit Use as instructed 1 each 11    glimepiride (Amaryl) 2 mg tablet Take 1 tablet (2 mg) by mouth once daily. 90 tablet 3    insulin glargine (Lantus U-100 Insulin) 100 unit/mL injection Inject 16 Units under the skin once daily at bedtime. BS >120 10 mL 11    pioglitazone (Actos) 15 mg tablet Take 1 tablet (15 mg) by mouth once daily. (Patient not taking: Reported on 2024) 90 tablet 3    QUEtiapine (SEROquel) 25 mg tablet TAKE 1 TO 2 TABLETS BY MOUTH AT BEDTIME IF NEEDED FOR SLEEP (Patient not taking: Reported on 2024) 180 tablet 0    [] tobramycin (Tobrex) 0.3 % ophthalmic solution Administer 2 drops into both eyes 3 times a day for 14 days. (Patient not taking: Reported on 2024) 5 mL 1    [DISCONTINUED] fluticasone (Flonase) 50 mcg/actuation nasal spray Administer 1 spray into each nostril 2 times a day as needed.      [DISCONTINUED] meloxicam (Mobic) 15 mg tablet Take by mouth.      [DISCONTINUED] montelukast (Singulair) 10 mg tablet Take by mouth once daily.      [DISCONTINUED] traZODone (Desyrel) 50 mg tablet Take by mouth.       Immunization History   Administered Date(s) Administered    Flu vaccine, quadrivalent, high-dose, preservative free, age 65y+ (FLUZONE) 2022    Influenza, High Dose Seasonal, Preservative Free 2015, 10/13/2016, 2018, 10/23/2019, 10/16/2020, 10/06/2021    Influenza,  Unspecified 09/25/2022    Moderna COVID-19 vaccine, Fall 2023, 12 yeasrs and older (50mcg/0.5mL) 10/14/2023    Moderna SARS-CoV-2 Vaccination 01/17/2021    Novel influenza-H1N1-09, preservative-free 12/22/2009    Pfizer COVID-19 vaccine, bivalent, age 12 years and older (30 mcg/0.3 mL) 09/25/2022, 05/09/2023    Pfizer Gray Cap SARS-CoV-2 04/08/2022    Pfizer Purple Cap SARS-CoV-2 01/23/2021, 02/14/2021, 10/20/2021    Pneumococcal polysaccharide vaccine, 23-valent, age 2 years and older (PNEUMOVAX 23) 06/01/2023     Patient's medical history was reviewed and updated either before or during this encounter.  ASSESSMENT / PLAN:  Active Hospital Problems    *Mood disorder (CMS-HCC)      Benign essential hypertension      DM type 2 (diabetes mellitus, type 2) (Multi)      Hyperlipidemia      Seizure disorder (Multi)      Benign prostatic hyperplasia       Patient's blood sugars have been fairly controlled.  Blood pressure is stable.  No significant episodes of anxiety or combativeness to staff    Jose Maria Beth MD

## 2024-04-26 NOTE — PROGRESS NOTES
Jose Guardado is a 83 y.o. male on day 2 of admission presenting with Mood disorder (CMS-HCC).      Subjective   Chart reviewed and case discussed with nursing.    Patient remains admitted for concerning psychotic decompensation related to worsening progressive dementia.  On the unit, patient is cooperative taking medications and not demonstrating agitated behaviors toward self nor others.  On approach for discussion, patient remains with limited insight minimizing admission circumstance and need for being in hospital.  Collateral contributed by family describes patient remains an imminent safety risk including making suicidal threats over the phone from the hospital.  It is also described by family the patient has contributed desire to destroy the family home by making it explode and harming the wife.  Family further contribute that patient has expressed various delusions including that his wife is being unfaithful to the extent that patient will drive to dialysis while she is there to make sure she is not in fact with another man instead.  Family is wishing to pursue guardianship.  Patient continues to minimize need for admission and requests discharge often describing the facility and experience in the hospital to be substandard and unhelpful for him.  During interaction, he is intrusive and perseverative.  He is poorly oriented reporting it is Thursday, Mary Alice 15.  He offers that he had a stroke and so this is responsible for trouble with finding words while discussing.  Despite provider offering patient responses related to needing information from family and social work to coordinate discharge, patient remained intrusive asking several times about when he would be able to leave the facility.  Patient's outpatient provider from neurology remains involved in the case assisting with communication with family and recommendations for continued care.       Objective     Last Recorded Vitals  Blood pressure 117/64,  "pulse 87, temperature 37.1 °C (98.8 °F), temperature source Temporal, resp. rate 18, height 1.702 m (5' 7\"), weight 70.2 kg (154 lb 12.8 oz), SpO2 95%.    Review of Systems    Psychiatric ROS - Adult  Anxiety: General Anxiety Disorder (DOV)DOV Behaviors: irritability, restlessness, and sleep disturbance  Depression: sleep decreased   Delirium: negative  Psychosis:  paranoia related to wife stealing and cheating  Selam: negative  Safety Issues:  dementia, impulsivity, poor insight    Physical Exam      Mental Status Exam  General Appearance: Fairly groomed.  Gait/Station:  shuffling  Speech: some word finding difficulty, increased rate, normal tone  Mood: \"when can I get out of here? My wife has dialysis\"  Affect: Dysphoric, constricted , Anxious, and Irritable  Thought Process: Perseverative, Terrell  Thought Associations: Mild loosening of associations  Thought Content: normal  Perception: No perceptual abnormalities noted  Level of Consciousness: Alert  Orientation:  to person  Attention and Concentration:  impaired  Recent Memory: Impaired as evidenced by patchy recollection   Remote Memory: Impaired as evidenced by confabulation   Executive function:  impaired  Fund of knowledge:  able to state president's name  Insight:  limited  Judgment: Limited, as evidence by inability to reason through medical decision making, recent high-risk or self-harming behavior , and non-compliance with treatment recommendations    Psychiatric Risk Assessment  Violence Risk Assessment: male and persecutory delusions, threats toward family  Acute Risk of Harm to Others is Considered: moderate   Suicide Risk Assessment: age > 65 yrs old, , chronic medical illness, and life crisis (shame/despair)  Protective Factors against Suicide: none, marriage/partnership, and positive family relationships  Acute Risk of Harm to Self is Considered: moderate    Relevant Results             Results for orders placed or performed during the " hospital encounter of 04/24/24 (from the past 96 hour(s))   POCT GLUCOSE   Result Value Ref Range    POCT Glucose 340 (H) 74 - 99 mg/dL   POCT GLUCOSE   Result Value Ref Range    POCT Glucose 190 (H) 74 - 99 mg/dL   POCT GLUCOSE   Result Value Ref Range    POCT Glucose 322 (H) 74 - 99 mg/dL   POCT GLUCOSE   Result Value Ref Range    POCT Glucose 202 (H) 74 - 99 mg/dL   POCT GLUCOSE   Result Value Ref Range    POCT Glucose 163 (H) 74 - 99 mg/dL   POCT GLUCOSE   Result Value Ref Range    POCT Glucose 105 (H) 74 - 99 mg/dL   POCT GLUCOSE   Result Value Ref Range    POCT Glucose 163 (H) 74 - 99 mg/dL   POCT GLUCOSE   Result Value Ref Range    POCT Glucose 185 (H) 74 - 99 mg/dL         Assessment/Plan   Principal Problem:    Mood disorder (CMS-[facility])  Active Problems:    Benign essential hypertension    Benign prostatic hyperplasia    DM type 2 (diabetes mellitus, type 2) (Multi)    Hyperlipidemia    Seizure disorder (Multi)    84 y/o male with history of vascular dementia, worsening over past several months with increased paranoia, wandering behaviors during the night, verbal aggression with threats toward wife.  Patient was started on depakote 125 mg daily by neurologist, however was not taking this medication consistently.  Will require extensive discharge planning, discussion with family related to ability to return home vs. Placement in a memory care unit.       Biological  -Continue depakote DR 125mg BID - likely subtherapeutic, titrate as beneficial  -Continue 0.5mg risperidone at bedtime- for paranoid delusions     2. Psychological -      Patient is encouraged to participate with the therapeutic milieu and program and group therapy      3. Sociological -     Patient encouraged to cooperate with social work staff on issues relevant to discharge planning.                      I personally spent 30 minutes today providing care for this patient, including preparation, face to face time, documentation and other services  such as review of medical records, diagnostic result, patient education, counseling, coordination of care as specified in the encounter.    Parts of this chart have been completed using voice recognition software.  Please excuse any errors of transcription.  Despite the medical decision making time stamp, my medical decision making has taken place during the patient's entire visit.  Thought process and reason for plan has been formulated from the time that I saw the patient until the time of disposition and is not specific to one specific moment during their visit and furthermore the medical decision making encompasses the entire chart and not only that represented in this note.        Cm Siegel, DO

## 2024-04-26 NOTE — NURSING NOTE
Pt's daughter Dyana called. She was wondering if she could talk to the Dr. A family meeting was suggested, possibly closer to discharge so everyone is on the same page. Next shift to be notified.
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
## 2024-04-26 NOTE — GROUP NOTE
"Group Topic: Self-Care/Wellness   Group Date: 4/26/2024  Start Time: 1100  End Time: 1150  Facilitators: SAFIA Ford   Department: [facility]    Number of Participants: 11   Group Focus: other health/wellness  Treatment Modality: Other: Recreation Therapy  Interventions utilized were mental fitness  Purpose: In this group session pts were engaged in a healthy-living Q&A discussion/game. This session was focused on promoting a lifestyle in which will help patients increase control over their over-all health and improve quality of life. Pt utilized mental fitness to promote enhanced cognition and stimulate appropriate socialization.     Name: Jose Guardado YOB: 1940   MR: [id number]      Facilitator: Recreational Therapist  Level of Participation: moderate  Quality of Participation: appropriate/pleasant, cooperative, and engaged  Interactions with others: appropriate  Mood/Affect: appropriate and brightens with interaction  Cognition: confused  Progress: Moderate  Comments: pt problem is agitation. Pt engaged when prompted/encouraged. Pt eventually gets up and states \"I'm gonna go cause I came in late and I dont know\". Pt leaves group.   Plan: continue with services      "

## 2024-04-27 LAB
GLUCOSE BLD MANUAL STRIP-MCNC: 228 MG/DL (ref 74–99)
GLUCOSE BLD MANUAL STRIP-MCNC: 251 MG/DL (ref 74–99)
GLUCOSE BLD MANUAL STRIP-MCNC: 265 MG/DL (ref 74–99)
GLUCOSE BLD MANUAL STRIP-MCNC: 68 MG/DL (ref 74–99)
GLUCOSE BLD MANUAL STRIP-MCNC: 68 MG/DL (ref 74–99)
GLUCOSE BLD MANUAL STRIP-MCNC: 85 MG/DL (ref 74–99)

## 2024-04-27 PROCEDURE — 82947 ASSAY GLUCOSE BLOOD QUANT: CPT

## 2024-04-27 PROCEDURE — 1140000001 HC PRIVATE PSYCH ROOM DAILY

## 2024-04-27 PROCEDURE — 2500000002 HC RX 250 W HCPCS SELF ADMINISTERED DRUGS (ALT 637 FOR MEDICARE OP, ALT 636 FOR OP/ED): Performed by: INTERNAL MEDICINE

## 2024-04-27 PROCEDURE — 2500000006 HC RX 250 W HCPCS SELF ADMINISTERED DRUGS (ALT 637 FOR ALL PAYERS): Mod: MUE | Performed by: INTERNAL MEDICINE

## 2024-04-27 PROCEDURE — 2500000001 HC RX 250 WO HCPCS SELF ADMINISTERED DRUGS (ALT 637 FOR MEDICARE OP)

## 2024-04-27 PROCEDURE — 99232 SBSQ HOSP IP/OBS MODERATE 35: CPT | Performed by: STUDENT IN AN ORGANIZED HEALTH CARE EDUCATION/TRAINING PROGRAM

## 2024-04-27 PROCEDURE — 2500000001 HC RX 250 WO HCPCS SELF ADMINISTERED DRUGS (ALT 637 FOR MEDICARE OP): Performed by: PSYCHIATRY & NEUROLOGY

## 2024-04-27 PROCEDURE — 2500000001 HC RX 250 WO HCPCS SELF ADMINISTERED DRUGS (ALT 637 FOR MEDICARE OP): Performed by: INTERNAL MEDICINE

## 2024-04-27 PROCEDURE — 97166 OT EVAL MOD COMPLEX 45 MIN: CPT | Mod: GO

## 2024-04-27 RX ORDER — INSULIN GLARGINE 100 [IU]/ML
12 INJECTION, SOLUTION SUBCUTANEOUS NIGHTLY
Status: DISCONTINUED | OUTPATIENT
Start: 2024-04-27 | End: 2024-05-02 | Stop reason: HOSPADM

## 2024-04-27 RX ADMIN — DIVALPROEX SODIUM 125 MG: 125 CAPSULE ORAL at 20:22

## 2024-04-27 RX ADMIN — LOSARTAN POTASSIUM 25 MG: 25 TABLET, FILM COATED ORAL at 08:30

## 2024-04-27 RX ADMIN — FINASTERIDE 5 MG: 5 TABLET, FILM COATED ORAL at 20:22

## 2024-04-27 RX ADMIN — RISPERIDONE 0.5 MG: 0.5 TABLET ORAL at 20:22

## 2024-04-27 RX ADMIN — DIVALPROEX SODIUM 125 MG: 125 CAPSULE ORAL at 08:29

## 2024-04-27 RX ADMIN — BRIVARACETAM 50 MG: 50 TABLET, FILM COATED ORAL at 22:07

## 2024-04-27 RX ADMIN — Medication 3 MG: at 20:22

## 2024-04-27 RX ADMIN — ATORVASTATIN CALCIUM 80 MG: 80 TABLET, FILM COATED ORAL at 20:22

## 2024-04-27 RX ADMIN — BRIVARACETAM 50 MG: 50 TABLET, FILM COATED ORAL at 08:30

## 2024-04-27 RX ADMIN — METFORMIN HYDROCHLORIDE 500 MG: 500 TABLET ORAL at 08:30

## 2024-04-27 RX ADMIN — LEVOTHYROXINE SODIUM 100 MCG: 100 TABLET ORAL at 06:18

## 2024-04-27 RX ADMIN — INSULIN GLARGINE 12 UNITS: 100 INJECTION, SOLUTION SUBCUTANEOUS at 20:34

## 2024-04-27 RX ADMIN — PANTOPRAZOLE SODIUM 40 MG: 40 TABLET, DELAYED RELEASE ORAL at 08:30

## 2024-04-27 RX ADMIN — CLOPIDOGREL BISULFATE 75 MG: 75 TABLET ORAL at 08:30

## 2024-04-27 ASSESSMENT — PAIN - FUNCTIONAL ASSESSMENT
PAIN_FUNCTIONAL_ASSESSMENT: 0-10
PAIN_FUNCTIONAL_ASSESSMENT: 0-10

## 2024-04-27 ASSESSMENT — COLUMBIA-SUICIDE SEVERITY RATING SCALE - C-SSRS
1. SINCE LAST CONTACT, HAVE YOU WISHED YOU WERE DEAD OR WISHED YOU COULD GO TO SLEEP AND NOT WAKE UP?: NO
2. HAVE YOU ACTUALLY HAD ANY THOUGHTS OF KILLING YOURSELF?: NO
6. HAVE YOU EVER DONE ANYTHING, STARTED TO DO ANYTHING, OR PREPARED TO DO ANYTHING TO END YOUR LIFE?: NO
2. HAVE YOU ACTUALLY HAD ANY THOUGHTS OF KILLING YOURSELF?: NO
2. HAVE YOU ACTUALLY HAD ANY THOUGHTS OF KILLING YOURSELF?: NO
6. HAVE YOU EVER DONE ANYTHING, STARTED TO DO ANYTHING, OR PREPARED TO DO ANYTHING TO END YOUR LIFE?: NO
6. HAVE YOU EVER DONE ANYTHING, STARTED TO DO ANYTHING, OR PREPARED TO DO ANYTHING TO END YOUR LIFE?: NO
1. SINCE LAST CONTACT, HAVE YOU WISHED YOU WERE DEAD OR WISHED YOU COULD GO TO SLEEP AND NOT WAKE UP?: NO
1. SINCE LAST CONTACT, HAVE YOU WISHED YOU WERE DEAD OR WISHED YOU COULD GO TO SLEEP AND NOT WAKE UP?: NO

## 2024-04-27 ASSESSMENT — COGNITIVE AND FUNCTIONAL STATUS - GENERAL: DAILY ACTIVITIY SCORE: 24

## 2024-04-27 ASSESSMENT — PAIN SCALES - GENERAL
PAINLEVEL_OUTOF10: 0 - NO PAIN
PAINLEVEL_OUTOF10: 0 - NO PAIN

## 2024-04-27 ASSESSMENT — ACTIVITIES OF DAILY LIVING (ADL)
ADL_ASSISTANCE: INDEPENDENT
BATHING_ASSISTANCE: INDEPENDENT

## 2024-04-27 NOTE — CARE PLAN
The patient's goals for the shift include I want to go home    The clinical goals for the shift include Maintain safety    Over the shift, the patient did make progress toward the following goals. Barriers to progression include confusion. Recommendations to address these barriers include Redirect and reorient the patient frequently.

## 2024-04-27 NOTE — NURSING NOTE
FORTINO NOTE     Problem:  agitation     Behavior:  Pt isolated himself to his room, refused HS snack, cooperative with care and complaint with medication. Pt is aware of being at the hospital and wants to go home, he was able to recall today's visitation with his family.      Group Participation: n/a  Appetite/Meals: refused hs snack        Interventions:  scheduled medication administered     Response:  Pt is compliant with medication,      Plan:  Provide safe environment, continue monitoring

## 2024-04-27 NOTE — GROUP NOTE
Group Topic: Other   Group Date: 4/27/2024  Start Time: 1330  End Time: 1430  Facilitators: SAFIA Ford   Department: [facility]    Number of Participants: 10   Group Focus: leisure skills and social skills  Treatment Modality: Other: Recreation Therapy  Interventions utilized were leisure development  Purpose: In this group patients were prompted to engage together in a therapeutic game which aims to promote increased socialization, motivation, activity level, communication and following directions along utilizing balance and motor skills. CTRS integrated music to this activity to enhance mood and promote memory stimulation.     Name: Jose Guardado YOB: 1940   MR: [id number]      Facilitator: Recreational Therapist  Level of Participation: active  Quality of Participation: appropriate/pleasant, attentive, cooperative, and engaged  Interactions with others: appropriate  Mood/Affect: appropriate and bright  Cognition: confused  Progress: Moderate  Comments: pt problem is agitation. No agitations noted.   Plan: continue with services

## 2024-04-27 NOTE — PROGRESS NOTES
"Occupational Therapy Note   04/27/24 [id number]   OT Last Visit   OT Received On 04/27/24   General   Reason for Referral mood disorder   Referred By Dr. 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, patient reports \"lazy eye.\" [place], reports his hearing aides are at home   Pain Assessment   Pain Assessment 0-10   Pain Score 0 - No pain   Cognition   Orientation Level Disoriented to place;Disoriented to time   Home Living   Type of Home House   Lives With Spouse   Home Adaptive Equipment Walker rolling or standard;Cane   Home Layout One level   Home Access Stairs to enter without rails   Entrance Stairs-Rails None   Entrance Stairs-Number of Steps 1   Bathroom Shower/Tub Tub/shower unit;Walk-in shower   Bathroom Toilet Standard   Bathroom Equipment Grab bars in shower   Prior Function Per Pt/Caregiver Report   Level of [place] Independent with ADLs and functional transfers;Independent with homemaking with ambulation   ADL Assistance Independent   Homemaking Assistance Independent   Ambulatory Assistance Independent   Vocational Retired   Hand Dominance Right   ADL   Eating Assistance Independent   Grooming Assistance Independent   Bathing Assistance Independent   UE Dressing Assistance Independent   LE Dressing Assistance Independent   Toileting Assistance with Device Independent   Activity Tolerance   Endurance Endurance does not limit participation in activity   Bed Mobility   Bed Mobility Yes   Bed Mobility 1   Bed Mobility 1 Supine to sitting   Level of Assistance 1 Independent   Bed Mobility Comments 1 flat bed no rail   Functional Mobility   Functional Mobility Performed Yes   Functional Mobility " 1   Surface 1 Level tile   Device 1 No device   Assistance 1 Independent   Comments 1 pt performs functional mobility independently in room and throughout unit   Transfers   Transfer Yes   Transfer 1   Technique 1 Sit to stand;Stand to sit   Transfer Level of Assistance 1 Independent   Static Sitting Balance   Static Sitting-Balance Support No upper extremity supported;Feet unsupported   Static Sitting-Level of Assistance Independent   Dynamic Sitting Balance   Dynamic Sitting-Balance Support No upper extremity supported;Feet unsupported   Dynamic Sitting-Balance Reaching for objects   Dynamic Sitting-Comments independent   Static Standing Balance   Static Standing-Balance Support No upper extremity supported   Static Standing-Level of Assistance Independent   Dynamic Standing Balance   Dynamic Standing-Balance Support No upper extremity supported   Dynamic Standing-Balance Reaching for objects   Dynamic Standing-Comments independent   Vision - Basic Assessment   Current Vision Other (Comment)  (L eye appears impaired (unable to determine at this time))   Sensation   Light Touch No apparent deficits   Proprioception No apparent deficits   Strength   Strength Comments BUEs WFL   Coordination   Movements are Fluid and Coordinated Yes   Hand Function   Gross Grasp Functional   Coordination Functional   RUE    RUE  WFL   LUE    LUE WFL   IP OT Assessment   OT Assessment Pt independent with ADLs, functional transfers, and functional mobility without an assistive device.  No further OT services are indicated at this time.   Prognosis Good   Barriers to Discharge None   Evaluation/Treatment Tolerance Patient tolerated treatment well   Medical Staff Made Aware Yes   End of Session Communication Bedside nurse   End of Session Patient Position Up in chair   Inpatient/Swing Bed or Outpatient   Inpatient/Swing Bed or Outpatient Inpatient   Inpatient Plan   No Skilled OT No acute OT goals identified   OT - OK to Discharge Yes    OT Recommended Transfer Status Independent      04/27/24 1100   [facility] Daily Activity   Putting on and taking off regular lower body clothing 4   Bathing (including washing, rinsing, drying) 4   Putting on and taking off regular upper body clothing 4   Toileting, which includes using toilet, bedpan or urinal 4   Taking care of personal grooming such as brushing teeth 4   Eating Meals 4   Daily Activity - Total Score 24
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
## 2024-04-27 NOTE — NURSING NOTE
FORTINO NOTE     Problem:  Agitation     Behavior:  Patient has been calm and cooperative, some irritability when many people talking to him at one time but it resolved quickly.  Pt was walking the hallway today to get exercise.  He kept asking about going home.    Group Participation: Yes  Appetite/Meals: 25%       Interventions:  Redirect and reorient as needed, give scheduled medications, PRN medications, every 15 minute checks.    Response:  Pt has been calm and cooperative without agitation     Plan:  Redirect and reorient as needed, give scheduled medications, PRN medications, every 15 minute checks.

## 2024-04-27 NOTE — GROUP NOTE
Group Topic: Symptom Management   Group Date: 4/27/2024  Start Time: 1015  End Time: 1115  Facilitators: SAFIA Ford   Department: [facility] REHABTH VIRTUAL    Number of Participants: 9   Group Focus: anxiety and coping skills  Treatment Modality: Psychoeducation and Other: Recreation Therapy  Interventions utilized were patient education  Purpose: Patients engaged in a group session aimed to increase knowledge focused on stress, warning signs and responsibilities. Pt participated in group discussion and then encouraged to create a coping skills toolbox for those times when in a distressed situation/s.     Name: Jose Guardado YOB: 1940   MR: [id number]      Facilitator: Recreational Therapist  Level of Participation: active  Quality of Participation: appropriate/pleasant, attentive, cooperative, and engaged  Interactions with others: appropriate  Mood/Affect: appropriate and brightens with interaction  Cognition: confused  Progress: Moderate  Comments: pt problem is agitation. Pt engages in group discussion and creating coping skills handout. Pt at times having difficulty following along (possible d/t hearing some loss) but re-direction effective.   Plan: continue with services

## 2024-04-28 LAB
GLUCOSE BLD MANUAL STRIP-MCNC: 155 MG/DL (ref 74–99)
GLUCOSE BLD MANUAL STRIP-MCNC: 186 MG/DL (ref 74–99)
GLUCOSE BLD MANUAL STRIP-MCNC: 224 MG/DL (ref 74–99)
GLUCOSE BLD MANUAL STRIP-MCNC: 91 MG/DL (ref 74–99)

## 2024-04-28 PROCEDURE — 1140000001 HC PRIVATE PSYCH ROOM DAILY

## 2024-04-28 PROCEDURE — 2500000001 HC RX 250 WO HCPCS SELF ADMINISTERED DRUGS (ALT 637 FOR MEDICARE OP): Performed by: INTERNAL MEDICINE

## 2024-04-28 PROCEDURE — 82947 ASSAY GLUCOSE BLOOD QUANT: CPT

## 2024-04-28 PROCEDURE — 2500000002 HC RX 250 W HCPCS SELF ADMINISTERED DRUGS (ALT 637 FOR MEDICARE OP, ALT 636 FOR OP/ED): Performed by: INTERNAL MEDICINE

## 2024-04-28 PROCEDURE — 99232 SBSQ HOSP IP/OBS MODERATE 35: CPT | Performed by: STUDENT IN AN ORGANIZED HEALTH CARE EDUCATION/TRAINING PROGRAM

## 2024-04-28 PROCEDURE — 2500000006 HC RX 250 W HCPCS SELF ADMINISTERED DRUGS (ALT 637 FOR ALL PAYERS): Performed by: INTERNAL MEDICINE

## 2024-04-28 PROCEDURE — 2500000001 HC RX 250 WO HCPCS SELF ADMINISTERED DRUGS (ALT 637 FOR MEDICARE OP)

## 2024-04-28 PROCEDURE — 2500000001 HC RX 250 WO HCPCS SELF ADMINISTERED DRUGS (ALT 637 FOR MEDICARE OP): Performed by: PSYCHIATRY & NEUROLOGY

## 2024-04-28 RX ADMIN — ATORVASTATIN CALCIUM 80 MG: 80 TABLET, FILM COATED ORAL at 20:51

## 2024-04-28 RX ADMIN — CLOPIDOGREL BISULFATE 75 MG: 75 TABLET ORAL at 08:31

## 2024-04-28 RX ADMIN — LOSARTAN POTASSIUM 25 MG: 25 TABLET, FILM COATED ORAL at 08:31

## 2024-04-28 RX ADMIN — BRIVARACETAM 50 MG: 50 TABLET, FILM COATED ORAL at 08:32

## 2024-04-28 RX ADMIN — DIVALPROEX SODIUM 125 MG: 125 CAPSULE ORAL at 20:52

## 2024-04-28 RX ADMIN — PANTOPRAZOLE SODIUM 40 MG: 40 TABLET, DELAYED RELEASE ORAL at 08:31

## 2024-04-28 RX ADMIN — BRIVARACETAM 50 MG: 50 TABLET, FILM COATED ORAL at 20:52

## 2024-04-28 RX ADMIN — METFORMIN HYDROCHLORIDE 500 MG: 500 TABLET ORAL at 17:30

## 2024-04-28 RX ADMIN — DIVALPROEX SODIUM 125 MG: 125 CAPSULE ORAL at 08:31

## 2024-04-28 RX ADMIN — Medication 3 MG: at 20:52

## 2024-04-28 RX ADMIN — INSULIN GLARGINE 12 UNITS: 100 INJECTION, SOLUTION SUBCUTANEOUS at 20:48

## 2024-04-28 RX ADMIN — LEVOTHYROXINE SODIUM 100 MCG: 100 TABLET ORAL at 06:07

## 2024-04-28 RX ADMIN — METFORMIN HYDROCHLORIDE 500 MG: 500 TABLET ORAL at 08:31

## 2024-04-28 RX ADMIN — RISPERIDONE 0.5 MG: 0.5 TABLET ORAL at 20:52

## 2024-04-28 RX ADMIN — FINASTERIDE 5 MG: 5 TABLET, FILM COATED ORAL at 20:52

## 2024-04-28 ASSESSMENT — PAIN SCALES - GENERAL
PAINLEVEL_OUTOF10: 0 - NO PAIN

## 2024-04-28 ASSESSMENT — PAIN - FUNCTIONAL ASSESSMENT
PAIN_FUNCTIONAL_ASSESSMENT: 0-10

## 2024-04-28 NOTE — GROUP NOTE
"Group Topic: Self Esteem   Group Date: 4/28/2024  Start Time: 1020  End Time: 1115  Facilitators: SAFIA Ford   Department: [facility] REHABTH VIRTUAL    Number of Participants: 10   Group Focus: self-esteem  Treatment Modality: Other: Recreation Therapy  Interventions utilized were story telling  Purpose: Patients engaged in group activity completing \"Self Esteem Mad Libs\". Pt engaged in group discussion covering multi aspects of self-esteem and then participated in creating a Mad Jessica story utilizing appropriate humor to foster enhanced mood/laughter, increase socialization. This activity also aims to enhance focus and following directions by use of unique storytelling.      Name: Jose Guardado YOB: 1940   MR: [id number]      Facilitator: Recreational Therapist  Level of Participation: active  Quality of Participation: appropriate/pleasant, attentive, cooperative, and engaged  Interactions with others: appropriate  Mood/Affect: appropriate, bright, and positive  Cognition: confused  Progress: Significant  Comments: pt problem is agitation. Pt engages to the best of his ability. Pt has some [place] but able to complete activity with help from staff and peers.   Plan: continue with services      "

## 2024-04-28 NOTE — PROGRESS NOTES
"Jose Guardado is a 83 y.o. male on day 4 of admission presenting with Mood disorder (CMS-HCC).      Subjective   Chart reviewed and case discussed with nursing.    Patient remains admitted for concerning psychotic decompensation related to worsening progressive dementia.  On the unit, patient remains cooperative, medication-adherent, and not demonstrating agitated behaviors toward self nor others.  On interview, patient remains with limited insight, minimizing admission circumstance and need for being in hospital. Patient remains discharge-focused, telling me it was a mistake to be here. States he has nothing wrong with him and that he runs every day. Worries about getting his wife to dialysis. Patient's outpatient provider from neurology remains involved in the case assisting with communication with family and recommendations for continued care.       Objective     Last Recorded Vitals  Blood pressure 119/80, pulse (!) 120, temperature 36.6 °C (97.9 °F), temperature source Temporal, resp. rate 18, height 1.702 m (5' 7\"), weight 70.2 kg (154 lb 12.8 oz), SpO2 95%.    Review of Systems    Psychiatric ROS - Adult  Anxiety: General Anxiety Disorder (DOV)DOV Behaviors: irritability, restlessness, and sleep disturbance  Depression: sleep decreased   Delirium: negative  Psychosis:  paranoia related to wife stealing and cheating  Selam: negative  Safety Issues:  dementia, impulsivity, poor insight    Physical Exam      Mental Status Exam  General Appearance: Fairly groomed.  Gait/Station:  shuffling  Speech: some word finding difficulty, increased rate, increased volume, normal tone  Mood: \"I'm totally fine\"  Affect: constricted , Anxious  Thought Process: Perseverative, [place]  Thought Associations: Mild loosening of associations  Thought Content: normal  Perception: No perceptual abnormalities noted  Level of Consciousness: Alert  Orientation:  to person  Attention and Concentration:  impaired  Recent Memory: Impaired " as evidenced by patchy recollection   Remote Memory: Impaired as evidenced by confabulation   Executive function:  impaired  Fund of knowledge:  able to state president's name  Insight:  limited  Judgment: Limited, as evidence by inability to reason through medical decision making, recent high-risk or self-harming behavior , and non-compliance with treatment recommendations    Psychiatric Risk Assessment  Violence Risk Assessment: male and persecutory delusions, threats toward family  Acute Risk of Harm to Others is Considered: moderate   Suicide Risk Assessment: age > 65 yrs old, , chronic medical illness, and life crisis (shame/despair)  Protective Factors against Suicide: none, marriage/partnership, and positive family relationships  Acute Risk of Harm to Self is Considered: moderate    Relevant Results             Results for orders placed or performed during the hospital encounter of 04/24/24 (from the past 96 hour(s))   POCT GLUCOSE   Result Value Ref Range    POCT Glucose 340 (H) 74 - 99 mg/dL   POCT GLUCOSE   Result Value Ref Range    POCT Glucose 190 (H) 74 - 99 mg/dL   POCT GLUCOSE   Result Value Ref Range    POCT Glucose 322 (H) 74 - 99 mg/dL   POCT GLUCOSE   Result Value Ref Range    POCT Glucose 202 (H) 74 - 99 mg/dL   POCT GLUCOSE   Result Value Ref Range    POCT Glucose 163 (H) 74 - 99 mg/dL   POCT GLUCOSE   Result Value Ref Range    POCT Glucose 105 (H) 74 - 99 mg/dL   POCT GLUCOSE   Result Value Ref Range    POCT Glucose 163 (H) 74 - 99 mg/dL   POCT GLUCOSE   Result Value Ref Range    POCT Glucose 185 (H) 74 - 99 mg/dL   POCT GLUCOSE   Result Value Ref Range    POCT Glucose 191 (H) 74 - 99 mg/dL   POCT GLUCOSE   Result Value Ref Range    POCT Glucose 68 (L) 74 - 99 mg/dL   POCT GLUCOSE   Result Value Ref Range    POCT Glucose 68 (L) 74 - 99 mg/dL   POCT GLUCOSE   Result Value Ref Range    POCT Glucose 85 74 - 99 mg/dL   POCT GLUCOSE   Result Value Ref Range    POCT Glucose 251 (H) 74 - 99  mg/dL   POCT GLUCOSE   Result Value Ref Range    POCT Glucose 265 (H) 74 - 99 mg/dL   POCT GLUCOSE   Result Value Ref Range    POCT Glucose 228 (H) 74 - 99 mg/dL   POCT GLUCOSE   Result Value Ref Range    POCT Glucose 91 74 - 99 mg/dL   POCT GLUCOSE   Result Value Ref Range    POCT Glucose 155 (H) 74 - 99 mg/dL         Assessment/Plan   Principal Problem:    Mood disorder (CMS-[facility])  Active Problems:    Benign essential hypertension    Benign prostatic hyperplasia    DM type 2 (diabetes mellitus, type 2) (Multi)    Hyperlipidemia    Seizure disorder (Multi)    84 y/o male with history of vascular dementia, worsening over past several months with increased paranoia, wandering behaviors during the night, verbal aggression with threats toward wife.  Patient was started on depakote 125 mg daily by neurologist, however was not taking this medication consistently.  Will require extensive discharge planning, discussion with family related to ability to return home vs. Placement in a memory care unit.       Biological  -Continue depakote DR 125mg BID - likely subtherapeutic, titrate as beneficial   -will get level 4/30  -Continue 0.5mg risperidone at bedtime- for paranoid delusions     2. Psychological -      Patient is encouraged to participate with the therapeutic milieu and program and group therapy      3. Sociological -     Patient encouraged to cooperate with social work staff on issues relevant to discharge planning.                      I personally spent 25 minutes today providing care for this patient, including preparation, face to face time, documentation and other services such as review of medical records, diagnostic result, patient education, counseling, coordination of care as specified in the encounter.      Christophe Dubose MD

## 2024-04-28 NOTE — CARE PLAN
Problem: Thought Disorders  Goal: LTG: Jose will exhibit improved ability to concentrate  Outcome: Progressing   The patient's goals for the shift include im going home    The clinical goals for the shift include maintain safety    Over the shift, the patient did make progress toward the following goals. Barriers to progression include dementia. Recommendations to address these barriers include give scheduled medications, discuss in treatment team.

## 2024-04-28 NOTE — GROUP NOTE
Group Topic: Art Creative   Group Date: 4/28/2024  Start Time: 1330  End Time: 1430  Facilitators: SAFIA Ford   Department: [facility] REHAB[facility] VIRTUAL    Number of Participants: 8   Group Focus: concentration, mindfulness, relaxation, and self-esteem  Treatment Modality: Other: Recreation Therapy  Interventions utilized were other Therapeutic Creative Art  Purpose: Patients participated in therapeutic creative arts activity utilizing water color. This activity promoted creative expression, leisure awareness and social interaction, while also working on fine motor skills and following directions. CTRS also played peaceful music to create a calm and inviting atmosphere.     Name: Jose Guardado YOB: 1940   MR: [id number]      Facilitator: Recreational Therapist  Level of Participation: active  Quality of Participation: appropriate/pleasant, attentive, cooperative, and engaged  Interactions with others: appropriate  Mood/Affect: appropriate and brightens with interaction  Cognition: coherent/clear  Progress: Moderate  Comments: pt problem is agitation. No agitation displayed. Engages appropriately with staff and peers.   Plan: continue with services

## 2024-04-28 NOTE — NURSING NOTE
FORTINO NOTE     Problem:  Paranoia     Behavior:  Pt has been calm and cooperative with care. No paranoia expressed.  He has been able to sit for extended periods of time and talk with staff.  He is going to groups.  Group Participation: Yes  Appetite/Meals: 100%       Interventions:  Pt given scheduled medications, every 15 minute checks, 1:1 interaction with patient, encourage patient to go to groups, assist patient to meet his needs.    Response:  Patient is calm and cooperative.  He had a visit with his wife and daughter this shift.     Plan:  Pt given scheduled medications, every 15 minute checks, 1:1 interaction with patient, encourage patient to go to groups, assist patient to meet his needs.

## 2024-04-28 NOTE — NURSING NOTE
Daughter informed this RN that the patient told them that he is being held here in this brothel and the nurses are prostitutes and he has been very firm with us prostitutes to stay away from him.  Daughter concerned and wants a family meeting about whether he should come home or go to AL

## 2024-04-28 NOTE — NURSING NOTE
FORTINO NOTE     Problem:  Anxiety     Behavior:  Patient is in patient’s room laying in bed sleeping. Upon being woken up by this nurse, patient is pleasant and calm. Patient’s affect is broad range. Patient is talkative. Patient maintains good eye contact.     Group Participation: N/A  Appetite/Meals: N/A       Interventions:  This nurse completed a shift assessment and administered patient's scheduled night time medications.    Response:  Patient remained pleasant and calm and was cooperative throughout this shift assessment and medication administration.     Plan:  Continue to monitor for anxiety. Continue to monitor for patient safety.

## 2024-04-29 LAB
GLUCOSE BLD MANUAL STRIP-MCNC: 138 MG/DL (ref 74–99)
GLUCOSE BLD MANUAL STRIP-MCNC: 190 MG/DL (ref 74–99)

## 2024-04-29 PROCEDURE — 2500000001 HC RX 250 WO HCPCS SELF ADMINISTERED DRUGS (ALT 637 FOR MEDICARE OP): Performed by: INTERNAL MEDICINE

## 2024-04-29 PROCEDURE — 2500000006 HC RX 250 W HCPCS SELF ADMINISTERED DRUGS (ALT 637 FOR ALL PAYERS): Performed by: INTERNAL MEDICINE

## 2024-04-29 PROCEDURE — 82947 ASSAY GLUCOSE BLOOD QUANT: CPT

## 2024-04-29 PROCEDURE — 2500000006 HC RX 250 W HCPCS SELF ADMINISTERED DRUGS (ALT 637 FOR ALL PAYERS): Mod: MUE | Performed by: REGISTERED NURSE

## 2024-04-29 PROCEDURE — 2500000001 HC RX 250 WO HCPCS SELF ADMINISTERED DRUGS (ALT 637 FOR MEDICARE OP): Performed by: REGISTERED NURSE

## 2024-04-29 PROCEDURE — 99232 SBSQ HOSP IP/OBS MODERATE 35: CPT | Performed by: INTERNAL MEDICINE

## 2024-04-29 PROCEDURE — 1140000001 HC PRIVATE PSYCH ROOM DAILY

## 2024-04-29 PROCEDURE — 2500000001 HC RX 250 WO HCPCS SELF ADMINISTERED DRUGS (ALT 637 FOR MEDICARE OP): Performed by: PSYCHIATRY & NEUROLOGY

## 2024-04-29 PROCEDURE — 2500000001 HC RX 250 WO HCPCS SELF ADMINISTERED DRUGS (ALT 637 FOR MEDICARE OP)

## 2024-04-29 PROCEDURE — 2500000002 HC RX 250 W HCPCS SELF ADMINISTERED DRUGS (ALT 637 FOR MEDICARE OP, ALT 636 FOR OP/ED): Performed by: INTERNAL MEDICINE

## 2024-04-29 PROCEDURE — 99232 SBSQ HOSP IP/OBS MODERATE 35: CPT | Performed by: REGISTERED NURSE

## 2024-04-29 RX ORDER — DIVALPROEX SODIUM 125 MG/1
250 CAPSULE, COATED PELLETS ORAL 2 TIMES DAILY
Status: DISCONTINUED | OUTPATIENT
Start: 2024-04-29 | End: 2024-05-02 | Stop reason: HOSPADM

## 2024-04-29 RX ORDER — RISPERIDONE 1 MG/1
1 TABLET ORAL NIGHTLY
Status: DISCONTINUED | OUTPATIENT
Start: 2024-04-29 | End: 2024-05-02 | Stop reason: HOSPADM

## 2024-04-29 RX ADMIN — DIVALPROEX SODIUM 250 MG: 125 CAPSULE ORAL at 20:23

## 2024-04-29 RX ADMIN — CLOPIDOGREL BISULFATE 75 MG: 75 TABLET ORAL at 08:30

## 2024-04-29 RX ADMIN — METFORMIN HYDROCHLORIDE 500 MG: 500 TABLET ORAL at 08:30

## 2024-04-29 RX ADMIN — RISPERIDONE 1 MG: 1 TABLET, FILM COATED ORAL at 20:23

## 2024-04-29 RX ADMIN — INSULIN GLARGINE 12 UNITS: 100 INJECTION, SOLUTION SUBCUTANEOUS at 20:23

## 2024-04-29 RX ADMIN — Medication 3 MG: at 20:23

## 2024-04-29 RX ADMIN — BRIVARACETAM 50 MG: 50 TABLET, FILM COATED ORAL at 08:30

## 2024-04-29 RX ADMIN — METFORMIN HYDROCHLORIDE 500 MG: 500 TABLET ORAL at 17:23

## 2024-04-29 RX ADMIN — BRIVARACETAM 50 MG: 50 TABLET, FILM COATED ORAL at 20:23

## 2024-04-29 RX ADMIN — FINASTERIDE 5 MG: 5 TABLET, FILM COATED ORAL at 20:23

## 2024-04-29 RX ADMIN — LOSARTAN POTASSIUM 25 MG: 25 TABLET, FILM COATED ORAL at 08:30

## 2024-04-29 RX ADMIN — LEVOTHYROXINE SODIUM 100 MCG: 100 TABLET ORAL at 07:00

## 2024-04-29 RX ADMIN — DIVALPROEX SODIUM 125 MG: 125 CAPSULE ORAL at 08:30

## 2024-04-29 RX ADMIN — ATORVASTATIN CALCIUM 80 MG: 80 TABLET, FILM COATED ORAL at 20:23

## 2024-04-29 RX ADMIN — PANTOPRAZOLE SODIUM 40 MG: 40 TABLET, DELAYED RELEASE ORAL at 08:30

## 2024-04-29 ASSESSMENT — PAIN - FUNCTIONAL ASSESSMENT
PAIN_FUNCTIONAL_ASSESSMENT: 0-10

## 2024-04-29 ASSESSMENT — PAIN SCALES - GENERAL
PAINLEVEL_OUTOF10: 0 - NO PAIN

## 2024-04-29 ASSESSMENT — COLUMBIA-SUICIDE SEVERITY RATING SCALE - C-SSRS
1. SINCE LAST CONTACT, HAVE YOU WISHED YOU WERE DEAD OR WISHED YOU COULD GO TO SLEEP AND NOT WAKE UP?: NO
6. HAVE YOU EVER DONE ANYTHING, STARTED TO DO ANYTHING, OR PREPARED TO DO ANYTHING TO END YOUR LIFE?: NO
2. HAVE YOU ACTUALLY HAD ANY THOUGHTS OF KILLING YOURSELF?: NO

## 2024-04-29 NOTE — GROUP NOTE
Group Topic: Self-Care/Wellness   Group Date: 4/29/2024  Start Time: 1100  End Time: 1145  Facilitators: SAFIA Coles   Department: [facility] REHABTH VIRTUAL    Number of Participants: 10   Group Focus: other Leisure Jeopardy  Treatment Modality: Other: Recreation therapy  Interventions utilized were confrontation, exploration, leisure development, mental fitness, patient education, problem solving, and support  Purpose: coping skills, maladaptive thinking, feelings, irrational fears, communication skills, insight or knowledge, self-worth, self-care, relapse prevention strategies, and trigger / craving management    Name: Jose Guardado YOB: 1940   MR: [id number]      Facilitator: Recreational Therapist  Level of Participation: minimal  Quality of Participation: appropriate/pleasant and distractible  Interactions with others:  pt joins group at the end of session.  Was encouraged to attend earlier but engaging with other unit staff.  Pt has very short attention span and displays pleasantly confused mood for the short time in session.    Mood/Affect: appropriate  Triggers (if applicable): n/a  Cognition: confused  Progress: Minimal  Comments: pt problem is agitation.  Pt did not display any agitation during this short time in group.    Plan: continue with services

## 2024-04-29 NOTE — PROGRESS NOTES
LSW spoke with pt's dtr  on the phone and son Sj while on unit. Pt's dtr Lizy passed away today. It was determined that it would be best to tell pt while here in the hospital. The family would like for pt to come home instead of going to a facility at this time. Family plans to provide 24/7 supervision for pt in shifts amongst the family. BRITW and  discussed possible discharge for Wednesday if provider is agreeable. LSW met with pt, pt's son, and pt's dtr-in-law while pt's son broke the news to him about his dtr. Pt was tearful as expected but appropriate. Pt after some time appeared to have forgotten what was told to him and was in good spirits. Pt's family has put locks on the home and cameras to help when pt returns home. They also have locked up the guns and changed the code to the safe. As well as taken the vehicles off the property. Pt's family are still going to explore AL's as a secondary plan should returning home not work out.       Glenna Hutchinson, JODIE

## 2024-04-29 NOTE — GROUP NOTE
Group Topic: Music Therapy   Group Date: 4/29/2024  Start Time: 1000  End Time: 1100  Facilitators: Colleen Rojas; Courtney Street   Department: [facility] EXPRESSIVE THER VIRTUAL    Number of Participants: 10   Group Focus: communication/socialization, concentration, leisure skills, music therapy, reality orientation, and social skills  Treatment Modality: Music Therapy  Interventions Utilized were: leisure development, orientation, passive music engagement, and sharing/discussion      Name: Jose Guardado YOB: 1940   MR: [id number]      Level of Participation: minimal  Quality of Participation: appropriate/pleasant and cooperative  Interactions with others: appropriate  Mood/Affect: appropriate and positive  Cognition, Pre Treatment: confused, inattentive, loose, and not focused  Cognition, Post Treatment: confused, inattentive, loose, and not focused  Progress: Minimal  Plan: continue with services

## 2024-04-29 NOTE — CARE PLAN
The patient's goals for the shift include Go home    The clinical goals for the shift include maintain safety    Over the shift, the patient did make progress toward the following goals. Barriers to progression include having impaired memory. Recommendations to address these barriers include reorient patient to enviroment.

## 2024-04-29 NOTE — PROGRESS NOTES
LSW spoke with pt's wife Kerri and dtr  regarding disposition and discharge plan. Family reported that pt was very delusional and paranoid yesterday during their visit. As well as that he tried to kick his wife. Pt's family reports that pt has always had a temper and that maybe it is showing more now with the dementia. Pt's family is not on the same page with whether pt should return home or go to AL. A family meeting is warranted. LSW discussed possible days and times to hold meeting and will discuss with provider. LSW also discussed possible civil commitment for pt and request for an Expert Evaluation to be completed for guardianship. LSW will discuss with provider and update family when both of these have been completed or updated information. Family requests that only pt's wife Kerri and dtr  be the point's of contact. LSW shortly after received a phone call from pt's son Jose Gustafson and informed him that he needs to obtain information from pt's wife as there needs to be one point person.     Glenna Hutchinson, JODIE

## 2024-04-29 NOTE — GROUP NOTE
Group Topic: Goals   Group Date: 4/29/2024  Start Time: 0730  End Time: 0800  Facilitators: Jordi Shaffer   Department: [facility] Geriatric     Number of Participants: 8   Group Focus: goals  Treatment Modality: Patient-Centered Therapy  Interventions utilized were assignment and group exercise  Purpose: self-care    Name: Jose Guardado YOB: 1940   MR: [id number]      Facilitator: Mental Health [facility]  Level of Participation: minimal  Quality of Participation: appropriate/pleasant  Interactions with others: appropriate  Mood/Affect: appropriate  Triggers (if applicable): n/a  Cognition: coherent/clear  Progress: Minimal  Comments: Pt attended group and completed assigned worksheet  Plan: continue with services

## 2024-04-29 NOTE — PROGRESS NOTES
"Jose Guardado is a 83 y.o. male on day 5 of admission presenting with Mood disorder (CMS-[facility]).      Subjective   Chart reviewed and case discussed with nursing.    Patient remains admitted for concerning psychotic decompensation related to worsening progressive dementia.  On the unit, patient remains cooperative, medication-adherent, and not demonstrating agitated behaviors toward self nor others.  On interview, patient remains with limited insight, minimizing admission circumstance and need for being in hospital. Patient remains discharge-focused, telling me it was a mistake to be here. States he has nothing wrong with him and that he runs every day. Worries about getting his wife to dialysis. Patient's outpatient provider from neurology remains involved in the case assisting with communication with family and recommendations for continued care.  Patient is sleeping ok, appetite isminimal- he says he is not hungry and says he has lost weight.  He admits to yelling at his wife and after some encouragement, he says he does remember threatening is family on phone but says he was upset and that he would never hurt them.  He signed voluntary admission form and provider explained that he would work with provider to develop optimal med regimen and continue to observe behaviors including his behaviors with his family on th ephone.  He agrees totreat his family uwith respect when talking tothem       Objective     Last Recorded Vitals  Blood pressure 104/58, pulse 87, temperature 37.1 °C (98.8 °F), temperature source Temporal, resp. rate 18, height 1.702 m (5' 7\"), weight 70.2 kg (154 lb 12.8 oz), SpO2 97%.    Review of Systems    Psychiatric ROS - Adult  Anxiety: General Anxiety Disorder (DOV)DOV Behaviors: irritability, restlessness, and sleep disturbance  Depression: sleep decreased   Delirium: negative  Psychosis:  paranoia related to wife stealing and cheating  Selam: negative  Safety Issues:  dementia, impulsivity, " "poor insight    Physical Exam      Mental Status Exam  General Appearance: Fairly groomed.  Gait/Station:  shuffling  Speech: some word finding difficulty, increased rate, increased volume, normal tone  Mood: \"ok, I just want togo home'  Affect: constricted , Anxious  Thought Process: Perseverative, [place]  Thought Associations: Mild loosening of associations  Thought Content: normal  Perception: No perceptual abnormalities noted  Level of Consciousness: Alert  Orientation:  to person  Attention and Concentration:  impaired  Recent Memory: Impaired as evidenced by patchy recollection   Remote Memory: Impaired as evidenced by confabulation   Executive function:  impaired  Fund of knowledge:  able to state president's name, knows he is in hospital  Insight:  limited  Judgment: Limited, as evidence by inability to reason through medical decision making, recent high-risk or self-harming behavior , and non-compliance with treatment recommendations    Psychiatric Risk Assessment  Violence Risk Assessment: male and persecutory delusions, threats toward family  Acute Risk of Harm to Others is Considered: moderate   Suicide Risk Assessment: age > 65 yrs old, , chronic medical illness, and life crisis (shame/despair)  Protective Factors against Suicide: none, marriage/partnership, and positive family relationships  Acute Risk of Harm to Self is Considered: moderate    Relevant Results             Results for orders placed or performed during the hospital encounter of 04/24/24 (from the past 96 hour(s))   POCT GLUCOSE   Result Value Ref Range    POCT Glucose 202 (H) 74 - 99 mg/dL   POCT GLUCOSE   Result Value Ref Range    POCT Glucose 163 (H) 74 - 99 mg/dL   POCT GLUCOSE   Result Value Ref Range    POCT Glucose 105 (H) 74 - 99 mg/dL   POCT GLUCOSE   Result Value Ref Range    POCT Glucose 163 (H) 74 - 99 mg/dL   POCT GLUCOSE   Result Value Ref Range    POCT Glucose 185 (H) 74 - 99 mg/dL   POCT GLUCOSE   Result Value " Ref Range    POCT Glucose 191 (H) 74 - 99 mg/dL   POCT GLUCOSE   Result Value Ref Range    POCT Glucose 68 (L) 74 - 99 mg/dL   POCT GLUCOSE   Result Value Ref Range    POCT Glucose 68 (L) 74 - 99 mg/dL   POCT GLUCOSE   Result Value Ref Range    POCT Glucose 85 74 - 99 mg/dL   POCT GLUCOSE   Result Value Ref Range    POCT Glucose 251 (H) 74 - 99 mg/dL   POCT GLUCOSE   Result Value Ref Range    POCT Glucose 265 (H) 74 - 99 mg/dL   POCT GLUCOSE   Result Value Ref Range    POCT Glucose 228 (H) 74 - 99 mg/dL   POCT GLUCOSE   Result Value Ref Range    POCT Glucose 91 74 - 99 mg/dL   POCT GLUCOSE   Result Value Ref Range    POCT Glucose 155 (H) 74 - 99 mg/dL   POCT GLUCOSE   Result Value Ref Range    POCT Glucose 224 (H) 74 - 99 mg/dL   POCT GLUCOSE   Result Value Ref Range    POCT Glucose 186 (H) 74 - 99 mg/dL   POCT GLUCOSE   Result Value Ref Range    POCT Glucose 138 (H) 74 - 99 mg/dL         Assessment/Plan   Principal Problem:    Mood disorder (CMS-[facility])  Active Problems:    Benign essential hypertension    Benign prostatic hyperplasia    DM type 2 (diabetes mellitus, type 2) (Multi)    Hyperlipidemia    Seizure disorder (Multi)    82 y/o male with history of vascular dementia, worsening over past several months with increased paranoia, wandering behaviors during the night, verbal aggression with threats toward wife.  Patient was started on depakote 125 mg daily by neurologist, however was not taking this medication consistently.  Will require extensive discharge planning, discussion with family related to ability to return home vs. Placement in a memory care unit.       Biological  -Increase depakote DR 250mg BID - likely subtherapeutic, titrate as beneficial   -will get level 4/30  -Increase 1mg risperidone at bedtime- for paranoid delusions     2. Psychological -      Patient is encouraged to participate with the therapeutic milieu and program and group therapy      3. Sociological -     Patient encouraged to  cooperate with social work staff on issues relevant to discharge planning.                      I personally spent 25 minutes today providing care for this patient, including preparation, face to face time, documentation and other services such as review of medical records, diagnostic result, patient education, counseling, coordination of care as specified in the encounter.      Bettina Naik, APRN-CNS

## 2024-04-29 NOTE — GROUP NOTE
Group Topic: Reminiscence   Group Date: 4/29/2024  Start Time: 1330  End Time: 1425  Facilitators: SAFIA Coles   Department: [facility] REHABTH VIRTUAL    Number of Participants: 5   Group Focus: other, Getting to know you  Treatment Modality: Other: Recreation Therapy  Interventions utilized were mental fitness, reminiscence, story telling, and support  Purpose: feelings, communication skills, self-care, and other: fun, elevate mood, enhance self esteem, increase socialization    Name: Jose Guardado YOB: 1940   MR: [id number]      Facilitator: Recreational Therapist  Level of Participation: did not attend  Quality of Participation:  did not attend  Interactions with others:  did not attend  Mood/Affect:  did not attend  Triggers (if applicable): n/a  Cognition:  did not attend  Progress: None  Comments: pt problem is agitation.  Pt is sitting in the hallway engaging with other unit staff.  No handout to offer.  Plan: continue with services

## 2024-04-29 NOTE — PROGRESS NOTES
[facility]: [facility]  PROGRESS NOTE      Jose Guardado is a 83 y.o. male that is being seen  today for follow up .  Subjective   Patient is being seen for follow-up at [facility].  Patient had episodes of low blood sugars.  Patient was on glimepiride which has been discontinued.  Patient was also on pioglitazone which has been discontinued.  Patient continued with Lantus and sliding scale.  Patient's blood pressure has been fairly controlled.  No recent falls.  Patient is confused at baseline.      ROS  Negative for fever or chills  Negative for sore throat, ear pain, nasal discharge  Negative for cough, shortness of breath or wheezing  Negative for chest pain, palpitations, swelling of legs  Negative for abdominal pain, constipation, diarrhea, blood in the stools  Negative for urinary complaints  Negative for headache, dizziness, weakness or numbness  Negative for joint pain  Negative for depression or anxiety  All other systems reviewed and were negative   Vitals:    04/29/24 0700   BP: 127/63   Pulse: 104   Resp:    Temp:    SpO2:       Vitals:    04/24/24 1857   Weight: 70.2 kg (154 lb 12.8 oz)     Body mass index is 24.25 kg/m².  Physical Exam  Constitutional: Patient does not appear to be in any acute distress  Cardiovascular: RRR, S1/S2, no murmurs, rubs, or gallops, radial pulses +2, no edema of extremities  Pulmonary: CTAB, no respiratory distress.  Abdomen: +BS, soft, non-tender, nondistended, no guarding or rebound, no masses noted  MSK: No joint swelling, normal movements of all extremities. Range of motion- normal.  Skin- No lesions, contusions, or erythema.  Peripheral puslses palpable bilaterally 2+  Neuro: AAO X1, no gross neurological deficit    LABS   [unfilled]  Lab Results   Component Value Date    GLUCOSE 114 (H) 04/23/2024    CALCIUM 9.4 04/23/2024     04/23/2024    K 4.3 04/23/2024    CO2 31 04/23/2024     04/23/2024    BUN 20 04/23/2024    CREATININE 0.87  04/23/2024     Lab Results   Component Value Date    ALT 15 04/23/2024    AST 20 04/23/2024    ALKPHOS 68 04/23/2024    BILITOT 0.6 04/23/2024     Lab Results   Component Value Date    WBC 6.3 04/23/2024    HGB 14.8 04/23/2024    HCT 45.9 04/23/2024    MCV 91 04/23/2024     04/23/2024     Lab Results   Component Value Date    CHOL 151 03/18/2024    CHOL 194 11/18/2022    CHOL 99 08/31/2021     Lab Results   Component Value Date    HDL 33.9 03/18/2024    HDL 27.0 (A) 11/18/2022    HDL 24.0 (A) 08/31/2021     Lab Results   Component Value Date    LDLCALC 88 03/18/2024     Lab Results   Component Value Date    TRIG 145 03/18/2024    TRIG 205 (H) 11/18/2022    TRIG 106 08/31/2021     Lab Results   Component Value Date    HGBA1C 7.9 (H) 03/18/2024     Other labs not included in the list above were reviewed either before or during this encounter.    History    Past Medical History:   Diagnosis Date    Anxiety     Dizziness and giddiness 12/21/2021    Episodic lightheadedness    Other chest pain 12/21/2021    Atypical chest pain    Other chest pain 12/21/2021    Chest pain, musculoskeletal    Personal history of other diseases of male genital organs 11/30/2021    History of erectile dysfunction    Personal history of other specified conditions 05/14/2018    History of urinary incontinence    Seizures (Multi)     Stroke (Multi)      Past Surgical History:   Procedure Laterality Date    MR HEAD ANGIO WO IV CONTRAST  2/24/2021    MR HEAD ANGIO WO IV CONTRAST 2/24/2021 [facility]    MR HEAD ANGIO WO IV CONTRAST  4/24/2021    MR HEAD ANGIO WO IV CONTRAST 4/24/2021 [facility]    MR HEAD ANGIO WO IV CONTRAST  11/18/2022    MR HEAD ANGIO WO IV CONTRAST 11/18/2022 [facility]    MR HEAD ANGIO WO IV CONTRAST  3/22/2023    MR HEAD ANGIO WO IV CONTRAST [facility]    MR NECK ANGIO WO IV CONTRAST  2/24/2021    MR NECK ANGIO WO IV CONTRAST 2/24/2021 [facility]    MR NECK ANGIO WO IV CONTRAST  4/24/2021     MR NECK ANGIO WO IV CONTRAST 4/24/2021 [facility]    MR NECK ANGIO WO IV CONTRAST  11/18/2022    MR NECK ANGIO WO IV CONTRAST 11/18/2022 [facility]    MR NECK ANGIO WO IV CONTRAST  3/22/2023    MR NECK ANGIO WO IV CONTRAST [facility]    OTHER SURGICAL HISTORY  05/14/2018    Incision Of Mastoid    OTHER SURGICAL HISTORY  11/30/2021    Complete colonoscopy    OTHER SURGICAL HISTORY  11/30/2021    Esophagogastroduodenoscopy    OTHER SURGICAL HISTORY  11/30/2021    Mastoidectomy    OTHER SURGICAL HISTORY  11/30/2021    Renal lithotripsy    OTHER SURGICAL HISTORY  11/30/2021    Tonsillectomy with adenoidectomy    TONSILLECTOMY  05/14/2018    Tonsillectomy     Family History   Problem Relation Name Age of Onset    No Known Problems Mother      Arthritis Father      Hypertension Father      Diabetes Brother      Other (Stroke syndrome) Brother       Allergies   Allergen Reactions    Keppra [Levetiracetam] Agitation     No current facility-administered medications on file prior to encounter.     Current Outpatient Medications on File Prior to Encounter   Medication Sig Dispense Refill    Briviact 50 mg tablet tablet TAKE 1 TABLET BY MOUTH TWICE  DAILY 180 tablet 1    busPIRone (Buspar) 15 mg tablet Take 1 tablet (15 mg) by mouth 3 times a day as needed (anger). (Patient taking differently: Take 1 tablet (15 mg) by mouth 2 times a day.) 90 tablet 5    clopidogrel (Plavix) 75 mg tablet Take 1 tablet (75 mg) by mouth once daily.      finasteride (Proscar) 5 mg tablet Take 1 tablet (5 mg) by mouth once daily at bedtime. (Patient taking differently: Take 1 tablet (5 mg) by mouth once daily.) 90 tablet 3    levothyroxine (Synthroid, Levoxyl) 100 mcg tablet Take 1 tablet (100 mcg) by mouth once daily in the morning. Take before meals. 90 tablet 3    losartan (Cozaar) 25 mg tablet Take 1 tablet (25 mg) by mouth once daily. 90 tablet 3    metFORMIN (Glucophage) 500 mg tablet Take 1 tablet (500 mg) by mouth 2 times a  day with meals. (Patient taking differently: Take 1 tablet (500 mg) by mouth once daily with breakfast.) 180 tablet 3    pantoprazole (ProtoNix) 40 mg EC tablet TAKE 1 TABLET BY MOUTH DAILY (Patient taking differently: Take by mouth every other day.) 90 tablet 3    atorvastatin (Lipitor) 80 mg tablet Take 1 tablet (80 mg) by mouth once daily at bedtime. 90 tablet 3    divalproex sprinkle (Depakote Sprinkles) 125 mg DR capsule Start 1 tablet nightly for 3-5 days then increase to 2 tablets nightly (Patient not taking: Reported on 2024) 60 capsule 11    FreeStyle Doug 2 Sensor kit Use as instructed 1 each 11    glimepiride (Amaryl) 2 mg tablet Take 1 tablet (2 mg) by mouth once daily. 90 tablet 3    insulin glargine (Lantus U-100 Insulin) 100 unit/mL injection Inject 16 Units under the skin once daily at bedtime. BS >120 10 mL 11    pioglitazone (Actos) 15 mg tablet Take 1 tablet (15 mg) by mouth once daily. (Patient not taking: Reported on 2024) 90 tablet 3    QUEtiapine (SEROquel) 25 mg tablet TAKE 1 TO 2 TABLETS BY MOUTH AT BEDTIME IF NEEDED FOR SLEEP (Patient not taking: Reported on 2024) 180 tablet 0    [] tobramycin (Tobrex) 0.3 % ophthalmic solution Administer 2 drops into both eyes 3 times a day for 14 days. (Patient not taking: Reported on 2024) 5 mL 1    [DISCONTINUED] fluticasone (Flonase) 50 mcg/actuation nasal spray Administer 1 spray into each nostril 2 times a day as needed.      [DISCONTINUED] meloxicam (Mobic) 15 mg tablet Take by mouth.      [DISCONTINUED] montelukast (Singulair) 10 mg tablet Take by mouth once daily.      [DISCONTINUED] traZODone (Desyrel) 50 mg tablet Take by mouth.       Immunization History   Administered Date(s) Administered    Flu vaccine, quadrivalent, high-dose, preservative free, age 65y+ (FLUZONE) 2022    Influenza, High Dose Seasonal, Preservative Free 2015, 10/13/2016, 2018, 10/23/2019, 10/16/2020, 10/06/2021    Influenza,  Unspecified 09/25/2022    Moderna COVID-19 vaccine, Fall 2023, 12 yeasrs and older (50mcg/0.5mL) 10/14/2023    Moderna SARS-CoV-2 Vaccination 01/17/2021    Novel influenza-H1N1-09, preservative-free 12/22/2009    Pfizer COVID-19 vaccine, bivalent, age 12 years and older (30 mcg/0.3 mL) 09/25/2022, 05/09/2023    Pfizer Gray Cap SARS-CoV-2 04/08/2022    Pfizer Purple Cap SARS-CoV-2 01/23/2021, 02/14/2021, 10/20/2021    Pneumococcal polysaccharide vaccine, 23-valent, age 2 years and older (PNEUMOVAX 23) 06/01/2023     Patient's medical history was reviewed and updated either before or during this encounter.  ASSESSMENT / PLAN:  Active Hospital Problems    *Mood disorder (CMS-HCC)      Benign essential hypertension      DM type 2 (diabetes mellitus, type 2) (Multi)      Hyperlipidemia      Seizure disorder (Multi)      Benign prostatic hyperplasia       Patient's blood sugars running low so change in medications have been done for diabetes.  Blood pressure is stable.  No significant episodes of anxiety or combativeness to staff    Jose Maria Beth MD

## 2024-04-29 NOTE — NURSING NOTE
"BIRP NOTE     Problem:  Agitation     Behavior:  Patient is alert and oriented x 1-2. Patient is dressed in own attire and appears disheveled. Patient stated \"it is  laser pointing at me an they are trying to shoot me\". Patient reoriented by this nurse to environment. Patient is forgetful r/t dementia. Patient is calm, pleasant and cooperative. Patient is med compliant. Patient family called and told patient that his daughter past away today and his son came and visited him. Patient was crying and sad. Patient family stayed with him for a while. Staff offered patient comfort and support. Patient   Group Participation: Yes  Appetite/Meals: 100/100/75       Interventions:  Nurse administered medications as prescribed    Response:  Patient med compliant     Plan:  Will continue to monitor q 15 mins   "

## 2024-04-29 NOTE — PROGRESS NOTES
"LSW received the following message from nursing \"the family is requesting a d/c to home with family taking shifts. They also reported one of his daughters passing away today. So they will like to get him home to break the news\". LSW attempted to contact hector Lopez and left a message to further discuss and obtain more information.     Glenna Hutchinson, JODIE      "

## 2024-04-29 NOTE — NURSING NOTE
BIRP NOTE     Problem:  Anxiety     Behavior:  Patient was in the group room and now patient has went to patient’s room. Patient is pleasant and calm. Patient’s affect is broad range. Patient is talkative. Patient maintains good eye contact.     Group Participation: N/A  Appetite/Meals: N/A       Interventions:  This nurse completed a shift assessment and administered patient's scheduled night time medications.    Response:  Patient remained pleasant and calm and was cooperative throughout this shift assessment and medication administration.     Plan:  Continue to monitor for anxiety. Continue to monitor for patient safety.

## 2024-04-30 ENCOUNTER — TELEPHONE (OUTPATIENT)
Dept: NEUROLOGY | Facility: CLINIC | Age: 84
End: 2024-04-30
Payer: MEDICARE

## 2024-04-30 LAB
GLUCOSE BLD MANUAL STRIP-MCNC: 212 MG/DL (ref 74–99)
GLUCOSE BLD MANUAL STRIP-MCNC: 251 MG/DL (ref 74–99)
GLUCOSE BLD MANUAL STRIP-MCNC: 82 MG/DL (ref 74–99)
VALPROATE SERPL-MCNC: 29 UG/ML (ref 50–100)

## 2024-04-30 PROCEDURE — 2500000001 HC RX 250 WO HCPCS SELF ADMINISTERED DRUGS (ALT 637 FOR MEDICARE OP): Performed by: REGISTERED NURSE

## 2024-04-30 PROCEDURE — 2500000001 HC RX 250 WO HCPCS SELF ADMINISTERED DRUGS (ALT 637 FOR MEDICARE OP): Performed by: INTERNAL MEDICINE

## 2024-04-30 PROCEDURE — 2500000002 HC RX 250 W HCPCS SELF ADMINISTERED DRUGS (ALT 637 FOR MEDICARE OP, ALT 636 FOR OP/ED): Performed by: INTERNAL MEDICINE

## 2024-04-30 PROCEDURE — 36415 COLL VENOUS BLD VENIPUNCTURE: CPT | Performed by: STUDENT IN AN ORGANIZED HEALTH CARE EDUCATION/TRAINING PROGRAM

## 2024-04-30 PROCEDURE — 99232 SBSQ HOSP IP/OBS MODERATE 35: CPT | Performed by: REGISTERED NURSE

## 2024-04-30 PROCEDURE — 1140000001 HC PRIVATE PSYCH ROOM DAILY

## 2024-04-30 PROCEDURE — 2500000006 HC RX 250 W HCPCS SELF ADMINISTERED DRUGS (ALT 637 FOR ALL PAYERS): Performed by: INTERNAL MEDICINE

## 2024-04-30 PROCEDURE — 82947 ASSAY GLUCOSE BLOOD QUANT: CPT

## 2024-04-30 PROCEDURE — 2500000001 HC RX 250 WO HCPCS SELF ADMINISTERED DRUGS (ALT 637 FOR MEDICARE OP): Performed by: PSYCHIATRY & NEUROLOGY

## 2024-04-30 PROCEDURE — 80164 ASSAY DIPROPYLACETIC ACD TOT: CPT | Performed by: STUDENT IN AN ORGANIZED HEALTH CARE EDUCATION/TRAINING PROGRAM

## 2024-04-30 PROCEDURE — 2500000006 HC RX 250 W HCPCS SELF ADMINISTERED DRUGS (ALT 637 FOR ALL PAYERS): Mod: MUE | Performed by: REGISTERED NURSE

## 2024-04-30 RX ADMIN — Medication 3 MG: at 20:06

## 2024-04-30 RX ADMIN — INSULIN GLARGINE 12 UNITS: 100 INJECTION, SOLUTION SUBCUTANEOUS at 20:06

## 2024-04-30 RX ADMIN — DIVALPROEX SODIUM 250 MG: 125 CAPSULE ORAL at 20:05

## 2024-04-30 RX ADMIN — METFORMIN HYDROCHLORIDE 500 MG: 500 TABLET ORAL at 17:25

## 2024-04-30 RX ADMIN — DIVALPROEX SODIUM 250 MG: 125 CAPSULE ORAL at 08:34

## 2024-04-30 RX ADMIN — PANTOPRAZOLE SODIUM 40 MG: 40 TABLET, DELAYED RELEASE ORAL at 08:34

## 2024-04-30 RX ADMIN — LOSARTAN POTASSIUM 25 MG: 25 TABLET, FILM COATED ORAL at 08:34

## 2024-04-30 RX ADMIN — RISPERIDONE 1 MG: 1 TABLET, FILM COATED ORAL at 20:05

## 2024-04-30 RX ADMIN — CLOPIDOGREL BISULFATE 75 MG: 75 TABLET ORAL at 08:34

## 2024-04-30 RX ADMIN — LEVOTHYROXINE SODIUM 100 MCG: 100 TABLET ORAL at 06:25

## 2024-04-30 RX ADMIN — ATORVASTATIN CALCIUM 80 MG: 80 TABLET, FILM COATED ORAL at 20:05

## 2024-04-30 RX ADMIN — FINASTERIDE 5 MG: 5 TABLET, FILM COATED ORAL at 20:06

## 2024-04-30 RX ADMIN — BRIVARACETAM 50 MG: 50 TABLET, FILM COATED ORAL at 20:06

## 2024-04-30 RX ADMIN — BRIVARACETAM 50 MG: 50 TABLET, FILM COATED ORAL at 08:34

## 2024-04-30 RX ADMIN — METFORMIN HYDROCHLORIDE 500 MG: 500 TABLET ORAL at 08:34

## 2024-04-30 ASSESSMENT — COLUMBIA-SUICIDE SEVERITY RATING SCALE - C-SSRS
2. HAVE YOU ACTUALLY HAD ANY THOUGHTS OF KILLING YOURSELF?: NO
1. SINCE LAST CONTACT, HAVE YOU WISHED YOU WERE DEAD OR WISHED YOU COULD GO TO SLEEP AND NOT WAKE UP?: NO
1. SINCE LAST CONTACT, HAVE YOU WISHED YOU WERE DEAD OR WISHED YOU COULD GO TO SLEEP AND NOT WAKE UP?: NO
6. HAVE YOU EVER DONE ANYTHING, STARTED TO DO ANYTHING, OR PREPARED TO DO ANYTHING TO END YOUR LIFE?: NO
6. HAVE YOU EVER DONE ANYTHING, STARTED TO DO ANYTHING, OR PREPARED TO DO ANYTHING TO END YOUR LIFE?: NO
2. HAVE YOU ACTUALLY HAD ANY THOUGHTS OF KILLING YOURSELF?: NO

## 2024-04-30 ASSESSMENT — PAIN - FUNCTIONAL ASSESSMENT
PAIN_FUNCTIONAL_ASSESSMENT: 0-10
PAIN_FUNCTIONAL_ASSESSMENT: 0-10

## 2024-04-30 ASSESSMENT — PAIN SCALES - GENERAL
PAINLEVEL_OUTOF10: 0 - NO PAIN
PAINLEVEL_OUTOF10: 0 - NO PAIN

## 2024-04-30 NOTE — TELEPHONE ENCOUNTER
called the office again regarding Jose. She states that his daughter has passed so they want to get him clearance to leave [facility] due to the circumstances. She wanted to know if you could fill out the expert evaluation documents. I did let her know that you are not in office this week so you wouldn't be available to fill out paperwork sent to the office. She would like to discuss a plan when you have a free moment.    Please advise,  Thanks

## 2024-04-30 NOTE — GROUP NOTE
"Group Topic: Stress Reduction/Relaxation   Group Date: 4/30/2024  Start Time: 1100  End Time: 1145  Facilitators: SAFIA Coles   Department: [facility] REHABTH VIRTUAL    Number of Participants: 7   Group Focus: other The Stress Management Game  Treatment Modality: Other: Recreation Therapy  Interventions utilized were exploration, patient education, problem solving, and support  Purpose: coping skills, maladaptive thinking, feelings, irrational fears, communication skills, insight or knowledge, self-worth, self-care, relapse prevention strategies, and trigger / craving management    Name: Jose Guardado YOB: 1940   MR: [id number]      Facilitator: Recreational Therapist  Level of Participation: minimal  Quality of Participation: distractible, impulsive, and restless  Interactions with others:  pt joins group for short periods of time.  Confused at times, reports he is \"having a day\" making focusing difficult.  Pt shares with this writer  his daughter passed away yesterday and he is \"just really waiting to go home to be with my family\".    Mood/Affect: anxious  Triggers (if applicable): n/a  Cognition: confused  Progress: Minimal  Comments: pt problem is agitation.  No agitation noted.   Plan: continue with services      "

## 2024-04-30 NOTE — PROGRESS NOTES
BRITW spoke with pt's dtr  regarding pt's disposition and reaction to the news of his dtr Lizy passing away.  plans to have pt and his wife move in with her for the time being and remain as long as possible. The family plans to take shifts to provide 24/7 supervision for pt.  inquired about the Statement of Expert Evaluation being completed today and pt being able to be discharged tomorrow. LSW will defer to provider for this information.     JODIE Noguera

## 2024-04-30 NOTE — GROUP NOTE
"Group Topic: Other   Group Date: 4/30/2024  Start Time: 1330  End Time: 1425  Facilitators: SAFIA Coles   Department: [facility] REHABTH VIRTUAL    Number of Participants: 6   Group Focus: other Can you name 5?  Treatment Modality: Other: Recreation therapy  Interventions utilized were mental fitness  Purpose: other: fun, elevate mood, enhance self esteem, increase socialization    Name: Jose Guardado YOB: 1940   MR: [id number]      Facilitator: Recreational Therapist  Level of Participation: minimal  Quality of Participation: distractible and impulsive  Interactions with others:  mostly passive  Mood/Affect: anxious  Triggers (if applicable): n/a  Cognition: not focused  Progress: Minimal  Comments: pt problem is agitation.  Pt continues to attempt to focus in group, continues to report having difficulty as he is \"having a hard day\".  No agitation noted.  Plan: continue with services      "

## 2024-04-30 NOTE — PROGRESS NOTES
"Nutrition Follow up Note    Nutrition Assessment      Pt's daughter passed away yesterday. Plan for discharge home with family.     Nutrition History:  Energy Intake: Poor < 50 %    Anthropometrics:  Ht: 170.2 cm (5' 7\"), Wt: 70.2 kg (154 lb 12.8 oz), BMI: 24.24    Weight Change:  Daily Weight  04/24/24 : 70.2 kg (154 lb 12.8 oz)  04/23/24 : 74.8 kg (165 lb)  04/23/24 : 72.6 kg (160 lb)  04/04/24 : 73.9 kg (163 lb)  03/19/24 : 74.7 kg (164 lb 10.9 oz)  02/02/24 : 73.4 kg (161 lb 13.1 oz)  01/11/24 : 76.2 kg (168 lb)  10/23/23 : 75.2 kg (165 lb 12.8 oz)  10/09/23 : 74.8 kg (165 lb)  10/02/23 : 70.3 kg (155 lb)     Weight History / % Weight Change: wt fluctuations noted in wt hx possibly r/t scale/clothing differences. usual wt appears to range mid 150s-mid 160s (current wt within range). possible wt loss over the past ~1 month.  Significant Weight Loss: Yes  Interpretation of Weight Loss:  (10# (6%) wt loss over ~1 month (3/19-4/24))    Nutrition Focused Physical Exam Findings: deferred     Nutrition Significant Labs:  Lab Results   Component Value Date    WBC 6.3 04/23/2024    HGB 14.8 04/23/2024    HCT 45.9 04/23/2024     04/23/2024    CHOL 151 03/18/2024    TRIG 145 03/18/2024    HDL 33.9 03/18/2024    ALT 15 04/23/2024    AST 20 04/23/2024     04/23/2024    K 4.3 04/23/2024     04/23/2024    CREATININE 0.87 04/23/2024    BUN 20 04/23/2024    CO2 31 04/23/2024    TSH 1.52 03/22/2023    INR 1.0 03/18/2024    HGBA1C 7.9 (H) 03/18/2024     Nutrition Specific Medications:  atorvastatin, 80 mg, oral, Nightly  brivaracetam, 50 mg, oral, BID  clopidogrel, 75 mg, oral, Daily  divalproex sprinkle, 250 mg, oral, BID  finasteride, 5 mg, oral, Nightly  insulin glargine, 12 Units, subcutaneous, Nightly  levothyroxine, 100 mcg, oral, Daily before breakfast  losartan, 25 mg, oral, Daily  melatonin, 3 mg, oral, Nightly  metFORMIN, 500 mg, oral, BID with meals  pantoprazole, 40 mg, oral, Daily  risperiDONE, 1 " mg, oral, Nightly      Dietary Orders (From admission, onward)       Start     Ordered    04/25/24 1453  Oral nutritional supplements  Until discontinued        Comments: Vanilla   Question Answer Comment   Deliver with All meals    Select supplement: Sugar Free Mighty Shake        04/25/24 1452    04/24/24 2031  Adult diet Carb Controlled; 75 gram carb/meal, 45 gram Carb evening snack  Diet effective now        Question Answer Comment   Diet type Carb Controlled    Carb diet selection: 75 gram carb/meal, 45 gram Carb evening snack        04/24/24 2034                  Estimated Needs:   Estimated Energy Needs  Total Energy Estimated Needs (kCal): 1750 kCal  Total Estimated Energy Need per Day (kCal/kg): 25 kCal/kg  Method for Estimating Needs: actual wt    Estimated Protein Needs  Total Protein Estimated Needs (g): 70 g  Total Protein Estimated Needs (g/kg): 1 g/kg  Method for Estimating Needs: actual wt    Estimated Fluid Needs  Method for Estimating Needs: 1 ml/kcal        Nutrition Diagnosis   Nutrition Diagnosis:  Malnutrition Diagnosis  Patient has Malnutrition Diagnosis: No    Nutrition Diagnosis  Patient has Nutrition Diagnosis: Yes  Diagnosis Status (1): Ongoing  Nutrition Diagnosis 1: Unintended weight loss  Related to (1): decreased ability to consume sufficient energy, psychological causes  As Evidenced by (1): 10# (6%) wt loss over ~1 month (3/19-4/24)       Nutrition Interventions/Recommendations   Nutrition Interventions and Recommendations:    Nutrition Prescription:  Individualized Nutrition Prescription Provided for : 1750 kcals and 70g protein to be provided via diet and supplements    Nutrition Interventions:   Food and/or Nutrient Delivery Interventions  Interventions: Meals and snacks, Medical food supplement  Meals and Snacks: Carbohydrate-modified diet  Goal: provide as ordered  Medical Food Supplement: Modified beverage  Goal: mighty shake TID to provide 200 kcals and 7g protein  each    Education Documentation  No documentation found.           Nutrition Monitoring and Evaluation   Monitoring/Evaluation:   Food/Nutrient Related History Monitoring  Monitoring and Evaluation Plan: Energy intake  Energy Intake: Estimated energy intake  Criteria: pt to consume >/= 75% estimated needs    Body Composition/Growth/Weight History  Monitoring and Evaluation Plan: Weight  Weight: Measured weight  Criteria: pt will maintain wt at this time       Time Spent/Follow-up:   Follow Up  Time Spent (min): 20 minutes  Last Date of Nutrition Visit: 04/30/24  Nutrition Follow-Up Needed?: 5-7 days  Follow up Comment: 5/6/24

## 2024-04-30 NOTE — GROUP NOTE
Group Topic: Cognitive Focus   Group Date: 2024  Start Time: 1500  End Time: 1600  Facilitators: JOVANNI Daley   Department: [facility] PROVIDER VIRTUAL    Number of Participants: 5   Group Focus: acceptance, concentration, and daily focus  Treatment Modality: Cognitive Behavioral Therapy  Interventions utilized were exploration, mental fitness, and reality testing  Purpose: coping skills, maladaptive thinking, feelings, irrational fears, insight or knowledge, self-worth, self-care, relapse prevention strategies, and trigger / craving management    Name: Jose Guardado YOB: 1940   MR: [id number]      Facilitator: Nurse Practitioner  Level of Participation: active  Quality of Participation: distracting to others and engaged  Interactions with others:  a little confused but aware we were working on meditation  Mood/Affect: appropriate and positive  Triggers (if applicable): none  Cognition: confused about instuctions but may have been due to [place], he asked us to say a prayer for his daughter who just  of alcoholism  Progress: Minimal  Comments: Dementia with mood component  Plan: continue with services

## 2024-04-30 NOTE — NURSING NOTE
FORTINO NOTE     Problem:  agitation     Behavior:  Pt isolated himself to his room, cooperative with care and complaint with medication. Pt is disoriented to place time and situation, wants to go home, he was able to recall today's visitation with his family, Pt reported that his daughter passed away today. Pt is tearful, low mood is noted, denied SI/HI.     Group Participation: n/a  Appetite/Meals: refused hs snack        Interventions:  scheduled medication administered     Response:  Pt is compliant with medication     Plan:  Provide safe environment, reorient as needed

## 2024-04-30 NOTE — GROUP NOTE
Group Topic: Medication Education   Group Date: 4/30/2024  Start Time: 1005  End Time: 1100  Facilitators: Lizy Joyner PharmD   Department: [facility]    Number of Participants: 5   Group Focus: daily focus, goals, and other general medication education  Treatment Modality: Patient-Centered Therapy and Other: group game  Interventions utilized were group exercise, other Jeopardy game, and patient education  Purpose: self-care and other: generalized medication education    Name: Jose Guardado YOB: 1940   MR: [id number]      Facilitator: Pharmacist  Level of Participation: did not attend  Quality of Participation:  did not attend  Interactions with others:  did not attend  Mood/Affect:  did not attend  Triggers (if applicable): n/a  Cognition:  did not attend  Progress: None  Comments: Jose was in the room when we were ready to begin group. However he left group before even beginning stating he was waiting for a call.  Plan: continue with services

## 2024-04-30 NOTE — NURSING NOTE
FORTINO NOTE     Problem:  paranoia     Behavior:  No behaviors seen this shift, no PRN's needed  Group Participation: yes  Appetite/Meals: 100/100/75       Interventions:  Advised to seek staff w any issues  response:  Pt states he understands to seek staff w any issues     Plan:  Will continue to monitor

## 2024-04-30 NOTE — PROGRESS NOTES
"Jose Guardado is a 83 y.o. male on day 6 of admission presenting with Mood disorder (CMS-HCC).      Subjective   Chart reviewed and case discussed with nursing.    Patient remains admitted for concerning psychotic decompensation related to worsening progressive dementia.  On the unit, patient remains cooperative, medication-adherent, and not demonstrating agitated behaviors toward self nor others.  On interview, patient remains with limited insight, minimizing admission circumstance and need for being in hospital. Patient remains discharge-focused- he reacted appropriately when his son informed him that his daughter had  yesterday morning. He remembers that his daughter is dead today and is appearing to grieve in a healthy manner.  He slept ok last night- is eating and drinking, He is attending groups and interacting with others in positive manner.  Objective     Last Recorded Vitals  Blood pressure 130/81, pulse (!) 130, temperature 36.6 °C (97.9 °F), temperature source Temporal, resp. rate 18, height 1.702 m (5' 7\"), weight 70.2 kg (154 lb 12.8 oz), SpO2 95%.    Review of Systems    Psychiatric ROS - Adult  Anxiety: General Anxiety Disorder (DOV)DOV Behaviors: irritability, restlessness, and sleep disturbance  Depression: sleep decreased   Delirium: negative  Psychosis:  paranoia related to wife stealing and cheating  Selam: negative  Safety Issues:  dementia, impulsivity, poor insight    Physical Exam      Mental Status Exam  General Appearance: Fairly groomed.  Gait/Station:  shuffling  Speech: some word finding difficulty, increased rate, increased volume, normal tone  Mood: \"ok, I just want togo home'  Affect: constricted , Anxious  Thought Process: Perseverative, [place]  Thought Associations: Mild loosening of associations  Thought Content: normal  Perception: No perceptual abnormalities noted  Level of Consciousness: Alert  Orientation:  to person  Attention and Concentration:  impaired  Recent " Memory: Impaired as evidenced by patchy recollection   Remote Memory: Impaired as evidenced by confabulation   Executive function:  impaired  Fund of knowledge:  able to state president's name, knows he is in hospital  Insight:  limited  Judgment: Limited, as evidence by inability to reason through medical decision making, recent high-risk or self-harming behavior , and non-compliance with treatment recommendations    Psychiatric Risk Assessment  Violence Risk Assessment: male and persecutory delusions, threats toward family  Acute Risk of Harm to Others is Considered: moderate   Suicide Risk Assessment: age > 65 yrs old, , chronic medical illness, and life crisis (shame/despair)  Protective Factors against Suicide: none, marriage/partnership, and positive family relationships  Acute Risk of Harm to Self is Considered: moderate    Relevant Results             Results for orders placed or performed during the hospital encounter of 04/24/24 (from the past 96 hour(s))   POCT GLUCOSE   Result Value Ref Range    POCT Glucose 185 (H) 74 - 99 mg/dL   POCT GLUCOSE   Result Value Ref Range    POCT Glucose 191 (H) 74 - 99 mg/dL   POCT GLUCOSE   Result Value Ref Range    POCT Glucose 68 (L) 74 - 99 mg/dL   POCT GLUCOSE   Result Value Ref Range    POCT Glucose 68 (L) 74 - 99 mg/dL   POCT GLUCOSE   Result Value Ref Range    POCT Glucose 85 74 - 99 mg/dL   POCT GLUCOSE   Result Value Ref Range    POCT Glucose 251 (H) 74 - 99 mg/dL   POCT GLUCOSE   Result Value Ref Range    POCT Glucose 265 (H) 74 - 99 mg/dL   POCT GLUCOSE   Result Value Ref Range    POCT Glucose 228 (H) 74 - 99 mg/dL   POCT GLUCOSE   Result Value Ref Range    POCT Glucose 91 74 - 99 mg/dL   POCT GLUCOSE   Result Value Ref Range    POCT Glucose 155 (H) 74 - 99 mg/dL   POCT GLUCOSE   Result Value Ref Range    POCT Glucose 224 (H) 74 - 99 mg/dL   POCT GLUCOSE   Result Value Ref Range    POCT Glucose 186 (H) 74 - 99 mg/dL   POCT GLUCOSE   Result Value Ref  Range    POCT Glucose 138 (H) 74 - 99 mg/dL   POCT GLUCOSE   Result Value Ref Range    POCT Glucose 190 (H) 74 - 99 mg/dL   Valproic Acid   Result Value Ref Range    Valproic Acid 29 (L) 50 - 100 ug/mL   POCT GLUCOSE   Result Value Ref Range    POCT Glucose 82 74 - 99 mg/dL         Assessment/Plan   Principal Problem:    Mood disorder (CMS-formerly [facility])  Active Problems:    Benign essential hypertension    Benign prostatic hyperplasia    DM type 2 (diabetes mellitus, type 2) (Multi)    Hyperlipidemia    Seizure disorder (Multi)    82 y/o male with history of vascular dementia, worsening over past several months with increased paranoia, wandering behaviors during the night, verbal aggression with threats toward wife.  Patient was started on depakote 125 mg daily by neurologist, however was not taking this medication consistently.  Will require extensive discharge planning, discussion with family related to ability to return home vs. Placement in a memory care unit.       Biological  -Increase depakote DR 250mg BID - likely subtherapeutic, titrate as beneficial   -valproic  acid level was 29 om 24  -Increase 1mg risperidone at bedtime- for paranoid delusions     2. Psychological -      Patient is encouraged to participate with the therapeutic milieu and program and group therapy      3. Sociological -     Patient encouraged to cooperate with social work staff on issues relevant to discharge planning.      Provider spoke with daughter, , who said that she would ask neurologist if she will fill out expert eval form.  She said she understood that patient needed to be in hospital for long enough to let medications get in his system giving him the best chance of being more stable on discharge. Provider said she understood that daughter's  will be happening within the next few days and will keep that in mind while making clinical decisions.   is POA for her father and is willing to be guardian.  She has  "been starting [facility]ok at [facility].  She reported that her father has always had a temper but has never been physically abusive to wife or children.  She says he has said things like \"I should just shoot myself\" for years when he is frustrated but has never attempted suicide or appeared toseriously threaten suicide.  She said the night that he was admitted he had a fight with family and was attempting to leave the house when his children confronted hinm in the driveway and said his neurologist had suggested he not drive.  Daughter says she got in front of the car never thinking he would hit her and that he oulled slowly up to her body- not knocking her down but she felt he was sending a message that he was leaving the driveway no matter what.    Will cont toassess readiness for discharge on a daily basis.                I personally spent 25 minutes today providing care for this patient, including preparation, face to face time, documentation and other services such as review of medical records, diagnostic result, patient education, counseling, coordination of care as specified in the encounter.      Bettina Naik, APRN-CNS      "

## 2024-05-01 LAB
GLUCOSE BLD MANUAL STRIP-MCNC: 173 MG/DL (ref 74–99)
GLUCOSE BLD MANUAL STRIP-MCNC: 192 MG/DL (ref 74–99)
GLUCOSE BLD MANUAL STRIP-MCNC: 87 MG/DL (ref 74–99)
GLUCOSE BLD MANUAL STRIP-MCNC: 93 MG/DL (ref 74–99)

## 2024-05-01 PROCEDURE — 2500000006 HC RX 250 W HCPCS SELF ADMINISTERED DRUGS (ALT 637 FOR ALL PAYERS): Performed by: REGISTERED NURSE

## 2024-05-01 PROCEDURE — 82947 ASSAY GLUCOSE BLOOD QUANT: CPT

## 2024-05-01 PROCEDURE — 2500000001 HC RX 250 WO HCPCS SELF ADMINISTERED DRUGS (ALT 637 FOR MEDICARE OP): Performed by: INTERNAL MEDICINE

## 2024-05-01 PROCEDURE — 2500000002 HC RX 250 W HCPCS SELF ADMINISTERED DRUGS (ALT 637 FOR MEDICARE OP, ALT 636 FOR OP/ED): Performed by: INTERNAL MEDICINE

## 2024-05-01 PROCEDURE — 99232 SBSQ HOSP IP/OBS MODERATE 35: CPT | Performed by: REGISTERED NURSE

## 2024-05-01 PROCEDURE — 1140000001 HC PRIVATE PSYCH ROOM DAILY

## 2024-05-01 PROCEDURE — 2500000001 HC RX 250 WO HCPCS SELF ADMINISTERED DRUGS (ALT 637 FOR MEDICARE OP): Performed by: REGISTERED NURSE

## 2024-05-01 PROCEDURE — 2500000001 HC RX 250 WO HCPCS SELF ADMINISTERED DRUGS (ALT 637 FOR MEDICARE OP): Performed by: PSYCHIATRY & NEUROLOGY

## 2024-05-01 PROCEDURE — 2500000006 HC RX 250 W HCPCS SELF ADMINISTERED DRUGS (ALT 637 FOR ALL PAYERS): Performed by: INTERNAL MEDICINE

## 2024-05-01 RX ADMIN — PANTOPRAZOLE SODIUM 40 MG: 40 TABLET, DELAYED RELEASE ORAL at 08:46

## 2024-05-01 RX ADMIN — DIVALPROEX SODIUM 250 MG: 125 CAPSULE ORAL at 08:46

## 2024-05-01 RX ADMIN — BRIVARACETAM 50 MG: 50 TABLET, FILM COATED ORAL at 20:32

## 2024-05-01 RX ADMIN — LEVOTHYROXINE SODIUM 100 MCG: 100 TABLET ORAL at 06:01

## 2024-05-01 RX ADMIN — LOSARTAN POTASSIUM 25 MG: 25 TABLET, FILM COATED ORAL at 08:46

## 2024-05-01 RX ADMIN — RISPERIDONE 1 MG: 1 TABLET, FILM COATED ORAL at 20:32

## 2024-05-01 RX ADMIN — FINASTERIDE 5 MG: 5 TABLET, FILM COATED ORAL at 20:32

## 2024-05-01 RX ADMIN — BRIVARACETAM 50 MG: 50 TABLET, FILM COATED ORAL at 08:45

## 2024-05-01 RX ADMIN — ATORVASTATIN CALCIUM 80 MG: 80 TABLET, FILM COATED ORAL at 20:32

## 2024-05-01 RX ADMIN — METFORMIN HYDROCHLORIDE 500 MG: 500 TABLET ORAL at 08:46

## 2024-05-01 RX ADMIN — CLOPIDOGREL BISULFATE 75 MG: 75 TABLET ORAL at 08:46

## 2024-05-01 RX ADMIN — DIVALPROEX SODIUM 250 MG: 125 CAPSULE ORAL at 20:31

## 2024-05-01 RX ADMIN — INSULIN GLARGINE 12 UNITS: 100 INJECTION, SOLUTION SUBCUTANEOUS at 20:32

## 2024-05-01 RX ADMIN — METFORMIN HYDROCHLORIDE 500 MG: 500 TABLET ORAL at 17:12

## 2024-05-01 RX ADMIN — Medication 3 MG: at 20:32

## 2024-05-01 ASSESSMENT — PAIN SCALES - GENERAL
PAINLEVEL_OUTOF10: 0 - NO PAIN

## 2024-05-01 ASSESSMENT — PAIN - FUNCTIONAL ASSESSMENT
PAIN_FUNCTIONAL_ASSESSMENT: 0-10
PAIN_FUNCTIONAL_ASSESSMENT: 0-10

## 2024-05-01 NOTE — GROUP NOTE
"Group Topic: Self-Care/Wellness   Group Date: 5/1/2024  Start Time: 1100  End Time: 1200  Facilitators: SAFIA Coles   Department: [facility] REHABTH VIRTUAL    Number of Participants: 4   Group Focus: other Positive steps to well being  Treatment Modality: Other: Recreation Therapy  Interventions utilized were confrontation, exploration, mental fitness, patient education, problem solving, reality testing, and support  Purpose: coping skills, maladaptive thinking, feelings, irrational fears, communication skills, insight or knowledge, self-worth, self-care, relapse prevention strategies, and trigger / craving management    Name: Jose Guardado YOB: 1940   MR: [id number]      Facilitator: Recreational Therapist  Level of Participation: did not attend  Quality of Participation:  did not attend  Interactions with others:  did not attend  Mood/Affect:  did not attend  Triggers (if applicable): n/a  Cognition:  did not attend  Progress: None  Comments: pt problem is agitation.  Pt refuses to attend group at this time.  Is sitting in the hallway calmly.  Reports he is \"sorting some things out in my mind\".  Expresses sadness at the death of his daughter and reports missing his wife.  Handout offered after session.  Plan: continue with services      "

## 2024-05-01 NOTE — NURSING NOTE
"VALERIEP NOTE     Problem:  agitation     Behavior:  Pt is cooperative with care and complaint with medication. Pt is disoriented to place, time and situation, wants to go home, denied SI/HI, mild anxiety is noted.    At around 2:00 am Pt came out of his room, reported that his \"room smells form being sprayed to kill the bugs\" Pt stated that a few days ago there was a bug infestation in the building, \"roaches were crawling all over the walls\" and the roderick named \"Jose\" sprayed all the walls. Pt refused to go back to his room, asked if it is ok to stay in the chair in hallway, Pt took blanket and pillow from his room and sat in the rocking chair in the hallway, currently denied seeing bugs.     Group Participation: n/a  Appetite/Meals: hs snack provided        Interventions:  scheduled medication administered  Pt was oriented to place and situation, reassured that no bugs were seen at the hospital, and no chemicals were used to spray the walls.      Response:  Pt is compliant with medication  Pt accepted the fact that he is in the hospital, and suggested that he might be confused about the bugs infestation happened at the different place and time.      Plan:  Provide safe environment, reorient as needed  "

## 2024-05-01 NOTE — GROUP NOTE
Group Topic: Other   Group Date: 5/1/2024  Start Time: 1330  End Time: 1430  Facilitators: SAFIA Coles   Department: [facility] REHABTH VIRTUAL    Number of Participants: 6   Group Focus: other [facility]  Treatment Modality: Other: Recreation Therapy  Interventions utilized were orientation and reminiscence  Purpose: other: fun, elevate mood, enhance self esteem, increase socialization    Name: Jose Guardado YOB: 1940   MR: [id number]      Facilitator: Recreational Therapist  Level of Participation: did not attend  Quality of Participation:  did not attend  Interactions with others:  did not attend  Mood/Affect:  did not attend  Triggers (if applicable): n/a  Cognition:  did not attend  Progress: None  Comments: pt problem is agitation.  Pt is resting in his room.  No handout to offer at this time.   Plan: continue with services

## 2024-05-01 NOTE — PROGRESS NOTES
JODIE scheduled outpatient psychiatry appt for pt through  for 5/24/24 at 10am. As well as a follow up PCP appt for 5/29/24 at 12:30pm.     JODIE Proctor

## 2024-05-01 NOTE — PROGRESS NOTES
BRITW spoke with pt's dtr  to coordinate discharge for tomorrow at 2pm. Dtr requests medications be provided upon discharge. LSW review appts scheduled for pt.     JODIE Proctor

## 2024-05-01 NOTE — PROGRESS NOTES
LSW met with pt to inform him of his discharge for tomorrow. Pt was under the impression that it would be today. LSW informed pt that it was originally scheduled for today and that is likely where the confusion is. Pt was happy to hear that he would be going home to ' his sweetheart'. As well as he briefly made a comment about a , therefore pt is able to have some recall about his dtr passing away. Pt was pleasant and cooperative. Pt had no agitation or aggression and reacted appropriately. Pt will discharge tomorrow at 2pm and be picked up by his dtr .     Glenna Hutchinson, JODIE

## 2024-05-01 NOTE — PROGRESS NOTES
LSW met with pt and provided encouragement for pt to coming to dining woo for dinner. LSW provide brief conversation with pt. Pt discussed how this is the second dtr to have passed away. Pt had 7 children all together from two marriages. Pt states that he had 6 girls and one boy. Pt presents with some confusion and difficulty finding words but with improvement since admission. Pt was agreeable to sitting in the dinning woo and having something to eat with some encouragement, despite saying he wasn't hungry. Pt presented with appropriate affect and behavior. Pt presented with no agitation or aggression. Pt is pleasant and cooperative.     Glenna Hutchinson, JODIE

## 2024-05-01 NOTE — GROUP NOTE
Group Topic: Goals   Group Date: 5/1/2024  Start Time: 0730  End Time: 0800  Facilitators: Gwendolyn Mendoza   Department: [facility]    Number of Participants: 5   Group Focus: goals  Treatment Modality: Individual Therapy  Interventions utilized were support  Purpose: self-worth    Name: Jose Guardado YOB: 1940   MR: [id number]      Facilitator: Mental Health [facility]  Level of Participation: minimal  Quality of Participation: appropriate/pleasant  Interactions with others: appropriate  Mood/Affect: appropriate  Triggers (if applicable): n/a  Cognition: coherent/clear  Progress: Minimal  Comments: Pt. Attempted to particpate  Plan: continue with services

## 2024-05-01 NOTE — GROUP NOTE
Group Topic: Coping Skills   Group Date: 5/1/2024  Start Time: 1000  End Time: 1100  Facilitators: MILES Wahl   Department: [facility] CARE TRANSITIONS VIRTUAL    Number of Participants: 5   Group Focus: coping skills, feeling awareness/expression, and social skills  Treatment Modality: Cognitive Behavioral Therapy and Psychoeducation  Interventions utilized were assignment, clarification, and support  Purpose: coping skills, maladaptive thinking, feelings, insight or knowledge, and self-worth    Name: Joes Guardado YOB: 1940   MR: [id number]      Facilitator:   Level of Participation: did not attend  Quality of Participation:  n/a  Interactions with others:  n/a  Mood/Affect:  n/a  Triggers (if applicable): n/a  Cognition:  n/a  Progress: None  Comments: Jose walked into group near the end of discussion but was unable to participate due to group ending.  Plan: continue with services

## 2024-05-01 NOTE — CARE PLAN
The patient's goals for the shift include go home    The clinical goals for the shift include group participation    Over the shift, the patient did make progress toward the following goals. Barriers to progression include confusion to circumstance. Recommendations to address these barriers include continue to re orientate and encourage group participation.    Problem: Thought Disorders  Goal: LTG: Jose will exhibit improved ability to concentrate  Outcome: Progressing     Problem: Thought Disorders  Goal: LTG: Jose will exhibit a decrease in psychotic symptoms  Outcome: Progressing     Problem: Psychosocial Needs  Goal: Demonstrates ability to cope with hospitalization/illness  Outcome: Progressing

## 2024-05-01 NOTE — PROGRESS NOTES
"Jose Guardado is a 83 y.o. male on day 7 of admission presenting with Mood disorder (CMS-HCC).      Subjective   Chart reviewed and case discussed with nursing.    Patient remains admitted for concerning psychotic decompensation related to worsening progressive dementia.  On the unit, patient remains cooperative, medication-adherent, and not demonstrating agitated behaviors toward self nor others.  On interview, patient remains with limited insight, minimizing admission circumstance and need for being in hospital. Patient remains discharge-focused- he reacted appropriately when his son informed him that his daughter had  . He remembers that his daughter is dead today and is appearing to grieve in a healthy manner.  He slept very little last night and is anxious to get home to his family- is eating and drinking, He is attending groups and interacting with others in positive manner. Plan  is to discharge home tomorrow.  Objective     Last Recorded Vitals  Blood pressure 142/73, pulse 110, temperature 36.4 °C (97.5 °F), temperature source Temporal, resp. rate 20, height 1.702 m (5' 7\"), weight 70.5 kg (155 lb 6.8 oz), SpO2 95%.    Review of Systems    Psychiatric ROS - Adult  Anxiety: General Anxiety Disorder (DOV)DOV Behaviors: irritability, restlessness, and sleep disturbance  Depression: sleep decreased   Delirium: negative  Psychosis:  paranoia related to wife stealing and cheating  Selam: negative  Safety Issues:  dementia, impulsivity, poor insight    Physical Exam      Mental Status Exam  General Appearance: Fairly groomed.  Gait/Station:  shuffling  Speech: some word finding difficulty, increased rate, increased volume, normal tone  Mood: \"ok, I just want togo home'  Affect: constricted , Anxious  Thought Process: Perseverative, Clarence  Thought Associations: Mild loosening of associations  Thought Content: normal  Perception: No perceptual abnormalities noted  Level of Consciousness: Alert  Orientation:  " to person  Attention and Concentration:  impaired  Recent Memory: Impaired as evidenced by patchy recollection   Remote Memory: Impaired as evidenced by confabulation   Executive function:  impaired  Fund of knowledge:  able to state president's name, knows he is in hospital  Insight:  limited  Judgment: Limited, as evidence by inability to reason through medical decision making, recent high-risk or self-harming behavior , and non-compliance with treatment recommendations    Psychiatric Risk Assessment  Violence Risk Assessment: male and persecutory delusions, threats toward family  Acute Risk of Harm to Others is Considered: moderate   Suicide Risk Assessment: age > 65 yrs old, , chronic medical illness, and life crisis (shame/despair)  Protective Factors against Suicide: none, marriage/partnership, and positive family relationships  Acute Risk of Harm to Self is Considered: moderate    Relevant Results             Results for orders placed or performed during the hospital encounter of 04/24/24 (from the past 96 hour(s))   POCT GLUCOSE   Result Value Ref Range    POCT Glucose 251 (H) 74 - 99 mg/dL   POCT GLUCOSE   Result Value Ref Range    POCT Glucose 265 (H) 74 - 99 mg/dL   POCT GLUCOSE   Result Value Ref Range    POCT Glucose 228 (H) 74 - 99 mg/dL   POCT GLUCOSE   Result Value Ref Range    POCT Glucose 91 74 - 99 mg/dL   POCT GLUCOSE   Result Value Ref Range    POCT Glucose 155 (H) 74 - 99 mg/dL   POCT GLUCOSE   Result Value Ref Range    POCT Glucose 224 (H) 74 - 99 mg/dL   POCT GLUCOSE   Result Value Ref Range    POCT Glucose 186 (H) 74 - 99 mg/dL   POCT GLUCOSE   Result Value Ref Range    POCT Glucose 138 (H) 74 - 99 mg/dL   POCT GLUCOSE   Result Value Ref Range    POCT Glucose 190 (H) 74 - 99 mg/dL   Valproic Acid   Result Value Ref Range    Valproic Acid 29 (L) 50 - 100 ug/mL   POCT GLUCOSE   Result Value Ref Range    POCT Glucose 82 74 - 99 mg/dL   POCT GLUCOSE   Result Value Ref Range    POCT  Glucose 251 (H) 74 - 99 mg/dL   POCT GLUCOSE   Result Value Ref Range    POCT Glucose 212 (H) 74 - 99 mg/dL   POCT GLUCOSE   Result Value Ref Range    POCT Glucose 87 74 - 99 mg/dL         Assessment/Plan   Principal Problem:    Mood disorder (CMS-[facility])  Active Problems:    Benign essential hypertension    Benign prostatic hyperplasia    DM type 2 (diabetes mellitus, type 2) (Multi)    Hyperlipidemia    Seizure disorder (Multi)    82 y/o male with history of vascular dementia, worsening over past several months with increased paranoia, wandering behaviors during the night, verbal aggression with threats toward wife.  Patient was started on depakote 125 mg daily by neurologist, however was not taking this medication consistently.  Will require extensive discharge planning, discussion with family related to ability to return home vs. Placement in a memory care unit.       Biological  -Increase depakote DR 250mg BID - likely subtherapeutic, titrate as beneficial   -valproic  acid level was 29 om 24  -Increase 1mg risperidone at bedtime- for paranoid delusions     2. Psychological -      Patient is encouraged to participate with the therapeutic milieu and program and group therapy      3. Sociological -     Patient encouraged to cooperate with social work staff on issues relevant to discharge planning.      Provider spoke with daughter, , who said that she would ask neurologist if she will fill out expert eval form.  She said she understood that patient needed to be in hospital for long enough to let medications get in his system giving him the best chance of being more stable on discharge. Provider said she understood that daughter's  will be happening within the next few days and will keep that in mind while making clinical decisions.   is POA for her father and is willing to be guardian.  She has been starting tollok at memory care facilities.  She reported that her father has always had a  "temper but has never been physically abusive to wife or children.  She says he has said things like \"I should just shoot myself\" for years when he is frustrated but has never attempted suicide or appeared toseriously threaten suicide.  She said the night that he was admitted he had a fight with family and was attempting to leave the house when his children confronted hinm in the driveway and said his neurologist had suggested he not drive.  Daughter says she got in front of the car never thinking he would hit her and that he oulled slowly up to her body- not knocking her down but she felt he was sending a message that he was leaving the driveway no matter what.        Plan to discharge home tomorrow.  Neurologist says she will see patient next Tuesday and will complete expert evaluation for guardianship- provider will send her discharge summary. Cont current meds.                I personally spent 25 minutes today providing care for this patient, including preparation, face to face time, documentation and other services such as review of medical records, diagnostic result, patient education, counseling, coordination of care as specified in the encounter.      Bettina Naik, APRN-CNS      "

## 2024-05-01 NOTE — NURSING NOTE
FORTINO NOTE     Problem:  PARANOID     Behavior:  PT DID NOT SHOW ANY SIGNS OF PARANOIOA THIS SHIFT, NO PRN'S NEEDED  Group Participation: NO  Appetite/Meals: PENDING       Interventions:  ADVISED PT TO SEEK STAFF WITH ANY CONCERNS    Response:  PT STATES HE UNDERSTANDS TO SEEK STAFF WITH ANY CONCERNS     Plan:  WILL CONTINUE TO MONITOR

## 2024-05-02 ENCOUNTER — DOCUMENTATION (OUTPATIENT)
Dept: BEHAVIORAL HEALTH | Facility: HOSPITAL | Age: 84
End: 2024-05-02
Payer: MEDICARE

## 2024-05-02 ENCOUNTER — PHARMACY VISIT (OUTPATIENT)
Dept: PHARMACY | Facility: CLINIC | Age: 84
End: 2024-05-02
Payer: COMMERCIAL

## 2024-05-02 ENCOUNTER — DOCUMENTATION (OUTPATIENT)
Dept: CARDIOLOGY | Facility: CLINIC | Age: 84
End: 2024-05-02
Payer: MEDICARE

## 2024-05-02 VITALS
RESPIRATION RATE: 17 BRPM | HEART RATE: 89 BPM | TEMPERATURE: 98.8 F | DIASTOLIC BLOOD PRESSURE: 69 MMHG | OXYGEN SATURATION: 95 % | BODY MASS INDEX: 24.39 KG/M2 | SYSTOLIC BLOOD PRESSURE: 142 MMHG | WEIGHT: 155.42 LBS | HEIGHT: 67 IN

## 2024-05-02 LAB
GLUCOSE BLD MANUAL STRIP-MCNC: 163 MG/DL (ref 74–99)
GLUCOSE BLD MANUAL STRIP-MCNC: 75 MG/DL (ref 74–99)

## 2024-05-02 PROCEDURE — 99232 SBSQ HOSP IP/OBS MODERATE 35: CPT | Performed by: INTERNAL MEDICINE

## 2024-05-02 PROCEDURE — 99238 HOSP IP/OBS DSCHRG MGMT 30/<: CPT | Performed by: REGISTERED NURSE

## 2024-05-02 PROCEDURE — RXMED WILLOW AMBULATORY MEDICATION CHARGE

## 2024-05-02 PROCEDURE — 2500000001 HC RX 250 WO HCPCS SELF ADMINISTERED DRUGS (ALT 637 FOR MEDICARE OP): Performed by: REGISTERED NURSE

## 2024-05-02 PROCEDURE — 2500000006 HC RX 250 W HCPCS SELF ADMINISTERED DRUGS (ALT 637 FOR ALL PAYERS): Performed by: INTERNAL MEDICINE

## 2024-05-02 PROCEDURE — 2500000001 HC RX 250 WO HCPCS SELF ADMINISTERED DRUGS (ALT 637 FOR MEDICARE OP): Performed by: INTERNAL MEDICINE

## 2024-05-02 PROCEDURE — 82947 ASSAY GLUCOSE BLOOD QUANT: CPT

## 2024-05-02 RX ORDER — METOPROLOL TARTRATE 25 MG/1
25 TABLET, FILM COATED ORAL 2 TIMES DAILY
Status: DISCONTINUED | OUTPATIENT
Start: 2024-05-02 | End: 2024-05-02 | Stop reason: HOSPADM

## 2024-05-02 RX ORDER — LOSARTAN POTASSIUM 25 MG/1
25 TABLET ORAL DAILY
Qty: 30 TABLET | Refills: 0 | Status: SHIPPED | OUTPATIENT
Start: 2024-05-02 | End: 2024-06-01

## 2024-05-02 RX ORDER — METFORMIN HYDROCHLORIDE 500 MG/1
500 TABLET ORAL
Qty: 60 TABLET | Refills: 0 | Status: SHIPPED | OUTPATIENT
Start: 2024-05-02 | End: 2024-06-01

## 2024-05-02 RX ORDER — PANTOPRAZOLE SODIUM 40 MG/1
40 TABLET, DELAYED RELEASE ORAL DAILY
Qty: 30 TABLET | Refills: 0 | Status: SHIPPED | OUTPATIENT
Start: 2024-05-02 | End: 2024-06-01

## 2024-05-02 RX ORDER — RISPERIDONE 1 MG/1
1 TABLET ORAL NIGHTLY
Qty: 30 TABLET | Refills: 0 | Status: SHIPPED | OUTPATIENT
Start: 2024-05-02 | End: 2024-05-23 | Stop reason: HOSPADM

## 2024-05-02 RX ORDER — DIVALPROEX SODIUM 125 MG/1
250 CAPSULE, COATED PELLETS ORAL 2 TIMES DAILY
Qty: 120 CAPSULE | Refills: 0 | Status: SHIPPED | OUTPATIENT
Start: 2024-05-02 | End: 2024-05-07 | Stop reason: WASHOUT

## 2024-05-02 RX ORDER — INSULIN GLARGINE 100 [IU]/ML
12 INJECTION, SOLUTION SUBCUTANEOUS NIGHTLY
Qty: 10 ML | Refills: 0 | Status: SHIPPED | OUTPATIENT
Start: 2024-05-02 | End: 2024-06-01

## 2024-05-02 RX ORDER — TALC
3 POWDER (GRAM) TOPICAL NIGHTLY
Qty: 60 TABLET | Refills: 0 | Status: SHIPPED | OUTPATIENT
Start: 2024-05-02 | End: 2024-07-01

## 2024-05-02 RX ORDER — LEVOTHYROXINE SODIUM 100 UG/1
100 TABLET ORAL
Qty: 30 TABLET | Refills: 0 | Status: SHIPPED | OUTPATIENT
Start: 2024-05-02 | End: 2024-06-01

## 2024-05-02 RX ORDER — CLOPIDOGREL BISULFATE 75 MG/1
75 TABLET ORAL DAILY
Qty: 30 TABLET | Refills: 0 | Status: SHIPPED | OUTPATIENT
Start: 2024-05-02 | End: 2024-06-01

## 2024-05-02 RX ORDER — METOPROLOL TARTRATE 25 MG/1
25 TABLET, FILM COATED ORAL 2 TIMES DAILY
Qty: 60 TABLET | Refills: 0 | Status: SHIPPED | OUTPATIENT
Start: 2024-05-02

## 2024-05-02 RX ORDER — ATORVASTATIN CALCIUM 80 MG/1
80 TABLET, FILM COATED ORAL NIGHTLY
Qty: 30 TABLET | Refills: 0 | Status: SHIPPED | OUTPATIENT
Start: 2024-05-02 | End: 2024-06-01

## 2024-05-02 RX ORDER — FINASTERIDE 5 MG/1
5 TABLET, FILM COATED ORAL NIGHTLY
Qty: 30 TABLET | Refills: 0 | Status: SHIPPED | OUTPATIENT
Start: 2024-05-02 | End: 2024-06-01

## 2024-05-02 RX ADMIN — BRIVARACETAM 50 MG: 50 TABLET, FILM COATED ORAL at 08:02

## 2024-05-02 RX ADMIN — METFORMIN HYDROCHLORIDE 500 MG: 500 TABLET ORAL at 08:01

## 2024-05-02 RX ADMIN — LEVOTHYROXINE SODIUM 100 MCG: 100 TABLET ORAL at 06:16

## 2024-05-02 RX ADMIN — DIVALPROEX SODIUM 250 MG: 125 CAPSULE ORAL at 08:01

## 2024-05-02 RX ADMIN — LOSARTAN POTASSIUM 25 MG: 25 TABLET, FILM COATED ORAL at 08:01

## 2024-05-02 RX ADMIN — CLOPIDOGREL BISULFATE 75 MG: 75 TABLET ORAL at 08:01

## 2024-05-02 RX ADMIN — PANTOPRAZOLE SODIUM 40 MG: 40 TABLET, DELAYED RELEASE ORAL at 08:01

## 2024-05-02 RX ADMIN — METOPROLOL TARTRATE 25 MG: 25 TABLET, FILM COATED ORAL at 10:47

## 2024-05-02 ASSESSMENT — PAIN - FUNCTIONAL ASSESSMENT: PAIN_FUNCTIONAL_ASSESSMENT: 0-10

## 2024-05-02 ASSESSMENT — PAIN SCALES - GENERAL: PAINLEVEL_OUTOF10: 0 - NO PAIN

## 2024-05-02 NOTE — NURSING NOTE
Verified with Dr Beth if it is ok to discharge patient today or keep him another couple days since he was started on Lopressor this morning.  Dr Beth said it is ok for patient to discharge with follow up with his PCP.

## 2024-05-02 NOTE — GROUP NOTE
Group Topic: Goals   Group Date: 5/1/2024  Start Time: 2008  End Time: 2017  Facilitators: Ann Gustafson   Department: [facility]     Number of Participants: 7   Group Focus: goals  Treatment Modality: Other: PCA  Interventions utilized were group exercise and reminiscence  Purpose: communication skills, self-worth, self-care, and relapse prevention strategies    Name: Jose Guardado YOB: 1940   MR: [id number]      Facilitator: [facility]  Level of Participation: did not attend  Quality of Participation:  did not attend  Interactions with others:  did not attend  Mood/Affect:  did not attend  Triggers (if applicable): N/A  Cognition:  did not attend  Progress: Other  Comments: Pt problem is mood disorder. Pt declined the invitation to attend group.  Plan: continue with services

## 2024-05-02 NOTE — PROGRESS NOTES
Jose Guardado is a 83 y.o. male on day 0 of admission presenting with No Principal Problem: There is no principal problem currently on the Problem List. Please update the Problem List and refresh..      Subjective   ***       Objective     Last Recorded Vitals  There were no vitals taken for this visit.    Review of Systems    Psychiatric ROS - Adult  Anxiety: {Anxiety:09386475}  Depression: {Depression:32814986}  Delirium: {Delirium:67268369}  Psychosis: {Psychosis:45027784}  Selam: {Selam:65278779}  Safety Issues: {Safety Issues:85658065}  Psychiatric ROS Comment: ***    Physical Exam      Mental Status Exam  General: ***  Appearance: ***  Attitude: ***  Behavior: ***  Motor Activity: ***  Speech: ***  Mood: ***  Affect: ***  Thought Process: ***  Thought Content: ***  Thought Perception: ***  Cognition: ***  Insight: ***  Judgement: ***    Psychiatric Risk Assessment  Violence Risk Assessment: {Violence Risk Assessment:87043}  Acute Risk of Harm to Others is Considered: {Low/ModHigh:51684}   Suicide Risk Assessment: {Suicide Risk Assessment:43393}  Protective Factors against Suicide: {Protective Factors:19030}  Acute Risk of Harm to Self is Considered: {Low/ModHigh:85540}    Relevant Results  {If you would like to pull in Medications, type .meds     If you would like to pull in Lab results for the last 24 hours, type .[id number]    If you would like to pull in Imaging results, type .imgrslt :99}      {Link to Stroke Scoring tools - Link :99}       Assessment/Plan   Active Problems:  There are no active Hospital Problems.    ***                {This patient does not have an ACP note on file for this encounter, please fill one out - Advance Care Planning Activity :99}    I spent *** minutes in the professional and overall care of this patient.      Bettina Naik, APRN-CNS

## 2024-05-02 NOTE — NURSING NOTE
FORTINO NOTE     Problem:  Paranoia     Behavior:  Patient was calm, friendly and cooperative. He spent time in the group room watching television and interacting with peers. He is very hard of hearing but does answer questions when heard. He stated that he's happy today because he will be able to see his wife tomorrow. Denies SI/HI and VH/AH.    Group Participation: No    Appetite/Meals: Snacks provided       Interventions:  Administered scheduled medications per order    Response:  Compliant with care and medications     Plan:  Continue current plan of care

## 2024-05-02 NOTE — PROGRESS NOTES
[facility]  PROGRESS NOTE      Jose Guardado is a 83 y.o. male that is being seen  today for follow up .  Subjective   Patient is [facility].  Patient has been pleasantly confused.  Blood sugars have been stable.  Patient has been having elevated heart rate.  Patient has been started on oral metoprolol.      ROS  Negative for fever or chills  Negative for sore throat, ear pain, nasal discharge  Negative for cough, shortness of breath or wheezing  Negative for chest pain, palpitations, swelling of legs  Negative for abdominal pain, constipation, diarrhea, blood in the stools  Negative for urinary complaints  Negative for headache, dizziness, weakness or numbness  Negative for joint pain  Negative for depression or anxiety  All other systems reviewed and were negative   Vitals:    05/02/24 0753   BP: 140/72   Pulse: (!) 120   Resp:    Temp:    SpO2:       Vitals:    05/01/24 0650   Weight: 70.5 kg (155 lb 6.8 oz)     Body mass index is 24.34 kg/m².  Physical Exam  Constitutional: Patient does not appear to be in any acute distress  Cardiovascular: Tachycardia, no murmurs, rubs, or gallops, radial pulses +2, no edema of extremities  Pulmonary: CTAB, no respiratory distress.  Abdomen: +BS, soft, non-tender, nondistended, no guarding or rebound, no masses noted  MSK: No joint swelling, normal movements of all extremities. Range of motion- normal.  Skin- No lesions, contusions, or erythema.  Peripheral puslses palpable bilaterally 2+  Neuro: AAO X1, no gross neurological deficit    LABS   [unfilled]  Lab Results   Component Value Date    GLUCOSE 114 (H) 04/23/2024    CALCIUM 9.4 04/23/2024     04/23/2024    K 4.3 04/23/2024    CO2 31 04/23/2024     04/23/2024    BUN 20 04/23/2024    CREATININE 0.87 04/23/2024     Lab Results   Component Value Date    ALT 15 04/23/2024    AST 20 04/23/2024    ALKPHOS 68 04/23/2024    BILITOT 0.6 04/23/2024     Lab  Results   Component Value Date    WBC 6.3 04/23/2024    HGB 14.8 04/23/2024    HCT 45.9 04/23/2024    MCV 91 04/23/2024     04/23/2024     Lab Results   Component Value Date    CHOL 151 03/18/2024    CHOL 194 11/18/2022    CHOL 99 08/31/2021     Lab Results   Component Value Date    HDL 33.9 03/18/2024    HDL 27.0 (A) 11/18/2022    HDL 24.0 (A) 08/31/2021     Lab Results   Component Value Date    LDLCALC 88 03/18/2024     Lab Results   Component Value Date    TRIG 145 03/18/2024    TRIG 205 (H) 11/18/2022    TRIG 106 08/31/2021     Lab Results   Component Value Date    HGBA1C 7.9 (H) 03/18/2024     Other labs not included in the list above were reviewed either before or during this encounter.    History    Past Medical History:   Diagnosis Date    Anxiety     Dizziness and giddiness 12/21/2021    Episodic lightheadedness    Other chest pain 12/21/2021    Atypical chest pain    Other chest pain 12/21/2021    Chest pain, musculoskeletal    Personal history of other diseases of male genital organs 11/30/2021    History of erectile dysfunction    Personal history of other specified conditions 05/14/2018    History of urinary incontinence    Seizures (Multi)     Stroke (Multi)      Past Surgical History:   Procedure Laterality Date    MR HEAD ANGIO WO IV CONTRAST  2/24/2021    MR HEAD ANGIO WO IV CONTRAST 2/24/2021 [facility]    MR HEAD ANGIO WO IV CONTRAST  4/24/2021    MR HEAD ANGIO WO IV CONTRAST 4/24/2021 [facility]    MR HEAD ANGIO WO IV CONTRAST  11/18/2022    MR HEAD ANGIO WO IV CONTRAST 11/18/2022 [facility]    MR HEAD ANGIO WO IV CONTRAST  3/22/2023    MR HEAD ANGIO WO IV CONTRAST [facility]    MR NECK ANGIO WO IV CONTRAST  2/24/2021    MR NECK ANGIO WO IV CONTRAST 2/24/2021 [facility]    MR NECK ANGIO WO IV CONTRAST  4/24/2021    MR NECK ANGIO WO IV CONTRAST 4/24/2021 [facility]    MR NECK ANGIO WO IV CONTRAST  11/18/2022    MR NECK ANGIO WO IV CONTRAST 11/18/2022  [facility]    MR NECK ANGIO WO IV CONTRAST  3/22/2023    MR NECK ANGIO WO IV CONTRAST [facility]    OTHER SURGICAL HISTORY  05/14/2018    Incision Of Mastoid    OTHER SURGICAL HISTORY  11/30/2021    Complete colonoscopy    OTHER SURGICAL HISTORY  11/30/2021    Esophagogastroduodenoscopy    OTHER SURGICAL HISTORY  11/30/2021    Mastoidectomy    OTHER SURGICAL HISTORY  11/30/2021    Renal lithotripsy    OTHER SURGICAL HISTORY  11/30/2021    Tonsillectomy with adenoidectomy    TONSILLECTOMY  05/14/2018    Tonsillectomy     Family History   Problem Relation Name Age of Onset    No Known Problems Mother      Arthritis Father      Hypertension Father      Diabetes Brother      Other (Stroke syndrome) Brother       Allergies   Allergen Reactions    Keppra [Levetiracetam] Agitation     No current facility-administered medications on file prior to encounter.     Current Outpatient Medications on File Prior to Encounter   Medication Sig Dispense Refill    Briviact 50 mg tablet tablet TAKE 1 TABLET BY MOUTH TWICE  DAILY 180 tablet 1    busPIRone (Buspar) 15 mg tablet Take 1 tablet (15 mg) by mouth 3 times a day as needed (anger). (Patient taking differently: Take 1 tablet (15 mg) by mouth 2 times a day.) 90 tablet 5    clopidogrel (Plavix) 75 mg tablet Take 1 tablet (75 mg) by mouth once daily.      finasteride (Proscar) 5 mg tablet Take 1 tablet (5 mg) by mouth once daily at bedtime. (Patient taking differently: Take 1 tablet (5 mg) by mouth once daily.) 90 tablet 3    levothyroxine (Synthroid, Levoxyl) 100 mcg tablet Take 1 tablet (100 mcg) by mouth once daily in the morning. Take before meals. 90 tablet 3    losartan (Cozaar) 25 mg tablet Take 1 tablet (25 mg) by mouth once daily. 90 tablet 3    metFORMIN (Glucophage) 500 mg tablet Take 1 tablet (500 mg) by mouth 2 times a day with meals. (Patient taking differently: Take 1 tablet (500 mg) by mouth once daily with breakfast.) 180 tablet 3    pantoprazole (ProtoNix)  40 mg EC tablet TAKE 1 TABLET BY MOUTH DAILY (Patient taking differently: Take by mouth every other day.) 90 tablet 3    atorvastatin (Lipitor) 80 mg tablet Take 1 tablet (80 mg) by mouth once daily at bedtime. 90 tablet 3    divalproex sprinkle (Depakote Sprinkles) 125 mg DR capsule Start 1 tablet nightly for 3-5 days then increase to 2 tablets nightly (Patient not taking: Reported on 4/23/2024) 60 capsule 11    FreeStyle Doug 2 Sensor kit Use as instructed 1 each 11    glimepiride (Amaryl) 2 mg tablet Take 1 tablet (2 mg) by mouth once daily. 90 tablet 3    insulin glargine (Lantus U-100 Insulin) 100 unit/mL injection Inject 16 Units under the skin once daily at bedtime. BS >120 10 mL 11    pioglitazone (Actos) 15 mg tablet Take 1 tablet (15 mg) by mouth once daily. (Patient not taking: Reported on 4/23/2024) 90 tablet 3    QUEtiapine (SEROquel) 25 mg tablet TAKE 1 TO 2 TABLETS BY MOUTH AT BEDTIME IF NEEDED FOR SLEEP (Patient not taking: Reported on 4/23/2024) 180 tablet 0     Immunization History   Administered Date(s) Administered    Flu vaccine, quadrivalent, high-dose, preservative free, age 65y+ (FLUZONE) 09/25/2022    Influenza, High Dose Seasonal, Preservative Free 09/14/2015, 10/13/2016, 09/19/2018, 10/23/2019, 10/16/2020, 10/06/2021    Influenza, Unspecified 09/25/2022    Moderna COVID-19 vaccine, Fall 2023, 12 yeasrs and older (50mcg/0.5mL) 10/14/2023    Moderna SARS-CoV-2 Vaccination 01/17/2021    Novel influenza-H1N1-09, preservative-free 12/22/2009    Pfizer COVID-19 vaccine, bivalent, age 12 years and older (30 mcg/0.3 mL) 09/25/2022, 05/09/2023    Pfizer Gray Cap SARS-CoV-2 04/08/2022    Pfizer Purple Cap SARS-CoV-2 01/23/2021, 02/14/2021, 10/20/2021    Pneumococcal polysaccharide vaccine, 23-valent, age 2 years and older (PNEUMOVAX 23) 06/01/2023     Patient's medical history was reviewed and updated either before or during this encounter.  ASSESSMENT / PLAN:  Active Hospital Problems    *Mood  disorder (CMS-HCC)      Benign essential hypertension      DM type 2 (diabetes mellitus, type 2) (Multi)      Hyperlipidemia      Seizure disorder (Multi)      Benign prostatic hyperplasia       Patient's blood sugars have been fairly controlled.  Blood pressure is stable.  Patient is having elevated heart rate.  Patient is being started on metoprolol 25 mg twice a day with holding parameters.    Jose Maria Beth MD

## 2024-05-02 NOTE — NURSING NOTE
"Discharge Nursing Note    Discharge date: 5/2/24  Discharge time: 1325    Discharged to:  home    Transportation provided by:  Daughter    Responsible person notified of discharge/transfer?  Include name of person if answering \"YES\"  No    Patient left with all belongings:  Yes    Patient left with discharge medication list:  Yes    Patient left with discharge instructions:  Yes    AVS/Continuing Care Plan discussed with patient and copy given to either patient or handed to medical transport for discharges to facilities:  Yes    Other concerns at discharge:  New Medication started today for Blood Pressure, discussed with daughter and informed her to follow up with PCP.    Presentation on discharge:  Patient smiling and stated he was so happy to be going home.  He denies anxiety, he has not expressed any hallucinations or delusions.   "
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
## 2024-05-07 ENCOUNTER — PATIENT OUTREACH (OUTPATIENT)
Dept: CARDIOLOGY | Facility: CLINIC | Age: 84
End: 2024-05-07

## 2024-05-07 ENCOUNTER — ANCILLARY PROCEDURE (OUTPATIENT)
Dept: CARDIOLOGY | Facility: CLINIC | Age: 84
End: 2024-05-07
Payer: MEDICARE

## 2024-05-07 ENCOUNTER — OFFICE VISIT (OUTPATIENT)
Dept: CARDIOLOGY | Facility: CLINIC | Age: 84
End: 2024-05-07
Payer: MEDICARE

## 2024-05-07 ENCOUNTER — OFFICE VISIT (OUTPATIENT)
Dept: NEUROLOGY | Facility: CLINIC | Age: 84
End: 2024-05-07
Payer: MEDICARE

## 2024-05-07 VITALS
HEIGHT: 67 IN | DIASTOLIC BLOOD PRESSURE: 80 MMHG | HEART RATE: 91 BPM | BODY MASS INDEX: 24.08 KG/M2 | TEMPERATURE: 97.3 F | WEIGHT: 153.4 LBS | SYSTOLIC BLOOD PRESSURE: 120 MMHG

## 2024-05-07 DIAGNOSIS — F03.B2 MODERATE DEMENTIA WITH PSYCHOTIC DISTURBANCE, UNSPECIFIED DEMENTIA TYPE (MULTI): Primary | ICD-10-CM

## 2024-05-07 DIAGNOSIS — Z95.818 PRESENCE OF CARDIAC DEVICE: ICD-10-CM

## 2024-05-07 DIAGNOSIS — I63.9 CEREBROVASCULAR ACCIDENT (CVA), UNSPECIFIED MECHANISM (MULTI): ICD-10-CM

## 2024-05-07 DIAGNOSIS — Z79.4 TYPE 2 DIABETES MELLITUS WITH HYPERGLYCEMIA, WITH LONG-TERM CURRENT USE OF INSULIN (MULTI): ICD-10-CM

## 2024-05-07 DIAGNOSIS — Z79.4 TYPE 2 DIABETES MELLITUS WITH DIABETIC PERIPHERAL ANGIOPATHY WITHOUT GANGRENE, WITH LONG-TERM CURRENT USE OF INSULIN (MULTI): ICD-10-CM

## 2024-05-07 DIAGNOSIS — I69.351 HEMIPLEGIA AND HEMIPARESIS FOLLOWING CEREBRAL INFARCTION AFFECTING RIGHT DOMINANT SIDE (MULTI): ICD-10-CM

## 2024-05-07 DIAGNOSIS — E11.65 TYPE 2 DIABETES MELLITUS WITH HYPERGLYCEMIA, WITH LONG-TERM CURRENT USE OF INSULIN (MULTI): ICD-10-CM

## 2024-05-07 DIAGNOSIS — E11.51 TYPE 2 DIABETES MELLITUS WITH DIABETIC PERIPHERAL ANGIOPATHY WITHOUT GANGRENE, WITH LONG-TERM CURRENT USE OF INSULIN (MULTI): ICD-10-CM

## 2024-05-07 DIAGNOSIS — I73.9 PERIPHERAL VASCULAR DISEASE OF FOOT (CMS-HCC): ICD-10-CM

## 2024-05-07 DIAGNOSIS — I48.0 PAROXYSMAL ATRIAL FIBRILLATION (MULTI): ICD-10-CM

## 2024-05-07 DIAGNOSIS — Z95.818 IMPLANTABLE LOOP RECORDER PRESENT: Primary | ICD-10-CM

## 2024-05-07 PROCEDURE — 1111F DSCHRG MED/CURRENT MED MERGE: CPT | Performed by: STUDENT IN AN ORGANIZED HEALTH CARE EDUCATION/TRAINING PROGRAM

## 2024-05-07 PROCEDURE — 99214 OFFICE O/P EST MOD 30 MIN: CPT | Performed by: STUDENT IN AN ORGANIZED HEALTH CARE EDUCATION/TRAINING PROGRAM

## 2024-05-07 PROCEDURE — 99213 OFFICE O/P EST LOW 20 MIN: CPT | Performed by: INTERNAL MEDICINE

## 2024-05-07 PROCEDURE — 1159F MED LIST DOCD IN RCRD: CPT | Performed by: INTERNAL MEDICINE

## 2024-05-07 PROCEDURE — 1160F RVW MEDS BY RX/DR IN RCRD: CPT | Performed by: STUDENT IN AN ORGANIZED HEALTH CARE EDUCATION/TRAINING PROGRAM

## 2024-05-07 PROCEDURE — 1111F DSCHRG MED/CURRENT MED MERGE: CPT | Performed by: INTERNAL MEDICINE

## 2024-05-07 PROCEDURE — 1159F MED LIST DOCD IN RCRD: CPT | Performed by: STUDENT IN AN ORGANIZED HEALTH CARE EDUCATION/TRAINING PROGRAM

## 2024-05-07 PROCEDURE — 1160F RVW MEDS BY RX/DR IN RCRD: CPT | Performed by: INTERNAL MEDICINE

## 2024-05-07 PROCEDURE — 93291 INTERROG DEV EVAL SCRMS IP: CPT | Performed by: INTERNAL MEDICINE

## 2024-05-07 PROCEDURE — 3079F DIAST BP 80-89 MM HG: CPT | Performed by: INTERNAL MEDICINE

## 2024-05-07 PROCEDURE — 1036F TOBACCO NON-USER: CPT | Performed by: INTERNAL MEDICINE

## 2024-05-07 PROCEDURE — 3074F SYST BP LT 130 MM HG: CPT | Performed by: INTERNAL MEDICINE

## 2024-05-07 RX ORDER — DIVALPROEX SODIUM 500 MG/1
500 TABLET, DELAYED RELEASE ORAL 2 TIMES DAILY
Qty: 60 TABLET | Refills: 11 | Status: SHIPPED | OUTPATIENT
Start: 2024-05-07 | End: 2024-05-23 | Stop reason: HOSPADM

## 2024-05-07 ASSESSMENT — MINI MENTAL STATE EXAM: SUM ALL MMSE QUESTIONS FOR TOTAL SCORE [OUT OF 30].: 21
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
## 2024-05-07 NOTE — PROGRESS NOTES
Subjective:  The patient is an 83-year-old white male, followed primarily by Dr. Reid Diana from a neurologic standpoint by Dr. June Simon, who presents for insertable loop recorder follow-up.  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  He was suspected of having a proximal source, such as the left atrium.  I implanted a Medtronic insertable loop recorder on 11/22/2022 to follow him for any evidence of paroxysmal atrial fibrillation.  The patient has largely recovered neurologically, except for a mild expressive aphasia when he is quite fatigued.    The patient's history is remarkable for hypertension, type 2 diabetes, hypothyroidism, hyperlipidemia, gastroesophageal reflux disease, asthma, scoliosis, benign prostatic hypertrophy, and urolithiasis.  He apparently lives at home with his wife, and has been advised by Dr. Simon to avoid driving an automobile.  He worked as a  for [employer].  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    Current Outpatient Medications   Medication Sig    atorvastatin (Lipitor) 80 mg tablet Take 1 tablet (80 mg) by mouth once daily at bedtime.    Briviact 50 mg tablet tablet TAKE 1 TABLET BY MOUTH TWICE  DAILY    clopidogrel (Plavix) 75 mg tablet Take 1 tablet (75 mg) by mouth once daily.    divalproex (Depakote) 500 mg EC tablet Take 1 tablet (500 mg) by mouth 2 times a day. Do not crush, chew, or split.    finasteride (Proscar) 5 mg tablet Take 1 tablet (5 mg) by mouth once daily at bedtime.    insulin glargine (Lantus U-100 Insulin) 100 unit/mL injection Inject 12 Units under the skin once daily at bedtime. Take as directed per insulin instructions.    levothyroxine (Synthroid, Levoxyl) 100 mcg tablet Take 1 tablet (100 mcg) by mouth once daily in the morning. Take before meals.    losartan (Cozaar) 25 mg  tablet Take 1 tablet (25 mg) by mouth once daily.    melatonin 3 mg tablet Take 1 tablet (3 mg) by mouth once daily at bedtime.    metFORMIN (Glucophage) 500 mg tablet Take 1 tablet (500 mg) by mouth 2 times a day with meals.    metoprolol tartrate (Lopressor) 25 mg tablet Take 1 tablet (25 mg) by mouth 2 times a day.    pantoprazole (ProtoNix) 40 mg EC tablet Take 1 tablet (40 mg) by mouth once daily.    risperiDONE (RisperDAL) 1 mg tablet Take 1 tablet (1 mg) by mouth once daily at bedtime.     Allergies:  Keppra [levetiracetam]     Patient Active Problem List   Diagnosis    Focal seizure (Multi)    Arthritis    Osteoarthritis    Pleural plaque due to asbestos exposure    Benign essential hypertension    Benign prostatic hyperplasia    Bursitis of right hip    Calcium kidney stone    Cerebrovascular small vessel disease    Cervical radiculopathy    Spondylosis of cervical spine without myelopathy    Chronic low back pain    Colon polyp    Diverticulitis, colon    Confusion and disorientation    Diastolic dysfunction    HTN (hypertension)    History of TIA (transient ischemic attack)    History of orthostatic hypotension    Hemangioma of skin and subcutaneous tissue    Gastroesophageal reflux disease    Expressive aphasia    Erectile dysfunction    Type 2 diabetes mellitus with hyperglycemia, with long-term current use of insulin (Multi)    Disorder of bursae of shoulder region    Memory impairment    Hyperlipidemia    Hypothyroidism    Melanocytic nevi of trunk    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    Other seborrheic keratosis    Other melanin hyperpigmentation    Seizure disorder (Multi)    Shingles    Shortness of breath    Tremor, essential    Varicose veins of leg with swelling    Vitamin D deficiency    White matter disease, unspecified    Overweight with body mass index (BMI) of 25 to 25.9 in adult    Anxiety    Other chest pain    Chest pain    Transient ischemic attack (TIA)    Acute pharyngitis     Allergic rhinitis    Altered mental status    Aphasia    Ataxia    Chills    Contact with and (suspected) exposure to covid-19    Cramps of lower extremity    Diarrhea    Headache    History of cardiovascular surgery    Hypokalemia    Implantable loop recorder present    Insect bite of hand    Knee pain    Neurological symptoms    Oropharyngeal dysphagia    Urinary incontinence    Skin lesion of face    Word finding difficulty    Acute conjunctivitis    Mood disorder (CMS-HCC)    Hemiplegia and hemiparesis following cerebral infarction affecting right dominant side (Multi)    Paroxysmal atrial fibrillation (Multi)    Peripheral vascular disease of foot (CMS-HCC)    Type 2 diabetes mellitus with diabetic peripheral angiopathy without gangrene, with long-term current use of insulin (Multi)     Past Surgical History:   Procedure Laterality Date    MR HEAD ANGIO WO IV CONTRAST  2/24/2021    MR HEAD ANGIO WO IV CONTRAST 2/24/2021 [facility]    MR HEAD ANGIO WO IV CONTRAST  4/24/2021    MR HEAD ANGIO WO IV CONTRAST 4/24/2021 [facility]    MR HEAD ANGIO WO IV CONTRAST  11/18/2022    MR HEAD ANGIO WO IV CONTRAST 11/18/2022 [facility]    MR HEAD ANGIO WO IV CONTRAST  3/22/2023    MR HEAD ANGIO WO IV CONTRAST [facility]    MR NECK ANGIO WO IV CONTRAST  2/24/2021    MR NECK ANGIO WO IV CONTRAST 2/24/2021 [facility]    MR NECK ANGIO WO IV CONTRAST  4/24/2021    MR NECK ANGIO WO IV CONTRAST 4/24/2021 [facility]    MR NECK ANGIO WO IV CONTRAST  11/18/2022    MR NECK ANGIO WO IV CONTRAST 11/18/2022 [facility]    MR NECK ANGIO WO IV CONTRAST  3/22/2023    MR NECK ANGIO WO IV CONTRAST [facility]    OTHER SURGICAL HISTORY  05/14/2018    Incision Of Mastoid    OTHER SURGICAL HISTORY  11/30/2021    Complete colonoscopy    OTHER SURGICAL HISTORY  11/30/2021    Esophagogastroduodenoscopy    OTHER SURGICAL HISTORY  11/30/2021    Mastoidectomy    OTHER SURGICAL HISTORY  11/30/2021    Renal  "lithotripsy    OTHER SURGICAL HISTORY  11/30/2021    Tonsillectomy with adenoidectomy    TONSILLECTOMY  05/14/2018    Tonsillectomy     Objective:  Vitals:    05/07/24 1618   BP: 120/80   Pulse: 91   Temp: 36.3 °C (97.3 °F)   Height:     1.702 m (5' 7\")  Weight: 69.6 kg (153 lb 6.4 oz)     Exam:  Gen: Pleasant elderly gentleman in no distress.  HEENT: No scleral icterus; left eye exotropia; poor hearing.  Neck: No jugular venous distention or thyromegaly.  Lungs: Clear to auscultation, with no wheezes or rales.  Heart: Regular rhythm with positive S4 but no murmurs today, though functional flow murmur is have been noted in the past.  Left parasternal loop recorder: Palpable beneath skin.  Abdomen: Benign, with no organomegaly or masses.  Extremities: Intact distal pulses; no edema.  Neuro: No motor weakness in arms or legs.  Skin: No cutaneous lesions.    Device Check:  A Medtronic insertable loop recorder check was done.  The LINQ II device is 17 months old, with an estimated battery longevity of 54 months or longer.  There have been a few spells of supraventricular tachycardia up to 36 seconds in duration at around 150 bpm, but no actual fibrillation or flutter.  The battery status is good.    Impressions:  1.  Chronic hypertension, under good control.  2.  Type 2 diabetes, on insulin and oral hypoglycemic therapy.  3.  Cryptogenic stroke in November 2022, with trivial residual expressive aphasia.  This has never been proven to be embolic from the heart.  4.  Status post Medtronic insertable loop recorder placement in November 2022, with no compelling evidence of atrial fibrillation to date.  5.  Other medical problems, detailed in history above.    Recommendations:  1.  The patient will continue his clopidogrel therapy for now.  2.  His device will be checked every 8 to 12 months for evidence of atrial fibrillation.  If this rhythm is ever convincingly demonstrated, the patient would then be switched from " antiplatelet therapy to Eliquis anticoagulation.      Vinayak Christopher MD

## 2024-05-10 ENCOUNTER — HOSPITAL ENCOUNTER (OUTPATIENT)
Dept: CARDIOLOGY | Facility: HOSPITAL | Age: 84
Discharge: HOME | End: 2024-05-10
Payer: MEDICARE

## 2024-05-10 DIAGNOSIS — Z86.73 HISTORY OF TIA (TRANSIENT ISCHEMIC ATTACK): ICD-10-CM

## 2024-05-10 DIAGNOSIS — Z95.818 STATUS POST PLACEMENT OF IMPLANTABLE LOOP RECORDER: ICD-10-CM

## 2024-05-15 ENCOUNTER — TELEPHONE (OUTPATIENT)
Dept: NEUROLOGY | Facility: CLINIC | Age: 84
End: 2024-05-15
Payer: MEDICARE

## 2024-05-15 DIAGNOSIS — F03.B2 MODERATE DEMENTIA WITH PSYCHOTIC DISTURBANCE, UNSPECIFIED DEMENTIA TYPE (MULTI): Primary | ICD-10-CM

## 2024-05-15 DIAGNOSIS — R56.9 SEIZURE (MULTI): ICD-10-CM

## 2024-05-15 RX ORDER — BRIVARACETAM 50 MG/1
50 TABLET, FILM COATED ORAL 2 TIMES DAILY
Qty: 180 TABLET | Refills: 1 | Status: SHIPPED | OUTPATIENT
Start: 2024-05-15

## 2024-05-15 NOTE — TELEPHONE ENCOUNTER
Patient's son Sj called in requesting to speak with you regarding Jose's decline in his health. He is in a vegetative state and does not want to eat/drink. This has been happening for about 3-4 days. Sj would like to know if Jose needs a change to his medications. Sj also would like to know how he can go about getting info to get Jose set up on [facility].     [phone]

## 2024-05-15 NOTE — TELEPHONE ENCOUNTER
He has been oversedated the past 3 days  Depakote 500mg BID and risperidone 1mg nightly   Held the depakote this morning. More coherent and was able to move around but remains irritable. Then started getting very agitated and aggressive. Son restrained him and eventually he calmed down. He is now over at his sister's house  Will reduce depakote to 250mg BID with risperidone 1mg nightly   Also refill of briviact sent to pharmacy

## 2024-05-16 ENCOUNTER — HOME HEALTH ADMISSION (OUTPATIENT)
Dept: HOME HEALTH SERVICES | Facility: HOME HEALTH | Age: 84
End: 2024-05-16
Payer: MEDICARE

## 2024-05-16 DIAGNOSIS — R26.81 GAIT INSTABILITY: ICD-10-CM

## 2024-05-16 DIAGNOSIS — F03.B2 MODERATE DEMENTIA WITH PSYCHOTIC DISTURBANCE, UNSPECIFIED DEMENTIA TYPE (MULTI): Primary | ICD-10-CM

## 2024-05-16 DIAGNOSIS — I69.351 HEMIPLEGIA AND HEMIPARESIS FOLLOWING CEREBRAL INFARCTION AFFECTING RIGHT DOMINANT SIDE (MULTI): ICD-10-CM

## 2024-05-16 NOTE — PROGRESS NOTES
[facility] [place] snot provide home health aid and PT support for behavioral health. Also does not have [facility] on [address]  Referral placed to healthy at home   Will fax [facility] orders to another agency

## 2024-05-17 ENCOUNTER — APPOINTMENT (OUTPATIENT)
Dept: RADIOLOGY | Facility: HOSPITAL | Age: 84
DRG: 057 | End: 2024-05-17
Payer: MEDICARE

## 2024-05-17 ENCOUNTER — APPOINTMENT (OUTPATIENT)
Dept: CARDIOLOGY | Facility: HOSPITAL | Age: 84
DRG: 057 | End: 2024-05-17
Payer: MEDICARE

## 2024-05-17 ENCOUNTER — TELEPHONE (OUTPATIENT)
Dept: NEUROLOGY | Facility: CLINIC | Age: 84
End: 2024-05-17

## 2024-05-17 ENCOUNTER — HOSPITAL ENCOUNTER (INPATIENT)
Facility: HOSPITAL | Age: 84
LOS: 4 days | Discharge: NURSING, REHAB OR LONG-TERM CARE | DRG: 057 | End: 2024-05-23
Attending: STUDENT IN AN ORGANIZED HEALTH CARE EDUCATION/TRAINING PROGRAM | Admitting: STUDENT IN AN ORGANIZED HEALTH CARE EDUCATION/TRAINING PROGRAM
Payer: MEDICARE

## 2024-05-17 DIAGNOSIS — T14.8XXA HEMATOMA: ICD-10-CM

## 2024-05-17 DIAGNOSIS — F03.C0 SEVERE DEMENTIA, UNSPECIFIED DEMENTIA TYPE, UNSPECIFIED WHETHER BEHAVIORAL, PSYCHOTIC, OR MOOD DISTURBANCE OR ANXIETY (MULTI): ICD-10-CM

## 2024-05-17 DIAGNOSIS — F02.B11 MODERATE LATE ONSET ALZHEIMER'S DEMENTIA WITH AGITATION (MULTI): ICD-10-CM
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
## 2024-05-17 DIAGNOSIS — K21.9 GASTROESOPHAGEAL REFLUX DISEASE WITHOUT ESOPHAGITIS: ICD-10-CM

## 2024-05-17 DIAGNOSIS — G30.1 MODERATE LATE ONSET ALZHEIMER'S DEMENTIA WITH AGITATION (MULTI): ICD-10-CM

## 2024-05-17 DIAGNOSIS — F03.B2 MODERATE DEMENTIA WITH PSYCHOTIC DISTURBANCE, UNSPECIFIED DEMENTIA TYPE (MULTI): Primary | ICD-10-CM

## 2024-05-17 PROBLEM — S30.0XXA HEMATOMA OF LEFT BUTTOCK: Status: ACTIVE | Noted: 2024-05-17

## 2024-05-17 LAB
ABO GROUP (TYPE) IN BLOOD: NORMAL
ALBUMIN SERPL BCP-MCNC: 3.4 G/DL (ref 3.4–5)
ALP SERPL-CCNC: 41 U/L (ref 33–136)
ALT SERPL W P-5'-P-CCNC: 17 U/L (ref 10–52)
ANION GAP SERPL CALC-SCNC: 11 MMOL/L (ref 10–20)
ANTIBODY SCREEN: NORMAL
AST SERPL W P-5'-P-CCNC: 27 U/L (ref 9–39)
BASOPHILS # BLD AUTO: 0.01 X10*3/UL (ref 0–0.1)
BASOPHILS NFR BLD AUTO: 0.2 %
BILIRUB SERPL-MCNC: 0.6 MG/DL (ref 0–1.2)
BUN SERPL-MCNC: 19 MG/DL (ref 6–23)
CALCIUM SERPL-MCNC: 8.6 MG/DL (ref 8.6–10.3)
CHLORIDE SERPL-SCNC: 105 MMOL/L (ref 98–107)
CO2 SERPL-SCNC: 29 MMOL/L (ref 21–32)
CREAT SERPL-MCNC: 0.87 MG/DL (ref 0.5–1.3)
EGFRCR SERPLBLD CKD-EPI 2021: 85 ML/MIN/1.73M*2
EOSINOPHIL # BLD AUTO: 0.13 X10*3/UL (ref 0–0.4)
EOSINOPHIL NFR BLD AUTO: 2.2 %
ERYTHROCYTE [DISTWIDTH] IN BLOOD BY AUTOMATED COUNT: 14.4 % (ref 11.5–14.5)
ETHANOL SERPL-MCNC: <10 MG/DL
GLUCOSE BLD MANUAL STRIP-MCNC: 135 MG/DL (ref 74–99)
GLUCOSE BLD MANUAL STRIP-MCNC: 140 MG/DL (ref 74–99)
GLUCOSE BLD MANUAL STRIP-MCNC: 151 MG/DL (ref 74–99)
GLUCOSE SERPL-MCNC: 160 MG/DL (ref 74–99)
HCT VFR BLD AUTO: 37.7 % (ref 41–52)
HGB BLD-MCNC: 12.5 G/DL (ref 13.5–17.5)
IMM GRANULOCYTES # BLD AUTO: 0.02 X10*3/UL (ref 0–0.5)
IMM GRANULOCYTES NFR BLD AUTO: 0.3 % (ref 0–0.9)
INR PPP: 1.2 (ref 0.9–1.1)
LACTATE SERPL-SCNC: 2 MMOL/L (ref 0.4–2)
LYMPHOCYTES # BLD AUTO: 1.47 X10*3/UL (ref 0.8–3)
LYMPHOCYTES NFR BLD AUTO: 24.7 %
MCH RBC QN AUTO: 30.3 PG (ref 26–34)
MCHC RBC AUTO-ENTMCNC: 33.2 G/DL (ref 32–36)
MCV RBC AUTO: 92 FL (ref 80–100)
MONOCYTES # BLD AUTO: 0.69 X10*3/UL (ref 0.05–0.8)
MONOCYTES NFR BLD AUTO: 11.6 %
NEUTROPHILS # BLD AUTO: 3.63 X10*3/UL (ref 1.6–5.5)
NEUTROPHILS NFR BLD AUTO: 61 %
NRBC BLD-RTO: 0 /100 WBCS (ref 0–0)
PLATELET # BLD AUTO: 141 X10*3/UL (ref 150–450)
POTASSIUM SERPL-SCNC: 4.4 MMOL/L (ref 3.5–5.3)
PROT SERPL-MCNC: 6 G/DL (ref 6.4–8.2)
PROTHROMBIN TIME: 13.6 SECONDS (ref 9.8–12.8)
RBC # BLD AUTO: 4.12 X10*6/UL (ref 4.5–5.9)
RH FACTOR (ANTIGEN D): NORMAL
SODIUM SERPL-SCNC: 141 MMOL/L (ref 136–145)
WBC # BLD AUTO: 6 X10*3/UL (ref 4.4–11.3)

## 2024-05-17 PROCEDURE — 71045 X-RAY EXAM CHEST 1 VIEW: CPT

## 2024-05-17 PROCEDURE — 74177 CT ABD & PELVIS W/CONTRAST: CPT

## 2024-05-17 PROCEDURE — 90471 IMMUNIZATION ADMIN: CPT | Performed by: STUDENT IN AN ORGANIZED HEALTH CARE EDUCATION/TRAINING PROGRAM

## 2024-05-17 PROCEDURE — 72125 CT NECK SPINE W/O DYE: CPT | Performed by: RADIOLOGY

## 2024-05-17 PROCEDURE — 2500000001 HC RX 250 WO HCPCS SELF ADMINISTERED DRUGS (ALT 637 FOR MEDICARE OP): Performed by: INTERNAL MEDICINE

## 2024-05-17 PROCEDURE — 71260 CT THORAX DX C+: CPT | Performed by: RADIOLOGY

## 2024-05-17 PROCEDURE — 85610 PROTHROMBIN TIME: CPT | Performed by: STUDENT IN AN ORGANIZED HEALTH CARE EDUCATION/TRAINING PROGRAM

## 2024-05-17 PROCEDURE — 2550000001 HC RX 255 CONTRASTS: Performed by: STUDENT IN AN ORGANIZED HEALTH CARE EDUCATION/TRAINING PROGRAM

## 2024-05-17 PROCEDURE — 96372 THER/PROPH/DIAG INJ SC/IM: CPT | Performed by: STUDENT IN AN ORGANIZED HEALTH CARE EDUCATION/TRAINING PROGRAM

## 2024-05-17 PROCEDURE — 72128 CT CHEST SPINE W/O DYE: CPT | Mod: RCN

## 2024-05-17 PROCEDURE — 82947 ASSAY GLUCOSE BLOOD QUANT: CPT

## 2024-05-17 PROCEDURE — 85025 COMPLETE CBC W/AUTO DIFF WBC: CPT | Performed by: STUDENT IN AN ORGANIZED HEALTH CARE EDUCATION/TRAINING PROGRAM

## 2024-05-17 PROCEDURE — 99232 SBSQ HOSP IP/OBS MODERATE 35: CPT | Performed by: STUDENT IN AN ORGANIZED HEALTH CARE EDUCATION/TRAINING PROGRAM

## 2024-05-17 PROCEDURE — 2500000004 HC RX 250 GENERAL PHARMACY W/ HCPCS (ALT 636 FOR OP/ED): Performed by: INTERNAL MEDICINE

## 2024-05-17 PROCEDURE — 36415 COLL VENOUS BLD VENIPUNCTURE: CPT | Performed by: STUDENT IN AN ORGANIZED HEALTH CARE EDUCATION/TRAINING PROGRAM

## 2024-05-17 PROCEDURE — 72170 X-RAY EXAM OF PELVIS: CPT

## 2024-05-17 PROCEDURE — 83605 ASSAY OF LACTIC ACID: CPT | Performed by: STUDENT IN AN ORGANIZED HEALTH CARE EDUCATION/TRAINING PROGRAM

## 2024-05-17 PROCEDURE — 72125 CT NECK SPINE W/O DYE: CPT

## 2024-05-17 PROCEDURE — 99285 EMERGENCY DEPT VISIT HI MDM: CPT | Mod: 25

## 2024-05-17 PROCEDURE — 2500000004 HC RX 250 GENERAL PHARMACY W/ HCPCS (ALT 636 FOR OP/ED): Performed by: STUDENT IN AN ORGANIZED HEALTH CARE EDUCATION/TRAINING PROGRAM

## 2024-05-17 PROCEDURE — 74177 CT ABD & PELVIS W/CONTRAST: CPT | Performed by: RADIOLOGY

## 2024-05-17 PROCEDURE — G0378 HOSPITAL OBSERVATION PER HR: HCPCS

## 2024-05-17 PROCEDURE — 72128 CT CHEST SPINE W/O DYE: CPT | Performed by: RADIOLOGY

## 2024-05-17 PROCEDURE — 2500000002 HC RX 250 W HCPCS SELF ADMINISTERED DRUGS (ALT 637 FOR MEDICARE OP, ALT 636 FOR OP/ED): Performed by: INTERNAL MEDICINE

## 2024-05-17 PROCEDURE — 82077 ASSAY SPEC XCP UR&BREATH IA: CPT | Performed by: STUDENT IN AN ORGANIZED HEALTH CARE EDUCATION/TRAINING PROGRAM

## 2024-05-17 PROCEDURE — 86850 RBC ANTIBODY SCREEN: CPT | Mod: 91 | Performed by: STUDENT IN AN ORGANIZED HEALTH CARE EDUCATION/TRAINING PROGRAM

## 2024-05-17 PROCEDURE — 84075 ASSAY ALKALINE PHOSPHATASE: CPT | Performed by: STUDENT IN AN ORGANIZED HEALTH CARE EDUCATION/TRAINING PROGRAM

## 2024-05-17 PROCEDURE — 72131 CT LUMBAR SPINE W/O DYE: CPT | Performed by: RADIOLOGY

## 2024-05-17 PROCEDURE — G0390 TRAUMA RESPONS W/HOSP CRITI: HCPCS

## 2024-05-17 PROCEDURE — 70450 CT HEAD/BRAIN W/O DYE: CPT | Performed by: RADIOLOGY

## 2024-05-17 PROCEDURE — 72170 X-RAY EXAM OF PELVIS: CPT | Performed by: RADIOLOGY

## 2024-05-17 PROCEDURE — 71045 X-RAY EXAM CHEST 1 VIEW: CPT | Performed by: RADIOLOGY

## 2024-05-17 PROCEDURE — 90715 TDAP VACCINE 7 YRS/> IM: CPT | Performed by: STUDENT IN AN ORGANIZED HEALTH CARE EDUCATION/TRAINING PROGRAM

## 2024-05-17 PROCEDURE — 70450 CT HEAD/BRAIN W/O DYE: CPT

## 2024-05-17 PROCEDURE — 99223 1ST HOSP IP/OBS HIGH 75: CPT | Performed by: INTERNAL MEDICINE

## 2024-05-17 PROCEDURE — 93005 ELECTROCARDIOGRAM TRACING: CPT

## 2024-05-17 PROCEDURE — 72131 CT LUMBAR SPINE W/O DYE: CPT | Mod: RCN

## 2024-05-17 RX ORDER — HALOPERIDOL 5 MG/ML
5 INJECTION INTRAMUSCULAR ONCE
Status: COMPLETED | OUTPATIENT
Start: 2024-05-17 | End: 2024-05-17

## 2024-05-17 RX ORDER — METFORMIN HYDROCHLORIDE 500 MG/1
500 TABLET ORAL
Status: DISCONTINUED | OUTPATIENT
Start: 2024-05-17 | End: 2024-05-23 | Stop reason: HOSPADM

## 2024-05-17 RX ORDER — HALOPERIDOL 5 MG/ML
2 INJECTION INTRAMUSCULAR EVERY 6 HOURS PRN
Status: DISCONTINUED | OUTPATIENT
Start: 2024-05-17 | End: 2024-05-23 | Stop reason: HOSPADM

## 2024-05-17 RX ORDER — ONDANSETRON 4 MG/1
4 TABLET, FILM COATED ORAL EVERY 8 HOURS PRN
Status: DISCONTINUED | OUTPATIENT
Start: 2024-05-17 | End: 2024-05-23 | Stop reason: HOSPADM

## 2024-05-17 RX ORDER — DEXTROSE, SODIUM CHLORIDE, SODIUM LACTATE, POTASSIUM CHLORIDE, AND CALCIUM CHLORIDE 5; .6; .31; .03; .02 G/100ML; G/100ML; G/100ML; G/100ML; G/100ML
75 INJECTION, SOLUTION INTRAVENOUS CONTINUOUS
Status: DISCONTINUED | OUTPATIENT
Start: 2024-05-17 | End: 2024-05-23

## 2024-05-17 RX ORDER — PANTOPRAZOLE SODIUM 40 MG/1
40 TABLET, DELAYED RELEASE ORAL
Status: DISCONTINUED | OUTPATIENT
Start: 2024-05-17 | End: 2024-05-23 | Stop reason: HOSPADM

## 2024-05-17 RX ORDER — TALC
3 POWDER (GRAM) TOPICAL NIGHTLY PRN
Status: DISCONTINUED | OUTPATIENT
Start: 2024-05-17 | End: 2024-05-23 | Stop reason: HOSPADM

## 2024-05-17 RX ORDER — ACETAMINOPHEN 325 MG/1
650 TABLET ORAL EVERY 4 HOURS PRN
Status: DISCONTINUED | OUTPATIENT
Start: 2024-05-17 | End: 2024-05-23 | Stop reason: HOSPADM

## 2024-05-17 RX ORDER — DIVALPROEX SODIUM 250 MG/1
500 TABLET, DELAYED RELEASE ORAL 2 TIMES DAILY
Status: DISCONTINUED | OUTPATIENT
Start: 2024-05-17 | End: 2024-05-18

## 2024-05-17 RX ORDER — ONDANSETRON HYDROCHLORIDE 2 MG/ML
4 INJECTION, SOLUTION INTRAVENOUS EVERY 8 HOURS PRN
Status: DISCONTINUED | OUTPATIENT
Start: 2024-05-17 | End: 2024-05-23 | Stop reason: HOSPADM

## 2024-05-17 RX ORDER — POLYETHYLENE GLYCOL 3350 17 G/17G
17 POWDER, FOR SOLUTION ORAL DAILY PRN
Status: DISCONTINUED | OUTPATIENT
Start: 2024-05-17 | End: 2024-05-23 | Stop reason: HOSPADM

## 2024-05-17 RX ORDER — LOSARTAN POTASSIUM 25 MG/1
25 TABLET ORAL DAILY
Status: DISCONTINUED | OUTPATIENT
Start: 2024-05-17 | End: 2024-05-23 | Stop reason: HOSPADM

## 2024-05-17 RX ORDER — LEVOTHYROXINE SODIUM 100 UG/1
100 TABLET ORAL
Status: DISCONTINUED | OUTPATIENT
Start: 2024-05-17 | End: 2024-05-23 | Stop reason: HOSPADM

## 2024-05-17 RX ORDER — CLOPIDOGREL BISULFATE 75 MG/1
75 TABLET ORAL DAILY
Status: DISCONTINUED | OUTPATIENT
Start: 2024-05-17 | End: 2024-05-23 | Stop reason: HOSPADM

## 2024-05-17 RX ORDER — RISPERIDONE 1 MG/1
1 TABLET ORAL NIGHTLY
Status: DISCONTINUED | OUTPATIENT
Start: 2024-05-17 | End: 2024-05-21

## 2024-05-17 RX ORDER — DEXTROSE 50 % IN WATER (D50W) INTRAVENOUS SYRINGE
12.5
Status: DISCONTINUED | OUTPATIENT
Start: 2024-05-17 | End: 2024-05-23 | Stop reason: HOSPADM

## 2024-05-17 RX ORDER — ACETAMINOPHEN 650 MG/1
650 SUPPOSITORY RECTAL EVERY 4 HOURS PRN
Status: DISCONTINUED | OUTPATIENT
Start: 2024-05-17 | End: 2024-05-23 | Stop reason: HOSPADM

## 2024-05-17 RX ORDER — DEXTROSE 50 % IN WATER (D50W) INTRAVENOUS SYRINGE
25
Status: DISCONTINUED | OUTPATIENT
Start: 2024-05-17 | End: 2024-05-23 | Stop reason: HOSPADM

## 2024-05-17 RX ORDER — ATORVASTATIN CALCIUM 80 MG/1
80 TABLET, FILM COATED ORAL NIGHTLY
Status: DISCONTINUED | OUTPATIENT
Start: 2024-05-17 | End: 2024-05-23 | Stop reason: HOSPADM

## 2024-05-17 RX ORDER — PANTOPRAZOLE SODIUM 40 MG/10ML
40 INJECTION, POWDER, LYOPHILIZED, FOR SOLUTION INTRAVENOUS
Status: DISCONTINUED | OUTPATIENT
Start: 2024-05-17 | End: 2024-05-23 | Stop reason: HOSPADM

## 2024-05-17 RX ORDER — HALOPERIDOL 5 MG/ML
1 INJECTION INTRAMUSCULAR EVERY 4 HOURS PRN
Status: DISCONTINUED | OUTPATIENT
Start: 2024-05-17 | End: 2024-05-18

## 2024-05-17 RX ORDER — ACETAMINOPHEN 160 MG/5ML
650 SOLUTION ORAL EVERY 4 HOURS PRN
Status: DISCONTINUED | OUTPATIENT
Start: 2024-05-17 | End: 2024-05-23 | Stop reason: HOSPADM

## 2024-05-17 RX ORDER — METOPROLOL TARTRATE 25 MG/1
25 TABLET, FILM COATED ORAL 2 TIMES DAILY
Status: DISCONTINUED | OUTPATIENT
Start: 2024-05-17 | End: 2024-05-23 | Stop reason: HOSPADM

## 2024-05-17 RX ORDER — INSULIN LISPRO 100 [IU]/ML
0-5 INJECTION, SOLUTION INTRAVENOUS; SUBCUTANEOUS
Status: DISCONTINUED | OUTPATIENT
Start: 2024-05-17 | End: 2024-05-23 | Stop reason: HOSPADM

## 2024-05-17 RX ORDER — FINASTERIDE 5 MG/1
5 TABLET, FILM COATED ORAL NIGHTLY
Status: DISCONTINUED | OUTPATIENT
Start: 2024-05-17 | End: 2024-05-23 | Stop reason: HOSPADM

## 2024-05-17 RX ADMIN — DIVALPROEX SODIUM 500 MG: 250 TABLET, DELAYED RELEASE ORAL at 12:58

## 2024-05-17 RX ADMIN — SODIUM CHLORIDE, SODIUM LACTATE, POTASSIUM CHLORIDE, CALCIUM CHLORIDE AND DEXTROSE MONOHYDRATE 75 ML/HR: 5; 600; 310; 30; 20 INJECTION, SOLUTION INTRAVENOUS at 23:07

## 2024-05-17 RX ADMIN — INSULIN LISPRO 1 UNITS: 100 INJECTION, SOLUTION INTRAVENOUS; SUBCUTANEOUS at 17:39

## 2024-05-17 RX ADMIN — HALOPERIDOL LACTATE 5 MG: 5 INJECTION, SOLUTION INTRAMUSCULAR at 05:10

## 2024-05-17 RX ADMIN — LOSARTAN POTASSIUM 25 MG: 25 TABLET, FILM COATED ORAL at 12:58

## 2024-05-17 RX ADMIN — TETANUS TOXOID, REDUCED DIPHTHERIA TOXOID AND ACELLULAR PERTUSSIS VACCINE, ADSORBED 0.5 ML: 5; 2.5; 8; 8; 2.5 SUSPENSION INTRAMUSCULAR at 05:10

## 2024-05-17 RX ADMIN — METOPROLOL TARTRATE 25 MG: 25 TABLET, FILM COATED ORAL at 12:58

## 2024-05-17 RX ADMIN — CLOPIDOGREL BISULFATE 75 MG: 75 TABLET, FILM COATED ORAL at 12:58

## 2024-05-17 RX ADMIN — PANTOPRAZOLE SODIUM 40 MG: 40 TABLET, DELAYED RELEASE ORAL at 12:58

## 2024-05-17 RX ADMIN — IOHEXOL 100 ML: 350 INJECTION, SOLUTION INTRAVENOUS at 03:23

## 2024-05-17 SDOH — ECONOMIC STABILITY: INCOME INSECURITY: IN THE LAST 12 MONTHS, WAS THERE A TIME WHEN YOU WERE NOT ABLE TO PAY THE MORTGAGE OR RENT ON TIME?: NO

## 2024-05-17 SDOH — SOCIAL STABILITY: SOCIAL INSECURITY: WERE YOU ABLE TO COMPLETE ALL THE BEHAVIORAL HEALTH SCREENINGS?: YES

## 2024-05-17 SDOH — SOCIAL STABILITY: SOCIAL INSECURITY: ARE YOU OR HAVE YOU BEEN THREATENED OR ABUSED PHYSICALLY, EMOTIONALLY, OR SEXUALLY BY ANYONE?: NO

## 2024-05-17 SDOH — SOCIAL STABILITY: SOCIAL INSECURITY: ARE THERE ANY APPARENT SIGNS OF INJURIES/BEHAVIORS THAT COULD BE RELATED TO ABUSE/NEGLECT?: NO

## 2024-05-17 SDOH — SOCIAL STABILITY: SOCIAL INSECURITY: DOES ANYONE TRY TO KEEP YOU FROM HAVING/CONTACTING OTHER FRIENDS OR DOING THINGS OUTSIDE YOUR HOME?: NO

## 2024-05-17 SDOH — ECONOMIC STABILITY: HOUSING INSECURITY: IN THE LAST 12 MONTHS, HOW MANY PLACES HAVE YOU LIVED?: 1

## 2024-05-17 SDOH — SOCIAL STABILITY: SOCIAL INSECURITY: HAVE YOU HAD THOUGHTS OF HARMING ANYONE ELSE?: NO

## 2024-05-17 SDOH — ECONOMIC STABILITY: INCOME INSECURITY: HOW HARD IS IT FOR YOU TO PAY FOR THE VERY BASICS LIKE FOOD, HOUSING, MEDICAL CARE, AND HEATING?: NOT HARD AT ALL

## 2024-05-17 SDOH — SOCIAL STABILITY: SOCIAL INSECURITY: HAVE YOU HAD ANY THOUGHTS OF HARMING ANYONE ELSE?: NO

## 2024-05-17 SDOH — SOCIAL STABILITY: SOCIAL INSECURITY: ABUSE: ADULT

## 2024-05-17 SDOH — SOCIAL STABILITY: SOCIAL INSECURITY: HAS ANYONE EVER THREATENED TO HURT YOUR FAMILY OR YOUR PETS?: NO

## 2024-05-17 SDOH — SOCIAL STABILITY: SOCIAL INSECURITY: DO YOU FEEL ANYONE HAS EXPLOITED OR TAKEN ADVANTAGE OF YOU FINANCIALLY OR OF YOUR PERSONAL PROPERTY?: NO

## 2024-05-17 SDOH — SOCIAL STABILITY: SOCIAL INSECURITY: DO YOU FEEL UNSAFE GOING BACK TO THE PLACE WHERE YOU ARE LIVING?: NO

## 2024-05-17 ASSESSMENT — ACTIVITIES OF DAILY LIVING (ADL)
PATIENT'S MEMORY ADEQUATE TO SAFELY COMPLETE DAILY ACTIVITIES?: NO
GROOMING: NEEDS ASSISTANCE
ADEQUATE_TO_COMPLETE_ADL: YES
ASSISTIVE_DEVICE: DENTURES UPPER;DENTURES LOWER
HEARING - RIGHT EAR: FUNCTIONAL
FEEDING YOURSELF: NEEDS ASSISTANCE
TOILETING: NEEDS ASSISTANCE
JUDGMENT_ADEQUATE_SAFELY_COMPLETE_DAILY_ACTIVITIES: NO
WALKS IN HOME: NEEDS ASSISTANCE
BATHING: NEEDS ASSISTANCE
HEARING - LEFT EAR: FUNCTIONAL
DRESSING YOURSELF: NEEDS ASSISTANCE

## 2024-05-17 ASSESSMENT — LIFESTYLE VARIABLES
AUDIT-C TOTAL SCORE: 1
AUDIT-C TOTAL SCORE: 1
SKIP TO QUESTIONS 9-10: 1
HOW MANY STANDARD DRINKS CONTAINING ALCOHOL DO YOU HAVE ON A TYPICAL DAY: 1 OR 2
HOW OFTEN DO YOU HAVE A DRINK CONTAINING ALCOHOL: MONTHLY OR LESS
HOW OFTEN DO YOU HAVE 6 OR MORE DRINKS ON ONE OCCASION: NEVER

## 2024-05-17 ASSESSMENT — COGNITIVE AND FUNCTIONAL STATUS - GENERAL
MOVING FROM LYING ON BACK TO SITTING ON SIDE OF FLAT BED WITH BEDRAILS: A LITTLE
PERSONAL GROOMING: A LITTLE
MOBILITY SCORE: 17
PATIENT BASELINE BEDBOUND: NO
DAILY ACTIVITIY SCORE: 18
DRESSING REGULAR LOWER BODY CLOTHING: A LITTLE
WALKING IN HOSPITAL ROOM: A LITTLE
TURNING FROM BACK TO SIDE WHILE IN FLAT BAD: A LITTLE
TOILETING: A LITTLE
HELP NEEDED FOR BATHING: A LITTLE
MOVING TO AND FROM BED TO CHAIR: A LITTLE
STANDING UP FROM CHAIR USING ARMS: A LITTLE
DRESSING REGULAR UPPER BODY CLOTHING: A LITTLE
CLIMB 3 TO 5 STEPS WITH RAILING: A LOT
EATING MEALS: A LITTLE

## 2024-05-17 ASSESSMENT — PATIENT HEALTH QUESTIONNAIRE - PHQ9
SUM OF ALL RESPONSES TO PHQ9 QUESTIONS 1 & 2: 0
1. LITTLE INTEREST OR PLEASURE IN DOING THINGS: NOT AT ALL
2. FEELING DOWN, DEPRESSED OR HOPELESS: NOT AT ALL

## 2024-05-17 ASSESSMENT — PAIN - FUNCTIONAL ASSESSMENT
PAIN_FUNCTIONAL_ASSESSMENT: 0-10
PAIN_FUNCTIONAL_ASSESSMENT: 0-10

## 2024-05-17 ASSESSMENT — PAIN SCALES - GENERAL
PAINLEVEL_OUTOF10: 0 - NO PAIN

## 2024-05-17 NOTE — SIGNIFICANT EVENT
Dr. Mendoza and I performed a tertiary exam on the patient and it was negative for any new findings.  He is in no acute distress, normocephalic, pupils are equal, round, and reactive to light, there is no abdominal tenderness, he has no bony tenderness to the cervical spine or bilateral upper or lower extremities, there is no swelling.    Trauma team will sign off.  Please do not hesitate to reach out with any questions or concerns.

## 2024-05-17 NOTE — CONSULTS
Consults  [facility]  TRAUMA SERVICE - HISTORY AND PHYSICAL / CONSULT    Patient Name: Jose Guardado  MRN: [id number]  Admit Date: 517  : 1940  AGE: 83 y.o.   GENDER: male  ==============================================================================  MECHANISM OF INJURY / CHIEF COMPLAINT:   83-year-old male who was brought to the [facility] ED by [facility] after he suffered a fall at home.  Patient has dementia and is alert to person.  Per EMS, patient was moving in with his daughter as of today and fell down approximately 10-12 stairs.  He takes clopidogrel.  ROS is limited due to patient's mental status.  He is cooperative but has difficulty following commands.  Per notes he has a history of mild expressive aphasia after suffering a middle cerebral artery infarct in 2022 and dementia. He denies any pain.    LOC (yes/no?):  No loss of consciousness reported  Anticoagulant / Anti-platelet Rx? (for what dx?):  Clopidogrel per chart review-poststroke  Referring Facility Name (N/A for scene EMR run): N/A    INJURIES:   Abrasion to the left forearm and bilateral knees    OTHER MEDICAL PROBLEMS:  Seizures poststroke  Expressive aphasia  Hypertension  BPH  GERD  DM2  HLD  Hypothyroidism  Anxiety    INCIDENTAL FINDINGS:  None    ==============================================================================  ADMISSION PLAN OF CARE:  Admit to the medicine service for placement in assisted living facility as family does not believe that they can care for the patient at home any longer.  Consultants notified (specialty, provider name, time): Consultants notified per ED, including Dr. Esteves.    ==============================================================================  PAST MEDICAL HISTORY:   PMH:   Past Medical History:   Diagnosis Date    Anxiety     Dizziness and giddiness 2021    Episodic lightheadedness    Other chest pain 2021    Atypical chest pain    Other  chest pain 12/21/2021    Chest pain, musculoskeletal    Personal history of other diseases of male genital organs 11/30/2021    History of erectile dysfunction    Personal history of other specified conditions 05/14/2018    History of urinary incontinence    Seizures (Multi)     Stroke (Multi)        PSH:   Past Surgical History:   Procedure Laterality Date    MR HEAD ANGIO WO IV CONTRAST  2/24/2021    MR HEAD ANGIO WO IV CONTRAST 2/24/2021 [facility]    MR HEAD ANGIO WO IV CONTRAST  4/24/2021    MR HEAD ANGIO WO IV CONTRAST 4/24/2021 [facility]    MR HEAD ANGIO WO IV CONTRAST  11/18/2022    MR HEAD ANGIO WO IV CONTRAST 11/18/2022 [facility]    MR HEAD ANGIO WO IV CONTRAST  3/22/2023    MR HEAD ANGIO WO IV CONTRAST [facility]    MR NECK ANGIO WO IV CONTRAST  2/24/2021    MR NECK ANGIO WO IV CONTRAST 2/24/2021 [facility]    MR NECK ANGIO WO IV CONTRAST  4/24/2021    MR NECK ANGIO WO IV CONTRAST 4/24/2021 [facility]    MR NECK ANGIO WO IV CONTRAST  11/18/2022    MR NECK ANGIO WO IV CONTRAST 11/18/2022 [facility]    MR NECK ANGIO WO IV CONTRAST  3/22/2023    MR NECK ANGIO WO IV CONTRAST [facility]    OTHER SURGICAL HISTORY  05/14/2018    Incision Of Mastoid    OTHER SURGICAL HISTORY  11/30/2021    Complete colonoscopy    OTHER SURGICAL HISTORY  11/30/2021    Esophagogastroduodenoscopy    OTHER SURGICAL HISTORY  11/30/2021    Mastoidectomy    OTHER SURGICAL HISTORY  11/30/2021    Renal lithotripsy    OTHER SURGICAL HISTORY  11/30/2021    Tonsillectomy with adenoidectomy    TONSILLECTOMY  05/14/2018    Tonsillectomy     FH:   Family History   Problem Relation Name Age of Onset    No Known Problems Mother      Arthritis Father      Hypertension Father      Diabetes Brother      Other (Stroke syndrome) Brother       SOCIAL HISTORY:    Smoking:    Social History     Tobacco Use   Smoking Status Former    Current packs/day: 0.00    Types: Cigarettes    Quit date: 1984    Years since  quittin.4   Smokeless Tobacco Never       Alcohol:    Social History     Substance and Sexual Activity   Alcohol Use Not Currently       Drug use: Unknown    MEDICATIONS:   Prior to Admission medications    Medication Sig Start Date End Date Taking? Authorizing Provider   atorvastatin (Lipitor) 80 mg tablet Take 1 tablet (80 mg) by mouth once daily at bedtime. 24  JOVANNI Daley   Briviact 50 mg tablet tablet Take 1 tablet (50 mg) by mouth 2 times a day. 5/15/24   June Simon MD   clopidogrel (Plavix) 75 mg tablet Take 1 tablet (75 mg) by mouth once daily. 24  JOVANNI Daley   divalproex (Depakote) 500 mg EC tablet Take 1 tablet (500 mg) by mouth 2 times a day. Do not crush, chew, or split. 24  June Simon MD   finasteride (Proscar) 5 mg tablet Take 1 tablet (5 mg) by mouth once daily at bedtime. 24  JOVANNI Daley   FreeStyle Doug 2 Sensor kit Use as instructed 24   Reid Diana MD   insulin glargine (Lantus U-100 Insulin) 100 unit/mL injection Inject 12 Units under the skin once daily at bedtime. Take as directed per insulin instructions. 24  JOVANNI Daley   levothyroxine (Synthroid, Levoxyl) 100 mcg tablet Take 1 tablet (100 mcg) by mouth once daily in the morning. Take before meals. 24  JOVANNI Daley   losartan (Cozaar) 25 mg tablet Take 1 tablet (25 mg) by mouth once daily. 24  JOVANNI Daley   melatonin 3 mg tablet Take 1 tablet (3 mg) by mouth once daily at bedtime. 24  JOVANNI Daley   metFORMIN (Glucophage) 500 mg tablet Take 1 tablet (500 mg) by mouth 2 times a day with meals. 24  CRUZ Daley-CNS   metoprolol tartrate (Lopressor) 25 mg tablet Take 1 tablet (25 mg) by mouth 2 times a day. 24   Bettina RIVAS  CRUZ Naik-CNS   pantoprazole (ProtoNix) 40 mg EC tablet Take 1 tablet (40 mg) by mouth once daily. 5/2/24 6/1/24  Bettina RIVAS CRUZ Naik-CNS   risperiDONE (RisperDAL) 1 mg tablet Take 1 tablet (1 mg) by mouth once daily at bedtime. 5/2/24 6/1/24  Bettina RIVAS CRUZ Naik-CNS   Briviact 50 mg tablet tablet TAKE 1 TABLET BY MOUTH TWICE  DAILY 3/11/24 5/15/24  June Simon MD     ALLERGIES:   Allergies   Allergen Reactions    Keppra [Levetiracetam] Agitation       REVIEW OF SYSTEMS:  Review of Systems   Unable to perform ROS: Dementia     PHYSICAL EXAM:  PRIMARY SURVEY:  Airway  Airway is patent.     Breathing  Breathing is normal. Right breath sounds are normal. Left breath sounds are normal.     Circulation  Cardiac rhythm is regular. Rate is regular.   Pulses  Radial: 2+ on the right; 2+ on the left.  Pedal: 2+ on the right; 2+ on the left.    Disability  Iron Coma Score  Eye:4   Verbal:4   Motor:5      13  Pupils  Right Pupil:   round and reactive      2 mm  Left Pupil:   round and reactive           Motor Strength   strength:  4/5 on the right  4/5 on the left  Dorsiflex strength:  4/5 on the right  4/5 on the left  Plantarflex strength:  4/5 on the right  4/5 on the left  The patient does not have a sensory deficit.       SECONDARY SURVEY/PHYSICAL EXAM:  Physical Exam  Constitutional:       General: He is in acute distress.      Appearance: He is obese.   HENT:      Head: Normocephalic and atraumatic.      Nose: Nose normal.      Mouth/Throat:      Mouth: Mucous membranes are dry.   Eyes:      Pupils: Pupils are equal, round, and reactive to light.   Neck:      Comments: Cervical collar in place  Cardiovascular:      Rate and Rhythm: Normal rate and regular rhythm.   Pulmonary:      Effort: Pulmonary effort is normal.      Breath sounds: Normal breath sounds.   Abdominal:      General: There is no distension.      Palpations: Abdomen is soft.      Tenderness: There is no  abdominal tenderness. There is no guarding or rebound.   Musculoskeletal:         General: Normal range of motion.      Thoracic back: No swelling, deformity or tenderness.      Lumbar back: No swelling, deformity or tenderness.   Skin:     General: Skin is warm and dry.      Capillary Refill: Capillary refill takes less than 2 seconds.      Findings: Abrasion and bruising present.             Comments: Ecchymosis to right lateral foot   Neurological:      Mental Status: He is alert. He is disoriented.   Psychiatric:         Mood and Affect: Mood normal.         Behavior: Behavior normal.       IMAGING SUMMARY:  (summary of findings, not a copy of dictation)  CT Head/Face: No acute intracranial abnormality  CT C-Spine: No acute fracture or traumatic subluxation of the cervical spine  CT Chest/Abd/Pelvis: No acute abnormality of the chest, abdomen, or pelvis, left gluteal subcutaneous hematoma  CXR/PXR: CXR-no fractures or acute cardiopulmonary process, PXR- no fractures or malalignment  Other(s): CT of the lumbar and thoracic spine-no acute fracture or traumatic subluxation.    LABS:  Results for orders placed or performed during the hospital encounter of 05/17/24 (from the past 24 hour(s))   Comprehensive Metabolic Panel   Result Value Ref Range    Glucose 160 (H) 74 - 99 mg/dL    Sodium 141 136 - 145 mmol/L    Potassium 4.4 3.5 - 5.3 mmol/L    Chloride 105 98 - 107 mmol/L    Bicarbonate 29 21 - 32 mmol/L    Anion Gap 11 10 - 20 mmol/L    Urea Nitrogen 19 6 - 23 mg/dL    Creatinine 0.87 0.50 - 1.30 mg/dL    eGFR 85 >60 mL/min/1.73m*2    Calcium 8.6 8.6 - 10.3 mg/dL    Albumin 3.4 3.4 - 5.0 g/dL    Alkaline Phosphatase 41 33 - 136 U/L    Total Protein 6.0 (L) 6.4 - 8.2 g/dL    AST 27 9 - 39 U/L    Bilirubin, Total 0.6 0.0 - 1.2 mg/dL    ALT 17 10 - 52 U/L   Alcohol   Result Value Ref Range    Alcohol <10 <=10 mg/dL   Lactate   Result Value Ref Range    Lactate 2.0 0.4 - 2.0 mmol/L   Protime-INR   Result Value Ref  Range    Protime 13.6 (H) 9.8 - 12.8 seconds    INR 1.2 (H) 0.9 - 1.1       Pending disposition based on further evaluation and management by emergency medicine attending physician and in concert with trauma attending physician.    Time spent  47  minutes obtaining labs, imaging, recommendations, interview, assessment, examination, medication review/ordering, and EMR review.    Of note, this documentation is completed using the Dragon Dictation system (voice recognition software). There may be spelling and/or grammatical errors that were not corrected prior to final submission.    I have reviewed all laboratory and imaging results ordered/pertinent for this encounter.

## 2024-05-17 NOTE — PROGRESS NOTES
"Daily progress note    Jose Guardado is 83 y.o. 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  CT head was done and was negative for acute intracranial pathology, CT cervical spine was negative for fracture, CT chest abdomen pelvis was negative for traumatic injury except a small left gluteal hematoma    Patient was combative in the ED and has to be given Haldol and was placed on wrist restraints    Subjective  Patient was seen and examined at bedside  Patient had bilateral wrist restraint mittens confused unable to carry out conversation  Denies any pain not in distress      Vital signs in last 24 hours:  Temperature:  [35.9 °C (96.6 °F)] 35.9 °C (96.6 °F)  Heart Rate:  [74-96] 94  Respirations:  [17-18] 18  BP: (148-154)/(65-70) 148/65  /65 (BP Location: Left arm, Patient Position: Lying)   Pulse 94   Temp 35.9 °C (96.6 °F)   Resp 18   Ht 1.702 m (5' 7\")   Wt 69.4 kg (153 lb)   SpO2 99%   BMI 23.96 kg/m²    Intake/Output last 3 shifts:  No intake/output data recorded.  Intake/Output this shift:  No intake/output data recorded.    Physical Exam  Constitutional:       General: He is not in acute distress.     Appearance: Normal appearance. He is not ill-appearing, toxic-appearing or diaphoretic.   HENT:      Head: Normocephalic and atraumatic.      Mouth/Throat:      Mouth: Mucous membranes are " moist.      Pharynx: No oropharyngeal exudate.   Eyes:      Extraocular Movements: Extraocular movements intact.      Pupils: Pupils are equal, round, and reactive to light.   Cardiovascular:      Rate and Rhythm: Normal rate and regular rhythm.      Heart sounds: No murmur heard.  Pulmonary:      Effort: Pulmonary effort is normal. No respiratory distress.      Breath sounds: Normal breath sounds. No wheezing.   Abdominal:      General: Abdomen is flat. Bowel sounds are normal. There is no distension.      Tenderness: There is no abdominal tenderness. There is no guarding or rebound.   Musculoskeletal:         General: No swelling.      Right lower leg: No edema.      Left lower leg: No edema.   Skin:     Findings: Bruising present. No lesion or rash.   Neurological:      Mental Status: He is alert. Mental status is at baseline. He is disoriented.      Cranial Nerves: No cranial nerve deficit.      Motor: No weakness.         Current medication   Current Facility-Administered Medications   Medication Dose Route Frequency Provider Last Rate Last Admin    acetaminophen (Tylenol) tablet 650 mg  650 mg oral q4h PRN Isa Hedrick MD        Or    acetaminophen (Tylenol) oral liquid 650 mg  650 mg nasogastric tube q4h PRN Isa Hedrick MD        Or    acetaminophen (Tylenol) suppository 650 mg  650 mg rectal q4h PRN Isa Hedrick MD        atorvastatin (Lipitor) tablet 80 mg  80 mg oral Nightly Isa Hedrick MD        brivaracetam (Briviact) tablet 50 mg  50 mg oral BID Isa Hedrick MD        clopidogrel (Plavix) tablet 75 mg  75 mg oral Daily Isa Hedrick MD        dextrose 50 % injection 12.5 g  12.5 g intravenous q15 min PRN Isa Hedrick MD        dextrose 50 % injection 25 g  25 g intravenous q15 min PRN Isa Hedrick MD        divalproex (Depakote) delayed release tablet 500 mg  500 mg oral BID Isa Hedrick MD        finasteride (Proscar) tablet 5 mg  5 mg oral Nightly Isa Hedrick MD        glucagon  (Glucagen) injection 1 mg  1 mg intramuscular q15 min PRN Isa Hedrick MD        glucagon (Glucagen) injection 1 mg  1 mg intramuscular q15 min PRN Isa Hedrick MD        insulin lispro (HumaLOG) injection 0-5 Units  0-5 Units subcutaneous TID Isa Hedrick MD        levothyroxine (Synthroid, Levoxyl) tablet 100 mcg  100 mcg oral Daily before breakfast Isa Hedrick MD        losartan (Cozaar) tablet 25 mg  25 mg oral Daily Isa Hedrick MD        melatonin tablet 3 mg  3 mg oral Nightly PRN Isa Hedrick MD        metFORMIN (Glucophage) tablet 500 mg  500 mg oral BID Isa Hedrick MD        metoprolol tartrate (Lopressor) tablet 25 mg  25 mg oral BID Isa Hedrick MD        ondansetron (Zofran) tablet 4 mg  4 mg oral q8h PRN Isa Hedrick MD        Or    ondansetron (Zofran) injection 4 mg  4 mg intravenous q8h PRN Isa Hedrick MD        pantoprazole (ProtoNix) EC tablet 40 mg  40 mg oral Daily before breakfast Isa Hedrick MD        Or    pantoprazole (ProtoNix) injection 40 mg  40 mg intravenous Daily before breakfast Isa Hedrick MD        polyethylene glycol (Glycolax, Miralax) packet 17 g  17 g oral Daily PRN Isa Hedrick MD        risperiDONE (RisperDAL) tablet 1 mg  1 mg oral Nightly Isa Hedrick MD         Current Outpatient Medications   Medication Sig Dispense Refill    atorvastatin (Lipitor) 80 mg tablet Take 1 tablet (80 mg) by mouth once daily at bedtime. 30 tablet 0    Briviact 50 mg tablet tablet Take 1 tablet (50 mg) by mouth 2 times a day. 180 tablet 1    clopidogrel (Plavix) 75 mg tablet Take 1 tablet (75 mg) by mouth once daily. 30 tablet 0    divalproex (Depakote) 500 mg EC tablet Take 1 tablet (500 mg) by mouth 2 times a day. Do not crush, chew, or split. 60 tablet 11    finasteride (Proscar) 5 mg tablet Take 1 tablet (5 mg) by mouth once daily at bedtime. 30 tablet 0    FreeStyle Doug 2 Sensor kit Use as instructed 1 each 11    insulin glargine (Lantus U-100 Insulin) 100  unit/mL injection Inject 12 Units under the skin once daily at bedtime. Take as directed per insulin instructions. 10 mL 0    levothyroxine (Synthroid, Levoxyl) 100 mcg tablet Take 1 tablet (100 mcg) by mouth once daily in the morning. Take before meals. 30 tablet 0    losartan (Cozaar) 25 mg tablet Take 1 tablet (25 mg) by mouth once daily. 30 tablet 0    melatonin 3 mg tablet Take 1 tablet (3 mg) by mouth once daily at bedtime. 60 tablet 0    metFORMIN (Glucophage) 500 mg tablet Take 1 tablet (500 mg) by mouth 2 times a day with meals. 60 tablet 0    metoprolol tartrate (Lopressor) 25 mg tablet Take 1 tablet (25 mg) by mouth 2 times a day. 60 tablet 0    pantoprazole (ProtoNix) 40 mg EC tablet Take 1 tablet (40 mg) by mouth once daily. 30 tablet 0    risperiDONE (RisperDAL) 1 mg tablet Take 1 tablet (1 mg) by mouth once daily at bedtime. 30 tablet 0        Labs  Lab Results   Component Value Date    WBC 6.0 05/17/2024    HGB 12.5 (L) 05/17/2024    HCT 37.7 (L) 05/17/2024    MCV 92 05/17/2024     (L) 05/17/2024     Lab Results   Component Value Date    HGBA1C 7.9 (H) 03/18/2024     Lab Results   Component Value Date    GLUCOSE 160 (H) 05/17/2024    CALCIUM 8.6 05/17/2024     05/17/2024    K 4.4 05/17/2024    CO2 29 05/17/2024     05/17/2024    BUN 19 05/17/2024    CREATININE 0.87 05/17/2024           Principal Problem:    Falls, initial encounter  Active Problems:    HTN (hypertension)    Expressive aphasia    Seizure disorder (Multi)    Type 2 diabetes mellitus with diabetic peripheral angiopathy without gangrene, with long-term current use of insulin (Multi)    Hematoma of left buttock    Moderate late onset Alzheimer's dementia with agitation (Multi)      Assessment and Plan     #Status post fall  #Left gluteal hematoma  Monitor H&H    #History of CVA with expressive aphasia  #Progressive dementia with behavioral disturbance      Admit to medical floor  One-to-one sitter  Fall  precaution  PT/OT eval  Patient has had progressive decline in mentation with aggressive behavior, will get psych eval  Currently on restraints for safety  Haldol as needed IM for agitation  Continue Depakote and Risperdal  Will get psych consult    #Seizure disorder  #History of hypertension  Continue home medications    #Type 2 diabetes  #Peripheral neuropathy  Continue insulin sliding scale  Diabetic diet    #DVT prophylaxis SCDs due to hematoma         LOS: 0 days

## 2024-05-17 NOTE — PROGRESS NOTES
Occupational Therapy                 Therapy Communication Note    Patient Name: Jose Guardado  MRN: [id number]  Today's Date: 5/17/2024     Discipline: Occupational Therapy    Missed Visit Reason: Missed Visit Reason: Other (Comment) (OT eval attempted. Pt with 1:1 sitter, in bilat soft wrist restaints d/t agitation. Pt eyes closed, responding to therapist though lethargic. RN in to room and in agreement to reattempt therapy evals when pt is more alert.)    Missed Time: Attempt

## 2024-05-17 NOTE — PROGRESS NOTES
Physical Therapy                 Therapy Communication Note    Patient Name: Jose Guardado  MRN: [id number]  Today's Date: 5/17/2024     Discipline: Physical Therapy    Missed Visit Reason: Missed Visit Reason:  (PT eval attempted. Pt with 1:1 sitter, in B/L soft wrist restaints d/t agitation. Pt eyes closed, responding to therapist though lethargic. RN in to room and in agreement to reattempt therapy evals when pt is more alert and able to participate.)    Missed Time: Attempt @ 1231

## 2024-05-17 NOTE — H&P
History Of Present Illness  Jose Guardado is a 83 y.o. male presenting with a fall from the stairs approximately 10-12 steps striking his head and sustaining superficial laceration of the left forearm and the top of the scalp.  No loss of consciousness reported.  Patient has dementia he does not remember what happened.  He also has history of expressive aphasia prior stroke.  Apparently family were trying to get him to assisted living due to advanced dementia and poor ambulation.    ER course: Patient was awake, alert, pleasantly confused due to dementia.  His vitals were stable.  His labs were basically unremarkable.  CT head reported negative for acute intracranial pathology.  CT cervical spine also negative for fracture.  CT chest/abdomen/pelvis did not show internal organ trauma but has a small left gluteal hematoma.    Patient is admitted for further workup and management.    Past Medical History  He has a past medical history of Anxiety, Dizziness and giddiness (12/21/2021), Other chest pain (12/21/2021), Other chest pain (12/21/2021), Personal history of other diseases of male genital organs (11/30/2021), Personal history of other specified conditions (05/14/2018), Seizures (Multi), and Stroke (Multi).    Surgical History  He has a past surgical history that includes Other surgical history (05/14/2018); Tonsillectomy (05/14/2018); Other surgical history (11/30/2021); Other surgical history (11/30/2021); Other surgical history (11/30/2021); Other surgical history (11/30/2021); Other surgical history (11/30/2021); MR angio head wo IV contrast (2/24/2021); MR angio neck wo IV contrast (2/24/2021); MR angio head wo IV contrast (4/24/2021); MR angio neck wo IV contrast (4/24/2021); MR angio head wo IV contrast (11/18/2022); MR angio neck wo IV contrast (11/18/2022); MR angio neck wo IV contrast (3/22/2023); and MR angio head wo IV contrast (3/22/2023).     Social History  He reports that he quit smoking about 40  years ago. His smoking use included cigarettes. He has never used smokeless tobacco. He reports that he does not currently use alcohol. He reports that he does not currently use drugs.    Family History  Family History   Problem Relation Name Age of Onset    No Known Problems Mother      Arthritis Father      Hypertension Father      Diabetes Brother      Other (Stroke syndrome) Brother          Allergies  Keppra [levetiracetam]    Review of Systems  Limited due to dementia but as per family negative except as above    Physical Exam  Constitutional:       Appearance: Normal appearance. He is not ill-appearing or diaphoretic.   HENT:      Head:      Comments: Has minor scalp hematoma on the top     Nose: Nose normal.      Mouth/Throat:      Mouth: Mucous membranes are moist.   Eyes:      General: No scleral icterus.     Extraocular Movements: Extraocular movements intact.      Conjunctiva/sclera: Conjunctivae normal.      Pupils: Pupils are equal, round, and reactive to light.   Neck:      Vascular: No carotid bruit.   Cardiovascular:      Rate and Rhythm: Normal rate and regular rhythm.      Pulses: Normal pulses.      Heart sounds: Murmur heard.   Pulmonary:      Breath sounds: No wheezing, rhonchi or rales.   Chest:      Chest wall: No tenderness.   Abdominal:      General: There is no distension.      Palpations: There is no mass.      Tenderness: There is no abdominal tenderness. There is no right CVA tenderness, left CVA tenderness, guarding or rebound.      Hernia: No hernia is present.   Musculoskeletal:         General: Normal range of motion.      Cervical back: Normal range of motion and neck supple. No rigidity or tenderness.      Right lower leg: No edema.      Left lower leg: No edema.   Lymphadenopathy:      Cervical: No cervical adenopathy.   Skin:     General: Skin is warm and dry.      Comments: Few superficial laceration on the left forearm, top of the scalp.   Neurological:      General: No focal  "deficit present.      Mental Status: He is alert. Mental status is at baseline. He is disoriented.   Psychiatric:         Mood and Affect: Mood normal.         Behavior: Behavior normal.          Last Recorded Vitals  Blood pressure 148/70, pulse 82, temperature 35.9 °C (96.6 °F), resp. rate 18, height 1.702 m (5' 7\"), weight 69.4 kg (153 lb), SpO2 99%.    Relevant Results  Scheduled medications  atorvastatin, 80 mg, oral, Nightly  brivaracetam, 50 mg, oral, BID  clopidogrel, 75 mg, oral, Daily  divalproex, 500 mg, oral, BID  finasteride, 5 mg, oral, Nightly  insulin lispro, 0-5 Units, subcutaneous, TID  levothyroxine, 100 mcg, oral, Daily before breakfast  losartan, 25 mg, oral, Daily  metFORMIN, 500 mg, oral, BID  metoprolol tartrate, 25 mg, oral, BID  pantoprazole, 40 mg, oral, Daily before breakfast   Or  pantoprazole, 40 mg, intravenous, Daily before breakfast  risperiDONE, 1 mg, oral, Nightly      Continuous medications     PRN medications  PRN medications: acetaminophen **OR** acetaminophen **OR** acetaminophen, dextrose, dextrose, glucagon, glucagon, melatonin, ondansetron **OR** ondansetron, polyethylene glycol     Results for orders placed or performed during the hospital encounter of 05/17/24 (from the past 24 hour(s))   CBC and Auto Differential   Result Value Ref Range    WBC 6.0 4.4 - 11.3 x10*3/uL    nRBC 0.0 0.0 - 0.0 /100 WBCs    RBC 4.12 (L) 4.50 - 5.90 x10*6/uL    Hemoglobin 12.5 (L) 13.5 - 17.5 g/dL    Hematocrit 37.7 (L) 41.0 - 52.0 %    MCV 92 80 - 100 fL    MCH 30.3 26.0 - 34.0 pg    MCHC 33.2 32.0 - 36.0 g/dL    RDW 14.4 11.5 - 14.5 %    Platelets 141 (L) 150 - 450 x10*3/uL    Neutrophils % 61.0 40.0 - 80.0 %    Immature Granulocytes %, Automated 0.3 0.0 - 0.9 %    Lymphocytes % 24.7 13.0 - 44.0 %    Monocytes % 11.6 2.0 - 10.0 %    Eosinophils % 2.2 0.0 - 6.0 %    Basophils % 0.2 0.0 - 2.0 %    Neutrophils Absolute 3.63 1.60 - 5.50 x10*3/uL    Immature Granulocytes Absolute, Automated 0.02 " 0.00 - 0.50 x10*3/uL    Lymphocytes Absolute 1.47 0.80 - 3.00 x10*3/uL    Monocytes Absolute 0.69 0.05 - 0.80 x10*3/uL    Eosinophils Absolute 0.13 0.00 - 0.40 x10*3/uL    Basophils Absolute 0.01 0.00 - 0.10 x10*3/uL   Comprehensive Metabolic Panel   Result Value Ref Range    Glucose 160 (H) 74 - 99 mg/dL    Sodium 141 136 - 145 mmol/L    Potassium 4.4 3.5 - 5.3 mmol/L    Chloride 105 98 - 107 mmol/L    Bicarbonate 29 21 - 32 mmol/L    Anion Gap 11 10 - 20 mmol/L    Urea Nitrogen 19 6 - 23 mg/dL    Creatinine 0.87 0.50 - 1.30 mg/dL    eGFR 85 >60 mL/min/1.73m*2    Calcium 8.6 8.6 - 10.3 mg/dL    Albumin 3.4 3.4 - 5.0 g/dL    Alkaline Phosphatase 41 33 - 136 U/L    Total Protein 6.0 (L) 6.4 - 8.2 g/dL    AST 27 9 - 39 U/L    Bilirubin, Total 0.6 0.0 - 1.2 mg/dL    ALT 17 10 - 52 U/L   Alcohol   Result Value Ref Range    Alcohol <10 <=10 mg/dL   Lactate   Result Value Ref Range    Lactate 2.0 0.4 - 2.0 mmol/L   Protime-INR   Result Value Ref Range    Protime 13.6 (H) 9.8 - 12.8 seconds    INR 1.2 (H) 0.9 - 1.1       XR pelvis 1-2 views    Result Date: 5/17/2024  Interpreted By:  Khadra Bowen, STUDY: XR PELVIS 1-2 VIEWS; ;  5/17/2024 3:13 am   INDICATION: Signs/Symptoms:Trauma.   COMPARISON: None.   ACCESSION NUMBER(S): [id number]   ORDERING CLINICIAN: LESLIE VILLAFUERTE   FINDINGS: Single view pelvis. No acute fracture or malalignment. Mild bilateral hip osteoarthrosis. Degenerative changes noted in the imaged lumbar spine. Soft tissues are within normal limits.       No acute osseous abnormality     MACRO: None   Signed by: Khadra Bowen 5/17/2024 4:04 AM Dictation workstation:   [id number]    XR chest 1 view    Result Date: 5/17/2024  Interpreted By:  Khadra Bowen, STUDY: XR CHEST 1 VIEW;  5/17/2024 3:13 am   INDICATION: Signs/Symptoms:Trauma.   COMPARISON: 02/02/2024   ACCESSION NUMBER(S): [id number]   ORDERING CLINICIAN: LESLIE VILLAFUERTE   FINDINGS:     CARDIOMEDIASTINAL SILHOUETTE: Cardiomediastinal  silhouette is normal in size and configuration.   LUNGS: No pulmonary consolidation, pleural effusion or pneumothorax. Bilateral pleural calcifications.   ABDOMEN: No remarkable upper abdominal findings.   BONES: No acute osseous abnormality.       No acute cardiopulmonary process.   MACRO: None   Signed by: Khadra Bowen 5/17/2024 4:03 AM Dictation workstation:   [id number]    CT thoracic spine wo IV contrast    Result Date: 5/17/2024  Interpreted By:  Khadra Bowen, STUDY: CT CHEST ABDOMEN PELVIS W IV CONTRAST; CT LUMBAR SPINE WO IV CONTRAST; CT THORACIC SPINE WO IV CONTRAST;  5/17/2024 3:30 am   INDICATION: Signs/Symptoms:Trauma.   COMPARISON: CT abdomen pelvis 02/02/2024   ACCESSION NUMBER(S): [id number]; [id number]; [id number]   ORDERING CLINICIAN: LESLIE VILLAFUERTE   TECHNIQUE: Axial CT images of the chest, abdomen and pelvis with coronal and sagittal reconstructed images obtained after intravenous administration of contrast. Axial, sagittal and coronal images of the thoracic and lumbar spine were reconstructed.   FINDINGS: CHEST:   VESSELS: No aortic aneurysm. HEART: Normal size. Mild coronary artery calcifications. No pericardial effusion. MEDIASTINUM AND CARLOS: No pathologically enlarged thoracic lymph nodes. LUNG, PLEURA, LARGE AIRWAYS: No pleural effusion or pneumothorax. Bilateral pleural calcifications. Mild emphysema. No pulmonary consolidation or suspicious pulmonary nodule. CHEST WALL AND LOWER NECK: Within normal limits. No acute osseous abnormality.   ABDOMEN:   BONES: No acute osseous abnormality. ABDOMINAL WALL: Subcutaneous stranding in the left gluteal region consistent with contusion.   LIVER: Within normal limits. BILE DUCTS: No biliary dilatation. GALLBLADDER: No calcified gallstones. No pericholecystic inflammatory changes. PANCREAS: Fatty atrophy of the pancreas. No peripancreatic inflammatory stranding or duct dilatation. SPLEEN: Within normal limits. ADRENALS: Within normal limits.  KIDNEYS AND URETERS: Symmetric renal enhancement. No hydronephrosis or perinephric fluid collection.   VESSELS: The aorta and IVC are within normal limits. Moderate to severe aortic calcification. RETROPERITONEUM: No retroperitoneal hematoma. No pathologically enlarged lymph nodes.   PELVIS:   REPRODUCTIVE ORGANS: Enlarged prostate. BLADDER: Within normal limits.   BOWEL: No dilated bowel.  Normal appendix. PERITONEUM: No ascites, pneumoperitoneum, or fluid collection.   Thoracic spine: ALIGNMENT: Normal. VERTEBRAE: No acute fracture. PREVERTEBRAL SOFT TISSUES: Within normal limits.   SPINAL CANAL: Mild to moderate multilevel degenerative disc changes. No critical spinal canal stenosis.   Lumbar spine: ALIGNMENT: No traumatic malalignment. Mild levoconvex curvature. VERTEBRAE: No acute fracture. Hemangioma in the L1 vertebral body. Mild vertebral body height loss at T12 and L1, is not significantly changed. PREVERTEBRAL SOFT TISSUES: Within normal limits.   SPINAL CANAL: Mild-to-moderate diffuse degenerative disc changes and lumbar facet arthropathy. No significant spinal canal stenosis. Mild bilateral neural foraminal stenosis at L5-S1.       No acute abnormality of the chest, abdomen and pelvis.   Left gluteal subcutaneous hematoma.   No acute fracture or traumatic subluxation of the thoracic and lumbar spine.   MACRO: None   Signed by: Khadra Bowen 5/17/2024 4:02 AM Dictation workstation:   [id number]    CT lumbar spine wo IV contrast    Result Date: 5/17/2024  Interpreted By:  Khadra Bowen, STUDY: CT CHEST ABDOMEN PELVIS W IV CONTRAST; CT LUMBAR SPINE WO IV CONTRAST; CT THORACIC SPINE WO IV CONTRAST;  5/17/2024 3:30 am   INDICATION: Signs/Symptoms:Trauma.   COMPARISON: CT abdomen pelvis 02/02/2024   ACCESSION NUMBER(S): [id number]; [id number]; [id number]   ORDERING CLINICIAN: LESLIE VILLAFUERTE   TECHNIQUE: Axial CT images of the chest, abdomen and pelvis with coronal and sagittal reconstructed images  obtained after intravenous administration of contrast. Axial, sagittal and coronal images of the thoracic and lumbar spine were reconstructed.   FINDINGS: CHEST:   VESSELS: No aortic aneurysm. HEART: Normal size. Mild coronary artery calcifications. No pericardial effusion. MEDIASTINUM AND CARLOS: No pathologically enlarged thoracic lymph nodes. LUNG, PLEURA, LARGE AIRWAYS: No pleural effusion or pneumothorax. Bilateral pleural calcifications. Mild emphysema. No pulmonary consolidation or suspicious pulmonary nodule. CHEST WALL AND LOWER NECK: Within normal limits. No acute osseous abnormality.   ABDOMEN:   BONES: No acute osseous abnormality. ABDOMINAL WALL: Subcutaneous stranding in the left gluteal region consistent with contusion.   LIVER: Within normal limits. BILE DUCTS: No biliary dilatation. GALLBLADDER: No calcified gallstones. No pericholecystic inflammatory changes. PANCREAS: Fatty atrophy of the pancreas. No peripancreatic inflammatory stranding or duct dilatation. SPLEEN: Within normal limits. ADRENALS: Within normal limits. KIDNEYS AND URETERS: Symmetric renal enhancement. No hydronephrosis or perinephric fluid collection.   VESSELS: The aorta and IVC are within normal limits. Moderate to severe aortic calcification. RETROPERITONEUM: No retroperitoneal hematoma. No pathologically enlarged lymph nodes.   PELVIS:   REPRODUCTIVE ORGANS: Enlarged prostate. BLADDER: Within normal limits.   BOWEL: No dilated bowel.  Normal appendix. PERITONEUM: No ascites, pneumoperitoneum, or fluid collection.   Thoracic spine: ALIGNMENT: Normal. VERTEBRAE: No acute fracture. PREVERTEBRAL SOFT TISSUES: Within normal limits.   SPINAL CANAL: Mild to moderate multilevel degenerative disc changes. No critical spinal canal stenosis.   Lumbar spine: ALIGNMENT: No traumatic malalignment. Mild levoconvex curvature. VERTEBRAE: No acute fracture. Hemangioma in the L1 vertebral body. Mild vertebral body height loss at T12 and L1, is not  significantly changed. PREVERTEBRAL SOFT TISSUES: Within normal limits.   SPINAL CANAL: Mild-to-moderate diffuse degenerative disc changes and lumbar facet arthropathy. No significant spinal canal stenosis. Mild bilateral neural foraminal stenosis at L5-S1.       No acute abnormality of the chest, abdomen and pelvis.   Left gluteal subcutaneous hematoma.   No acute fracture or traumatic subluxation of the thoracic and lumbar spine.   MACRO: None   Signed by: Khadra Bowen 5/17/2024 4:02 AM Dictation workstation:   [id number]    CT chest abdomen pelvis w IV contrast    Result Date: 5/17/2024  Interpreted By:  Khadra Bowen, STUDY: CT CHEST ABDOMEN PELVIS W IV CONTRAST; CT LUMBAR SPINE WO IV CONTRAST; CT THORACIC SPINE WO IV CONTRAST;  5/17/2024 3:30 am   INDICATION: Signs/Symptoms:Trauma.   COMPARISON: CT abdomen pelvis 02/02/2024   ACCESSION NUMBER(S): [id number]; [id number]; [id number]   ORDERING CLINICIAN: LESLIE VILLAFUERTE   TECHNIQUE: Axial CT images of the chest, abdomen and pelvis with coronal and sagittal reconstructed images obtained after intravenous administration of contrast. Axial, sagittal and coronal images of the thoracic and lumbar spine were reconstructed.   FINDINGS: CHEST:   VESSELS: No aortic aneurysm. HEART: Normal size. Mild coronary artery calcifications. No pericardial effusion. MEDIASTINUM AND CARLOS: No pathologically enlarged thoracic lymph nodes. LUNG, PLEURA, LARGE AIRWAYS: No pleural effusion or pneumothorax. Bilateral pleural calcifications. Mild emphysema. No pulmonary consolidation or suspicious pulmonary nodule. CHEST WALL AND LOWER NECK: Within normal limits. No acute osseous abnormality.   ABDOMEN:   BONES: No acute osseous abnormality. ABDOMINAL WALL: Subcutaneous stranding in the left gluteal region consistent with contusion.   LIVER: Within normal limits. BILE DUCTS: No biliary dilatation. GALLBLADDER: No calcified gallstones. No pericholecystic inflammatory changes.  PANCREAS: Fatty atrophy of the pancreas. No peripancreatic inflammatory stranding or duct dilatation. SPLEEN: Within normal limits. ADRENALS: Within normal limits. KIDNEYS AND URETERS: Symmetric renal enhancement. No hydronephrosis or perinephric fluid collection.   VESSELS: The aorta and IVC are within normal limits. Moderate to severe aortic calcification. RETROPERITONEUM: No retroperitoneal hematoma. No pathologically enlarged lymph nodes.   PELVIS:   REPRODUCTIVE ORGANS: Enlarged prostate. BLADDER: Within normal limits.   BOWEL: No dilated bowel.  Normal appendix. PERITONEUM: No ascites, pneumoperitoneum, or fluid collection.   Thoracic spine: ALIGNMENT: Normal. VERTEBRAE: No acute fracture. PREVERTEBRAL SOFT TISSUES: Within normal limits.   SPINAL CANAL: Mild to moderate multilevel degenerative disc changes. No critical spinal canal stenosis.   Lumbar spine: ALIGNMENT: No traumatic malalignment. Mild levoconvex curvature. VERTEBRAE: No acute fracture. Hemangioma in the L1 vertebral body. Mild vertebral body height loss at T12 and L1, is not significantly changed. PREVERTEBRAL SOFT TISSUES: Within normal limits.   SPINAL CANAL: Mild-to-moderate diffuse degenerative disc changes and lumbar facet arthropathy. No significant spinal canal stenosis. Mild bilateral neural foraminal stenosis at L5-S1.       No acute abnormality of the chest, abdomen and pelvis.   Left gluteal subcutaneous hematoma.   No acute fracture or traumatic subluxation of the thoracic and lumbar spine.   MACRO: None   Signed by: Khadra Bowen 5/17/2024 4:02 AM Dictation workstation:   [id number]    CT head W O contrast trauma protocol    Result Date: 5/17/2024  Interpreted By:  Khadra Bowen, STUDY: CT HEAD W/O CONTRAST TRAUMA PROTOCOL; CT CERVICAL SPINE WO IV CONTRAST;  5/17/2024 3:26 am   INDICATION: Signs/Symptoms:Trauma.   COMPARISON: Head CT 03/18/2024 CT cervical spine 02/10/2016   ACCESSION NUMBER(S): [id number]; [id number]    ORDERING CLINICIAN: LESLIE VILLAFUERTE   TECHNIQUE: Noncontrast CT images of head. Axial noncontrast CT images of the cervical spine with coronal and sagittal reconstructed images.   FINDINGS: BRAIN PARENCHYMA: Encephalomalacia in the left parietal lobe and focal hypodensities in the left cerebellum, consistent with chronic infarcts. Gray-white matter interfaces are  preserved. No mass effect or midline shift.   HEMORRHAGE: No acute intracranial hemorrhage. VENTRICLES and EXTRA-AXIAL SPACES: The ventricles and sulci are within normal limits in size for brain volume. No abnormal extraaxial fluid collection. EXTRACRANIAL SOFT TISSUES: Within normal limits. PARANASAL SINUSES/MASTOIDS: Mucous retention cyst in the left maxillary sinus. Right mastectomy. CALVARIUM: No depressed skull fracture. No destructive osseous lesion.   OTHER FINDINGS: None.   CERVICAL SPINE:   ALIGNMENT: Normal. VERTEBRAE: No acute fracture. SPINAL CANAL: Mild-to-moderate diffuse degenerative disc changes. No critical spinal canal stenosis. PREVERTEBRAL SOFT TISSUES: No prevertebral soft tissue swelling. LUNG APICES: Imaged portion of the lung apices are within normal limits.   OTHER FINDINGS: None.       No acute intracranial abnormality.   No acute fracture or traumatic subluxation of the cervical spine.       MACRO: None   Signed by: Khadra Bowen 5/17/2024 3:51 AM Dictation workstation:   [id number]    CT cervical spine wo IV contrast    Result Date: 5/17/2024  Interpreted By:  Khadra Bowen, STUDY: CT HEAD W/O CONTRAST TRAUMA PROTOCOL; CT CERVICAL SPINE WO IV CONTRAST;  5/17/2024 3:26 am   INDICATION: Signs/Symptoms:Trauma.   COMPARISON: Head CT 03/18/2024 CT cervical spine 02/10/2016   ACCESSION NUMBER(S): [id number]; [id number]   ORDERING CLINICIAN: LESLIE VILLAFUERTE   TECHNIQUE: Noncontrast CT images of head. Axial noncontrast CT images of the cervical spine with coronal and sagittal reconstructed images.   FINDINGS: BRAIN PARENCHYMA:  Encephalomalacia in the left parietal lobe and focal hypodensities in the left cerebellum, consistent with chronic infarcts. Gray-white matter interfaces are  preserved. No mass effect or midline shift.   HEMORRHAGE: No acute intracranial hemorrhage. VENTRICLES and EXTRA-AXIAL SPACES: The ventricles and sulci are within normal limits in size for brain volume. No abnormal extraaxial fluid collection. EXTRACRANIAL SOFT TISSUES: Within normal limits. PARANASAL SINUSES/MASTOIDS: Mucous retention cyst in the left maxillary sinus. Right mastectomy. CALVARIUM: No depressed skull fracture. No destructive osseous lesion.   OTHER FINDINGS: None.   CERVICAL SPINE:   ALIGNMENT: Normal. VERTEBRAE: No acute fracture. SPINAL CANAL: Mild-to-moderate diffuse degenerative disc changes. No critical spinal canal stenosis. PREVERTEBRAL SOFT TISSUES: No prevertebral soft tissue swelling. LUNG APICES: Imaged portion of the lung apices are within normal limits.   OTHER FINDINGS: None.       No acute intracranial abnormality.   No acute fracture or traumatic subluxation of the cervical spine.       MACRO: None   Signed by: Khadra Bowen 5/17/2024 3:51 AM Dictation workstation:   [id number]    ECG 12 lead    Result Date: 4/23/2024  Normal sinus rhythm Normal ECG When compared with ECG of 18-MAR-2024 07:25, No significant change was found          Assessment/Plan   Principal Problem:    Falls, initial encounter  Active Problems:    HTN (hypertension)    Expressive aphasia    Seizure disorder (Multi)    Type 2 diabetes mellitus with diabetic peripheral angiopathy without gangrene, with long-term current use of insulin (Multi)    Hematoma of left buttock    Moderate late onset Alzheimer's dementia with agitation (Multi)    -Patient was brought in from home after a fall on the stairs 10-12 steps.  No fractures but has minor hematoma of scalp and left gluteal area, as well as minor lacerations of the left arm.    -Will follow-up fall  precautions  -OT/PT evaluation.  -Hold chemical DVT prophylaxis due to hematoma.  -Resume seizure medication Briviact  -Continue with Plavix.  Has history of stroke.  -Metformin for diabetes.  Also use corrective insulin scale.  -Patient is on risperidone for agitation due to dementia will continue.  He was given Haldol in the ER.  -Resume home medication for hypertension.    Isa Hedrick MD

## 2024-05-17 NOTE — ED PROVIDER NOTES
HPI   Chief Complaint   Patient presents with    Fall       Patient is an 83-year-old male presenting to the emergency department via EMS as a limited trauma for fall.  Patient has significant history of dementia and is unable to provide any meaningful information.  Patient is being cared for at home by relatives when he got up and wandered off falling down a flight of stairs.  No reported loss of consciousness.  Chart review shows that patient is on Plavix.  Evaluation in the emergency department shows patient does have minor hematoma to the occipital region as well as a minor skin tear to the left forearm.  Daughter came to the emergency department and confirmed that the patient did fall down a flight of stairs with no apparent reported loss of consciousness.  Patient does have a history of significant dementia and the family was exploring placement in assisted living facility but given tonight's events they do not feel that they can care for him at home any longer and question if he is appropriate for an assisted living facility.      History provided by:  Medical records, EMS personnel and relative  History limited by:  Dementia                      [place] Coma Scale Score: 14                     Patient History   Past Medical History:   Diagnosis Date    Anxiety     Dizziness and giddiness 12/21/2021    Episodic lightheadedness    Other chest pain 12/21/2021    Atypical chest pain    Other chest pain 12/21/2021    Chest pain, musculoskeletal    Personal history of other diseases of male genital organs 11/30/2021    History of erectile dysfunction    Personal history of other specified conditions 05/14/2018    History of urinary incontinence    Seizures (Multi)     Stroke (Multi)      Past Surgical History:   Procedure Laterality Date    MR HEAD ANGIO WO IV CONTRAST  2/24/2021    MR HEAD ANGIO WO IV CONTRAST 2/24/2021 [facility]    MR HEAD ANGIO WO IV CONTRAST  4/24/2021    MR HEAD ANGIO WO IV CONTRAST  2021 [facility]    MR HEAD ANGIO WO IV CONTRAST  2022    MR HEAD ANGIO WO IV CONTRAST 2022 [facility]    MR HEAD ANGIO WO IV CONTRAST  3/22/2023    MR HEAD ANGIO WO IV CONTRAST [facility]    MR NECK ANGIO WO IV CONTRAST  2021    MR NECK ANGIO WO IV CONTRAST 2021 [facility]    MR NECK ANGIO WO IV CONTRAST  2021    MR NECK ANGIO WO IV CONTRAST 2021 [facility]    MR NECK ANGIO WO IV CONTRAST  2022    MR NECK ANGIO WO IV CONTRAST 2022 [facility]    MR NECK ANGIO WO IV CONTRAST  3/22/2023    MR NECK ANGIO WO IV CONTRAST [facility]    OTHER SURGICAL HISTORY  2018    Incision Of Mastoid    OTHER SURGICAL HISTORY  2021    Complete colonoscopy    OTHER SURGICAL HISTORY  2021    Esophagogastroduodenoscopy    OTHER SURGICAL HISTORY  2021    Mastoidectomy    OTHER SURGICAL HISTORY  2021    Renal lithotripsy    OTHER SURGICAL HISTORY  2021    Tonsillectomy with adenoidectomy    TONSILLECTOMY  2018    Tonsillectomy     Family History   Problem Relation Name Age of Onset    No Known Problems Mother      Arthritis Father      Hypertension Father      Diabetes Brother      Other (Stroke syndrome) Brother       Social History     Tobacco Use    Smoking status: Former     Current packs/day: 0.00     Types: Cigarettes     Quit date:      Years since quittin.4    Smokeless tobacco: Never   Substance Use Topics    Alcohol use: Not Currently    Drug use: Not Currently       Physical Exam   ED Triage Vitals   Temperature Heart Rate Respirations BP   24 0300 24 0256 24 0256 24 0256   35.9 °C (96.6 °F) 76 18 148/70      Pulse Ox Temp src Heart Rate Source Patient Position   24 0256 -- 24 0256 24 0256   95 %  Monitor Lying      BP Location FiO2 (%)     -- --             Physical Exam  Vitals and nursing note reviewed.   Constitutional:       General: He is not in  acute distress.     Appearance: Normal appearance. He is not ill-appearing, toxic-appearing or diaphoretic.   HENT:      Head: Normocephalic.      Comments: Hematoma to the superior occipital scalp.     Nose: Nose normal.      Mouth/Throat:      Mouth: Mucous membranes are moist.      Pharynx: No oropharyngeal exudate or posterior oropharyngeal erythema.   Eyes:      General: No scleral icterus.     Extraocular Movements: Extraocular movements intact.      Pupils: Pupils are equal, round, and reactive to light.   Cardiovascular:      Rate and Rhythm: Normal rate and regular rhythm.      Pulses: Normal pulses.      Heart sounds: Normal heart sounds. No murmur heard.     No friction rub. No gallop.   Pulmonary:      Effort: Pulmonary effort is normal. No respiratory distress.      Breath sounds: Normal breath sounds. No stridor. No wheezing, rhonchi or rales.   Chest:      Chest wall: No tenderness.   Abdominal:      General: Abdomen is flat. There is no distension.      Palpations: Abdomen is soft. There is no mass.      Tenderness: There is no abdominal tenderness. There is no guarding.      Hernia: No hernia is present.   Musculoskeletal:         General: Tenderness present. No swelling, deformity or signs of injury. Normal range of motion.      Cervical back: Normal range of motion and neck supple. No rigidity.      Comments: Diffuse tenderness of the lumbar spine without any step-offs deformities crepitus or abrasions.   Skin:     General: Skin is warm and dry.      Capillary Refill: Capillary refill takes less than 2 seconds.      Coloration: Skin is not jaundiced or pale.      Findings: No bruising, erythema or rash.      Comments: Skin tear noted to the left forearm just distal to the elbow.   Neurological:      Mental Status: He is alert. Mental status is at baseline.         ED Course & MDM   Diagnoses as of 05/17/24 0509   Fall, initial encounter   Hematoma   Severe dementia, unspecified dementia type,  unspecified whether behavioral, psychotic, or mood disturbance or anxiety (Multi)       Medical Decision Making  Patient is an 83-year-old male presenting to the emergency department as a limited trauma after falling down a flight of stairs.  Cervical collar was in place.  Vitals signs reviewed.  Labs and pan scan CTs ordered per trauma protocol.  Chest x-ray and pelvic x-ray done in trauma bay negative for acute injuries.  Labs reviewed without concerning findings.  CT scans were negative for intracranial injuries or fractures to the C/T/L-spine.  CT chest abdomen pelvis with a left gluteal subcutaneous hematoma but otherwise no traumatic injuries.  Spoke to the patient's daughter who is at bedside who reiterates that she does not feel safe caring for the patient at home given him wandering off and falling down a flight of stairs and thus admission for placement was recommended. Patient was becoming agitated and attempting to get out of bed and take off all monitoring equipment and Haldol was ordered.  Case was discussed with trauma surgery who recommended admission to medicine for placement.  Hospital service was contacted and case discussed, the patient was accepted for admission.      Amount and/or Complexity of Data Reviewed  Labs: ordered. Decision-making details documented in ED Course.  Radiology: ordered. Decision-making details documented in ED Course.  ECG/medicine tests: independent interpretation performed.     Details: 0329 hrs.: Sinus rhythm with a ventricular rate of 94 bpm.  QRS interval 82 ms.  QTc 422 ms.  Normal axis.  No ischemic injury pattern appreciated.      Labs Reviewed   CBC WITH AUTO DIFFERENTIAL - Abnormal       Result Value    WBC 6.0      nRBC 0.0      RBC 4.12 (*)     Hemoglobin 12.5 (*)     Hematocrit 37.7 (*)     MCV 92      MCH 30.3      MCHC 33.2      RDW 14.4      Platelets 141 (*)     Neutrophils % 61.0      Immature Granulocytes %, Automated 0.3      Lymphocytes % 24.7       Monocytes % 11.6      Eosinophils % 2.2      Basophils % 0.2      Neutrophils Absolute 3.63      Immature Granulocytes Absolute, Automated 0.02      Lymphocytes Absolute 1.47      Monocytes Absolute 0.69      Eosinophils Absolute 0.13      Basophils Absolute 0.01     COMPREHENSIVE METABOLIC PANEL - Abnormal    Glucose 160 (*)     Sodium 141      Potassium 4.4      Chloride 105      Bicarbonate 29      Anion Gap 11      Urea Nitrogen 19      Creatinine 0.87      eGFR 85      Calcium 8.6      Albumin 3.4      Alkaline Phosphatase 41      Total Protein 6.0 (*)     AST 27      Bilirubin, Total 0.6      ALT 17     PROTIME-INR - Abnormal    Protime 13.6 (*)     INR 1.2 (*)    ALCOHOL - Normal    Alcohol <10     LACTATE - Normal    Lactate 2.0      Narrative:     Venipuncture immediately after or during the administration of Metamizole may lead to falsely low results. Testing should be performed immediately  prior to Metamizole dosing.   TYPE AND SCREEN     CT thoracic spine wo IV contrast   Final Result   No acute abnormality of the chest, abdomen and pelvis.        Left gluteal subcutaneous hematoma.        No acute fracture or traumatic subluxation of the thoracic and lumbar   spine.        MACRO:   None        Signed by: Khadra Bowen 5/17/2024 4:02 AM   Dictation workstation:   [id number]      CT lumbar spine wo IV contrast   Final Result   No acute abnormality of the chest, abdomen and pelvis.        Left gluteal subcutaneous hematoma.        No acute fracture or traumatic subluxation of the thoracic and lumbar   spine.        MACRO:   None        Signed by: Khadra Bowen 5/17/2024 4:02 AM   Dictation workstation:   [id number]      CT chest abdomen pelvis w IV contrast   Final Result   No acute abnormality of the chest, abdomen and pelvis.        Left gluteal subcutaneous hematoma.        No acute fracture or traumatic subluxation of the thoracic and lumbar   spine.        MACRO:   None        Signed by: Khadra  Jessi 5/17/2024 4:02 AM   Dictation workstation:   [id number]      CT head W O contrast trauma protocol   Final Result   No acute intracranial abnormality.        No acute fracture or traumatic subluxation of the cervical spine.                  MACRO:   None        Signed by: Khadra Bowen 5/17/2024 3:51 AM   Dictation workstation:   [id number]      CT cervical spine wo IV contrast   Final Result   No acute intracranial abnormality.        No acute fracture or traumatic subluxation of the cervical spine.                  MACRO:   None        Signed by: Khadra Bowen 5/17/2024 3:51 AM   Dictation workstation:   [id number]      XR chest 1 view   Final Result   No acute cardiopulmonary process.        MACRO:   None        Signed by: Khadra Bowen 5/17/2024 4:03 AM   Dictation workstation:   [id number]      XR pelvis 1-2 views   Final Result   No acute osseous abnormality             MACRO:   None        Signed by: Khadra Bowen 5/17/2024 4:04 AM   Dictation workstation:   [id number]            Procedure  Procedures     Richard Tyler DO  05/17/24 0510

## 2024-05-17 NOTE — TELEPHONE ENCOUNTER
Jose Guardado's wife called and states she does not want any information given to Jose Guardado she wants any info given to  (the daughter) and wife she would also like a call to [phone] she stated her father was rapidly declining and now assisted living will not take him because they believe he needs something more than just assisted care she states he cannot even tell anyone his name and is currently in the ED.     Returned phone call to [phone]. No response. Left  to call the office back [phone]     June Simon MD

## 2024-05-18 LAB
ALBUMIN SERPL BCP-MCNC: 3.1 G/DL (ref 3.4–5)
ALP SERPL-CCNC: 38 U/L (ref 33–136)
ALT SERPL W P-5'-P-CCNC: 17 U/L (ref 10–52)
ANION GAP SERPL CALC-SCNC: 11 MMOL/L (ref 10–20)
AST SERPL W P-5'-P-CCNC: 31 U/L (ref 9–39)
ATRIAL RATE: 94 BPM
BILIRUB SERPL-MCNC: 0.4 MG/DL (ref 0–1.2)
BUN SERPL-MCNC: 13 MG/DL (ref 6–23)
C DIF TOX TCDA+TCDB STL QL NAA+PROBE: NOT DETECTED
CALCIUM SERPL-MCNC: 7.9 MG/DL (ref 8.6–10.3)
CHLORIDE SERPL-SCNC: 105 MMOL/L (ref 98–107)
CO2 SERPL-SCNC: 28 MMOL/L (ref 21–32)
CREAT SERPL-MCNC: 0.76 MG/DL (ref 0.5–1.3)
EGFRCR SERPLBLD CKD-EPI 2021: 89 ML/MIN/1.73M*2
ERYTHROCYTE [DISTWIDTH] IN BLOOD BY AUTOMATED COUNT: 14.1 % (ref 11.5–14.5)
GLUCOSE BLD MANUAL STRIP-MCNC: 129 MG/DL (ref 74–99)
GLUCOSE BLD MANUAL STRIP-MCNC: 157 MG/DL (ref 74–99)
GLUCOSE BLD MANUAL STRIP-MCNC: 171 MG/DL (ref 74–99)
GLUCOSE BLD MANUAL STRIP-MCNC: 220 MG/DL (ref 74–99)
GLUCOSE SERPL-MCNC: 186 MG/DL (ref 74–99)
HCT VFR BLD AUTO: 37.2 % (ref 41–52)
HGB BLD-MCNC: 12.4 G/DL (ref 13.5–17.5)
MCH RBC QN AUTO: 30.2 PG (ref 26–34)
MCHC RBC AUTO-ENTMCNC: 33.3 G/DL (ref 32–36)
MCV RBC AUTO: 91 FL (ref 80–100)
NRBC BLD-RTO: 0 /100 WBCS (ref 0–0)
P AXIS: 75 DEGREES
P OFFSET: 203 MS
P ONSET: 151 MS
PLATELET # BLD AUTO: 117 X10*3/UL (ref 150–450)
POTASSIUM SERPL-SCNC: 3.6 MMOL/L (ref 3.5–5.3)
PR INTERVAL: 146 MS
PROT SERPL-MCNC: 5.5 G/DL (ref 6.4–8.2)
Q ONSET: 224 MS
QRS COUNT: 15 BEATS
QRS DURATION: 82 MS
QT INTERVAL: 338 MS
QTC CALCULATION(BAZETT): 422 MS
QTC FREDERICIA: 392 MS
R AXIS: 51 DEGREES
RBC # BLD AUTO: 4.11 X10*6/UL (ref 4.5–5.9)
SODIUM SERPL-SCNC: 140 MMOL/L (ref 136–145)
T AXIS: 8 DEGREES
T OFFSET: 393 MS
VENTRICULAR RATE: 94 BPM
WBC # BLD AUTO: 5.9 X10*3/UL (ref 4.4–11.3)

## 2024-05-18 PROCEDURE — 2500000004 HC RX 250 GENERAL PHARMACY W/ HCPCS (ALT 636 FOR OP/ED): Performed by: INTERNAL MEDICINE

## 2024-05-18 PROCEDURE — 36415 COLL VENOUS BLD VENIPUNCTURE: CPT | Performed by: INTERNAL MEDICINE

## 2024-05-18 PROCEDURE — 99232 SBSQ HOSP IP/OBS MODERATE 35: CPT | Performed by: STUDENT IN AN ORGANIZED HEALTH CARE EDUCATION/TRAINING PROGRAM

## 2024-05-18 PROCEDURE — 2500000001 HC RX 250 WO HCPCS SELF ADMINISTERED DRUGS (ALT 637 FOR MEDICARE OP): Performed by: INTERNAL MEDICINE

## 2024-05-18 PROCEDURE — 99221 1ST HOSP IP/OBS SF/LOW 40: CPT | Performed by: PSYCHIATRY & NEUROLOGY

## 2024-05-18 PROCEDURE — 99222 1ST HOSP IP/OBS MODERATE 55: CPT | Performed by: STUDENT IN AN ORGANIZED HEALTH CARE EDUCATION/TRAINING PROGRAM

## 2024-05-18 PROCEDURE — G0378 HOSPITAL OBSERVATION PER HR: HCPCS

## 2024-05-18 PROCEDURE — 87493 C DIFF AMPLIFIED PROBE: CPT | Performed by: STUDENT IN AN ORGANIZED HEALTH CARE EDUCATION/TRAINING PROGRAM

## 2024-05-18 PROCEDURE — 85027 COMPLETE CBC AUTOMATED: CPT | Performed by: INTERNAL MEDICINE

## 2024-05-18 PROCEDURE — 82947 ASSAY GLUCOSE BLOOD QUANT: CPT

## 2024-05-18 PROCEDURE — 2500000006 HC RX 250 W HCPCS SELF ADMINISTERED DRUGS (ALT 637 FOR ALL PAYERS): Performed by: INTERNAL MEDICINE

## 2024-05-18 PROCEDURE — 2500000005 HC RX 250 GENERAL PHARMACY W/O HCPCS: Performed by: INTERNAL MEDICINE

## 2024-05-18 PROCEDURE — C9113 INJ PANTOPRAZOLE SODIUM, VIA: HCPCS | Performed by: INTERNAL MEDICINE

## 2024-05-18 PROCEDURE — 80053 COMPREHEN METABOLIC PANEL: CPT | Performed by: INTERNAL MEDICINE

## 2024-05-18 RX ADMIN — INSULIN LISPRO 1 UNITS: 100 INJECTION, SOLUTION INTRAVENOUS; SUBCUTANEOUS at 11:49

## 2024-05-18 RX ADMIN — VALPROATE SODIUM 500 MG: 100 INJECTION, SOLUTION INTRAVENOUS at 17:34

## 2024-05-18 RX ADMIN — FINASTERIDE 5 MG: 5 TABLET, FILM COATED ORAL at 21:46

## 2024-05-18 RX ADMIN — INSULIN LISPRO 1 UNITS: 100 INJECTION, SOLUTION INTRAVENOUS; SUBCUTANEOUS at 09:51

## 2024-05-18 RX ADMIN — SODIUM CHLORIDE, SODIUM LACTATE, POTASSIUM CHLORIDE, CALCIUM CHLORIDE AND DEXTROSE MONOHYDRATE 75 ML/HR: 5; 600; 310; 30; 20 INJECTION, SOLUTION INTRAVENOUS at 12:46

## 2024-05-18 RX ADMIN — ATORVASTATIN CALCIUM 80 MG: 80 TABLET, FILM COATED ORAL at 21:46

## 2024-05-18 RX ADMIN — LEVOTHYROXINE SODIUM 100 MCG: 0.1 TABLET ORAL at 10:44

## 2024-05-18 RX ADMIN — METOPROLOL TARTRATE 25 MG: 25 TABLET, FILM COATED ORAL at 10:45

## 2024-05-18 RX ADMIN — VALPROATE SODIUM 500 MG: 100 INJECTION, SOLUTION INTRAVENOUS at 06:48

## 2024-05-18 RX ADMIN — HALOPERIDOL LACTATE 1 MG: 5 INJECTION, SOLUTION INTRAMUSCULAR at 01:10

## 2024-05-18 RX ADMIN — METFORMIN HYDROCHLORIDE 500 MG: 500 TABLET, FILM COATED ORAL at 10:45

## 2024-05-18 RX ADMIN — METOPROLOL TARTRATE 25 MG: 25 TABLET, FILM COATED ORAL at 21:47

## 2024-05-18 RX ADMIN — METFORMIN HYDROCHLORIDE 500 MG: 500 TABLET, FILM COATED ORAL at 17:34

## 2024-05-18 RX ADMIN — PANTOPRAZOLE SODIUM 40 MG: 40 INJECTION, POWDER, FOR SOLUTION INTRAVENOUS at 06:49

## 2024-05-18 RX ADMIN — RISPERIDONE 1 MG: 1 TABLET ORAL at 21:46

## 2024-05-18 RX ADMIN — LOSARTAN POTASSIUM 25 MG: 25 TABLET, FILM COATED ORAL at 10:44

## 2024-05-18 RX ADMIN — CLOPIDOGREL BISULFATE 75 MG: 75 TABLET, FILM COATED ORAL at 10:44

## 2024-05-18 ASSESSMENT — PAIN SCALES - GENERAL
PAINLEVEL_OUTOF10: 0 - NO PAIN
PAINLEVEL_OUTOF10: 0 - NO PAIN

## 2024-05-18 ASSESSMENT — COGNITIVE AND FUNCTIONAL STATUS - GENERAL
MOVING TO AND FROM BED TO CHAIR: A LOT
WALKING IN HOSPITAL ROOM: A LOT
DRESSING REGULAR UPPER BODY CLOTHING: A LITTLE
STANDING UP FROM CHAIR USING ARMS: A LITTLE
DRESSING REGULAR LOWER BODY CLOTHING: A LITTLE
DAILY ACTIVITIY SCORE: 18
EATING MEALS: A LITTLE
HELP NEEDED FOR BATHING: A LITTLE
MOVING FROM LYING ON BACK TO SITTING ON SIDE OF FLAT BED WITH BEDRAILS: A LITTLE
TURNING FROM BACK TO SIDE WHILE IN FLAT BAD: A LITTLE
TOILETING: A LITTLE
CLIMB 3 TO 5 STEPS WITH RAILING: A LOT
PERSONAL GROOMING: A LITTLE
MOBILITY SCORE: 15

## 2024-05-18 NOTE — CARE PLAN
The patient's goals for the shift include      The clinical goals for the shift include kevintey

## 2024-05-18 NOTE — PROGRESS NOTES
ASSESSMENT & PLAN:       #Status post fall  #Left gluteal hematoma  Monitor H&H - remains stable     #History of CVA with expressive aphasia  #Progressive dementia with behavioral disturbance  One-to-one sitter as needed  Fall precaution  PT/OT eval  Patient has had progressive decline in mentation with aggressive behavior, will get psych eval. Would likely benefit from geropsych placement.   Currently on restraints for safety  Haldol as needed IM for agitation  Continue Depakote and Risperdal  Psych consult pending, recs appreiated     #Seizure disorder  #History of hypertension  Continue home medications     #Type 2 diabetes  #Peripheral neuropathy  Continue insulin sliding scale  Diabetic diet     #DVT prophylaxis SCDs due to hematoma         Popeye Henderson MD    SUBJECTIVE     Patient had no acute events overnight.  In soft restraints at time of evaluation due to agitation in order to protect himself and staff.  Resting at time of evaluation.  Further ROS limited secondary to patient condition.    OBJECTIVE:       Last Recorded Vitals:  Vitals:    05/17/24 2327 05/18/24 0043 05/18/24 0413 05/18/24 0700   BP: (!) 151/111 149/63 134/79 166/73   BP Location: Left arm Left arm Left arm Left arm   Patient Position: Lying Lying Lying Lying   Pulse: 87  86    Resp: 20  18 19   Temp: 37.6 °C (99.7 °F)  36.9 °C (98.4 °F) 36.8 °C (98.2 °F)   TempSrc: Temporal  Temporal Temporal   SpO2: 93%  98%    Weight:       Height:           Last I/O:  I/O last 3 completed shifts:  In: 446.3 (6.6 mL/kg) [I.V.:396.3 (5.8 mL/kg); IV Piggyback:50]  Out: 130 (1.9 mL/kg) [Urine:130 (0.1 mL/kg/hr)]  Weight: 67.9 kg     Physical Exam:  General: Well-developed elderly male in mild distress  Respiratory: Equal chest rise, no retractions  Abdomen: Soft, nontender, nondistended  Extremities: No cyanosis or clubbing appreciated  Neurological: Disoriented, spontaneously moves all extremities  Skin: Warm, dry    Inpatient Medications:  atorvastatin,  80 mg, oral, Nightly  brivaracetam, 50 mg, intravenous, q12h HAYDEE  clopidogrel, 75 mg, oral, Daily  finasteride, 5 mg, oral, Nightly  insulin lispro, 0-5 Units, subcutaneous, TID  levothyroxine, 100 mcg, oral, Daily before breakfast  losartan, 25 mg, oral, Daily  metFORMIN, 500 mg, oral, BID  metoprolol tartrate, 25 mg, oral, BID  pantoprazole, 40 mg, oral, Daily before breakfast   Or  pantoprazole, 40 mg, intravenous, Daily before breakfast  risperiDONE, 1 mg, oral, Nightly  valproate sodium, 500 mg, intravenous, q12h        PRN Medications  PRN medications: acetaminophen **OR** acetaminophen **OR** acetaminophen, dextrose, dextrose, glucagon, glucagon, haloperidol lactate, haloperidol lactate, melatonin, ondansetron **OR** ondansetron, polyethylene glycol    Continuous Medications:  dextrose 5 % and lactated Ringer's, 75 mL/hr, Last Rate: 75 mL/hr (05/18/24 0424)          LABS AND IMAGING:     Labs:  Results for orders placed or performed during the hospital encounter of 05/17/24 (from the past 24 hour(s))   Type And Screen   Result Value Ref Range    ABO TYPE O     Rh TYPE POS     ANTIBODY SCREEN NEG    POCT GLUCOSE   Result Value Ref Range    POCT Glucose 135 (H) 74 - 99 mg/dL   ECG 12 lead   Result Value Ref Range    Ventricular Rate 94 BPM    Atrial Rate 94 BPM    RI Interval 146 ms    QRS Duration 82 ms    QT Interval 338 ms    QTC Calculation(Bazett) 422 ms    P Axis 75 degrees    R Axis 51 degrees    T Axis 8 degrees    QRS Count 15 beats    Q Onset 224 ms    P Onset 151 ms    P Offset 203 ms    T Offset 393 ms    QTC Fredericia 392 ms   POCT GLUCOSE   Result Value Ref Range    POCT Glucose 151 (H) 74 - 99 mg/dL   POCT GLUCOSE   Result Value Ref Range    POCT Glucose 140 (H) 74 - 99 mg/dL   CBC   Result Value Ref Range    WBC 5.9 4.4 - 11.3 x10*3/uL    nRBC 0.0 0.0 - 0.0 /100 WBCs    RBC 4.11 (L) 4.50 - 5.90 x10*6/uL    Hemoglobin 12.4 (L) 13.5 - 17.5 g/dL    Hematocrit 37.2 (L) 41.0 - 52.0 %    MCV 91 80 -  100 fL    MCH 30.2 26.0 - 34.0 pg    MCHC 33.3 32.0 - 36.0 g/dL    RDW 14.1 11.5 - 14.5 %    Platelets 117 (L) 150 - 450 x10*3/uL   Comprehensive metabolic panel   Result Value Ref Range    Glucose 186 (H) 74 - 99 mg/dL    Sodium 140 136 - 145 mmol/L    Potassium 3.6 3.5 - 5.3 mmol/L    Chloride 105 98 - 107 mmol/L    Bicarbonate 28 21 - 32 mmol/L    Anion Gap 11 10 - 20 mmol/L    Urea Nitrogen 13 6 - 23 mg/dL    Creatinine 0.76 0.50 - 1.30 mg/dL    eGFR 89 >60 mL/min/1.73m*2    Calcium 7.9 (L) 8.6 - 10.3 mg/dL    Albumin 3.1 (L) 3.4 - 5.0 g/dL    Alkaline Phosphatase 38 33 - 136 U/L    Total Protein 5.5 (L) 6.4 - 8.2 g/dL    AST 31 9 - 39 U/L    Bilirubin, Total 0.4 0.0 - 1.2 mg/dL    ALT 17 10 - 52 U/L   POCT GLUCOSE   Result Value Ref Range    POCT Glucose 171 (H) 74 - 99 mg/dL        Imaging:  ECG 12 lead  Normal sinus rhythm  Normal ECG  When compared with ECG of 23-APR-2024 07:44, (unconfirmed)  Nonspecific T wave abnormality now evident in Inferior leads  See ED provider note for full interpretation and clinical correlation  Confirmed by Ximena Rivas ([id number]) on 5/18/2024 10:29:11 AM

## 2024-05-18 NOTE — PROGRESS NOTES
Occupational Therapy                 Therapy Communication Note    Patient Name: Jose Guardado  MRN: [id number]  Today's Date: 5/18/2024     Discipline: Occupational Therapy    Missed Visit Reason: Missed Visit Reason:  (OT eval attempted. Pt with 1:1 sitter, in JD soft wrist restraints d/t agitation. Very lethargic. RN aware. Will reattempt when pt is more alert and able to participate.)    Missed Time: Attempt

## 2024-05-18 NOTE — PROGRESS NOTES
Physical Therapy                 Therapy Communication Note    Patient Name: Jose Guardado  MRN: [id number]  Today's Date: 5/18/2024     Discipline: Physical Therapy    Missed Visit Reason: Missed Visit Reason:  (PT eval attempted. Pt with 1:1 sitter, in JD soft wrist restraints d/t agitation and being combative. Very lethargic. RN aware. Will reattempt when pt is more alert and able to participate.)    Missed Time: Attempt 0920    Comment: Per RN, pt. Received Haldol this AM

## 2024-05-18 NOTE — CONSULTS
Inpatient consult to Neurology  Consult performed by: June Simon MD  Consult ordered by: Harmony Phillips MD      History Of Present Illness  Jose Guardado is a 83 y.o. male presenting with history of seizure disorder, left hemispheric stroke, dementia with behavioral disturbances who presented with falls and lethargy.    Patient is well-known to me, has been following in my outpatient clinic since 2021.  He has been on Briviact 50 mg twice daily for several years with well-controlled seizure disorder, semiology includes expressive aphasia.      In February 2024 he started experiencing progressive cognitive decline with agitated behaviors.  At that point Depakote was started for the agitated behaviors.  This was initially 125 mg twice daily but has been gradually uptitrated.  He was admitted to [facility] inpatient psychiatry unit for agitated behaviors from 4/25/2024 and discharged on 5/2/2024.  At that time psychiatry made the decision to uptitrate Depakote to 250 mg twice daily with risperidone 1 mg daily    I saw him in clinic on 5/7/2024.  At that time the decision was made to increase the Depakote to 500 mg twice daily again for treatment of the agitated behaviors.  However over the past week he has had worsening lethargy and gait instability.  Telephone encounter 5/17/2024 the recommendation was to reduce the Depakote back to 250 twice daily and continue the risperidone 1 mg daily.    He was admitted to [facility] on 5/17/2024 due to progressive decline in gait instability and a fall downstairs in the middle of the night.    He is a limited historian. No family was at bedside. But he reports feeling well and wanted ice cream.     Past Medical History  Past Medical History:   Diagnosis Date    Anxiety     Dizziness and giddiness 12/21/2021    Episodic lightheadedness    Other chest pain 12/21/2021    Atypical chest pain    Other chest pain 12/21/2021    Chest pain, musculoskeletal    Personal history of  other diseases of male genital organs 2021    History of erectile dysfunction    Personal history of other specified conditions 2018    History of urinary incontinence    Seizures (Multi)     Stroke (Multi)      Surgical History  Past Surgical History:   Procedure Laterality Date    MR HEAD ANGIO WO IV CONTRAST  2021    MR HEAD ANGIO WO IV CONTRAST 2021 [facility]    MR HEAD ANGIO WO IV CONTRAST  2021    MR HEAD ANGIO WO IV CONTRAST 2021 [facility]    MR HEAD ANGIO WO IV CONTRAST  2022    MR HEAD ANGIO WO IV CONTRAST 2022 [facility]GAC[facility]    MR HEAD ANGIO WO IV CONTRAST  3/22/2023    MR HEAD ANGIO WO IV CONTRAST [facility]    MR NECK ANGIO WO IV CONTRAST  2021    MR NECK ANGIO WO IV CONTRAST 2021 [facility]    MR NECK ANGIO WO IV CONTRAST  2021    MR NECK ANGIO WO IV CONTRAST 2021 [facility]    MR NECK ANGIO WO IV CONTRAST  2022    MR NECK ANGIO WO IV CONTRAST 2022 [facility] OFFICE [facility]GAC[facility]    MR NECK ANGIO WO IV CONTRAST  3/22/2023    MR NECK ANGIO WO IV CONTRAST [facility]    OTHER SURGICAL HISTORY  2018    Incision Of Mastoid    OTHER SURGICAL HISTORY  2021    Complete colonoscopy    OTHER SURGICAL HISTORY  2021    Esophagogastroduodenoscopy    OTHER SURGICAL HISTORY  2021    Mastoidectomy    OTHER SURGICAL HISTORY  2021    Renal lithotripsy    OTHER SURGICAL HISTORY  2021    Tonsillectomy with adenoidectomy    TONSILLECTOMY  2018    Tonsillectomy     Social History  Social History     Tobacco Use    Smoking status: Former     Current packs/day: 0.00     Types: Cigarettes     Quit date:      Years since quittin.4    Smokeless tobacco: Never   Substance Use Topics    Alcohol use: Not Currently    Drug use: Not Currently     Allergies  Keppra [levetiracetam]  Medications Prior to Admission   Medication Sig Dispense Refill Last Dose    clopidogrel (Plavix) 75 mg tablet  Take 1 tablet (75 mg) by mouth once daily. 30 tablet 0 5/16/2024    atorvastatin (Lipitor) 80 mg tablet Take 1 tablet (80 mg) by mouth once daily at bedtime. 30 tablet 0     Briviact 50 mg tablet tablet Take 1 tablet (50 mg) by mouth 2 times a day. 180 tablet 1     divalproex (Depakote) 500 mg EC tablet Take 1 tablet (500 mg) by mouth 2 times a day. Do not crush, chew, or split. (Patient taking differently: Take 250 mg by mouth 2 times a day. Do not crush, chew, or split.) 60 tablet 11     finasteride (Proscar) 5 mg tablet Take 1 tablet (5 mg) by mouth once daily at bedtime. 30 tablet 0     FreeStyle Doug 2 Sensor kit Use as instructed 1 each 11     insulin glargine (Lantus U-100 Insulin) 100 unit/mL injection Inject 12 Units under the skin once daily at bedtime. Take as directed per insulin instructions. (Patient taking differently: Inject 15 Units under the skin once daily at bedtime. Take as directed per insulin instructions.) 10 mL 0     levothyroxine (Synthroid, Levoxyl) 100 mcg tablet Take 1 tablet (100 mcg) by mouth once daily in the morning. Take before meals. 30 tablet 0     losartan (Cozaar) 25 mg tablet Take 1 tablet (25 mg) by mouth once daily. 30 tablet 0     melatonin 3 mg tablet Take 1 tablet (3 mg) by mouth once daily at bedtime. 60 tablet 0     metFORMIN (Glucophage) 500 mg tablet Take 1 tablet (500 mg) by mouth 2 times a day with meals. 60 tablet 0     metoprolol tartrate (Lopressor) 25 mg tablet Take 1 tablet (25 mg) by mouth 2 times a day. 60 tablet 0     pantoprazole (ProtoNix) 40 mg EC tablet Take 1 tablet (40 mg) by mouth once daily. 30 tablet 0     risperiDONE (RisperDAL) 1 mg tablet Take 1 tablet (1 mg) by mouth once daily at bedtime. 30 tablet 0        Review of Systems  Neurological Exam  Mental Status  Awake and alert. Oriented only to person. Speech is normal. Able to name objects and repeat. Follows one-step commands.  Oriented to hospital  Stated month was July  Limited  "historian  .    Cranial Nerves  CN III, IV, VI: Extraocular movements intact bilaterally.  CN VII: Full and symmetric facial movement.  CN VIII: Hearing is normal.    Motor   Strength is 5/5 throughout all four extremities.    Gait  Casual gait is normal including stance, stride, and arm swing.    Physical Exam  Constitutional:       General: He is awake.   Eyes:      Extraocular Movements: Extraocular movements intact.   Neurological:      Mental Status: He is alert.      Motor: Motor strength is normal.  Psychiatric:         Speech: Speech normal.       Last Recorded Vitals  Blood pressure 149/69, pulse 86, temperature 36.5 °C (97.7 °F), temperature source Temporal, resp. rate 19, height 1.702 m (5' 7.01\"), weight 67.9 kg (149 lb 12.8 oz), SpO2 96%.    Relevant Results  Reviewed. CT head showed no acute findings      Assessment/Plan   Principal Problem:    Falls, initial encounter  Active Problems:    HTN (hypertension)    Expressive aphasia    Seizure disorder (Multi)    Type 2 diabetes mellitus with diabetic peripheral angiopathy without gangrene, with long-term current use of insulin (Multi)    Hematoma of left buttock    Moderate late onset Alzheimer's dementia with agitation (Multi)    Depakote was only used for agitated behaviors.  Will defer to psychiatry in adjusting the the dose taper off or use alternative medications as they see appropriate    Please continue Briviact 50 mg twice daily for treatment of his seizures and clopidogrel for secondary stroke prevention     June Simon MD  "

## 2024-05-18 NOTE — PROGRESS NOTES
"Jose Guardado is a 83 y.o. male on day 0 of admission presenting with Falls, initial encounter.      Psychiatry consultation    Reason for consultation  Dementia with behavioral disturbance    History of present illness  This is a 83-year-old  white male who presented to [facility] from home after he fell down the stairs.  Medical workup revealed no acute bleed.  He sustained left forearm laceration superficial.  Records indicated that he received Haldol in the emergency room as he was combative  I talked to his nurse at bedside.  He was combative and he seems to be sundowning.  He was on restraints.    The patient has a diagnosis of vascular dementia was most recently discharged from geriatric psychiatry.  At that time, his valproic acid was increased to 500 twice a day and risperidone was started  Records also indicated that he followed up with neurology and he has history of seizure.   He was admitted to the geriatric psychiatry unit for increased aggression and paranoia  He does not know why he is in the hospital.  He thinks that he might have had stroke.  He says he is doing okay.  He was in and out of sleep.  He seemed drowsy to me.  I attempted to call his wife but did not get a hold of her      Physical Exam  He was drowsy but oriented to self not to month or date.  He was oriented to the year.  He thought he was at [facility].  His mood was anxious.  Affect was restricted.  No thoughts of suicide.  He did not appear to attend or respond to internal stimuli  He did not express any delusions.  Attention is significantly impaired.  His memory is impaired.  Insight and judgment were impaired  He had some aphasia.  For instance he was misplacing the words like he said look into my sugar and instead of saying to look into my chart  Last Recorded Vitals  Blood pressure 149/69, pulse 86, temperature 36.5 °C (97.7 °F), temperature source Temporal, resp. rate 19, height 1.702 m (5' 7.01\"), " weight 67.9 kg (149 lb 12.8 oz), SpO2 96%.  Intake/Output last 3 Shifts:  I/O last 3 completed shifts:  In: 446.3 (6.6 mL/kg) [I.V.:396.3 (5.8 mL/kg); IV Piggyback:50]  Out: 130 (1.9 mL/kg) [Urine:130 (0.1 mL/kg/hr)]  Weight: 67.9 kg       Scheduled medications  atorvastatin, 80 mg, oral, Nightly  brivaracetam, 50 mg, intravenous, q12h HAYDEE  clopidogrel, 75 mg, oral, Daily  finasteride, 5 mg, oral, Nightly  insulin lispro, 0-5 Units, subcutaneous, TID  levothyroxine, 100 mcg, oral, Daily before breakfast  losartan, 25 mg, oral, Daily  metFORMIN, 500 mg, oral, BID  metoprolol tartrate, 25 mg, oral, BID  pantoprazole, 40 mg, oral, Daily before breakfast   Or  pantoprazole, 40 mg, intravenous, Daily before breakfast  risperiDONE, 1 mg, oral, Nightly  valproate sodium, 500 mg, intravenous, q12h      Continuous medications  dextrose 5 % and lactated Ringer's, 75 mL/hr, Last Rate: 75 mL/hr (05/18/24 0424)      PRN medications  PRN medications: acetaminophen **OR** acetaminophen **OR** acetaminophen, dextrose, dextrose, glucagon, glucagon, haloperidol lactate, haloperidol lactate, melatonin, ondansetron **OR** ondansetron, polyethylene glycol                  Assessment/Plan   Major neurocognitive disorder, moderate with behavioral disturbance    Recommendations  Sitter decision will be deferred to primary team  Patient has diagnosis of dementia with most recent exacerbation a month ago.  He also has history of seizure.  Depakote was increased.  He fell last night.  I would like neurology input on this case if he needs this dose of Depakote or no.  I would like to get him off the Depakote due to of increased risk of falls but I do not want to get him off Depakote without a neurology input  Minimize the use of sedative medications.  Keep the curtains open,.  Familiar objects in the room.  All of those are delirium precautions.  Use of sedatives will be preserved for severe agitation  May start an SSRI such as Lexapro  We  will need more collaterals and will attempt to gather them  We will continue to follow up      Harmony Phillips MD
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
## 2024-05-19 LAB
ANION GAP SERPL CALC-SCNC: 11 MMOL/L (ref 10–20)
BASOPHILS # BLD AUTO: 0 X10*3/UL (ref 0–0.1)
BASOPHILS NFR BLD AUTO: 0 %
BUN SERPL-MCNC: 9 MG/DL (ref 6–23)
CALCIUM SERPL-MCNC: 7.9 MG/DL (ref 8.6–10.3)
CHLORIDE SERPL-SCNC: 107 MMOL/L (ref 98–107)
CO2 SERPL-SCNC: 26 MMOL/L (ref 21–32)
CREAT SERPL-MCNC: 0.72 MG/DL (ref 0.5–1.3)
EGFRCR SERPLBLD CKD-EPI 2021: 90 ML/MIN/1.73M*2
EOSINOPHIL # BLD AUTO: 0.15 X10*3/UL (ref 0–0.4)
EOSINOPHIL NFR BLD AUTO: 2.2 %
ERYTHROCYTE [DISTWIDTH] IN BLOOD BY AUTOMATED COUNT: 14.3 % (ref 11.5–14.5)
GLUCOSE BLD MANUAL STRIP-MCNC: 149 MG/DL (ref 74–99)
GLUCOSE SERPL-MCNC: 227 MG/DL (ref 74–99)
HCT VFR BLD AUTO: 38.5 % (ref 41–52)
HGB BLD-MCNC: 12.6 G/DL (ref 13.5–17.5)
HOLD SPECIMEN: NORMAL
IMM GRANULOCYTES # BLD AUTO: 0.03 X10*3/UL (ref 0–0.5)
IMM GRANULOCYTES NFR BLD AUTO: 0.4 % (ref 0–0.9)
LYMPHOCYTES # BLD AUTO: 0.84 X10*3/UL (ref 0.8–3)
LYMPHOCYTES NFR BLD AUTO: 12.4 %
MAGNESIUM SERPL-MCNC: 1.41 MG/DL (ref 1.6–2.4)
MCH RBC QN AUTO: 29.9 PG (ref 26–34)
MCHC RBC AUTO-ENTMCNC: 32.7 G/DL (ref 32–36)
MCV RBC AUTO: 91 FL (ref 80–100)
MONOCYTES # BLD AUTO: 0.77 X10*3/UL (ref 0.05–0.8)
MONOCYTES NFR BLD AUTO: 11.3 %
NEUTROPHILS # BLD AUTO: 5.01 X10*3/UL (ref 1.6–5.5)
NEUTROPHILS NFR BLD AUTO: 73.7 %
NRBC BLD-RTO: 0 /100 WBCS (ref 0–0)
PLATELET # BLD AUTO: 120 X10*3/UL (ref 150–450)
POTASSIUM SERPL-SCNC: 3.5 MMOL/L (ref 3.5–5.3)
RBC # BLD AUTO: 4.21 X10*6/UL (ref 4.5–5.9)
SODIUM SERPL-SCNC: 140 MMOL/L (ref 136–145)
WBC # BLD AUTO: 6.8 X10*3/UL (ref 4.4–11.3)

## 2024-05-19 PROCEDURE — 1200000002 HC GENERAL ROOM WITH TELEMETRY DAILY

## 2024-05-19 PROCEDURE — 82947 ASSAY GLUCOSE BLOOD QUANT: CPT

## 2024-05-19 PROCEDURE — 85025 COMPLETE CBC W/AUTO DIFF WBC: CPT | Performed by: STUDENT IN AN ORGANIZED HEALTH CARE EDUCATION/TRAINING PROGRAM

## 2024-05-19 PROCEDURE — 2500000004 HC RX 250 GENERAL PHARMACY W/ HCPCS (ALT 636 FOR OP/ED): Performed by: STUDENT IN AN ORGANIZED HEALTH CARE EDUCATION/TRAINING PROGRAM

## 2024-05-19 PROCEDURE — 2500000005 HC RX 250 GENERAL PHARMACY W/O HCPCS: Performed by: INTERNAL MEDICINE

## 2024-05-19 PROCEDURE — 2500000001 HC RX 250 WO HCPCS SELF ADMINISTERED DRUGS (ALT 637 FOR MEDICARE OP): Performed by: PSYCHIATRY & NEUROLOGY

## 2024-05-19 PROCEDURE — 2500000006 HC RX 250 W HCPCS SELF ADMINISTERED DRUGS (ALT 637 FOR ALL PAYERS): Mod: MUE | Performed by: INTERNAL MEDICINE

## 2024-05-19 PROCEDURE — C9113 INJ PANTOPRAZOLE SODIUM, VIA: HCPCS | Performed by: INTERNAL MEDICINE

## 2024-05-19 PROCEDURE — 82947 ASSAY GLUCOSE BLOOD QUANT: CPT | Mod: 91

## 2024-05-19 PROCEDURE — 99232 SBSQ HOSP IP/OBS MODERATE 35: CPT | Performed by: STUDENT IN AN ORGANIZED HEALTH CARE EDUCATION/TRAINING PROGRAM

## 2024-05-19 PROCEDURE — 83735 ASSAY OF MAGNESIUM: CPT | Performed by: STUDENT IN AN ORGANIZED HEALTH CARE EDUCATION/TRAINING PROGRAM

## 2024-05-19 PROCEDURE — 2500000001 HC RX 250 WO HCPCS SELF ADMINISTERED DRUGS (ALT 637 FOR MEDICARE OP): Performed by: INTERNAL MEDICINE

## 2024-05-19 PROCEDURE — 36415 COLL VENOUS BLD VENIPUNCTURE: CPT | Performed by: STUDENT IN AN ORGANIZED HEALTH CARE EDUCATION/TRAINING PROGRAM

## 2024-05-19 PROCEDURE — 96372 THER/PROPH/DIAG INJ SC/IM: CPT | Performed by: STUDENT IN AN ORGANIZED HEALTH CARE EDUCATION/TRAINING PROGRAM

## 2024-05-19 PROCEDURE — 84520 ASSAY OF UREA NITROGEN: CPT | Performed by: STUDENT IN AN ORGANIZED HEALTH CARE EDUCATION/TRAINING PROGRAM

## 2024-05-19 PROCEDURE — 99232 SBSQ HOSP IP/OBS MODERATE 35: CPT | Performed by: PSYCHIATRY & NEUROLOGY

## 2024-05-19 PROCEDURE — 2500000004 HC RX 250 GENERAL PHARMACY W/ HCPCS (ALT 636 FOR OP/ED): Performed by: INTERNAL MEDICINE

## 2024-05-19 RX ORDER — DIPHENHYDRAMINE HYDROCHLORIDE 50 MG/ML
25 INJECTION INTRAMUSCULAR; INTRAVENOUS ONCE
Status: COMPLETED | OUTPATIENT
Start: 2024-05-19 | End: 2024-05-19

## 2024-05-19 RX ORDER — DIVALPROEX SODIUM 250 MG/1
250 TABLET, DELAYED RELEASE ORAL 2 TIMES DAILY
Status: DISCONTINUED | OUTPATIENT
Start: 2024-05-19 | End: 2024-05-20

## 2024-05-19 RX ADMIN — CLOPIDOGREL BISULFATE 75 MG: 75 TABLET, FILM COATED ORAL at 09:00

## 2024-05-19 RX ADMIN — FINASTERIDE 5 MG: 5 TABLET, FILM COATED ORAL at 20:25

## 2024-05-19 RX ADMIN — HALOPERIDOL LACTATE 2 MG: 5 INJECTION, SOLUTION INTRAMUSCULAR at 00:17

## 2024-05-19 RX ADMIN — INSULIN LISPRO 2 UNITS: 100 INJECTION, SOLUTION INTRAVENOUS; SUBCUTANEOUS at 11:13

## 2024-05-19 RX ADMIN — INSULIN LISPRO 1 UNITS: 100 INJECTION, SOLUTION INTRAVENOUS; SUBCUTANEOUS at 09:01

## 2024-05-19 RX ADMIN — ATORVASTATIN CALCIUM 80 MG: 80 TABLET, FILM COATED ORAL at 20:26

## 2024-05-19 RX ADMIN — DIPHENHYDRAMINE HYDROCHLORIDE 25 MG: 50 INJECTION, SOLUTION INTRAMUSCULAR; INTRAVENOUS at 02:09

## 2024-05-19 RX ADMIN — LOSARTAN POTASSIUM 25 MG: 25 TABLET, FILM COATED ORAL at 09:00

## 2024-05-19 RX ADMIN — VALPROATE SODIUM 500 MG: 100 INJECTION, SOLUTION INTRAVENOUS at 07:03

## 2024-05-19 RX ADMIN — LEVOTHYROXINE SODIUM 100 MCG: 0.1 TABLET ORAL at 09:01

## 2024-05-19 RX ADMIN — METOPROLOL TARTRATE 25 MG: 25 TABLET, FILM COATED ORAL at 20:26

## 2024-05-19 RX ADMIN — DIVALPROEX SODIUM 250 MG: 250 TABLET, DELAYED RELEASE ORAL at 20:26

## 2024-05-19 RX ADMIN — METOPROLOL TARTRATE 25 MG: 25 TABLET, FILM COATED ORAL at 09:01

## 2024-05-19 RX ADMIN — SODIUM CHLORIDE, SODIUM LACTATE, POTASSIUM CHLORIDE, CALCIUM CHLORIDE AND DEXTROSE MONOHYDRATE 75 ML/HR: 5; 600; 310; 30; 20 INJECTION, SOLUTION INTRAVENOUS at 16:32

## 2024-05-19 RX ADMIN — RISPERIDONE 1 MG: 1 TABLET ORAL at 20:26

## 2024-05-19 RX ADMIN — METFORMIN HYDROCHLORIDE 500 MG: 500 TABLET, FILM COATED ORAL at 16:32

## 2024-05-19 RX ADMIN — PANTOPRAZOLE SODIUM 40 MG: 40 INJECTION, POWDER, FOR SOLUTION INTRAVENOUS at 07:02

## 2024-05-19 RX ADMIN — METFORMIN HYDROCHLORIDE 500 MG: 500 TABLET, FILM COATED ORAL at 09:00

## 2024-05-19 ASSESSMENT — COGNITIVE AND FUNCTIONAL STATUS - GENERAL
CLIMB 3 TO 5 STEPS WITH RAILING: A LOT
DRESSING REGULAR LOWER BODY CLOTHING: A LOT
DAILY ACTIVITIY SCORE: 15
TURNING FROM BACK TO SIDE WHILE IN FLAT BAD: A LITTLE
MOVING FROM LYING ON BACK TO SITTING ON SIDE OF FLAT BED WITH BEDRAILS: A LITTLE
PERSONAL GROOMING: A LITTLE
TOILETING: A LOT
EATING MEALS: A LITTLE
MOVING TO AND FROM BED TO CHAIR: A LITTLE
HELP NEEDED FOR BATHING: A LOT
STANDING UP FROM CHAIR USING ARMS: A LITTLE
DRESSING REGULAR UPPER BODY CLOTHING: A LITTLE
WALKING IN HOSPITAL ROOM: A LITTLE
MOBILITY SCORE: 17

## 2024-05-19 ASSESSMENT — PAIN SCALES - GENERAL
PAINLEVEL_OUTOF10: 0 - NO PAIN
PAINLEVEL_OUTOF10: 0 - NO PAIN

## 2024-05-19 ASSESSMENT — PAIN - FUNCTIONAL ASSESSMENT: PAIN_FUNCTIONAL_ASSESSMENT: 0-10

## 2024-05-19 NOTE — CARE PLAN
The patient's goals for the shift include      The clinical goals for the shift include patient will have decreased confusion throughout shift    Problem: Skin  Goal: Participates in plan/prevention/treatment measures  Outcome: Not Progressing  Flowsheets (Taken 5/19/2024 0331)  Participates in plan/prevention/treatment measures: Discuss with provider PT/OT consult     Problem: Skin  Goal: Decreased wound size/increased tissue granulation at next dressing change  Outcome: Progressing  Flowsheets (Taken 5/19/2024 0331)  Decreased wound size/increased tissue granulation at next dressing change:   Promote sleep for wound healing   Protective dressings over bony prominences  Goal: Participates in plan/prevention/treatment measures  Outcome: Not Progressing  Flowsheets (Taken 5/19/2024 0331)  Participates in plan/prevention/treatment measures: Discuss with provider PT/OT consult  Goal: Prevent/manage excess moisture  Outcome: Progressing  Flowsheets (Taken 5/19/2024 0331)  Prevent/manage excess moisture:   Cleanse incontinence/protect with barrier cream   Moisturize dry skin  Goal: Prevent/minimize sheer/friction injuries  Outcome: Progressing  Flowsheets (Taken 5/19/2024 0331)  Prevent/minimize sheer/friction injuries:   Use pull sheet   HOB 30 degrees or less   Turn/reposition every 2 hours/use positioning/transfer devices  Goal: Promote/optimize nutrition  Outcome: Progressing  Flowsheets (Taken 5/19/2024 0331)  Promote/optimize nutrition:   Assist with feeding   Offer water/supplements/favorite foods  Goal: Promote skin healing  Outcome: Progressing  Flowsheets (Taken 5/19/2024 0331)  Promote skin healing:   Assess skin/pad under line(s)/device(s)   Protective dressings over bony prominences   Rotate device position/do not position patient on device   Turn/reposition every 2 hours/use positioning/transfer devices

## 2024-05-19 NOTE — PROGRESS NOTES
ASSESSMENT & PLAN:       #Status post fall  #Left gluteal hematoma  Monitor H&H - remains stable     #History of CVA with expressive aphasia  #Progressive dementia with behavioral disturbance  Patient has had progressive decline in mentation with aggressive behavior. Would likely benefit from geropsych placement.   One-to-one sitter as needed  Fall precaution  PT/OT eval  Currently on soft wrist restraints/mitts for safety  Haldol as needed IM for agitation  Continue Depakote and Risperdal. Neuro following, was on depakote for agitation. Decision to discontinue/de-escalate deferred to psych.   Psych following; recs appreciated.      #Seizure disorder  #History of hypertension  Continue home medications     #Type 2 diabetes  #Peripheral neuropathy  Continue insulin sliding scale  Diabetic diet     #DVT prophylaxis SCDs due to hematoma         Popeye Henderson MD    SUBJECTIVE     Patient had no acute events overnight.  Remains in soft restraints and mitts with sitter.  Confused. Denies any pain.  Further ROS was unremarkable.    OBJECTIVE:       Last Recorded Vitals:  Vitals:    05/18/24 1917 05/19/24 0014 05/19/24 0716 05/19/24 1100   BP: 171/80 157/79 156/73 114/56   BP Location: Left arm Left arm Left arm Left arm   Patient Position: Lying Lying Lying Lying   Pulse: 80 79 87    Resp: 18 20 20 18   Temp: 36.3 °C (97.3 °F) 37.1 °C (98.8 °F) 37.3 °C (99.1 °F) 37.3 °C (99.1 °F)   TempSrc: Temporal Temporal Temporal Temporal   SpO2: 97% 98% 96% 100%   Weight:       Height:           Last I/O:  I/O last 3 completed shifts:  In: 1445 (21.3 mL/kg) [I.V.:1395 (20.5 mL/kg); IV Piggyback:50]  Out: 655 (9.6 mL/kg) [Urine:655 (0.3 mL/kg/hr)]  Weight: 67.9 kg     Physical Exam:  General: Well-developed elderly male in mild distress  Respiratory: Equal chest rise, no retractions  Abdomen: Soft, nontender, nondistended  Extremities: No cyanosis or clubbing appreciated  Neurological: Disoriented, spontaneously moves all  extremities  Skin: Warm, dry    Inpatient Medications:  atorvastatin, 80 mg, oral, Nightly  brivaracetam, 50 mg, intravenous, q12h HAYDEE  clopidogrel, 75 mg, oral, Daily  finasteride, 5 mg, oral, Nightly  insulin lispro, 0-5 Units, subcutaneous, TID  levothyroxine, 100 mcg, oral, Daily before breakfast  losartan, 25 mg, oral, Daily  metFORMIN, 500 mg, oral, BID  metoprolol tartrate, 25 mg, oral, BID  pantoprazole, 40 mg, oral, Daily before breakfast   Or  pantoprazole, 40 mg, intravenous, Daily before breakfast  risperiDONE, 1 mg, oral, Nightly  valproate sodium, 500 mg, intravenous, q12h        PRN Medications  PRN medications: acetaminophen **OR** acetaminophen **OR** acetaminophen, dextrose, dextrose, glucagon, glucagon, haloperidol lactate, melatonin, ondansetron **OR** ondansetron, polyethylene glycol    Continuous Medications:  dextrose 5 % and lactated Ringer's, 75 mL/hr, Last Rate: 75 mL/hr (05/18/24 1246)          LABS AND IMAGING:     Labs:  Results for orders placed or performed during the hospital encounter of 05/17/24 (from the past 24 hour(s))   POCT GLUCOSE   Result Value Ref Range    POCT Glucose 129 (H) 74 - 99 mg/dL   C. difficile, PCR    Specimen: Stool   Result Value Ref Range    C. difficile, PCR Not Detected Not Detected   POCT GLUCOSE   Result Value Ref Range    POCT Glucose 220 (H) 74 - 99 mg/dL   Magnesium   Result Value Ref Range    Magnesium 1.41 (L) 1.60 - 2.40 mg/dL   Basic Metabolic Panel   Result Value Ref Range    Glucose 227 (H) 74 - 99 mg/dL    Sodium 140 136 - 145 mmol/L    Potassium 3.5 3.5 - 5.3 mmol/L    Chloride 107 98 - 107 mmol/L    Bicarbonate 26 21 - 32 mmol/L    Anion Gap 11 10 - 20 mmol/L    Urea Nitrogen 9 6 - 23 mg/dL    Creatinine 0.72 0.50 - 1.30 mg/dL    eGFR 90 >60 mL/min/1.73m*2    Calcium 7.9 (L) 8.6 - 10.3 mg/dL   CBC and Auto Differential   Result Value Ref Range    WBC 6.8 4.4 - 11.3 x10*3/uL    nRBC 0.0 0.0 - 0.0 /100 WBCs    RBC 4.21 (L) 4.50 - 5.90 x10*6/uL     Hemoglobin 12.6 (L) 13.5 - 17.5 g/dL    Hematocrit 38.5 (L) 41.0 - 52.0 %    MCV 91 80 - 100 fL    MCH 29.9 26.0 - 34.0 pg    MCHC 32.7 32.0 - 36.0 g/dL    RDW 14.3 11.5 - 14.5 %    Platelets 120 (L) 150 - 450 x10*3/uL    Neutrophils % 73.7 40.0 - 80.0 %    Immature Granulocytes %, Automated 0.4 0.0 - 0.9 %    Lymphocytes % 12.4 13.0 - 44.0 %    Monocytes % 11.3 2.0 - 10.0 %    Eosinophils % 2.2 0.0 - 6.0 %    Basophils % 0.0 0.0 - 2.0 %    Neutrophils Absolute 5.01 1.60 - 5.50 x10*3/uL    Immature Granulocytes Absolute, Automated 0.03 0.00 - 0.50 x10*3/uL    Lymphocytes Absolute 0.84 0.80 - 3.00 x10*3/uL    Monocytes Absolute 0.77 0.05 - 0.80 x10*3/uL    Eosinophils Absolute 0.15 0.00 - 0.40 x10*3/uL    Basophils Absolute 0.00 0.00 - 0.10 x10*3/uL   SST TOP   Result Value Ref Range    Extra Tube Hold for add-ons.         Imaging:  ECG 12 lead  Normal sinus rhythm  Normal ECG  When compared with ECG of 23-APR-2024 07:44, (unconfirmed)  Nonspecific T wave abnormality now evident in Inferior leads  See ED provider note for full interpretation and clinical correlation  Confirmed by Ximena Rivas ([id number]) on 5/18/2024 10:29:11 AM

## 2024-05-19 NOTE — PROGRESS NOTES
Physical Therapy                 Therapy Communication Note    Patient Name: Jose Guardado  MRN: [id number]  Today's Date: 5/19/2024     Discipline: Physical Therapy    Missed Visit Reason: 1042-Missed Visit Reason: Other (Comment) (PT/OT attempt pt with 1 on 1 sitter. cont agitation  in restraints  not appropriate for therapy at this time. re-attempt as able.)    Missed Time: Attempt    Comment:

## 2024-05-19 NOTE — PROGRESS NOTES
Occupational Therapy                 Therapy Communication Note    Patient Name: Jose Guardado  MRN: [id number]  Today's Date: 5/19/2024     Discipline: Occupational Therapy    Missed Visit Reason: Missed Visit Reason: Per RN, Patient still in restraints due to agitation and combative state. Patient observed to be supine sleeping with HOB elevated and 1:1.)    Missed Time: Attempt    Comment:

## 2024-05-19 NOTE — PROGRESS NOTES
"Jose Guardado is a 83 y.o. male on day 0 of admission presenting with Falls, initial encounter.    Subjective   Seen by neurology yesterday, please refer to Dr Simon for more info. She deferred the decision of Depakote to us as he is on Briviact for seizure and Depakote is prescribed for agitation   He is admitted this time for lethargy and fall. Depakote was increased in his most recent Geriatric psych admission. Risperidone was started as well   He was not able to participate in conversation, was sedated as he received Haldol last night due to being combative and agitated, remains on restraints          Objective     Physical Exam  Psychiatric:         Attention and Perception: He is inattentive.         Mood and Affect: Affect is flat.         Speech: He is communicative.         Behavior: Behavior is not slowed.         Cognition and Memory: Cognition is impaired.         Judgment: Judgment is impulsive and inappropriate.         Last Recorded Vitals  Blood pressure 114/56, pulse 87, temperature 37.3 °C (99.1 °F), temperature source Temporal, resp. rate 18, height 1.702 m (5' 7.01\"), weight 67.9 kg (149 lb 12.8 oz), SpO2 100%.  Intake/Output last 3 Shifts:  I/O last 3 completed shifts:  In: 1445 (21.3 mL/kg) [I.V.:1395 (20.5 mL/kg); IV Piggyback:50]  Out: 655 (9.6 mL/kg) [Urine:655 (0.3 mL/kg/hr)]  Weight: 67.9 kg     Scheduled medications  atorvastatin, 80 mg, oral, Nightly  brivaracetam, 50 mg, intravenous, q12h HAYDEE  clopidogrel, 75 mg, oral, Daily  finasteride, 5 mg, oral, Nightly  insulin lispro, 0-5 Units, subcutaneous, TID  levothyroxine, 100 mcg, oral, Daily before breakfast  losartan, 25 mg, oral, Daily  metFORMIN, 500 mg, oral, BID  metoprolol tartrate, 25 mg, oral, BID  pantoprazole, 40 mg, oral, Daily before breakfast   Or  pantoprazole, 40 mg, intravenous, Daily before breakfast  risperiDONE, 1 mg, oral, Nightly  valproate sodium, 500 mg, intravenous, q12h      Continuous medications  dextrose 5 % " and lactated Ringer's, 75 mL/hr, Last Rate: 75 mL/hr (05/18/24 0856)      PRN medications  PRN medications: acetaminophen **OR** acetaminophen **OR** acetaminophen, dextrose, dextrose, glucagon, glucagon, haloperidol lactate, melatonin, ondansetron **OR** ondansetron, polyethylene glycol            Assessment/Plan   Principal Problem:    Falls, initial encounter  Active Problems:    HTN (hypertension)    Expressive aphasia    Seizure disorder (Multi)    Type 2 diabetes mellitus with diabetic peripheral angiopathy without gangrene, with long-term current use of insulin (Multi)    Hematoma of left buttock    Moderate late onset Alzheimer's dementia with agitation (Multi)    Fall, initial encounter  Major neurocognitive disorder, vascular, moderate, with behavioral disturbance     Recommendation:  1-Decrease Valproic acid from 500 bid to 250 bid, use PO instead of IV, with a plan to taper it off   2-Continue Risperidone for now with possible plan of decreasing the dose if  behavior is controlled   3-Delirium precautions: Keep curtains open during sunlight, avoid sedating medications, use only as needed for severe agitation, familiar objects in the room like family pictures, keep reorienting the patient   4-We will follow up        Harmony Phillips MD

## 2024-05-20 LAB
ANION GAP SERPL CALC-SCNC: 8 MMOL/L (ref 10–20)
BASOPHILS # BLD AUTO: 0.01 X10*3/UL (ref 0–0.1)
BASOPHILS NFR BLD AUTO: 0.2 %
BUN SERPL-MCNC: 8 MG/DL (ref 6–23)
CALCIUM SERPL-MCNC: 7.6 MG/DL (ref 8.6–10.3)
CHLORIDE SERPL-SCNC: 109 MMOL/L (ref 98–107)
CO2 SERPL-SCNC: 28 MMOL/L (ref 21–32)
CREAT SERPL-MCNC: 0.75 MG/DL (ref 0.5–1.3)
EGFRCR SERPLBLD CKD-EPI 2021: 89 ML/MIN/1.73M*2
EOSINOPHIL # BLD AUTO: 0.23 X10*3/UL (ref 0–0.4)
EOSINOPHIL NFR BLD AUTO: 4.2 %
ERYTHROCYTE [DISTWIDTH] IN BLOOD BY AUTOMATED COUNT: 14.3 % (ref 11.5–14.5)
GLUCOSE BLD MANUAL STRIP-MCNC: 165 MG/DL (ref 74–99)
GLUCOSE BLD MANUAL STRIP-MCNC: 216 MG/DL (ref 74–99)
GLUCOSE BLD MANUAL STRIP-MCNC: 236 MG/DL (ref 74–99)
GLUCOSE SERPL-MCNC: 200 MG/DL (ref 74–99)
HCT VFR BLD AUTO: 35.7 % (ref 41–52)
HGB BLD-MCNC: 11.5 G/DL (ref 13.5–17.5)
IMM GRANULOCYTES # BLD AUTO: 0.03 X10*3/UL (ref 0–0.5)
IMM GRANULOCYTES NFR BLD AUTO: 0.5 % (ref 0–0.9)
LYMPHOCYTES # BLD AUTO: 1.68 X10*3/UL (ref 0.8–3)
LYMPHOCYTES NFR BLD AUTO: 30.4 %
MAGNESIUM SERPL-MCNC: 1.38 MG/DL (ref 1.6–2.4)
MCH RBC QN AUTO: 29.7 PG (ref 26–34)
MCHC RBC AUTO-ENTMCNC: 32.2 G/DL (ref 32–36)
MCV RBC AUTO: 92 FL (ref 80–100)
MONOCYTES # BLD AUTO: 0.74 X10*3/UL (ref 0.05–0.8)
MONOCYTES NFR BLD AUTO: 13.4 %
NEUTROPHILS # BLD AUTO: 2.83 X10*3/UL (ref 1.6–5.5)
NEUTROPHILS NFR BLD AUTO: 51.3 %
NRBC BLD-RTO: 0 /100 WBCS (ref 0–0)
PLATELET # BLD AUTO: 99 X10*3/UL (ref 150–450)
POTASSIUM SERPL-SCNC: 3.7 MMOL/L (ref 3.5–5.3)
RBC # BLD AUTO: 3.87 X10*6/UL (ref 4.5–5.9)
SODIUM SERPL-SCNC: 141 MMOL/L (ref 136–145)
WBC # BLD AUTO: 5.5 X10*3/UL (ref 4.4–11.3)

## 2024-05-20 PROCEDURE — 97161 PT EVAL LOW COMPLEX 20 MIN: CPT | Mod: GP

## 2024-05-20 PROCEDURE — 97165 OT EVAL LOW COMPLEX 30 MIN: CPT | Mod: GO

## 2024-05-20 PROCEDURE — 2500000001 HC RX 250 WO HCPCS SELF ADMINISTERED DRUGS (ALT 637 FOR MEDICARE OP): Performed by: PSYCHIATRY & NEUROLOGY

## 2024-05-20 PROCEDURE — 99232 SBSQ HOSP IP/OBS MODERATE 35: CPT | Performed by: STUDENT IN AN ORGANIZED HEALTH CARE EDUCATION/TRAINING PROGRAM

## 2024-05-20 PROCEDURE — 1200000002 HC GENERAL ROOM WITH TELEMETRY DAILY

## 2024-05-20 PROCEDURE — 2500000004 HC RX 250 GENERAL PHARMACY W/ HCPCS (ALT 636 FOR OP/ED): Performed by: STUDENT IN AN ORGANIZED HEALTH CARE EDUCATION/TRAINING PROGRAM

## 2024-05-20 PROCEDURE — 2500000004 HC RX 250 GENERAL PHARMACY W/ HCPCS (ALT 636 FOR OP/ED): Performed by: INTERNAL MEDICINE

## 2024-05-20 PROCEDURE — 83735 ASSAY OF MAGNESIUM: CPT | Performed by: STUDENT IN AN ORGANIZED HEALTH CARE EDUCATION/TRAINING PROGRAM

## 2024-05-20 PROCEDURE — 2500000006 HC RX 250 W HCPCS SELF ADMINISTERED DRUGS (ALT 637 FOR ALL PAYERS): Performed by: INTERNAL MEDICINE

## 2024-05-20 PROCEDURE — 97530 THERAPEUTIC ACTIVITIES: CPT | Mod: GP

## 2024-05-20 PROCEDURE — 85025 COMPLETE CBC W/AUTO DIFF WBC: CPT | Performed by: STUDENT IN AN ORGANIZED HEALTH CARE EDUCATION/TRAINING PROGRAM

## 2024-05-20 PROCEDURE — 99232 SBSQ HOSP IP/OBS MODERATE 35: CPT | Performed by: PSYCHIATRY & NEUROLOGY

## 2024-05-20 PROCEDURE — 36415 COLL VENOUS BLD VENIPUNCTURE: CPT | Performed by: STUDENT IN AN ORGANIZED HEALTH CARE EDUCATION/TRAINING PROGRAM

## 2024-05-20 PROCEDURE — 80048 BASIC METABOLIC PNL TOTAL CA: CPT | Performed by: STUDENT IN AN ORGANIZED HEALTH CARE EDUCATION/TRAINING PROGRAM

## 2024-05-20 PROCEDURE — 2500000001 HC RX 250 WO HCPCS SELF ADMINISTERED DRUGS (ALT 637 FOR MEDICARE OP): Performed by: INTERNAL MEDICINE

## 2024-05-20 PROCEDURE — 82947 ASSAY GLUCOSE BLOOD QUANT: CPT

## 2024-05-20 PROCEDURE — 82947 ASSAY GLUCOSE BLOOD QUANT: CPT | Mod: 91

## 2024-05-20 PROCEDURE — 97535 SELF CARE MNGMENT TRAINING: CPT | Mod: GO

## 2024-05-20 PROCEDURE — 2500000001 HC RX 250 WO HCPCS SELF ADMINISTERED DRUGS (ALT 637 FOR MEDICARE OP): Performed by: STUDENT IN AN ORGANIZED HEALTH CARE EDUCATION/TRAINING PROGRAM

## 2024-05-20 RX ORDER — DIVALPROEX SODIUM 125 MG/1
125 TABLET, DELAYED RELEASE ORAL 2 TIMES DAILY
Status: DISCONTINUED | OUTPATIENT
Start: 2024-05-20 | End: 2024-05-21

## 2024-05-20 RX ORDER — POLYMYXIN B SULFATE AND TRIMETHOPRIM 1; 10000 MG/ML; [USP'U]/ML
1 SOLUTION OPHTHALMIC
Status: DISCONTINUED | OUTPATIENT
Start: 2024-05-20 | End: 2024-05-23 | Stop reason: HOSPADM

## 2024-05-20 RX ORDER — MAGNESIUM SULFATE HEPTAHYDRATE 40 MG/ML
4 INJECTION, SOLUTION INTRAVENOUS ONCE
Status: COMPLETED | OUTPATIENT
Start: 2024-05-20 | End: 2024-05-20

## 2024-05-20 RX ADMIN — DIVALPROEX SODIUM 125 MG: 125 TABLET, DELAYED RELEASE ORAL at 18:37

## 2024-05-20 RX ADMIN — DIVALPROEX SODIUM 250 MG: 250 TABLET, DELAYED RELEASE ORAL at 06:10

## 2024-05-20 RX ADMIN — POLYMYXIN B SULFATE AND TRIMETHOPRIM 1 DROP: 10000; 1 SOLUTION OPHTHALMIC at 21:30

## 2024-05-20 RX ADMIN — SODIUM CHLORIDE, SODIUM LACTATE, POTASSIUM CHLORIDE, CALCIUM CHLORIDE AND DEXTROSE MONOHYDRATE 75 ML/HR: 5; 600; 310; 30; 20 INJECTION, SOLUTION INTRAVENOUS at 17:13

## 2024-05-20 RX ADMIN — BRIVARACETAM 50 MG: 50 INJECTION, SUSPENSION INTRAVENOUS at 10:45

## 2024-05-20 RX ADMIN — RISPERIDONE 1 MG: 1 TABLET ORAL at 19:44

## 2024-05-20 RX ADMIN — FINASTERIDE 5 MG: 5 TABLET, FILM COATED ORAL at 19:46

## 2024-05-20 RX ADMIN — ATORVASTATIN CALCIUM 80 MG: 80 TABLET, FILM COATED ORAL at 19:43

## 2024-05-20 RX ADMIN — LOSARTAN POTASSIUM 25 MG: 25 TABLET, FILM COATED ORAL at 08:11

## 2024-05-20 RX ADMIN — MAGNESIUM SULFATE HEPTAHYDRATE 4 G: 40 INJECTION, SOLUTION INTRAVENOUS at 14:49

## 2024-05-20 RX ADMIN — INSULIN LISPRO 2 UNITS: 100 INJECTION, SOLUTION INTRAVENOUS; SUBCUTANEOUS at 11:00

## 2024-05-20 RX ADMIN — PANTOPRAZOLE SODIUM 40 MG: 40 TABLET, DELAYED RELEASE ORAL at 06:10

## 2024-05-20 RX ADMIN — METFORMIN HYDROCHLORIDE 500 MG: 500 TABLET, FILM COATED ORAL at 08:11

## 2024-05-20 RX ADMIN — SODIUM CHLORIDE, SODIUM LACTATE, POTASSIUM CHLORIDE, CALCIUM CHLORIDE AND DEXTROSE MONOHYDRATE 75 ML/HR: 5; 600; 310; 30; 20 INJECTION, SOLUTION INTRAVENOUS at 06:09

## 2024-05-20 RX ADMIN — LEVOTHYROXINE SODIUM 100 MCG: 0.1 TABLET ORAL at 06:10

## 2024-05-20 RX ADMIN — POLYMYXIN B SULFATE AND TRIMETHOPRIM 1 DROP: 10000; 1 SOLUTION OPHTHALMIC at 18:37

## 2024-05-20 RX ADMIN — BRIVARACETAM 50 MG: 50 INJECTION, SUSPENSION INTRAVENOUS at 21:30

## 2024-05-20 RX ADMIN — METOPROLOL TARTRATE 25 MG: 25 TABLET, FILM COATED ORAL at 19:45

## 2024-05-20 RX ADMIN — METOPROLOL TARTRATE 25 MG: 25 TABLET, FILM COATED ORAL at 08:11

## 2024-05-20 RX ADMIN — METFORMIN HYDROCHLORIDE 500 MG: 500 TABLET, FILM COATED ORAL at 17:09

## 2024-05-20 RX ADMIN — CLOPIDOGREL BISULFATE 75 MG: 75 TABLET, FILM COATED ORAL at 08:11

## 2024-05-20 ASSESSMENT — PAIN SCALES - GENERAL
PAINLEVEL_OUTOF10: 0 - NO PAIN

## 2024-05-20 ASSESSMENT — COGNITIVE AND FUNCTIONAL STATUS - GENERAL
CLIMB 3 TO 5 STEPS WITH RAILING: TOTAL
DRESSING REGULAR LOWER BODY CLOTHING: A LOT
MOVING FROM LYING ON BACK TO SITTING ON SIDE OF FLAT BED WITH BEDRAILS: A LOT
DAILY ACTIVITIY SCORE: 15
DRESSING REGULAR UPPER BODY CLOTHING: A LITTLE
WALKING IN HOSPITAL ROOM: A LOT
HELP NEEDED FOR BATHING: A LOT
MOBILITY SCORE: 11
TOILETING: A LOT
PERSONAL GROOMING: A LITTLE
EATING MEALS: A LITTLE
MOVING TO AND FROM BED TO CHAIR: A LOT
TURNING FROM BACK TO SIDE WHILE IN FLAT BAD: A LOT
STANDING UP FROM CHAIR USING ARMS: A LOT

## 2024-05-20 ASSESSMENT — ACTIVITIES OF DAILY LIVING (ADL)
HOME_MANAGEMENT_TIME_ENTRY: 8
BATHING_ASSISTANCE: MODERATE

## 2024-05-20 ASSESSMENT — PAIN - FUNCTIONAL ASSESSMENT
PAIN_FUNCTIONAL_ASSESSMENT: 0-10
PAIN_FUNCTIONAL_ASSESSMENT: 0-10

## 2024-05-20 NOTE — PROGRESS NOTES
ASSESSMENT & PLAN:       #Status post fall  #Left gluteal hematoma  Monitor H&H - remains stable     #History of CVA with expressive aphasia  #Progressive dementia with behavioral disturbance  Patient has had progressive decline in mentation with aggressive behavior. Would likely benefit from geropsych placement.   One-to-one sitter as needed  Fall precaution  PT/OT eval  Currently on soft wrist restraints/mitts for safety  Haldol as needed IM for agitation  Psych following; recs appreciated.   Being weaned off depakote. Agitation improving. Continued on risperidone 1 mg daily     #Seizure disorder  #History of hypertension  Continue home medications     #Type 2 diabetes  #Peripheral neuropathy  Continue insulin sliding scale  Diabetic diet    Hypomagnesemia  -Mg 1.38. Will replete/monitor     #DVT prophylaxis SCDs due to hematoma    Disposition:  -TBD. Care coordinators/social work following. Daughter is reportedly POA.      Popeye Henderson MD    SUBJECTIVE     Patient had no acute events overnight.  Mood appears more stable.  Has been off restraints overnight and is managed with one-to-one sitter.  Patient is alert and oriented to self.  Knows he is in the hospital although not which one.  Denies any pain or discomfort.  Further ROS is unremarkable.    OBJECTIVE:       Last Recorded Vitals:  Vitals:    05/19/24 1545 05/19/24 1913 05/20/24 0255 05/20/24 0700   BP: 145/65 156/70 142/62 158/70   BP Location:  Left arm Left arm Left arm   Patient Position:  Lying Lying Sitting   Pulse:  (!) 114 80    Resp: 16 17 17 18   Temp: 37.3 °C (99.1 °F) 36.9 °C (98.4 °F) 36.3 °C (97.3 °F) 36.6 °C (97.9 °F)   TempSrc:  Temporal Temporal Temporal   SpO2: 92% 93% 92% 95%   Weight:       Height:           Last I/O:  I/O last 3 completed shifts:  In: 3032.5 (44.6 mL/kg) [P.O.:300; I.V.:2732.5 (40.2 mL/kg)]  Out: 575 (8.5 mL/kg) [Urine:575 (0.2 mL/kg/hr)]  Weight: 67.9 kg     Physical Exam:  General: Well-developed elderly male in mild  distress  Respiratory: Equal chest rise, no retractions  Abdomen: Soft, nontender, nondistended  Extremities: No cyanosis or clubbing appreciated  Neurological: Disoriented, spontaneously moves all extremities  Skin: Warm, dry    Inpatient Medications:  atorvastatin, 80 mg, oral, Nightly  brivaracetam, 50 mg, intravenous, q12h HAYDEE  clopidogrel, 75 mg, oral, Daily  divalproex, 125 mg, oral, BID  finasteride, 5 mg, oral, Nightly  insulin lispro, 0-5 Units, subcutaneous, TID  levothyroxine, 100 mcg, oral, Daily before breakfast  losartan, 25 mg, oral, Daily  metFORMIN, 500 mg, oral, BID  metoprolol tartrate, 25 mg, oral, BID  pantoprazole, 40 mg, oral, Daily before breakfast   Or  pantoprazole, 40 mg, intravenous, Daily before breakfast  risperiDONE, 1 mg, oral, Nightly        PRN Medications  PRN medications: acetaminophen **OR** acetaminophen **OR** acetaminophen, dextrose, dextrose, glucagon, glucagon, haloperidol lactate, melatonin, ondansetron **OR** ondansetron, polyethylene glycol    Continuous Medications:  dextrose 5 % and lactated Ringer's, 75 mL/hr, Last Rate: 75 mL/hr (05/20/24 0609)          LABS AND IMAGING:     Labs:  Results for orders placed or performed during the hospital encounter of 05/17/24 (from the past 24 hour(s))   POCT GLUCOSE   Result Value Ref Range    POCT Glucose 149 (H) 74 - 99 mg/dL   Magnesium   Result Value Ref Range    Magnesium 1.38 (L) 1.60 - 2.40 mg/dL   Basic Metabolic Panel   Result Value Ref Range    Glucose 200 (H) 74 - 99 mg/dL    Sodium 141 136 - 145 mmol/L    Potassium 3.7 3.5 - 5.3 mmol/L    Chloride 109 (H) 98 - 107 mmol/L    Bicarbonate 28 21 - 32 mmol/L    Anion Gap 8 (L) 10 - 20 mmol/L    Urea Nitrogen 8 6 - 23 mg/dL    Creatinine 0.75 0.50 - 1.30 mg/dL    eGFR 89 >60 mL/min/1.73m*2    Calcium 7.6 (L) 8.6 - 10.3 mg/dL   CBC and Auto Differential   Result Value Ref Range    WBC 5.5 4.4 - 11.3 x10*3/uL    nRBC 0.0 0.0 - 0.0 /100 WBCs    RBC 3.87 (L) 4.50 - 5.90 x10*6/uL     Hemoglobin 11.5 (L) 13.5 - 17.5 g/dL    Hematocrit 35.7 (L) 41.0 - 52.0 %    MCV 92 80 - 100 fL    MCH 29.7 26.0 - 34.0 pg    MCHC 32.2 32.0 - 36.0 g/dL    RDW 14.3 11.5 - 14.5 %    Platelets 99 (L) 150 - 450 x10*3/uL    Neutrophils % 51.3 40.0 - 80.0 %    Immature Granulocytes %, Automated 0.5 0.0 - 0.9 %    Lymphocytes % 30.4 13.0 - 44.0 %    Monocytes % 13.4 2.0 - 10.0 %    Eosinophils % 4.2 0.0 - 6.0 %    Basophils % 0.2 0.0 - 2.0 %    Neutrophils Absolute 2.83 1.60 - 5.50 x10*3/uL    Immature Granulocytes Absolute, Automated 0.03 0.00 - 0.50 x10*3/uL    Lymphocytes Absolute 1.68 0.80 - 3.00 x10*3/uL    Monocytes Absolute 0.74 0.05 - 0.80 x10*3/uL    Eosinophils Absolute 0.23 0.00 - 0.40 x10*3/uL    Basophils Absolute 0.01 0.00 - 0.10 x10*3/uL   POCT GLUCOSE   Result Value Ref Range    POCT Glucose 165 (H) 74 - 99 mg/dL        Imaging:  ECG 12 lead  Normal sinus rhythm  Normal ECG  When compared with ECG of 23-APR-2024 07:44, (unconfirmed)  Nonspecific T wave abnormality now evident in Inferior leads  See ED provider note for full interpretation and clinical correlation  Confirmed by Ximena Rivas ([id number]) on 5/18/2024 10:29:11 AM

## 2024-05-20 NOTE — NURSING NOTE
waiting for RR RN to assist with US IV d/t Mg sulfate not being compatible with IVF running. Mg level at 1.33. IVF currently held, mg running.

## 2024-05-20 NOTE — PROGRESS NOTES
05/20/24 1437   Discharge Planning   Living Arrangements Spouse/significant other   Support Systems Spouse/significant other;Children   Assistance Needed needs 24 hr care/supervision   Type of Residence Private residence   Who is requesting discharge planning? Provider   Home or Post Acute Services Post acute facilities (Rehab/SNF/etc)   Type of Post Acute Facility Services Assisted living   Patient expects to be discharged to: [facility]   Does the patient need discharge transport arranged? Yes   RoundTrip coordination needed? Yes   Has discharge transport been arranged? No   Patient Choice   Provider Choice list and CMS website (https://medicare.gov/care-compare#search) for post-acute Quality and Resource Measure Data were provided and reviewed with: Family     Per rn caring for pt. Dtr. Requesting a call.  Spoke with dtrDane Lopez who states family has been working on getting pt. Placed at [facility] memory care unit [phone] and plan to move him in on thurs.  She has some paperwork that needs to be filled out by physician that she will be dropping  off.    She states pt. Has had a recent stay at [facility] and then went to in. [place].  Then home with spouse.  He is requiring more care than they can provide and decided on placement.  Psyc. Currently following.    Update:  met with dtr. Who has provided paperwork to be completed for [facility] memory care unit.  Forms given to rn and messaged physician.

## 2024-05-20 NOTE — PROGRESS NOTES
Jose Guardado is a 83 y.o. male on day 1 of admission was seen by psychiatry and is being followed by psychiatry because of recent geropsychiatric admission and agitation.  He had a good night last night.  His Depakote is being tapered down and the taper will continue and the previous attending had recommended optimizing the dose of risperidone to help with agitation and to prescribe it in the short-term.  Patient was pleasant this morning and misses his wife and wishes to go home.  Patient is tolerating medications well.   Labs Reviewed.  Vitals Reviewed.   Nursing Notes Reviewed.   No EPS, TD, vitals stable.      MSE: Patient was drowsy and not oriented to time, place, person and situation. Patient appears well groomed and clad in hospital gown. Patient is pleasant on approach. Recent and remote memory poor. Memory registration and recall poor. Attention and concentration restricted. Speech low in volume and slow in rate.  Fair eye contact. Thought process poverty of thoughts. Impaired associations. Limited fund of knowledge. Mood ok affect flat. Patient did not endorse any delusions. Patient denied any auditory visual hallucinations. Patient has denied any active suicidal  ideations and is future oriented.  Patient has limited insight, limited judgment and during the time of the assessment this morning fair impulse control.     Musculoskeletal: Patient bedbound and gait not assessed, no Parkinsonism, no Dystonia, no Akathisia, no TD. Psychomotor activity within normal limits.    Diagnosis: Dementia    Assessment and Plan:     Decrease Depakote 125 mg twice a day  Continue with risperidone 1 mg at bedtime  Psychiatry will follow  Continue with current treatment and medication adjustments. Patient is agreeable with continued medication management and adjustments discussed.       Last Recorded Vitals  /70 (BP Location: Left arm, Patient Position: Sitting)   Pulse 80   Temp 36.6 °C (97.9 °F) (Temporal)    "Resp 18   Ht 1.702 m (5' 7.01\")   Wt 67.9 kg (149 lb 12.8 oz)   SpO2 95%   BMI 23.46 kg/m²      Recent Results (from the past 24 hour(s))   POCT GLUCOSE    Collection Time: 05/19/24  7:45 PM   Result Value Ref Range    POCT Glucose 149 (H) 74 - 99 mg/dL   Magnesium    Collection Time: 05/20/24  5:43 AM   Result Value Ref Range    Magnesium 1.38 (L) 1.60 - 2.40 mg/dL   Basic Metabolic Panel    Collection Time: 05/20/24  5:43 AM   Result Value Ref Range    Glucose 200 (H) 74 - 99 mg/dL    Sodium 141 136 - 145 mmol/L    Potassium 3.7 3.5 - 5.3 mmol/L    Chloride 109 (H) 98 - 107 mmol/L    Bicarbonate 28 21 - 32 mmol/L    Anion Gap 8 (L) 10 - 20 mmol/L    Urea Nitrogen 8 6 - 23 mg/dL    Creatinine 0.75 0.50 - 1.30 mg/dL    eGFR 89 >60 mL/min/1.73m*2    Calcium 7.6 (L) 8.6 - 10.3 mg/dL   CBC and Auto Differential    Collection Time: 05/20/24  5:43 AM   Result Value Ref Range    WBC 5.5 4.4 - 11.3 x10*3/uL    nRBC 0.0 0.0 - 0.0 /100 WBCs    RBC 3.87 (L) 4.50 - 5.90 x10*6/uL    Hemoglobin 11.5 (L) 13.5 - 17.5 g/dL    Hematocrit 35.7 (L) 41.0 - 52.0 %    MCV 92 80 - 100 fL    MCH 29.7 26.0 - 34.0 pg    MCHC 32.2 32.0 - 36.0 g/dL    RDW 14.3 11.5 - 14.5 %    Platelets 99 (L) 150 - 450 x10*3/uL    Neutrophils % 51.3 40.0 - 80.0 %    Immature Granulocytes %, Automated 0.5 0.0 - 0.9 %    Lymphocytes % 30.4 13.0 - 44.0 %    Monocytes % 13.4 2.0 - 10.0 %    Eosinophils % 4.2 0.0 - 6.0 %    Basophils % 0.2 0.0 - 2.0 %    Neutrophils Absolute 2.83 1.60 - 5.50 x10*3/uL    Immature Granulocytes Absolute, Automated 0.03 0.00 - 0.50 x10*3/uL    Lymphocytes Absolute 1.68 0.80 - 3.00 x10*3/uL    Monocytes Absolute 0.74 0.05 - 0.80 x10*3/uL    Eosinophils Absolute 0.23 0.00 - 0.40 x10*3/uL    Basophils Absolute 0.01 0.00 - 0.10 x10*3/uL   POCT GLUCOSE    Collection Time: 05/20/24  6:41 AM   Result Value Ref Range    POCT Glucose 165 (H) 74 - 99 mg/dL          Current Facility-Administered Medications:     acetaminophen (Tylenol) " tablet 650 mg, 650 mg, oral, q4h PRN **OR** acetaminophen (Tylenol) oral liquid 650 mg, 650 mg, nasogastric tube, q4h PRN **OR** acetaminophen (Tylenol) suppository 650 mg, 650 mg, rectal, q4h PRN, Isa Hedrick MD    atorvastatin (Lipitor) tablet 80 mg, 80 mg, oral, Nightly, Isa Hedrick MD, 80 mg at 05/19/24 2026    brivaracetam (Briviact) injection 50 mg, 50 mg, intravenous, q12h HAYDEE, Isa Hedrick MD    clopidogrel (Plavix) tablet 75 mg, 75 mg, oral, Daily, Isa Hedrick MD, 75 mg at 05/20/24 0811    dextrose 5 % and lactated Ringer's infusion, 75 mL/hr, intravenous, Continuous, Isa Hedrick MD, Last Rate: 75 mL/hr at 05/20/24 0609, 75 mL/hr at 05/20/24 0609    dextrose 50 % injection 12.5 g, 12.5 g, intravenous, q15 min PRN, Isa Hedrick MD    dextrose 50 % injection 25 g, 25 g, intravenous, q15 min PRN, Isa Hedrick MD    divalproex (Depakote) delayed release tablet 125 mg, 125 mg, oral, BID, Ruy Amor MD    finasteride (Proscar) tablet 5 mg, 5 mg, oral, Nightly, Isa Hedrick MD, 5 mg at 05/19/24 2025    glucagon (Glucagen) injection 1 mg, 1 mg, intramuscular, q15 min PRN, Isa Hedrick MD    glucagon (Glucagen) injection 1 mg, 1 mg, intramuscular, q15 min PRN, Isa Hedrick MD    haloperidol lactate (Haldol) injection 2 mg, 2 mg, intramuscular, q6h PRN, Alisson Royal MD, 2 mg at 05/19/24 0017    insulin lispro (HumaLOG) injection 0-5 Units, 0-5 Units, subcutaneous, TID, Isa Hedrick MD, 2 Units at 05/19/24 1113    levothyroxine (Synthroid, Levoxyl) tablet 100 mcg, 100 mcg, oral, Daily before breakfast, Isa Hedrick MD, 100 mcg at 05/20/24 0610    losartan (Cozaar) tablet 25 mg, 25 mg, oral, Daily, Isa Hedrick MD, 25 mg at 05/20/24 0811    melatonin tablet 3 mg, 3 mg, oral, Nightly PRN, Isa Hedrick MD    metFORMIN (Glucophage) tablet 500 mg, 500 mg, oral, BID, Isa Hedrick MD, 500 mg at 05/20/24 0811    metoprolol tartrate (Lopressor) tablet 25 mg, 25 mg, oral, BID, Isa CLARKE  MD Floridalma, 25 mg at 05/20/24 0811    ondansetron (Zofran) tablet 4 mg, 4 mg, oral, q8h PRN **OR** ondansetron (Zofran) injection 4 mg, 4 mg, intravenous, q8h PRN, Isa Hedrick MD    pantoprazole (ProtoNix) EC tablet 40 mg, 40 mg, oral, Daily before breakfast, 40 mg at 05/20/24 0610 **OR** pantoprazole (ProtoNix) injection 40 mg, 40 mg, intravenous, Daily before breakfast, Isa Hedrick MD, 40 mg at 05/19/24 0702    polyethylene glycol (Glycolax, Miralax) packet 17 g, 17 g, oral, Daily PRN, Isa Hedrick MD    risperiDONE (RisperDAL) tablet 1 mg, 1 mg, oral, Nightly, Ias Hedrick MD, 1 mg at 05/19/24 2026       Ruy Amor MD

## 2024-05-20 NOTE — NURSING NOTE
Paperwork brought in by pts daughter, . Paperwork needs to be filled out by Beatriz. Beatriz notified via secure chat. Beatriz said that he would not be able to fill any paperwork out today. Placed all paperwork in front of chart, communicated this to social work and Beatriz. Supplied list of immunizations with paperwork as well.

## 2024-05-20 NOTE — PROGRESS NOTES
Physical Therapy    Physical Therapy Evaluation    Patient Name: Jose Guardado  MRN: [id number]  Today's Date: 5/20/2024   Time Calculation  Start Time: 1136  Stop Time: 1206  Time Calculation (min): 30 min    Assessment/Plan   PT Assessment  PT Assessment Results: Decreased strength, Decreased endurance, Impaired balance, Decreased mobility, Decreased coordination, Decreased cognition, Impaired judgement, Decreased safety awareness  Rehab Prognosis: Fair  Evaluation/Treatment Tolerance: Patient limited by fatigue  End of Session Communication: Bedside nurse, Care Coordinator  Assessment Comment: pt with decreased mobility/ gait strength balance endurance pt to benefit from skilled PT to address deficits and improve functional mobility .  End of Session Patient Position: Bed, 3 rail up, Alarm on (1 on 1 aide present.)  IP OR SWING BED PT PLAN  Inpatient or Swing Bed: Inpatient  PT Plan  Treatment/Interventions: Bed mobility, Transfer training, Gait training, Stair training, Balance training, Strengthening, Endurance training, Therapeutic exercise, Therapeutic activity  PT Plan: Skilled PT  PT Frequency: 3 times per week  PT Discharge Recommendations: Moderate intensity level of continued care  PT Recommended Transfer Status: Assist x2, Assistive device  PT - OK to Discharge: Yes (once medically ready for discharge to next level of care.)    Subjective     Current Problem:  Patient Active Problem List   Diagnosis    Focal seizure (Multi)    Arthritis    Osteoarthritis    Pleural plaque due to asbestos exposure    Benign essential hypertension    Benign prostatic hyperplasia    Bursitis of right hip    Calcium kidney stone    Cerebrovascular small vessel disease    Cervical radiculopathy    Spondylosis of cervical spine without myelopathy    Chronic low back pain    Colon polyp    Diverticulitis, colon    Confusion and disorientation    Diastolic dysfunction    HTN (hypertension)    History of TIA (transient ischemic  attack)    History of orthostatic hypotension    Hemangioma of skin and subcutaneous tissue    Gastroesophageal reflux disease    Expressive aphasia    Erectile dysfunction    Type 2 diabetes mellitus with hyperglycemia, with long-term current use of insulin (Multi)    Disorder of bursae of shoulder region    Memory impairment    Hyperlipidemia    Hypothyroidism    Melanocytic nevi of trunk    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    Other seborrheic keratosis    Other melanin hyperpigmentation    Seizure disorder (Multi)    Shingles    Shortness of breath    Tremor, essential    Varicose veins of leg with swelling    Vitamin D deficiency    White matter disease, unspecified    Overweight with body mass index (BMI) of 25 to 25.9 in adult    Anxiety    Other chest pain    Chest pain    Transient ischemic attack (TIA)    Acute pharyngitis    Allergic rhinitis    Altered mental status    Aphasia    Ataxia    Chills    Contact with and (suspected) exposure to covid-19    Cramps of lower extremity    Diarrhea    Headache    History of cardiovascular surgery    Hypokalemia    Implantable loop recorder present    Insect bite of hand    Knee pain    Neurological symptoms    Oropharyngeal dysphagia    Urinary incontinence    Skin lesion of face    Word finding difficulty    Acute conjunctivitis    Mood disorder (CMS-HCC)    Hemiplegia and hemiparesis following cerebral infarction affecting right dominant side (Multi)    Paroxysmal atrial fibrillation (Multi)    Peripheral vascular disease of foot (CMS-HCC)    Type 2 diabetes mellitus with diabetic peripheral angiopathy without gangrene, with long-term current use of insulin (Multi)    Falls, initial encounter    Hematoma of left buttock    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 cont agitation  in restraints  not appropriate for therapy at this time. re-attempt as able.)  Family/Caregiver Present:  (1 on1 sitter .)  Prior to Session Communication: Bedside nurse, PCT/NA/CTA  Patient Position Received: Bed, 3 rail up (tele IV purwick)  General Comment: Pt. to ED 5/17 after falling down 10-12 steps and hitting head. CT head (-).  CT c-spine: (-). CT chest/abdomen/pelvis: small gluteal hematoma.    Home Living/ PLOF   Home Living  Home Living Comments: Pt. is a questionable historian. Pt. reported the following: Lives with wife. 1 step to enter. 1st floor set up. No AD use at baseline. Independent with ADLs. Wife manages IADLs. 1 recent fall. Walk in shower with seat and safety bars.      Precautions:  Precautions  Medical Precautions: Fall precautions, Seizure precautions      Objective     Pain:  Pain Assessment  Pain Assessment: 0-10  Pain Score: 0 - No pain    Cognition:  Cognition  Overall Cognitive Status:  (confused, perseverating on calling his wife, difficult to redirect and 1 step command following ~ 25-50% only immedicately after redirecting pt.)  Orientation Level: Disoriented to time, Disoriented to situation, Disoriented to place    General Assessments:      Activity Tolerance  Endurance: Decreased tolerance for upright activites     Strength  Strength Comments: BLE 3+/5    Dynamic Sitting Balance  Dynamic Sitting-Comments: fair-  Dynamic Standing Balance  Dynamic Standing-Comments: poor    Functional Assessments:     Bed Mobility  Bed Mobility: Yes  Bed Mobility 1  Bed Mobility 1: Supine to sitting, Sitting to supine, Scooting  Level of Assistance 1: Moderate assistance, Moderate verbal cues  Bed Mobility Comments 1: mod A to EOB  Transfers  Transfer: Yes  Transfer 1  Transfer Device 1: Walker  (FWW)  Transfer Level of Assistance 1: Moderate assistance, +2, Moderate verbal cues  Trials/Comments 1: mod A x 2 with FWW . cues to push up . pt impulsive with mobility . (toilet transfer with FWW mod A x 2 .)  Ambulation/Gait Training  Ambulation/Gait Training Performed: Yes  Ambulation/Gait Training 1  Surface 1: Level tile  Device 1: Rolling walker  Assistance 1: Moderate assistance  Quality of Gait 1: Inconsistent stride length, Decreased step length, Forward flexed posture  Comments/Distance (ft) 1: 5 steps to bed from [facility] with FWW mod A x 2 . mod vcs impulsive wiht movement and decreased walker safety .    Extremity/Trunk Assessments:        RLE   RLE : Within Functional Limits  LLE   LLE : Within Functional Limits    Outcome Measures:  [facility] Basic Mobility  Turning from your back to your side while in a flat bed without using bedrails: A lot  Moving from lying on your back to sitting on the side of a flat bed without using bedrails: A lot  Moving to and from bed to chair (including a wheelchair): A lot  Standing up from a chair using your arms (e.g. wheelchair or bedside chair): A lot  To walk in hospital room: A lot  Climbing 3-5 steps with railing: Total  Basic Mobility - Total Score: 11  Goals:  Encounter Problems       Encounter Problems (Active)       PT Problem       Pt will demonstrate SBA with bed mobility to edge of bed.         Start:  05/20/24    Expected End:  06/03/24            Pt will demonstrate cga with sit to stand/chair transfers with FWW.         Start:  05/20/24    Expected End:  06/03/24            Pt will ambulate 30 feet with  FWW cga .         Start:  05/20/24    Expected End:  06/03/24            Pt to demo improved BLE strength by being able to complete supine/seated thera ex 2x20 BLEs with 4 or less rest breaks .         Start:  05/20/24    Expected End:  06/03/24               Pain - Adult            Education Documentation  Mobility Training, taught by Denny Gaviria, PT at  5/20/2024  2:29 PM.  Learner: Patient  Readiness: Acceptance  Method: Explanation  Response: Verbalizes Understanding    Education Comments  No comments found.

## 2024-05-20 NOTE — CARE PLAN
The patient's goals for the shift include      The clinical goals for the shift include pt to remain safe throughout shift

## 2024-05-20 NOTE — PROGRESS NOTES
Occupational Therapy    Evaluation    Patient Name: Jose Guardado  MRN: [id number]  Today's Date: 5/20/2024  Time Calculation  Start Time: 1135  Stop Time: 1203  Time Calculation (min): 28 min    Assessment  IP OT Assessment  End of Session Communication: Bedside nurse, PCT/NA/CTA  End of Session Patient Position: Bed, 3 rail up, Alarm on (sitter present)  Plan:  Treatment Interventions: ADL retraining, Functional transfer training, Endurance training, Cognitive reorientation, Patient/family training, Equipment evaluation/education, Compensatory technique education  OT Frequency: 3 times per week  OT Discharge Recommendations: Moderate intensity level of continued care  OT - OK to Discharge: Yes (when medically appropriate)    Subjective   Current Problem:  1. Fall, initial encounter        2. Hematoma        3. Severe dementia, unspecified dementia type, unspecified whether behavioral, psychotic, or mood disturbance or anxiety (Multi)          General:  General  Reason for Referral: ADL  Referred By: Floridalma  Past Medical History Relevant to Rehab: Includes: HTN, CVA, dementia, DM, expressive aphasia, seizures  Family/Caregiver Present:  (Sitter present)  Co-Treatment: PT  Prior to Session Communication: Bedside nurse, PCT/NA/CTA  Patient Position Received: Bed, 3 rail up (sitter present)  General Comment: Pt. to ED 5/17 after falling down 10-12 steps and hitting head. CT head (-).  CT c-spine: (-). CT chest/abdomen/pelvis: small gluteal hematoma.  Precautions:  Medical Precautions: Fall precautions, Seizure precautions  Vital Signs:     Pain:  Pain Assessment  Pain Assessment: 0-10  Pain Score: 0 - No pain    Objective   Cognition:  Overall Cognitive Status:  (confused, perseverating on calling his wife, difficult to redirect and 1 step command following ~ 25-50% only immedicately after redirecting pt.)  Orientation Level:  (oriented to self)           Home Living:  Home Living Comments: Pt. is a questionable  historian.   Pt. reported the following:  Lives with wife.  1 step to enter.  1st floor set up.  No AD use at baseline.  Independent with ADLs.  Wife manages IADLs.  1 recent fall.  Walk in shower with seat and safety bars.          ADL:  Eating Deficit: Supervision/safety, Verbal cueing  Grooming Assistance: Minimal  Grooming Deficit: Verbal cueing, Supervision/safety  Bathing Assistance: Moderate  Bathing Deficit: Verbal cueing, Supervision/safety, Steadying  UE Dressing Assistance: Minimal  UE Dressing Deficit: Supervision/safety, Verbal cueing, Steadying  LE Dressing Assistance: Maximal  LE Dressing Deficit: Supervision/safety, Verbal cueing, Steadying  Toileting Assistance with Device: Maximal  Toileting Deficit: Supervison/safety, Verbal cueing, Steadying  ADL Comments: Assist for hygiene at bed level and after toileting on BSC  Activity Tolerance:     Bed Mobility/Transfers: Bed Mobility  Bed Mobility: Yes (Sup to sit, mod assist and verbal cues to initiate and progress. Rolling R and L in bed, max assist and verbal cues.  Incontinent, and required hygiene in bed.)    Transfers  Transfer: Yes  Transfer 1  Trials/Comments 1: Sit to stand and stand to sit at EOB,  mod assist and cues for technique.  Bed to BSC and BSC to bed, 2 mod assist with FWW, but pt. abandoned FWW.  Cues for safety, technique, progression of transfer.      Sitting Balance:  Static Sitting Balance  Static Sitting-Comment/Number of Minutes: Fair  Dynamic Sitting Balance  Dynamic Sitting-Comments: Fair (-)  Standing Balance:  Static Standing Balance  Static Standing-Comment/Number of Minutes: Poor (+)  Dynamic Standing Balance  Dynamic Standing-Comments: Poor       Extremities: RUE   RUE :  (AROM WFL, MMT 4/5.) and LUE   LUE:  (AROM WFL, MMT 4/5.)    Outcome Measures: [facility] Daily Activity  Putting on and taking off regular lower body clothing: A lot  Bathing (including washing, rinsing, drying): A lot  Putting on and taking off regular upper  body clothing: A little  Toileting, which includes using toilet, bedpan or urinal: A lot  Taking care of personal grooming such as brushing teeth: A little  Eating Meals: A little  Daily Activity - Total Score: 15      Education Documentation  ADL Training, taught by Taylor Del Cid, OT at 5/20/2024  2:30 PM.  Learner: Patient  Readiness: Acceptance  Method: Explanation  Response: Verbalizes Understanding, Needs Reinforcement    Education Comments  No comments found.      Goals:   Encounter Problems       Encounter Problems (Active)       OT Goals       Min assist bed mobility.       Start:  05/20/24    Expected End:  06/03/24            Min assist sit/stand, bed/chair/commode with FWW.        Start:  05/20/24    Expected End:  06/03/24            Min assist LB dressing.        Start:  05/20/24    Expected End:  06/03/24            Fair dynamic standing balance for ADL.        Start:  05/20/24    Expected End:  06/03/24            Sup for UB dressing and grooming.        Start:  05/20/24    Expected End:  06/03/24

## 2024-05-21 LAB
ANION GAP SERPL CALC-SCNC: 10 MMOL/L (ref 10–20)
BASOPHILS # BLD AUTO: 0.01 X10*3/UL (ref 0–0.1)
BASOPHILS NFR BLD AUTO: 0.2 %
BUN SERPL-MCNC: 6 MG/DL (ref 6–23)
CALCIUM SERPL-MCNC: 7.5 MG/DL (ref 8.6–10.3)
CHLORIDE SERPL-SCNC: 106 MMOL/L (ref 98–107)
CO2 SERPL-SCNC: 28 MMOL/L (ref 21–32)
CREAT SERPL-MCNC: 0.67 MG/DL (ref 0.5–1.3)
EGFRCR SERPLBLD CKD-EPI 2021: >90 ML/MIN/1.73M*2
EOSINOPHIL # BLD AUTO: 0.15 X10*3/UL (ref 0–0.4)
EOSINOPHIL NFR BLD AUTO: 3 %
ERYTHROCYTE [DISTWIDTH] IN BLOOD BY AUTOMATED COUNT: 14.1 % (ref 11.5–14.5)
GLUCOSE BLD MANUAL STRIP-MCNC: 169 MG/DL (ref 74–99)
GLUCOSE BLD MANUAL STRIP-MCNC: 189 MG/DL (ref 74–99)
GLUCOSE BLD MANUAL STRIP-MCNC: 196 MG/DL (ref 74–99)
GLUCOSE SERPL-MCNC: 208 MG/DL (ref 74–99)
HCT VFR BLD AUTO: 34.1 % (ref 41–52)
HGB BLD-MCNC: 10.9 G/DL (ref 13.5–17.5)
IMM GRANULOCYTES # BLD AUTO: 0.03 X10*3/UL (ref 0–0.5)
IMM GRANULOCYTES NFR BLD AUTO: 0.6 % (ref 0–0.9)
LYMPHOCYTES # BLD AUTO: 1.3 X10*3/UL (ref 0.8–3)
LYMPHOCYTES NFR BLD AUTO: 26.2 %
MAGNESIUM SERPL-MCNC: 1.82 MG/DL (ref 1.6–2.4)
MCH RBC QN AUTO: 29.2 PG (ref 26–34)
MCHC RBC AUTO-ENTMCNC: 32 G/DL (ref 32–36)
MCV RBC AUTO: 91 FL (ref 80–100)
MONOCYTES # BLD AUTO: 0.67 X10*3/UL (ref 0.05–0.8)
MONOCYTES NFR BLD AUTO: 13.5 %
NEUTROPHILS # BLD AUTO: 2.8 X10*3/UL (ref 1.6–5.5)
NEUTROPHILS NFR BLD AUTO: 56.5 %
NRBC BLD-RTO: 0 /100 WBCS (ref 0–0)
PLATELET # BLD AUTO: 100 X10*3/UL (ref 150–450)
POTASSIUM SERPL-SCNC: 3.9 MMOL/L (ref 3.5–5.3)
RBC # BLD AUTO: 3.73 X10*6/UL (ref 4.5–5.9)
SODIUM SERPL-SCNC: 140 MMOL/L (ref 136–145)
VALPROATE SERPL-MCNC: 40 UG/ML (ref 50–100)
WBC # BLD AUTO: 5 X10*3/UL (ref 4.4–11.3)

## 2024-05-21 PROCEDURE — 36415 COLL VENOUS BLD VENIPUNCTURE: CPT | Performed by: STUDENT IN AN ORGANIZED HEALTH CARE EDUCATION/TRAINING PROGRAM

## 2024-05-21 PROCEDURE — 2500000006 HC RX 250 W HCPCS SELF ADMINISTERED DRUGS (ALT 637 FOR ALL PAYERS): Mod: MUE | Performed by: PSYCHIATRY & NEUROLOGY

## 2024-05-21 PROCEDURE — 99232 SBSQ HOSP IP/OBS MODERATE 35: CPT | Performed by: FAMILY MEDICINE

## 2024-05-21 PROCEDURE — 83735 ASSAY OF MAGNESIUM: CPT | Performed by: STUDENT IN AN ORGANIZED HEALTH CARE EDUCATION/TRAINING PROGRAM

## 2024-05-21 PROCEDURE — 2500000004 HC RX 250 GENERAL PHARMACY W/ HCPCS (ALT 636 FOR OP/ED): Performed by: INTERNAL MEDICINE

## 2024-05-21 PROCEDURE — 80164 ASSAY DIPROPYLACETIC ACD TOT: CPT | Performed by: PSYCHIATRY & NEUROLOGY

## 2024-05-21 PROCEDURE — 99232 SBSQ HOSP IP/OBS MODERATE 35: CPT | Performed by: PSYCHIATRY & NEUROLOGY

## 2024-05-21 PROCEDURE — 97530 THERAPEUTIC ACTIVITIES: CPT | Mod: GP,CQ

## 2024-05-21 PROCEDURE — 2500000001 HC RX 250 WO HCPCS SELF ADMINISTERED DRUGS (ALT 637 FOR MEDICARE OP): Performed by: INTERNAL MEDICINE

## 2024-05-21 PROCEDURE — 2500000006 HC RX 250 W HCPCS SELF ADMINISTERED DRUGS (ALT 637 FOR ALL PAYERS): Performed by: INTERNAL MEDICINE

## 2024-05-21 PROCEDURE — 85025 COMPLETE CBC W/AUTO DIFF WBC: CPT | Performed by: STUDENT IN AN ORGANIZED HEALTH CARE EDUCATION/TRAINING PROGRAM

## 2024-05-21 PROCEDURE — 97110 THERAPEUTIC EXERCISES: CPT | Mod: GP,CQ

## 2024-05-21 PROCEDURE — 82947 ASSAY GLUCOSE BLOOD QUANT: CPT

## 2024-05-21 PROCEDURE — 1100000001 HC PRIVATE ROOM DAILY

## 2024-05-21 PROCEDURE — 2500000001 HC RX 250 WO HCPCS SELF ADMINISTERED DRUGS (ALT 637 FOR MEDICARE OP): Performed by: PSYCHIATRY & NEUROLOGY

## 2024-05-21 PROCEDURE — 80048 BASIC METABOLIC PNL TOTAL CA: CPT | Performed by: STUDENT IN AN ORGANIZED HEALTH CARE EDUCATION/TRAINING PROGRAM

## 2024-05-21 RX ORDER — RISPERIDONE 0.25 MG/1
1.5 TABLET ORAL NIGHTLY
Status: DISCONTINUED | OUTPATIENT
Start: 2024-05-21 | End: 2024-05-23

## 2024-05-21 RX ADMIN — RISPERIDONE 1.5 MG: 0.25 TABLET ORAL at 20:54

## 2024-05-21 RX ADMIN — BRIVARACETAM 50 MG: 50 INJECTION, SUSPENSION INTRAVENOUS at 22:29

## 2024-05-21 RX ADMIN — SODIUM CHLORIDE, SODIUM LACTATE, POTASSIUM CHLORIDE, CALCIUM CHLORIDE AND DEXTROSE MONOHYDRATE 75 ML/HR: 5; 600; 310; 30; 20 INJECTION, SOLUTION INTRAVENOUS at 08:16

## 2024-05-21 RX ADMIN — DIVALPROEX SODIUM 125 MG: 125 TABLET, DELAYED RELEASE ORAL at 07:05

## 2024-05-21 RX ADMIN — METFORMIN HYDROCHLORIDE 500 MG: 500 TABLET, FILM COATED ORAL at 08:37

## 2024-05-21 RX ADMIN — LOSARTAN POTASSIUM 25 MG: 25 TABLET, FILM COATED ORAL at 08:37

## 2024-05-21 RX ADMIN — POLYMYXIN B SULFATE AND TRIMETHOPRIM 1 DROP: 10000; 1 SOLUTION OPHTHALMIC at 22:00

## 2024-05-21 RX ADMIN — POLYMYXIN B SULFATE AND TRIMETHOPRIM 1 DROP: 10000; 1 SOLUTION OPHTHALMIC at 01:11

## 2024-05-21 RX ADMIN — POLYMYXIN B SULFATE AND TRIMETHOPRIM 1 DROP: 10000; 1 SOLUTION OPHTHALMIC at 05:27

## 2024-05-21 RX ADMIN — SODIUM CHLORIDE, SODIUM LACTATE, POTASSIUM CHLORIDE, CALCIUM CHLORIDE AND DEXTROSE MONOHYDRATE 75 ML/HR: 5; 600; 310; 30; 20 INJECTION, SOLUTION INTRAVENOUS at 20:19

## 2024-05-21 RX ADMIN — CLOPIDOGREL BISULFATE 75 MG: 75 TABLET, FILM COATED ORAL at 08:37

## 2024-05-21 RX ADMIN — METOPROLOL TARTRATE 25 MG: 25 TABLET, FILM COATED ORAL at 08:37

## 2024-05-21 RX ADMIN — Medication 3 MG: at 20:53

## 2024-05-21 RX ADMIN — PANTOPRAZOLE SODIUM 40 MG: 40 TABLET, DELAYED RELEASE ORAL at 05:33

## 2024-05-21 RX ADMIN — FINASTERIDE 5 MG: 5 TABLET, FILM COATED ORAL at 20:53

## 2024-05-21 RX ADMIN — METOPROLOL TARTRATE 25 MG: 25 TABLET, FILM COATED ORAL at 20:53

## 2024-05-21 RX ADMIN — LEVOTHYROXINE SODIUM 100 MCG: 0.1 TABLET ORAL at 05:31

## 2024-05-21 RX ADMIN — ATORVASTATIN CALCIUM 80 MG: 80 TABLET, FILM COATED ORAL at 20:53

## 2024-05-21 RX ADMIN — BRIVARACETAM 50 MG: 50 INJECTION, SUSPENSION INTRAVENOUS at 11:04

## 2024-05-21 RX ADMIN — POLYMYXIN B SULFATE AND TRIMETHOPRIM 1 DROP: 10000; 1 SOLUTION OPHTHALMIC at 10:35

## 2024-05-21 ASSESSMENT — PAIN SCALES - GENERAL
PAINLEVEL_OUTOF10: 0 - NO PAIN

## 2024-05-21 ASSESSMENT — COGNITIVE AND FUNCTIONAL STATUS - GENERAL
STANDING UP FROM CHAIR USING ARMS: A LITTLE
CLIMB 3 TO 5 STEPS WITH RAILING: A LOT
MOVING TO AND FROM BED TO CHAIR: A LITTLE
TURNING FROM BACK TO SIDE WHILE IN FLAT BAD: A LOT
WALKING IN HOSPITAL ROOM: A LITTLE
TOILETING: A LITTLE
MOVING FROM LYING ON BACK TO SITTING ON SIDE OF FLAT BED WITH BEDRAILS: A LOT
CLIMB 3 TO 5 STEPS WITH RAILING: A LOT
HELP NEEDED FOR BATHING: A LITTLE
MOBILITY SCORE: 15
DAILY ACTIVITIY SCORE: 19
MOBILITY SCORE: 14
MOVING FROM LYING ON BACK TO SITTING ON SIDE OF FLAT BED WITH BEDRAILS: A LOT
TURNING FROM BACK TO SIDE WHILE IN FLAT BAD: A LOT
MOVING TO AND FROM BED TO CHAIR: A LITTLE
STANDING UP FROM CHAIR USING ARMS: A LITTLE
DRESSING REGULAR UPPER BODY CLOTHING: A LITTLE
DRESSING REGULAR LOWER BODY CLOTHING: A LITTLE
WALKING IN HOSPITAL ROOM: A LOT
PERSONAL GROOMING: A LITTLE

## 2024-05-21 ASSESSMENT — PAIN - FUNCTIONAL ASSESSMENT
PAIN_FUNCTIONAL_ASSESSMENT: 0-10
PAIN_FUNCTIONAL_ASSESSMENT: 0-10

## 2024-05-21 NOTE — PROGRESS NOTES
Jose Guardado is a 83 y.o. male on day 2 of admission was seen by psychiatry and is being followed by psychiatry because of recent geropsychiatric admission and agitation.  Jose was pleasant and did not appear to be in any acute distress this morning.  He wished to go home to be with his wife and provide for her as he is worried about how she is taking care of herself and her health.  He does not have insight into his own medical and mental health.  It is very evident that his platelet count have Downtrending and One of the Side Effects of Depakote and Hence Depakote Is Going to Be Stopped As It Has Been Tapered down on a Daily Basis.  As per Staff Patient Was Restless throughout the Night and for That Reason Risperidone Would Be Titrated to a Dose of 1.5 Mg Tonight.  Patient is tolerating medications well.   Labs Reviewed.  Vitals Reviewed.   Nursing Notes Reviewed.   No EPS, TD, vitals stable.      MSE: Patient was drowsy and not oriented to time, place, person and situation. Patient appears well groomed and clad in hospital gown. Patient is pleasant on approach. Recent and remote memory poor. Memory registration and recall poor. Attention and concentration restricted. Speech low in volume and slow in rate.  Fair eye contact. Thought process poverty of thoughts. Impaired associations. Limited fund of knowledge. Mood ok affect flat. Patient did not endorse any delusions. Patient denied any auditory visual hallucinations. Patient has denied any active suicidal  ideations and is future oriented.  Patient has limited insight, limited judgment and during the time of the assessment this morning fair impulse control.  But as per night staff he was quite restless     Musculoskeletal: Patient bedbound and gait not assessed, no Parkinsonism, no Dystonia, no Akathisia, no TD. Psychomotor activity within normal limits.     Diagnosis: Dementia     Assessment and Plan:      Stop the Depakote and I am hoping the platelet count  "would start to improve  Increase the dose of risperidone 1.5 mg at bedtime   Psychiatry will follow  Continue with current treatment and medication adjustments. Patient is agreeable with continued medication management and adjustments discussed.       Last Recorded Vitals  /70   Pulse 70   Temp 36.1 °C (97 °F)   Resp 17   Ht 1.702 m (5' 7.01\")   Wt 67.9 kg (149 lb 12.8 oz)   SpO2 96%   BMI 23.46 kg/m²      Recent Results (from the past 24 hour(s))   POCT GLUCOSE    Collection Time: 05/20/24 10:53 AM   Result Value Ref Range    POCT Glucose 236 (H) 74 - 99 mg/dL   POCT GLUCOSE    Collection Time: 05/20/24  9:12 PM   Result Value Ref Range    POCT Glucose 216 (H) 74 - 99 mg/dL   Magnesium    Collection Time: 05/21/24  4:33 AM   Result Value Ref Range    Magnesium 1.82 1.60 - 2.40 mg/dL   Basic Metabolic Panel    Collection Time: 05/21/24  4:33 AM   Result Value Ref Range    Glucose 208 (H) 74 - 99 mg/dL    Sodium 140 136 - 145 mmol/L    Potassium 3.9 3.5 - 5.3 mmol/L    Chloride 106 98 - 107 mmol/L    Bicarbonate 28 21 - 32 mmol/L    Anion Gap 10 10 - 20 mmol/L    Urea Nitrogen 6 6 - 23 mg/dL    Creatinine 0.67 0.50 - 1.30 mg/dL    eGFR >90 >60 mL/min/1.73m*2    Calcium 7.5 (L) 8.6 - 10.3 mg/dL   CBC and Auto Differential    Collection Time: 05/21/24  4:33 AM   Result Value Ref Range    WBC 5.0 4.4 - 11.3 x10*3/uL    nRBC 0.0 0.0 - 0.0 /100 WBCs    RBC 3.73 (L) 4.50 - 5.90 x10*6/uL    Hemoglobin 10.9 (L) 13.5 - 17.5 g/dL    Hematocrit 34.1 (L) 41.0 - 52.0 %    MCV 91 80 - 100 fL    MCH 29.2 26.0 - 34.0 pg    MCHC 32.0 32.0 - 36.0 g/dL    RDW 14.1 11.5 - 14.5 %    Platelets 100 (L) 150 - 450 x10*3/uL    Neutrophils % 56.5 40.0 - 80.0 %    Immature Granulocytes %, Automated 0.6 0.0 - 0.9 %    Lymphocytes % 26.2 13.0 - 44.0 %    Monocytes % 13.5 2.0 - 10.0 %    Eosinophils % 3.0 0.0 - 6.0 %    Basophils % 0.2 0.0 - 2.0 %    Neutrophils Absolute 2.80 1.60 - 5.50 x10*3/uL    Immature Granulocytes Absolute, " Automated 0.03 0.00 - 0.50 x10*3/uL    Lymphocytes Absolute 1.30 0.80 - 3.00 x10*3/uL    Monocytes Absolute 0.67 0.05 - 0.80 x10*3/uL    Eosinophils Absolute 0.15 0.00 - 0.40 x10*3/uL    Basophils Absolute 0.01 0.00 - 0.10 x10*3/uL   Valproic Acid    Collection Time: 05/21/24  4:33 AM   Result Value Ref Range    Valproic Acid 40 (L) 50 - 100 ug/mL   POCT GLUCOSE    Collection Time: 05/21/24  6:54 AM   Result Value Ref Range    POCT Glucose 189 (H) 74 - 99 mg/dL          Current Facility-Administered Medications:     acetaminophen (Tylenol) tablet 650 mg, 650 mg, oral, q4h PRN **OR** acetaminophen (Tylenol) oral liquid 650 mg, 650 mg, nasogastric tube, q4h PRN **OR** acetaminophen (Tylenol) suppository 650 mg, 650 mg, rectal, q4h PRN, Isa Hedrick MD    atorvastatin (Lipitor) tablet 80 mg, 80 mg, oral, Nightly, Isa Hedrick MD, 80 mg at 05/20/24 1943    brivaracetam (Briviact) injection 50 mg, 50 mg, intravenous, q12h HAYDEE, Isa Hedrick MD, Stopped at 05/20/24 2135    clopidogrel (Plavix) tablet 75 mg, 75 mg, oral, Daily, Isa Hedrick MD, 75 mg at 05/21/24 0837    dextrose 5 % and lactated Ringer's infusion, 75 mL/hr, intravenous, Continuous, Isa Hedrick MD, Last Rate: 75 mL/hr at 05/21/24 0816, 75 mL/hr at 05/21/24 0816    dextrose 50 % injection 12.5 g, 12.5 g, intravenous, q15 min PRN, Isa Hedrick MD    dextrose 50 % injection 25 g, 25 g, intravenous, q15 min PRN, Isa Hedrick MD    finasteride (Proscar) tablet 5 mg, 5 mg, oral, Nightly, Isa Hedrick MD, 5 mg at 05/20/24 1946    glucagon (Glucagen) injection 1 mg, 1 mg, intramuscular, q15 min PRN, Isa Hedrick MD    glucagon (Glucagen) injection 1 mg, 1 mg, intramuscular, q15 min PRN, Isa Hedrick MD    haloperidol lactate (Haldol) injection 2 mg, 2 mg, intramuscular, q6h PRN, Alisson Royal MD, 2 mg at 05/19/24 0017    insulin lispro (HumaLOG) injection 0-5 Units, 0-5 Units, subcutaneous, TID, Isa Hedrick MD, 2 Units at 05/20/24 1100     levothyroxine (Synthroid, Levoxyl) tablet 100 mcg, 100 mcg, oral, Daily before breakfast, Isa Hedrick MD, 100 mcg at 05/21/24 0531    losartan (Cozaar) tablet 25 mg, 25 mg, oral, Daily, Isa Hedrick MD, 25 mg at 05/21/24 0837    melatonin tablet 3 mg, 3 mg, oral, Nightly PRN, Isa Hedrick MD    metFORMIN (Glucophage) tablet 500 mg, 500 mg, oral, BID, Isa Hedrick MD, 500 mg at 05/21/24 0837    metoprolol tartrate (Lopressor) tablet 25 mg, 25 mg, oral, BID, Isa Hedrick MD, 25 mg at 05/21/24 0837    ondansetron (Zofran) tablet 4 mg, 4 mg, oral, q8h PRN **OR** ondansetron (Zofran) injection 4 mg, 4 mg, intravenous, q8h PRN, Isa Hedrick MD    pantoprazole (ProtoNix) EC tablet 40 mg, 40 mg, oral, Daily before breakfast, 40 mg at 05/21/24 0533 **OR** pantoprazole (ProtoNix) injection 40 mg, 40 mg, intravenous, Daily before breakfast, Isa Hedrick MD, 40 mg at 05/19/24 0702    polyethylene glycol (Glycolax, Miralax) packet 17 g, 17 g, oral, Daily PRN, Isa Hedrick MD    polymyxin B sulf-trimethoprim (Polytrim) ophthalmic solution 1 drop, 1 drop, Both Eyes, q4h HAYDEE, Popeye Henderson MD, 1 drop at 05/21/24 0527    risperiDONE (RisperDAL) tablet 1.5 mg, 1.5 mg, oral, Nightly, MD Ruy Perez MD

## 2024-05-21 NOTE — CARE PLAN
The patient's goals for the shift include      The clinical goals for the shift include remain safe throughout shift

## 2024-05-21 NOTE — PROGRESS NOTES
Physical Therapy    Physical Therapy Treatment    Patient Name: Jose Guardado  MRN: [id number]  Today's Date: 5/21/2024  Time Calculation  Start Time: 1015  Stop Time: 1040  Time Calculation (min): 25 min       Assessment/Plan   PT Assessment  PT Assessment Results: Decreased strength, Decreased endurance, Impaired balance, Decreased mobility, Decreased coordination, Decreased cognition, Impaired judgement, Decreased safety awareness  Rehab Prognosis: Good  Treatment Tolerance: Patient tolerated treatment well, Patient limited by fatigue  Medical Staff Made Aware: Yes  End of Session Communication: Bedside nurse, PCT/NA/CTA  Assessment Comment: Patient continues to require (A) for safety with transfers/amb. Patient will benefit from additional PT to address deficits and improve mobility.  End of Session Patient Position: Up in chair, Alarm off, caregiver present (Reclined in chair with LEs elevated; Nurse tech sitting next to patient at end of PT session.)  PT Plan  Inpatient/Swing Bed or Outpatient: Inpatient  Treatment/Interventions: Bed mobility, Transfer training, Gait training, Stair training, Balance training, Strengthening, Endurance training, Therapeutic exercise, Therapeutic activity  PT Plan: Skilled PT  PT Frequency: 3 times per week  PT Discharge Recommendations: Moderate intensity level of continued care    PT Recommended Transfer Status: Assist x2, Assistive device    General Visit Information:   PT  Visit  PT Received On: 05/21/24  General  Family/Caregiver Present: No  Prior to Session Communication: Bedside nurse, PCT/MANINDER/MATHEW  Patient Position Received: Bed, 3 rail up, Alarm off, caregiver present  General Comment: Pleasant and cooperative. Patient reports having expressive aphasia from h/o CVA, but he was able to communicate well and only had a few moments of word finding difficulties.    General Observations:   General Observation: 1:1 sitter present; Tele; IV.    Subjective      Precautions:  Precautions  Hearing/Visual Limitations: [place]----better with (R)ear  Medical Precautions: Fall precautions    Objective     Pain:  Pain Assessment  Pain Assessment: 0-10  Pain Score: 0 - No pain    Cognition:  Cognition  Orientation Level: Disoriented to place, Disoriented to situation    Balance:   Static Sitting Balance  Static Sitting-Comment/Number of Minutes: F+  Dynamic Sitting Balance  Dynamic Sitting-Comments: F  Static Standing Balance  Static Standing-Comment/Number of Minutes: F- with ww  Dynamic Standing Balance  Dynamic Standing-Comments: F- with ww    Treatments:  Therapeutic Exercise  Therapeutic Exercise Performed: Yes  Therapeutic Exercise Activity 2: Instructed patient in seated ther ex. AROM BLE with AP/LAQ/Hipflex x10. Also, instructed patient in standing hipflex(straight LE) x10 and marching in place x1min with (B)UE support on ww. No LOB.     Balance/Neuromuscular Re-Education  Balance/Neuromuscular Re-Education Activity Performed: Yes  Balance/Neuromuscular Re-Education Activity 1: Tolerated standing with ww ~3min total. Able to complete wt shifting at bed side before progressing with amb. No LOB, but patient has impulsive tendencies.  Bed Mobility  Bed Mobility: Yes  Bed Mobility 1  Bed Mobility 1: Supine to sitting  Level of Assistance 1: Moderate assistance  Bed Mobility Comments 1: HOB ~35°. Hand over hand (A) to reach across body to use the bed rail for support. (A) to lift UB from HOB while moving LEs over the EOB.  Ambulation/Gait Training  Ambulation/Gait Training Performed: Yes  Ambulation/Gait Training 1  Surface 1: Level tile  Device 1: Rolling walker  Gait Support Devices: Gait belt  Assistance 1: Minimum assistance (+1 additional staff member to manage IV pole.)  Comments/Distance (ft) 1: ~25ft x2. NBOS. Slow andres. Slightly forward flexed posture. Step to/through gait pattern. Decreased step height/length B. v/c and (A) for safer maneuvering of ww with 90/180°  turns and to keep aware of IV line to avoid entanglement. Impulsive tendencies.  Transfers  Transfer: Yes  Transfer 1  Technique 1: Sit to stand, Stand to sit  Transfer Device 1:  (ww)  Transfer Level of Assistance 1: Minimum assistance, +2  Trials/Comments 1: (3x). v/c for safe hand placement and technique. Slow transition of hands to/from ww. Impulsive at times.          Outcome Measures:  [facility] Basic Mobility  Turning from your back to your side while in a flat bed without using bedrails: A lot  Moving from lying on your back to sitting on the side of a flat bed without using bedrails: A lot  Moving to and from bed to chair (including a wheelchair): A little  Standing up from a chair using your arms (e.g. wheelchair or bedside chair): A little  To walk in hospital room: A little  Climbing 3-5 steps with railing: A lot  Basic Mobility - Total Score: 15    Education Documentation  Mobility Training, taught by Kim Billings PTA at 5/21/2024  1:40 PM.  Learner: Patient  Readiness: Acceptance  Method: Explanation, Demonstration  Response: Verbalizes Understanding, Needs Reinforcement  Comment: See therapy note.    EDUCATION:  Individual(s) Educated: Patient  Education Provided: Body Mechanics, Fall Risk, Home Exercise Program, POC, Posture (Balance; Safety with bed mobility/transfers/amb.)  Patient Response to Education:  (Patient verbalized understanding of information, but carry-over is questionable.)    Encounter Problems       Encounter Problems (Active)       PT Problem       Pt will demonstrate SBA with bed mobility to edge of bed.   (Progressing)       Start:  05/20/24    Expected End:  06/03/24            Pt will demonstrate cga with sit to stand/chair transfers with FWW.   (Progressing)       Start:  05/20/24    Expected End:  06/03/24            Pt will ambulate 30 feet with  FWW cga .   (Progressing)       Start:  05/20/24    Expected End:  06/03/24            Pt to demo improved BLE strength by being  able to complete supine/seated thera ex 2x20 BLEs with 4 or less rest breaks .   (Progressing)       Start:  05/20/24    Expected End:  06/03/24

## 2024-05-21 NOTE — PROGRESS NOTES
Jose Guardado is a 83 y.o. male on day 2 of admission presenting with Falls, initial encounter.      Subjective   Oriented to self, denies pain       Objective     Last Recorded Vitals  /70   Pulse 70   Temp 36.1 °C (97 °F)   Resp 17   Wt 67.9 kg (149 lb 12.8 oz)   SpO2 96%   Intake/Output last 3 Shifts:    Intake/Output Summary (Last 24 hours) at 5/21/2024 1406  Last data filed at 5/21/2024 0715  Gross per 24 hour   Intake 341.75 ml   Output 920 ml   Net -578.25 ml       Admission Weight  Weight: 69.4 kg (153 lb) (05/17/24 0256)    Daily Weight  05/17/24 : 67.9 kg (149 lb 12.8 oz)    Image Results  ECG 12 lead  Normal sinus rhythm  Normal ECG  When compared with ECG of 23-APR-2024 07:44, (unconfirmed)  Nonspecific T wave abnormality now evident in Inferior leads  See ED provider note for full interpretation and clinical correlation  Confirmed by Ximena Rivas ([id number]) on 5/18/2024 10:29:11 AM      Physical Exam  Respiratory: Equal chest rise, no retractions  Abdomen: Soft, nontender, nondistended  Extremities: No cyanosis or clubbing appreciated  Neurological: Disoriented, spontaneously moves all extremities    Relevant Results  Results for orders placed or performed during the hospital encounter of 05/17/24 (from the past 24 hour(s))   POCT GLUCOSE   Result Value Ref Range    POCT Glucose 216 (H) 74 - 99 mg/dL   Magnesium   Result Value Ref Range    Magnesium 1.82 1.60 - 2.40 mg/dL   Basic Metabolic Panel   Result Value Ref Range    Glucose 208 (H) 74 - 99 mg/dL    Sodium 140 136 - 145 mmol/L    Potassium 3.9 3.5 - 5.3 mmol/L    Chloride 106 98 - 107 mmol/L    Bicarbonate 28 21 - 32 mmol/L    Anion Gap 10 10 - 20 mmol/L    Urea Nitrogen 6 6 - 23 mg/dL    Creatinine 0.67 0.50 - 1.30 mg/dL    eGFR >90 >60 mL/min/1.73m*2    Calcium 7.5 (L) 8.6 - 10.3 mg/dL   CBC and Auto Differential   Result Value Ref Range    WBC 5.0 4.4 - 11.3 x10*3/uL    nRBC 0.0 0.0 - 0.0 /100 WBCs    RBC 3.73 (L) 4.50 - 5.90  x10*6/uL    Hemoglobin 10.9 (L) 13.5 - 17.5 g/dL    Hematocrit 34.1 (L) 41.0 - 52.0 %    MCV 91 80 - 100 fL    MCH 29.2 26.0 - 34.0 pg    MCHC 32.0 32.0 - 36.0 g/dL    RDW 14.1 11.5 - 14.5 %    Platelets 100 (L) 150 - 450 x10*3/uL    Neutrophils % 56.5 40.0 - 80.0 %    Immature Granulocytes %, Automated 0.6 0.0 - 0.9 %    Lymphocytes % 26.2 13.0 - 44.0 %    Monocytes % 13.5 2.0 - 10.0 %    Eosinophils % 3.0 0.0 - 6.0 %    Basophils % 0.2 0.0 - 2.0 %    Neutrophils Absolute 2.80 1.60 - 5.50 x10*3/uL    Immature Granulocytes Absolute, Automated 0.03 0.00 - 0.50 x10*3/uL    Lymphocytes Absolute 1.30 0.80 - 3.00 x10*3/uL    Monocytes Absolute 0.67 0.05 - 0.80 x10*3/uL    Eosinophils Absolute 0.15 0.00 - 0.40 x10*3/uL    Basophils Absolute 0.01 0.00 - 0.10 x10*3/uL   Valproic Acid   Result Value Ref Range    Valproic Acid 40 (L) 50 - 100 ug/mL   POCT GLUCOSE   Result Value Ref Range    POCT Glucose 189 (H) 74 - 99 mg/dL   POCT GLUCOSE   Result Value Ref Range    POCT Glucose 196 (H) 74 - 99 mg/dL         Assessment/Plan     Dementia without behavioral changes  Mechanical fall with left gluteal hematoma  History of CVA with expressive aphasia  Hypertension, seizure disorder, type 2 diabetes mellitus on insulin  Thrombocytopenia    Plan:  Continue to monitor H&H  PT OT evaluation and treatment  Depakote was discontinued and hopefully platelet count will start to improve  Increase the dose of risperidone 1.5 mg at bedtime per psychiatry  Plan is to discharge to memory care unit on Thursday       Harjit De La Rosa MD

## 2024-05-21 NOTE — CARE PLAN
The patient's goals for the shift include      The clinical goals for the shift include patient to remain cognitively stable throughout shift      Problem: Safety - Adult  Goal: Free from fall injury  Outcome: Progressing     Problem: Skin  Goal: Decreased wound size/increased tissue granulation at next dressing change  Outcome: Progressing  Flowsheets (Taken 5/21/2024 0052)  Decreased wound size/increased tissue granulation at next dressing change:   Promote sleep for wound healing   Protective dressings over bony prominences  Goal: Participates in plan/prevention/treatment measures  Outcome: Progressing  Flowsheets (Taken 5/19/2024 0331)  Participates in plan/prevention/treatment measures: Discuss with provider PT/OT consult  Goal: Prevent/manage excess moisture  Outcome: Progressing  Flowsheets (Taken 5/19/2024 1233 by Ximena Akers RN)  Prevent/manage excess moisture:   Cleanse incontinence/protect with barrier cream   Monitor for/manage infection if present  Goal: Prevent/minimize sheer/friction injuries  Outcome: Progressing  Flowsheets (Taken 5/21/2024 0052)  Prevent/minimize sheer/friction injuries:   HOB 30 degrees or less   Turn/reposition every 2 hours/use positioning/transfer devices   Use pull sheet  Goal: Promote/optimize nutrition  Outcome: Progressing  Flowsheets (Taken 5/21/2024 0052)  Promote/optimize nutrition:   Assist with feeding   Consume > 50% meals/supplements   Offer water/supplements/favorite foods  Goal: Promote skin healing  Outcome: Progressing  Flowsheets (Taken 5/21/2024 0052)  Promote skin healing:   Assess skin/pad under line(s)/device(s)   Protective dressings over bony prominences   Turn/reposition every 2 hours/use positioning/transfer devices

## 2024-05-21 NOTE — PROGRESS NOTES
Spoke with dtr. To notify her of completed paperwork in front of chart.  Paperwork needed for her appt. With [facility] with move in date of thurs.  She is aware to ask the nurse for the paperwork.

## 2024-05-22 ENCOUNTER — TELEPHONE (OUTPATIENT)
Dept: NEUROLOGY | Facility: CLINIC | Age: 84
End: 2024-05-22
Payer: MEDICARE

## 2024-05-22 LAB
ANION GAP SERPL CALC-SCNC: 8 MMOL/L (ref 10–20)
BASOPHILS # BLD AUTO: 0.01 X10*3/UL (ref 0–0.1)
BASOPHILS NFR BLD AUTO: 0.2 %
BUN SERPL-MCNC: 7 MG/DL (ref 6–23)
CALCIUM SERPL-MCNC: 7.2 MG/DL (ref 8.6–10.3)
CHLORIDE SERPL-SCNC: 107 MMOL/L (ref 98–107)
CO2 SERPL-SCNC: 30 MMOL/L (ref 21–32)
CREAT SERPL-MCNC: 0.7 MG/DL (ref 0.5–1.3)
EGFRCR SERPLBLD CKD-EPI 2021: >90 ML/MIN/1.73M*2
EOSINOPHIL # BLD AUTO: 0.15 X10*3/UL (ref 0–0.4)
EOSINOPHIL NFR BLD AUTO: 2.8 %
ERYTHROCYTE [DISTWIDTH] IN BLOOD BY AUTOMATED COUNT: 14.3 % (ref 11.5–14.5)
GLUCOSE BLD MANUAL STRIP-MCNC: 198 MG/DL (ref 74–99)
GLUCOSE BLD MANUAL STRIP-MCNC: 205 MG/DL (ref 74–99)
GLUCOSE BLD MANUAL STRIP-MCNC: 227 MG/DL (ref 74–99)
GLUCOSE SERPL-MCNC: 226 MG/DL (ref 74–99)
HCT VFR BLD AUTO: 32 % (ref 41–52)
HGB BLD-MCNC: 10.5 G/DL (ref 13.5–17.5)
IMM GRANULOCYTES # BLD AUTO: 0.03 X10*3/UL (ref 0–0.5)
IMM GRANULOCYTES NFR BLD AUTO: 0.6 % (ref 0–0.9)
LYMPHOCYTES # BLD AUTO: 1.34 X10*3/UL (ref 0.8–3)
LYMPHOCYTES NFR BLD AUTO: 24.7 %
MAGNESIUM SERPL-MCNC: 1.55 MG/DL (ref 1.6–2.4)
MCH RBC QN AUTO: 29.9 PG (ref 26–34)
MCHC RBC AUTO-ENTMCNC: 32.8 G/DL (ref 32–36)
MCV RBC AUTO: 91 FL (ref 80–100)
MONOCYTES # BLD AUTO: 0.84 X10*3/UL (ref 0.05–0.8)
MONOCYTES NFR BLD AUTO: 15.5 %
NEUTROPHILS # BLD AUTO: 3.05 X10*3/UL (ref 1.6–5.5)
NEUTROPHILS NFR BLD AUTO: 56.2 %
NRBC BLD-RTO: 0 /100 WBCS (ref 0–0)
PLATELET # BLD AUTO: 124 X10*3/UL (ref 150–450)
POTASSIUM SERPL-SCNC: 3.7 MMOL/L (ref 3.5–5.3)
RBC # BLD AUTO: 3.51 X10*6/UL (ref 4.5–5.9)
SODIUM SERPL-SCNC: 141 MMOL/L (ref 136–145)
WBC # BLD AUTO: 5.4 X10*3/UL (ref 4.4–11.3)

## 2024-05-22 PROCEDURE — 2500000006 HC RX 250 W HCPCS SELF ADMINISTERED DRUGS (ALT 637 FOR ALL PAYERS): Performed by: INTERNAL MEDICINE

## 2024-05-22 PROCEDURE — 1100000001 HC PRIVATE ROOM DAILY

## 2024-05-22 PROCEDURE — 2500000006 HC RX 250 W HCPCS SELF ADMINISTERED DRUGS (ALT 637 FOR ALL PAYERS): Performed by: PSYCHIATRY & NEUROLOGY

## 2024-05-22 PROCEDURE — 83735 ASSAY OF MAGNESIUM: CPT | Performed by: STUDENT IN AN ORGANIZED HEALTH CARE EDUCATION/TRAINING PROGRAM

## 2024-05-22 PROCEDURE — 36415 COLL VENOUS BLD VENIPUNCTURE: CPT | Performed by: STUDENT IN AN ORGANIZED HEALTH CARE EDUCATION/TRAINING PROGRAM

## 2024-05-22 PROCEDURE — 85025 COMPLETE CBC W/AUTO DIFF WBC: CPT | Performed by: STUDENT IN AN ORGANIZED HEALTH CARE EDUCATION/TRAINING PROGRAM

## 2024-05-22 PROCEDURE — 82947 ASSAY GLUCOSE BLOOD QUANT: CPT

## 2024-05-22 PROCEDURE — 2500000004 HC RX 250 GENERAL PHARMACY W/ HCPCS (ALT 636 FOR OP/ED): Performed by: STUDENT IN AN ORGANIZED HEALTH CARE EDUCATION/TRAINING PROGRAM

## 2024-05-22 PROCEDURE — 97530 THERAPEUTIC ACTIVITIES: CPT | Mod: GP,CQ

## 2024-05-22 PROCEDURE — 99232 SBSQ HOSP IP/OBS MODERATE 35: CPT | Performed by: FAMILY MEDICINE

## 2024-05-22 PROCEDURE — 97535 SELF CARE MNGMENT TRAINING: CPT | Mod: GO,CO

## 2024-05-22 PROCEDURE — 2500000001 HC RX 250 WO HCPCS SELF ADMINISTERED DRUGS (ALT 637 FOR MEDICARE OP): Performed by: INTERNAL MEDICINE

## 2024-05-22 PROCEDURE — 2500000004 HC RX 250 GENERAL PHARMACY W/ HCPCS (ALT 636 FOR OP/ED): Performed by: INTERNAL MEDICINE

## 2024-05-22 PROCEDURE — 2500000001 HC RX 250 WO HCPCS SELF ADMINISTERED DRUGS (ALT 637 FOR MEDICARE OP): Performed by: STUDENT IN AN ORGANIZED HEALTH CARE EDUCATION/TRAINING PROGRAM

## 2024-05-22 PROCEDURE — 99232 SBSQ HOSP IP/OBS MODERATE 35: CPT | Performed by: PSYCHIATRY & NEUROLOGY

## 2024-05-22 PROCEDURE — 80048 BASIC METABOLIC PNL TOTAL CA: CPT | Performed by: STUDENT IN AN ORGANIZED HEALTH CARE EDUCATION/TRAINING PROGRAM

## 2024-05-22 RX ADMIN — POLYMYXIN B SULFATE AND TRIMETHOPRIM 1 DROP: 10000; 1 SOLUTION OPHTHALMIC at 10:35

## 2024-05-22 RX ADMIN — METOPROLOL TARTRATE 25 MG: 25 TABLET, FILM COATED ORAL at 08:34

## 2024-05-22 RX ADMIN — INSULIN LISPRO 2 UNITS: 100 INJECTION, SOLUTION INTRAVENOUS; SUBCUTANEOUS at 11:25

## 2024-05-22 RX ADMIN — METOPROLOL TARTRATE 25 MG: 25 TABLET, FILM COATED ORAL at 20:42

## 2024-05-22 RX ADMIN — METFORMIN HYDROCHLORIDE 500 MG: 500 TABLET, FILM COATED ORAL at 17:21

## 2024-05-22 RX ADMIN — ATORVASTATIN CALCIUM 80 MG: 80 TABLET, FILM COATED ORAL at 20:42

## 2024-05-22 RX ADMIN — POLYMYXIN B SULFATE AND TRIMETHOPRIM 1 DROP: 10000; 1 SOLUTION OPHTHALMIC at 13:16

## 2024-05-22 RX ADMIN — BRIVARACETAM 50 MG: 50 INJECTION, SUSPENSION INTRAVENOUS at 11:25

## 2024-05-22 RX ADMIN — SODIUM CHLORIDE, SODIUM LACTATE, POTASSIUM CHLORIDE, CALCIUM CHLORIDE AND DEXTROSE MONOHYDRATE 75 ML/HR: 5; 600; 310; 30; 20 INJECTION, SOLUTION INTRAVENOUS at 17:33

## 2024-05-22 RX ADMIN — METFORMIN HYDROCHLORIDE 500 MG: 500 TABLET, FILM COATED ORAL at 08:35

## 2024-05-22 RX ADMIN — PANTOPRAZOLE SODIUM 40 MG: 40 TABLET, DELAYED RELEASE ORAL at 08:34

## 2024-05-22 RX ADMIN — Medication 3 MG: at 20:49

## 2024-05-22 RX ADMIN — LEVOTHYROXINE SODIUM 100 MCG: 0.1 TABLET ORAL at 08:34

## 2024-05-22 RX ADMIN — HALOPERIDOL LACTATE 2 MG: 5 INJECTION, SOLUTION INTRAMUSCULAR at 22:18

## 2024-05-22 RX ADMIN — LOSARTAN POTASSIUM 25 MG: 25 TABLET, FILM COATED ORAL at 08:35

## 2024-05-22 RX ADMIN — INSULIN LISPRO 1 UNITS: 100 INJECTION, SOLUTION INTRAVENOUS; SUBCUTANEOUS at 17:24

## 2024-05-22 RX ADMIN — RISPERIDONE 1.5 MG: 0.25 TABLET ORAL at 20:41

## 2024-05-22 RX ADMIN — POLYMYXIN B SULFATE AND TRIMETHOPRIM 1 DROP: 10000; 1 SOLUTION OPHTHALMIC at 17:28

## 2024-05-22 RX ADMIN — FINASTERIDE 5 MG: 5 TABLET, FILM COATED ORAL at 20:42

## 2024-05-22 RX ADMIN — CLOPIDOGREL BISULFATE 75 MG: 75 TABLET, FILM COATED ORAL at 08:35

## 2024-05-22 RX ADMIN — INSULIN LISPRO 2 UNITS: 100 INJECTION, SOLUTION INTRAVENOUS; SUBCUTANEOUS at 08:30

## 2024-05-22 ASSESSMENT — COGNITIVE AND FUNCTIONAL STATUS - GENERAL
MOVING FROM LYING ON BACK TO SITTING ON SIDE OF FLAT BED WITH BEDRAILS: A LOT
DRESSING REGULAR LOWER BODY CLOTHING: A LITTLE
DRESSING REGULAR UPPER BODY CLOTHING: A LITTLE
STANDING UP FROM CHAIR USING ARMS: A LITTLE
DRESSING REGULAR LOWER BODY CLOTHING: A LITTLE
PERSONAL GROOMING: A LOT
EATING MEALS: A LITTLE
MOBILITY SCORE: 14
WALKING IN HOSPITAL ROOM: A LITTLE
MOVING TO AND FROM BED TO CHAIR: A LITTLE
PERSONAL GROOMING: A LITTLE
PERSONAL GROOMING: A LITTLE
HELP NEEDED FOR BATHING: A LOT
TURNING FROM BACK TO SIDE WHILE IN FLAT BAD: A LOT
MOVING FROM LYING ON BACK TO SITTING ON SIDE OF FLAT BED WITH BEDRAILS: A LITTLE
CLIMB 3 TO 5 STEPS WITH RAILING: A LOT
STANDING UP FROM CHAIR USING ARMS: A LITTLE
TOILETING: TOTAL
TOILETING: A LITTLE
MOBILITY SCORE: 17
DRESSING REGULAR LOWER BODY CLOTHING: A LOT
TOILETING: A LITTLE
MOVING TO AND FROM BED TO CHAIR: A LITTLE
HELP NEEDED FOR BATHING: A LITTLE
STANDING UP FROM CHAIR USING ARMS: A LOT
EATING MEALS: A LOT
MOVING TO AND FROM BED TO CHAIR: A LITTLE
WALKING IN HOSPITAL ROOM: A LITTLE
EATING MEALS: A LITTLE
DAILY ACTIVITIY SCORE: 11
TURNING FROM BACK TO SIDE WHILE IN FLAT BAD: A LITTLE
CLIMB 3 TO 5 STEPS WITH RAILING: A LOT
DRESSING REGULAR UPPER BODY CLOTHING: A LOT
WALKING IN HOSPITAL ROOM: A LOT
DRESSING REGULAR UPPER BODY CLOTHING: A LITTLE
MOVING FROM LYING ON BACK TO SITTING ON SIDE OF FLAT BED WITH BEDRAILS: A LOT
DAILY ACTIVITIY SCORE: 18
HELP NEEDED FOR BATHING: A LITTLE
TURNING FROM BACK TO SIDE WHILE IN FLAT BAD: A LOT
MOBILITY SCORE: 13
DAILY ACTIVITIY SCORE: 18
CLIMB 3 TO 5 STEPS WITH RAILING: TOTAL

## 2024-05-22 ASSESSMENT — PAIN SCALES - GENERAL
PAINLEVEL_OUTOF10: 0 - NO PAIN
PAINLEVEL_OUTOF10: 0 - NO PAIN

## 2024-05-22 ASSESSMENT — PAIN - FUNCTIONAL ASSESSMENT
PAIN_FUNCTIONAL_ASSESSMENT: 0-10

## 2024-05-22 ASSESSMENT — ACTIVITIES OF DAILY LIVING (ADL): HOME_MANAGEMENT_TIME_ENTRY: 12

## 2024-05-22 NOTE — PROGRESS NOTES
Physical Therapy    Physical Therapy Treatment    Patient Name: Jose Guardado  MRN: [id number]  Today's Date: 5/22/2024  Time Calculation  Start Time: 0921  Stop Time: 0944  Time Calculation (min): 23 min       Assessment/Plan   PT Assessment  PT Assessment Results: Decreased strength, Decreased endurance, Impaired balance, Decreased mobility, Decreased coordination, Decreased cognition, Impaired judgement, Decreased safety awareness  Rehab Prognosis: Good  Treatment Tolerance: Patient tolerated treatment well, Patient limited by fatigue  Medical Staff Made Aware: Yes  End of Session Communication: Bedside nurse, PCT/NA/CTA  Assessment Comment: Patient continues to require (A) for safety with transfers/amb. Patient will benefit from additional PT to address deficits and improve mobility.  End of Session Patient Position: Up in chair, Alarm off, caregiver present (Reclined in chair with LEs elevated; Call light, phone, and tray table within reach.)  PT Plan  Inpatient/Swing Bed or Outpatient: Inpatient  Treatment/Interventions: Bed mobility, Transfer training, Gait training, Stair training, Balance training, Strengthening, Endurance training, Therapeutic exercise, Therapeutic activity  PT Plan: Skilled PT  PT Frequency: 3 times per week  PT Discharge Recommendations: Moderate intensity level of continued care    PT Recommended Transfer Status: Assist x2, Assistive device    General Visit Information:   PT  Visit  PT Received On: 05/22/24  General  Family/Caregiver Present: No  Co-Treatment: OT  Co-Treatment Reason: Co-treat with OT to maximize patient and staff safety with transfers/amb.  Prior to Session Communication: Bedside nurse, PCT/MANINDER/MATHEW  Patient Position Received: Bed, 3 rail up, Alarm on  General Comment: Pleasant and cooperative.    General Observations:   General Observation: 1:1 sitter present; IV.    Subjective     Precautions:  Precautions  Hearing/Visual Limitations: [place]----better with (R)ear  Medical  Precautions: Fall precautions    Objective     Pain:  Pain Assessment  Pain Assessment: 0-10  Pain Score: 0 - No pain (However, patient grimacing when sitting in the chair. Reports feeling comfortable when pilllows were placed behind his back for support. Nursing aware.)    Cognition:  Cognition  Overall Cognitive Status: Impaired  Orientation Level: Disoriented to time, Disoriented to situation  Processing Speed: Delayed    Balance:   Static Sitting Balance  Static Sitting-Comment/Number of Minutes: F+  Dynamic Sitting Balance  Dynamic Sitting-Comments: F  Static Standing Balance  Static Standing-Comment/Number of Minutes: F- with ww  Dynamic Standing Balance  Dynamic Standing-Comments: F- with ww    Treatments:     Bed Mobility  Bed Mobility: Yes  Bed Mobility 1  Bed Mobility 1: Supine to sitting  Level of Assistance 1: Moderate assistance, +2  Bed Mobility Comments 1: HOB ~35degrees. Hand over hand (A) to reach across body to use the bed rail for support. (A) to lift UB from HOB while moving LEs over the EOB.    Ambulation/Gait Training  Ambulation/Gait Training Performed: Yes  Ambulation/Gait Training 1  Surface 1: Level tile  Device 1: Rolling walker  Gait Support Devices: Gait belt  Assistance 1: Minimum assistance (x2)  Comments/Distance (ft) 1: ~15ft x2. NBOS. Slow andres. Forward flexed posture. Step through gait pattern. Decreased step height/length B. v/c and (A) for safer maneuvering of ww with 90/180° turns to avoid entanglement with IV. Increased fatigue noted with transfers/amb; having episodes of loose stools. Nursing aware.    Transfers  Transfer: Yes  Transfer 1  Technique 1: Sit to stand, Stand to sit  Transfer Device 1:  (ww)  Transfer Level of Assistance 1: Moderate assistance, +2, Minimum assistance  Trials/Comments 1: (4x). Mod(A)x2 sit to stand and Min(A) stand to sit. v/c for safe hand placement and technique. Slow transition of hands to/from ww. Benefits from elevated surfaces.           Outcome Measures:  [facility] Basic Mobility  Turning from your back to your side while in a flat bed without using bedrails: A lot  Moving from lying on your back to sitting on the side of a flat bed without using bedrails: A lot  Moving to and from bed to chair (including a wheelchair): A little  Standing up from a chair using your arms (e.g. wheelchair or bedside chair): A lot  To walk in hospital room: A little  Climbing 3-5 steps with railing: Total  Basic Mobility - Total Score: 13    Education Documentation  Mobility Training, taught by Kim Billings PTA at 5/22/2024  3:07 PM.  Learner: Patient  Readiness: Acceptance  Method: Explanation, Demonstration  Response: Verbalizes Understanding, Needs Reinforcement  Comment: See therapy note.    EDUCATION:  Individual(s) Educated: Patient  Education Provided: Body Mechanics, Fall Risk, POC, Posture (Balance; Safety with bed mobility/transfers/amb.)  Patient Response to Education:  (Patient verbalized understanding of information, but carry-over is questionable.)    Encounter Problems       Encounter Problems (Active)       PT Problem       Pt will demonstrate SBA with bed mobility to edge of bed.   (Progressing)       Start:  05/20/24    Expected End:  06/03/24            Pt will demonstrate cga with sit to stand/chair transfers with FWW.   (Progressing)       Start:  05/20/24    Expected End:  06/03/24            Pt will ambulate 30 feet with  FWW cga .   (Progressing)       Start:  05/20/24    Expected End:  06/03/24            Pt to demo improved BLE strength by being able to complete supine/seated thera ex 2x20 BLEs with 4 or less rest breaks .   (Not Progressing)       Start:  05/20/24    Expected End:  06/03/24

## 2024-05-22 NOTE — TELEPHONE ENCOUNTER
Lindsey from the [facility] states that she reviewed the patient's most recent note and it looks like skilled nursing and mental health takes priority over physical therapy. She wants to make sure that you are okay with this. She says they have to do things in a particular order due to Medicare guidelines.    Lindsey: [phone]

## 2024-05-22 NOTE — PROGRESS NOTES
Jose Guardado is a 83 y.o. male on day 3 of admission presenting with Falls, initial encounter.      Subjective   Oriented to self, denies pain       Objective     Last Recorded Vitals  /53 (BP Location: Left arm, Patient Position: Lying)   Pulse 70   Temp 36.2 °C (97.2 °F) (Temporal)   Resp 15   Wt 67.9 kg (149 lb 12.8 oz)   SpO2 94%   Intake/Output last 3 Shifts:    Intake/Output Summary (Last 24 hours) at 5/22/2024 1322  Last data filed at 5/21/2024 1600  Gross per 24 hour   Intake 1230 ml   Output --   Net 1230 ml       Admission Weight  Weight: 69.4 kg (153 lb) (05/17/24 0256)    Daily Weight  05/17/24 : 67.9 kg (149 lb 12.8 oz)    Image Results  ECG 12 lead  Normal sinus rhythm  Normal ECG  When compared with ECG of 23-APR-2024 07:44, (unconfirmed)  Nonspecific T wave abnormality now evident in Inferior leads  See ED provider note for full interpretation and clinical correlation  Confirmed by Ximena Rivas ([id number]) on 5/18/2024 10:29:11 AM      Physical Exam  Respiratory: Equal chest rise, no retractions  Abdomen: Soft, nontender, nondistended  Extremities: No cyanosis or clubbing appreciated  Neurological: Disoriented, spontaneously moves all extremities    Relevant Results  Results for orders placed or performed during the hospital encounter of 05/17/24 (from the past 24 hour(s))   POCT GLUCOSE   Result Value Ref Range    POCT Glucose 169 (H) 74 - 99 mg/dL   Magnesium   Result Value Ref Range    Magnesium 1.55 (L) 1.60 - 2.40 mg/dL   Basic Metabolic Panel   Result Value Ref Range    Glucose 226 (H) 74 - 99 mg/dL    Sodium 141 136 - 145 mmol/L    Potassium 3.7 3.5 - 5.3 mmol/L    Chloride 107 98 - 107 mmol/L    Bicarbonate 30 21 - 32 mmol/L    Anion Gap 8 (L) 10 - 20 mmol/L    Urea Nitrogen 7 6 - 23 mg/dL    Creatinine 0.70 0.50 - 1.30 mg/dL    eGFR >90 >60 mL/min/1.73m*2    Calcium 7.2 (L) 8.6 - 10.3 mg/dL   CBC and Auto Differential   Result Value Ref Range    WBC 5.4 4.4 - 11.3 x10*3/uL     nRBC 0.0 0.0 - 0.0 /100 WBCs    RBC 3.51 (L) 4.50 - 5.90 x10*6/uL    Hemoglobin 10.5 (L) 13.5 - 17.5 g/dL    Hematocrit 32.0 (L) 41.0 - 52.0 %    MCV 91 80 - 100 fL    MCH 29.9 26.0 - 34.0 pg    MCHC 32.8 32.0 - 36.0 g/dL    RDW 14.3 11.5 - 14.5 %    Platelets 124 (L) 150 - 450 x10*3/uL    Neutrophils % 56.2 40.0 - 80.0 %    Immature Granulocytes %, Automated 0.6 0.0 - 0.9 %    Lymphocytes % 24.7 13.0 - 44.0 %    Monocytes % 15.5 2.0 - 10.0 %    Eosinophils % 2.8 0.0 - 6.0 %    Basophils % 0.2 0.0 - 2.0 %    Neutrophils Absolute 3.05 1.60 - 5.50 x10*3/uL    Immature Granulocytes Absolute, Automated 0.03 0.00 - 0.50 x10*3/uL    Lymphocytes Absolute 1.34 0.80 - 3.00 x10*3/uL    Monocytes Absolute 0.84 (H) 0.05 - 0.80 x10*3/uL    Eosinophils Absolute 0.15 0.00 - 0.40 x10*3/uL    Basophils Absolute 0.01 0.00 - 0.10 x10*3/uL   POCT GLUCOSE   Result Value Ref Range    POCT Glucose 227 (H) 74 - 99 mg/dL   POCT GLUCOSE   Result Value Ref Range    POCT Glucose 205 (H) 74 - 99 mg/dL         Assessment/Plan     Dementia without behavioral changes  Mechanical fall with left gluteal hematoma  History of CVA with expressive aphasia  Hypertension, seizure disorder, type 2 diabetes mellitus on insulin  Thrombocytopenia    Plan:  Continue to monitor H&H  PT OT evaluation and treatment  Depakote was discontinued and hopefully platelet count will start to improve  Increase the dose of risperidone 1.5 mg at bedtime per psychiatry  Plan is to discharge to memory care unit on Thursday       Harjit De La Rosa MD

## 2024-05-22 NOTE — PROGRESS NOTES
Jose Guardado is a 83 y.o. male on day 3 of admission was seen by psychiatry and is being followed by psychiatry because of recent geropsychiatric admission and agitation.  Jose had a restful night for the most part.  No agitation although some anxiety in the middle of the night but he was able to overcome that.  The fact that he slept through the night did help as very looked more lucid and was working on his breakfast and had good coordination of his hands fingers use of the silverware and being able to put it into his mouth.  Labs Reviewed.  Vitals Reviewed.   Nursing Notes Reviewed.   No EPS, TD, vitals stable.      MSE: Patient was alert and not oriented to time, place, person although he is aware that he is in the hospital and wishes to go home . Patient appears well groomed and clad in hospital gown. Patient is pleasant on approach. Recent and remote memory poor. Memory registration and recall poor. Attention and concentration restricted. Speech low in volume and slow in rate.  Fair eye contact. Thought process poverty of thoughts. Impaired associations. Limited fund of knowledge. Mood ok affect flat. Patient did not endorse any delusions. Patient denied any auditory visual hallucinations. Patient has denied any active suicidal  ideations and is future oriented.  Patient has limited insight, limited judgment and during the time of the assessment this morning fair impulse control.       Musculoskeletal: Patient bedbound and gait not assessed, no Parkinsonism, no Dystonia, no Akathisia, no TD. Psychomotor activity within normal limits.     Diagnosis: Dementia     Assessment and Plan:      Continue risperidone 1.5 mg at bedtime  Depakote was stopped yesterday  Platelet count although minimally increased from 100 k to 124 K is encouraging psychiatry will follow  Continue with current treatment and medication adjustments. Patient is agreeable with continued medication management and adjustments discussed.  "      Last Recorded Vitals  /53 (BP Location: Left arm, Patient Position: Lying)   Pulse 70   Temp 36.2 °C (97.2 °F) (Temporal)   Resp 15   Ht 1.702 m (5' 7.01\")   Wt 67.9 kg (149 lb 12.8 oz)   SpO2 94%   BMI 23.46 kg/m²      Recent Results (from the past 24 hour(s))   POCT GLUCOSE    Collection Time: 05/21/24 11:00 AM   Result Value Ref Range    POCT Glucose 196 (H) 74 - 99 mg/dL   POCT GLUCOSE    Collection Time: 05/21/24  4:07 PM   Result Value Ref Range    POCT Glucose 169 (H) 74 - 99 mg/dL   Magnesium    Collection Time: 05/22/24  5:23 AM   Result Value Ref Range    Magnesium 1.55 (L) 1.60 - 2.40 mg/dL   Basic Metabolic Panel    Collection Time: 05/22/24  5:23 AM   Result Value Ref Range    Glucose 226 (H) 74 - 99 mg/dL    Sodium 141 136 - 145 mmol/L    Potassium 3.7 3.5 - 5.3 mmol/L    Chloride 107 98 - 107 mmol/L    Bicarbonate 30 21 - 32 mmol/L    Anion Gap 8 (L) 10 - 20 mmol/L    Urea Nitrogen 7 6 - 23 mg/dL    Creatinine 0.70 0.50 - 1.30 mg/dL    eGFR >90 >60 mL/min/1.73m*2    Calcium 7.2 (L) 8.6 - 10.3 mg/dL   CBC and Auto Differential    Collection Time: 05/22/24  5:23 AM   Result Value Ref Range    WBC 5.4 4.4 - 11.3 x10*3/uL    nRBC 0.0 0.0 - 0.0 /100 WBCs    RBC 3.51 (L) 4.50 - 5.90 x10*6/uL    Hemoglobin 10.5 (L) 13.5 - 17.5 g/dL    Hematocrit 32.0 (L) 41.0 - 52.0 %    MCV 91 80 - 100 fL    MCH 29.9 26.0 - 34.0 pg    MCHC 32.8 32.0 - 36.0 g/dL    RDW 14.3 11.5 - 14.5 %    Platelets 124 (L) 150 - 450 x10*3/uL    Neutrophils % 56.2 40.0 - 80.0 %    Immature Granulocytes %, Automated 0.6 0.0 - 0.9 %    Lymphocytes % 24.7 13.0 - 44.0 %    Monocytes % 15.5 2.0 - 10.0 %    Eosinophils % 2.8 0.0 - 6.0 %    Basophils % 0.2 0.0 - 2.0 %    Neutrophils Absolute 3.05 1.60 - 5.50 x10*3/uL    Immature Granulocytes Absolute, Automated 0.03 0.00 - 0.50 x10*3/uL    Lymphocytes Absolute 1.34 0.80 - 3.00 x10*3/uL    Monocytes Absolute 0.84 (H) 0.05 - 0.80 x10*3/uL    Eosinophils Absolute 0.15 0.00 - 0.40 " x10*3/uL    Basophils Absolute 0.01 0.00 - 0.10 x10*3/uL   POCT GLUCOSE    Collection Time: 05/22/24  6:52 AM   Result Value Ref Range    POCT Glucose 227 (H) 74 - 99 mg/dL          Current Facility-Administered Medications:     acetaminophen (Tylenol) tablet 650 mg, 650 mg, oral, q4h PRN **OR** acetaminophen (Tylenol) oral liquid 650 mg, 650 mg, nasogastric tube, q4h PRN **OR** acetaminophen (Tylenol) suppository 650 mg, 650 mg, rectal, q4h PRN, Isa Hedrick MD    atorvastatin (Lipitor) tablet 80 mg, 80 mg, oral, Nightly, Isa Hedrick MD, 80 mg at 05/21/24 2053    brivaracetam (Briviact) injection 50 mg, 50 mg, intravenous, q12h HAYDEE, Isa Hedrick MD, Stopped at 05/21/24 2234    clopidogrel (Plavix) tablet 75 mg, 75 mg, oral, Daily, Isa Hedrick MD, 75 mg at 05/22/24 0835    dextrose 5 % and lactated Ringer's infusion, 75 mL/hr, intravenous, Continuous, Isa Hedrick MD, Last Rate: 75 mL/hr at 05/21/24 2019, 75 mL/hr at 05/21/24 2019    dextrose 50 % injection 12.5 g, 12.5 g, intravenous, q15 min PRN, Isa Hedrick MD    dextrose 50 % injection 25 g, 25 g, intravenous, q15 min PRN, Isa Hedrick MD    finasteride (Proscar) tablet 5 mg, 5 mg, oral, Nightly, Isa Hedrick MD, 5 mg at 05/21/24 2053    glucagon (Glucagen) injection 1 mg, 1 mg, intramuscular, q15 min PRN, Isa Hedrick MD    glucagon (Glucagen) injection 1 mg, 1 mg, intramuscular, q15 min PRN, Isa Hedrick MD    haloperidol lactate (Haldol) injection 2 mg, 2 mg, intramuscular, q6h PRN, Alisson Royal MD, 2 mg at 05/19/24 0017    insulin lispro (HumaLOG) injection 0-5 Units, 0-5 Units, subcutaneous, TID, Isa Hedrick MD, 2 Units at 05/22/24 0830    levothyroxine (Synthroid, Levoxyl) tablet 100 mcg, 100 mcg, oral, Daily before breakfast, Isa Hedrick MD, 100 mcg at 05/22/24 0834    losartan (Cozaar) tablet 25 mg, 25 mg, oral, Daily, Isa Hedrick MD, 25 mg at 05/22/24 0835    melatonin tablet 3 mg, 3 mg, oral, Nightly PRN, Isa CLARKE  MD Floridalma, 3 mg at 05/21/24 2053    metFORMIN (Glucophage) tablet 500 mg, 500 mg, oral, BID, Isa Hedrick MD, 500 mg at 05/22/24 0835    metoprolol tartrate (Lopressor) tablet 25 mg, 25 mg, oral, BID, Isa Hedrick MD, 25 mg at 05/22/24 0834    ondansetron (Zofran) tablet 4 mg, 4 mg, oral, q8h PRN **OR** ondansetron (Zofran) injection 4 mg, 4 mg, intravenous, q8h PRN, Isa Hedrick MD    pantoprazole (ProtoNix) EC tablet 40 mg, 40 mg, oral, Daily before breakfast, 40 mg at 05/22/24 0834 **OR** pantoprazole (ProtoNix) injection 40 mg, 40 mg, intravenous, Daily before breakfast, Isa Hedrick MD, 40 mg at 05/19/24 0702    polyethylene glycol (Glycolax, Miralax) packet 17 g, 17 g, oral, Daily PRN, Isa Hedrick MD    polymyxin B sulf-trimethoprim (Polytrim) ophthalmic solution 1 drop, 1 drop, Both Eyes, q4h HAYDEE, Popeye Henderson MD, 1 drop at 05/21/24 2200    risperiDONE (RisperDAL) tablet 1.5 mg, 1.5 mg, oral, Nightly, Ruy Amor MD, 1.5 mg at 05/21/24 2054       Ruy Amor MD

## 2024-05-22 NOTE — PROGRESS NOTES
Occupational Therapy    OT Treatment    Patient Name: Jose Guardado  MRN: [id number]  Today's Date: 5/22/2024  Time Calculation  Start Time: 0920  Stop Time: 0943  Time Calculation (min): 23 min       Assessment:  End of Session Communication: Bedside nurse, PCT/NA/MATHEW  End of Session Patient Position: Up in chair, Alarm off, caregiver present (1:1 sitter)     Plan:  Treatment Interventions: ADL retraining, Functional transfer training, Endurance training, Cognitive reorientation, Patient/family training, Equipment evaluation/education, Compensatory technique education  OT Frequency: 3 times per week  Treatment Interventions: ADL retraining, Functional transfer training, Endurance training, Cognitive reorientation, Patient/family training, Equipment evaluation/education, Compensatory technique education    Subjective   Previous Visit Info:  OT Last Visit  OT Received On: 05/22/24  General:  General  Reason for Referral: falling down 10-12 steps and hitting head  Prior to Session Communication: Bedside nurse, PCT/MANINDER/MATHEW  Patient Position Received: Bed, 3 rail up, Alarm on  Precautions:  Hearing/Visual Limitations: [place]----better with (R)ear  Medical Precautions: Fall precautions     Pain:  Pain Assessment  Pain Assessment: 0-10  Pain Score:  (not rated)  Pain Location: Back    Objective    Cognition:  Cognition  Overall Cognitive Status: Impaired  Orientation Level: Disoriented to situation, Disoriented to time, Disoriented to place  Following Commands: Follows multistep commands with repetition  Safety Judgment: Decreased awareness of need for safety precautions  Problem Solving: Assistance required to implement solutions  Insight: Severe  Impulsive: Moderately  Processing Speed: Delayed    Activities of Daily Living: UE Dressing  UE Dressing Level of Assistance: Maximum assistance, Maximum verbal cues, Tactile cues (don/doff gown)  UE Dressing Where Assessed:  (toilet)    LE Dressing  LE Dressing: Yes  Adult Briefs  Level of Assistance:  (donned underwear with max a with max verbal cues. pants mgmt while in stance with mod a.)  LE Dressing Where Assessed: Toilet    Toileting  Toileting Level of Assistance: Maximum assistance  Where Assessed: Toilet  Functional Standing Tolerance:  Functional Standing Tolerance Comments: patient stood for a total of 5 mins this date with fair-/poor+ balance with 2-1 ue support at all times during funcitonal ambulation/transfers and ADL tasks  Bed Mobility/Transfers: Bed Mobility 1  Bed Mobility 1: Supine to sitting  Level of Assistance 1: Moderate assistance, +2, Maximum tactile cues, Maximum verbal cues    Transfers  Transfer: Yes  Transfer 1  Technique 1: Sit to stand  Transfer Device 1: Walker  Transfer Level of Assistance 1: Moderate assistance, +2, Moderate verbal cues, Moderate tactile cues  Transfers 2  Transfer Device 2: Walker  Transfer Level of Assistance 2: Minimum assistance, +2  Trials/Comments 2: functional ambulation    Toilet Transfers  Toilet Transfer Type: To and from  Toilet Transfer to: Raised toilet seat without rails (grab bar)  Toilet Transfer Technique: Ambulating  Toilet Transfers: Minimal assistance (x2)  Sitting Balance:  Dynamic Sitting Balance  Dynamic Sitting-Balance:  (fair/fair+)  Standing Balance:  Dynamic Standing Balance  Dynamic Standing-Balance:  (fair-/poor+)    Outcome Measures:[facility] Daily Activity  Putting on and taking off regular lower body clothing: A lot  Bathing (including washing, rinsing, drying): A lot  Putting on and taking off regular upper body clothing: A lot  Toileting, which includes using toilet, bedpan or urinal: Total  Taking care of personal grooming such as brushing teeth: A lot  Eating Meals: A lot  Daily Activity - Total Score: 11    Education Documentation  ADL Training, taught by RONDA Mckeon at 5/22/2024  2:18 PM.  Learner: Patient  Readiness: Acceptance  Method: Explanation  Response: Needs Reinforcement    OP  EDUCATION:  Education  Individual(s) Educated: Patient  Education Provided: Symptom management, Ergonomics and postural realignment, Joint protection and energy conservation, Fall precautons, Risk and benefits of OT discussed with patient or other, POC discussed and agreed upon  Equipment:  (EC tech)  Patient/Caregiver Demonstrated Understanding: yes  Plan of Care Discussed and Agreed Upon: yes  Patient Response to Education: Patient/Caregiver Verbalized Understanding of Information    Goals:  Encounter Problems       Encounter Problems (Active)       OT Goals       Min assist bed mobility. (Progressing)       Start:  05/20/24    Expected End:  06/03/24            Min assist sit/stand, bed/chair/commode with FWW.  (Progressing)       Start:  05/20/24    Expected End:  06/03/24            Min assist LB dressing.  (Progressing)       Start:  05/20/24    Expected End:  06/03/24            Fair dynamic standing balance for ADL.  (Progressing)       Start:  05/20/24    Expected End:  06/03/24            Sup for UB dressing and grooming.  (Progressing)       Start:  05/20/24    Expected End:  06/03/24

## 2024-05-22 NOTE — PROGRESS NOTES
Liaison Maryuri from [facility] assisted living here for assessment.  Received via fax, release of healthcare information . Applied to front of chart.  Healthcare H &  P along with DNR orders have skylar signed by Dr De La Rosa,  she requests H & P , therapy notes and progress notes be sent to their team.  Tina CHATTERJEE assisting with submitting of information .      Krystal Alegria RN

## 2024-05-22 NOTE — PROGRESS NOTES
Spoke with josette admisisons at [facility]   [phone] requesting paperwork be faxed to her as dtr. Did not show up for planned appt. And is meeting later this evening.  Also requesting h&p pt/ot  and recent progress notes.  [facility] location is not in [facility].  Handfaxed completed paperwork and referral info. [phone].

## 2024-05-23 VITALS
OXYGEN SATURATION: 96 % | BODY MASS INDEX: 23.51 KG/M2 | TEMPERATURE: 96.4 F | HEART RATE: 74 BPM | WEIGHT: 149.8 LBS | RESPIRATION RATE: 18 BRPM | HEIGHT: 67 IN | SYSTOLIC BLOOD PRESSURE: 148 MMHG | DIASTOLIC BLOOD PRESSURE: 70 MMHG

## 2024-05-23 LAB
ANION GAP SERPL CALC-SCNC: 10 MMOL/L (ref 10–20)
BASOPHILS # BLD AUTO: 0.01 X10*3/UL (ref 0–0.1)
BASOPHILS NFR BLD AUTO: 0.2 %
BUN SERPL-MCNC: 8 MG/DL (ref 6–23)
CALCIUM SERPL-MCNC: 7.7 MG/DL (ref 8.6–10.3)
CHLORIDE SERPL-SCNC: 106 MMOL/L (ref 98–107)
CO2 SERPL-SCNC: 28 MMOL/L (ref 21–32)
CREAT SERPL-MCNC: 0.66 MG/DL (ref 0.5–1.3)
EGFRCR SERPLBLD CKD-EPI 2021: >90 ML/MIN/1.73M*2
EOSINOPHIL # BLD AUTO: 0.14 X10*3/UL (ref 0–0.4)
EOSINOPHIL NFR BLD AUTO: 2.8 %
ERYTHROCYTE [DISTWIDTH] IN BLOOD BY AUTOMATED COUNT: 14.1 % (ref 11.5–14.5)
GLUCOSE BLD MANUAL STRIP-MCNC: 123 MG/DL (ref 74–99)
GLUCOSE BLD MANUAL STRIP-MCNC: 161 MG/DL (ref 74–99)
GLUCOSE BLD MANUAL STRIP-MCNC: 181 MG/DL (ref 74–99)
GLUCOSE BLD MANUAL STRIP-MCNC: 199 MG/DL (ref 74–99)
GLUCOSE BLD MANUAL STRIP-MCNC: 214 MG/DL (ref 74–99)
GLUCOSE SERPL-MCNC: 185 MG/DL (ref 74–99)
HCT VFR BLD AUTO: 33 % (ref 41–52)
HGB BLD-MCNC: 10.9 G/DL (ref 13.5–17.5)
IMM GRANULOCYTES # BLD AUTO: 0.02 X10*3/UL (ref 0–0.5)
IMM GRANULOCYTES NFR BLD AUTO: 0.4 % (ref 0–0.9)
LYMPHOCYTES # BLD AUTO: 1.4 X10*3/UL (ref 0.8–3)
LYMPHOCYTES NFR BLD AUTO: 28.2 %
MAGNESIUM SERPL-MCNC: 1.43 MG/DL (ref 1.6–2.4)
MCH RBC QN AUTO: 30.1 PG (ref 26–34)
MCHC RBC AUTO-ENTMCNC: 33 G/DL (ref 32–36)
MCV RBC AUTO: 91 FL (ref 80–100)
MONOCYTES # BLD AUTO: 0.82 X10*3/UL (ref 0.05–0.8)
MONOCYTES NFR BLD AUTO: 16.5 %
NEUTROPHILS # BLD AUTO: 2.58 X10*3/UL (ref 1.6–5.5)
NEUTROPHILS NFR BLD AUTO: 51.9 %
NRBC BLD-RTO: 0 /100 WBCS (ref 0–0)
PLATELET # BLD AUTO: 155 X10*3/UL (ref 150–450)
POTASSIUM SERPL-SCNC: 3.7 MMOL/L (ref 3.5–5.3)
RBC # BLD AUTO: 3.62 X10*6/UL (ref 4.5–5.9)
SODIUM SERPL-SCNC: 140 MMOL/L (ref 136–145)
WBC # BLD AUTO: 5 X10*3/UL (ref 4.4–11.3)

## 2024-05-23 PROCEDURE — 2500000001 HC RX 250 WO HCPCS SELF ADMINISTERED DRUGS (ALT 637 FOR MEDICARE OP): Performed by: INTERNAL MEDICINE

## 2024-05-23 PROCEDURE — 99232 SBSQ HOSP IP/OBS MODERATE 35: CPT | Performed by: FAMILY MEDICINE

## 2024-05-23 PROCEDURE — 85025 COMPLETE CBC W/AUTO DIFF WBC: CPT | Performed by: STUDENT IN AN ORGANIZED HEALTH CARE EDUCATION/TRAINING PROGRAM

## 2024-05-23 PROCEDURE — 82947 ASSAY GLUCOSE BLOOD QUANT: CPT

## 2024-05-23 PROCEDURE — 2500000004 HC RX 250 GENERAL PHARMACY W/ HCPCS (ALT 636 FOR OP/ED): Performed by: INTERNAL MEDICINE

## 2024-05-23 PROCEDURE — 36415 COLL VENOUS BLD VENIPUNCTURE: CPT | Performed by: STUDENT IN AN ORGANIZED HEALTH CARE EDUCATION/TRAINING PROGRAM

## 2024-05-23 PROCEDURE — 99232 SBSQ HOSP IP/OBS MODERATE 35: CPT | Performed by: PSYCHIATRY & NEUROLOGY

## 2024-05-23 PROCEDURE — 2500000004 HC RX 250 GENERAL PHARMACY W/ HCPCS (ALT 636 FOR OP/ED): Performed by: FAMILY MEDICINE

## 2024-05-23 PROCEDURE — 2500000006 HC RX 250 W HCPCS SELF ADMINISTERED DRUGS (ALT 637 FOR ALL PAYERS): Performed by: INTERNAL MEDICINE

## 2024-05-23 PROCEDURE — 2500000001 HC RX 250 WO HCPCS SELF ADMINISTERED DRUGS (ALT 637 FOR MEDICARE OP): Performed by: FAMILY MEDICINE

## 2024-05-23 PROCEDURE — 83735 ASSAY OF MAGNESIUM: CPT | Performed by: STUDENT IN AN ORGANIZED HEALTH CARE EDUCATION/TRAINING PROGRAM

## 2024-05-23 PROCEDURE — 2500000004 HC RX 250 GENERAL PHARMACY W/ HCPCS (ALT 636 FOR OP/ED): Performed by: STUDENT IN AN ORGANIZED HEALTH CARE EDUCATION/TRAINING PROGRAM

## 2024-05-23 PROCEDURE — 80048 BASIC METABOLIC PNL TOTAL CA: CPT | Performed by: STUDENT IN AN ORGANIZED HEALTH CARE EDUCATION/TRAINING PROGRAM

## 2024-05-23 RX ORDER — RISPERIDONE 2 MG/1
2 TABLET ORAL
Start: 2024-05-23 | End: 2024-05-24 | Stop reason: SDUPTHER

## 2024-05-23 RX ORDER — HALOPERIDOL 5 MG/ML
2 INJECTION INTRAMUSCULAR EVERY 6 HOURS PRN
Start: 2024-05-23 | End: 2024-05-23 | Stop reason: HOSPADM

## 2024-05-23 RX ORDER — LANOLIN ALCOHOL/MO/W.PET/CERES
400 CREAM (GRAM) TOPICAL ONCE
Status: COMPLETED | OUTPATIENT
Start: 2024-05-23 | End: 2024-05-23

## 2024-05-23 RX ORDER — PANTOPRAZOLE SODIUM 40 MG/1
40 TABLET, DELAYED RELEASE ORAL
Start: 2024-05-24 | End: 2024-05-30 | Stop reason: HOSPADM

## 2024-05-23 RX ORDER — RISPERIDONE 1 MG/1
2 TABLET ORAL
Status: DISCONTINUED | OUTPATIENT
Start: 2024-05-23 | End: 2024-05-23 | Stop reason: HOSPADM

## 2024-05-23 RX ADMIN — CLOPIDOGREL BISULFATE 75 MG: 75 TABLET, FILM COATED ORAL at 08:48

## 2024-05-23 RX ADMIN — HALOPERIDOL LACTATE 2 MG: 5 INJECTION, SOLUTION INTRAMUSCULAR at 10:22

## 2024-05-23 RX ADMIN — BRIVARACETAM 50 MG: 50 INJECTION, SUSPENSION INTRAVENOUS at 01:12

## 2024-05-23 RX ADMIN — METOPROLOL TARTRATE 25 MG: 25 TABLET, FILM COATED ORAL at 08:48

## 2024-05-23 RX ADMIN — PANTOPRAZOLE SODIUM 40 MG: 40 TABLET, DELAYED RELEASE ORAL at 08:48

## 2024-05-23 RX ADMIN — POLYMYXIN B SULFATE AND TRIMETHOPRIM 1 DROP: 10000; 1 SOLUTION OPHTHALMIC at 06:10

## 2024-05-23 RX ADMIN — METFORMIN HYDROCHLORIDE 500 MG: 500 TABLET, FILM COATED ORAL at 08:48

## 2024-05-23 RX ADMIN — POLYMYXIN B SULFATE AND TRIMETHOPRIM 1 DROP: 10000; 1 SOLUTION OPHTHALMIC at 10:21

## 2024-05-23 RX ADMIN — MAGNESIUM OXIDE 400 MG (241.3 MG MAGNESIUM) TABLET 400 MG: TABLET at 11:57

## 2024-05-23 RX ADMIN — LEVOTHYROXINE SODIUM 100 MCG: 0.1 TABLET ORAL at 08:48

## 2024-05-23 RX ADMIN — POLYMYXIN B SULFATE AND TRIMETHOPRIM 1 DROP: 10000; 1 SOLUTION OPHTHALMIC at 13:08

## 2024-05-23 RX ADMIN — LOSARTAN POTASSIUM 25 MG: 25 TABLET, FILM COATED ORAL at 08:48

## 2024-05-23 RX ADMIN — INSULIN LISPRO 1 UNITS: 100 INJECTION, SOLUTION INTRAVENOUS; SUBCUTANEOUS at 11:57

## 2024-05-23 RX ADMIN — BRIVARACETAM 50 MG: 50 TABLET, FILM COATED ORAL at 11:57

## 2024-05-23 RX ADMIN — POLYMYXIN B SULFATE AND TRIMETHOPRIM 1 DROP: 10000; 1 SOLUTION OPHTHALMIC at 01:26

## 2024-05-23 ASSESSMENT — COGNITIVE AND FUNCTIONAL STATUS - GENERAL
MOBILITY SCORE: 18
DAILY ACTIVITIY SCORE: 19
DRESSING REGULAR UPPER BODY CLOTHING: A LITTLE
HELP NEEDED FOR BATHING: A LITTLE
TURNING FROM BACK TO SIDE WHILE IN FLAT BAD: A LITTLE
TOILETING: A LITTLE
WALKING IN HOSPITAL ROOM: A LITTLE
STANDING UP FROM CHAIR USING ARMS: A LITTLE
MOVING TO AND FROM BED TO CHAIR: A LITTLE
PERSONAL GROOMING: A LITTLE
CLIMB 3 TO 5 STEPS WITH RAILING: A LOT
DRESSING REGULAR LOWER BODY CLOTHING: A LITTLE

## 2024-05-23 ASSESSMENT — PAIN - FUNCTIONAL ASSESSMENT: PAIN_FUNCTIONAL_ASSESSMENT: 0-10

## 2024-05-23 ASSESSMENT — PAIN SCALES - GENERAL: PAINLEVEL_OUTOF10: 0 - NO PAIN

## 2024-05-23 ASSESSMENT — PAIN SCALES - WONG BAKER: WONGBAKER_NUMERICALRESPONSE: NO HURT

## 2024-05-23 NOTE — PROGRESS NOTES
Jose Guardado is a 83 y.o. male on day 4 of admission presenting with Falls, initial encounter.      Subjective   The patient was agitated this morning, he was placed in restraints for safety reasons     Objective     Last Recorded Vitals  /90 (BP Location: Right arm, Patient Position: Lying)   Pulse 78   Temp 36.6 °C (97.9 °F) (Temporal)   Resp 18   Wt 67.9 kg (149 lb 12.8 oz)   SpO2 96%   Intake/Output last 3 Shifts:    Intake/Output Summary (Last 24 hours) at 5/23/2024 1249  Last data filed at 5/22/2024 2053  Gross per 24 hour   Intake --   Output 250 ml   Net -250 ml       Admission Weight  Weight: 69.4 kg (153 lb) (05/17/24 0256)    Daily Weight  05/17/24 : 67.9 kg (149 lb 12.8 oz)    Image Results  ECG 12 lead  Normal sinus rhythm  Normal ECG  When compared with ECG of 23-APR-2024 07:44, (unconfirmed)  Nonspecific T wave abnormality now evident in Inferior leads  See ED provider note for full interpretation and clinical correlation  Confirmed by Ximena Rivas ([id number]) on 5/18/2024 10:29:11 AM      Physical Exam  Respiratory: Equal chest rise, no retractions  Abdomen: Soft, nontender, nondistended  Extremities: No cyanosis or clubbing appreciated  Neurological: Disoriented, spontaneously moves all extremities    Relevant Results  Results for orders placed or performed during the hospital encounter of 05/17/24 (from the past 24 hour(s))   POCT GLUCOSE   Result Value Ref Range    POCT Glucose 198 (H) 74 - 99 mg/dL   Magnesium   Result Value Ref Range    Magnesium 1.43 (L) 1.60 - 2.40 mg/dL   Basic Metabolic Panel   Result Value Ref Range    Glucose 185 (H) 74 - 99 mg/dL    Sodium 140 136 - 145 mmol/L    Potassium 3.7 3.5 - 5.3 mmol/L    Chloride 106 98 - 107 mmol/L    Bicarbonate 28 21 - 32 mmol/L    Anion Gap 10 10 - 20 mmol/L    Urea Nitrogen 8 6 - 23 mg/dL    Creatinine 0.66 0.50 - 1.30 mg/dL    eGFR >90 >60 mL/min/1.73m*2    Calcium 7.7 (L) 8.6 - 10.3 mg/dL   CBC and Auto Differential    Result Value Ref Range    WBC 5.0 4.4 - 11.3 x10*3/uL    nRBC 0.0 0.0 - 0.0 /100 WBCs    RBC 3.62 (L) 4.50 - 5.90 x10*6/uL    Hemoglobin 10.9 (L) 13.5 - 17.5 g/dL    Hematocrit 33.0 (L) 41.0 - 52.0 %    MCV 91 80 - 100 fL    MCH 30.1 26.0 - 34.0 pg    MCHC 33.0 32.0 - 36.0 g/dL    RDW 14.1 11.5 - 14.5 %    Platelets 155 150 - 450 x10*3/uL    Neutrophils % 51.9 40.0 - 80.0 %    Immature Granulocytes %, Automated 0.4 0.0 - 0.9 %    Lymphocytes % 28.2 13.0 - 44.0 %    Monocytes % 16.5 2.0 - 10.0 %    Eosinophils % 2.8 0.0 - 6.0 %    Basophils % 0.2 0.0 - 2.0 %    Neutrophils Absolute 2.58 1.60 - 5.50 x10*3/uL    Immature Granulocytes Absolute, Automated 0.02 0.00 - 0.50 x10*3/uL    Lymphocytes Absolute 1.40 0.80 - 3.00 x10*3/uL    Monocytes Absolute 0.82 (H) 0.05 - 0.80 x10*3/uL    Eosinophils Absolute 0.14 0.00 - 0.40 x10*3/uL    Basophils Absolute 0.01 0.00 - 0.10 x10*3/uL       Assessment/Plan     Dementia without behavioral changes  Mechanical fall with left gluteal hematoma  History of CVA with expressive aphasia  Hypertension, seizure disorder, type 2 diabetes mellitus on insulin  Thrombocytopenia    Plan:  PT OT evaluation and treatment, will be discharged to memory care unit once out of restraints  Depakote was discontinued and platelet count normalized  Continue risperidone per psychiatry  Replete magnesium       Harjit De La Rosa MD

## 2024-05-23 NOTE — PROGRESS NOTES
Social Work Note  Pt placed in restraints last evening due to attempting to elope from the unit and was placed in restraints after ripping out IV and not returning to his room.  Pt has now been out of restraints since approximately 10:30AM today.  Left [facility] for [facility] in Admissions.  Per family and AL, Al will send van to transport today.  Floor to fax AVS.  MILES Mak

## 2024-05-23 NOTE — NURSING NOTE
2210: As this RN was rounding on patient, patient was trying to get out of bed. Patient pulled out iv and was holding on to iv tubing, This nurse instructed patient to sit down as he was a high falls risk. Patient did not cooperate with RN. RN tried to reorient patient and get him back to bed safely. This RN called for help and security came to assist. Patient medicated with haldol per order. Patient now cooperative with staff and was put back into bed safely with the assistance of staff members. Patient educated on the importance of calling for help and fall policy to help keep patient safe.. .Patient  initiated. Will continue to monitor and educate patient as needed

## 2024-05-23 NOTE — NURSING NOTE
Attempted to call [facility] for updates- phone number left with  for any questions.  Patient for discharge. AVS faxed and copy made to send with patient.

## 2024-05-23 NOTE — CARE PLAN
The clinical goals for the shift include saftey    Problem: Pain - Adult  Goal: Verbalizes/displays adequate comfort level or baseline comfort level  Outcome: Adequate for Discharge     Problem: Safety - Adult  Goal: Free from fall injury  Outcome: Adequate for Discharge     Problem: Discharge Planning  Goal: Discharge to home or other facility with appropriate resources  Outcome: Adequate for Discharge     Problem: Chronic Conditions and Co-morbidities  Goal: Patient's chronic conditions and co-morbidity symptoms are monitored and maintained or improved  Outcome: Adequate for Discharge     Problem: Diabetes  Goal: Achieve decreasing blood glucose levels by end of shift  Outcome: Adequate for Discharge  Goal: Increase stability of blood glucose readings by end of shift  Outcome: Adequate for Discharge  Goal: Decrease in ketones present in urine by end of shift  Outcome: Adequate for Discharge  Goal: Maintain electrolyte levels within acceptable range throughout shift  Outcome: Adequate for Discharge  Goal: Maintain glucose levels >70mg/dl to <250mg/dl throughout shift  Outcome: Adequate for Discharge  Goal: No changes in neurological exam by end of shift  Outcome: Adequate for Discharge  Goal: Learn about and adhere to nutrition recommendations by end of shift  Outcome: Adequate for Discharge  Goal: Vital signs within normal range for age by end of shift  Outcome: Adequate for Discharge  Goal: Increase self care and/or family involovement by end of shift  Outcome: Adequate for Discharge  Goal: Receive DSME education by end of shift  Outcome: Adequate for Discharge     Problem: Skin  Goal: Decreased wound size/increased tissue granulation at next dressing change  Outcome: Adequate for Discharge  Goal: Participates in plan/prevention/treatment measures  Outcome: Adequate for Discharge  Goal: Prevent/manage excess moisture  Outcome: Adequate for Discharge  Goal: Prevent/minimize sheer/friction injuries  Outcome:  Adequate for Discharge  Goal: Promote/optimize nutrition  Outcome: Adequate for Discharge  Goal: Promote skin healing  Outcome: Adequate for Discharge     Problem: Fall/Injury  Goal: Not fall by end of shift  Outcome: Adequate for Discharge  Goal: Be free from injury by end of the shift  Outcome: Adequate for Discharge  Goal: Verbalize understanding of personal risk factors for fall in the hospital  Outcome: Adequate for Discharge  Goal: Verbalize understanding of risk factor reduction measures to prevent injury from fall in the home  Outcome: Adequate for Discharge  Goal: Use assistive devices by end of the shift  Outcome: Adequate for Discharge  Goal: Pace activities to prevent fatigue by end of the shift  Outcome: Adequate for Discharge

## 2024-05-23 NOTE — DISCHARGE SUMMARY
Discharge Diagnosis  Falls, initial encounter    Issues Requiring Follow-Up  Dementia with behavioral changes  Mechanical fall with left gluteal hematoma  History of CVA with expressive aphasia  Hypertension, seizure disorder, type 2 diabetes mellitus on insulin  Thrombocytopenia    Discharge Meds     Your medication list        START taking these medications        Instructions Last Dose Given Next Dose Due   haloperidol lactate 5 mg/mL injection  Commonly known as: Haldol      Inject 0.4 mL (2 mg) into the muscle every 6 hours if needed for agitation.              CHANGE how you take these medications        Instructions Last Dose Given Next Dose Due   pantoprazole 40 mg EC tablet  Commonly known as: ProtoNix  What changed: Another medication with the same name was added. Make sure you understand how and when to take each.      Take 1 tablet (40 mg) by mouth once daily.       pantoprazole 40 mg EC tablet  Commonly known as: ProtoNix  Start taking on: May 24, 2024  What changed: You were already taking a medication with the same name, and this prescription was added. Make sure you understand how and when to take each.      Take 1 tablet (40 mg) by mouth once daily in the morning. Take before meals. Do not crush, chew, or split.       risperiDONE 2 mg tablet  Commonly known as: RisperDAL  What changed:   medication strength  how much to take  when to take this      Take 1 tablet (2 mg) by mouth once daily in the evening. Take with meals.              CONTINUE taking these medications        Instructions Last Dose Given Next Dose Due   atorvastatin 80 mg tablet  Commonly known as: Lipitor      Take 1 tablet (80 mg) by mouth once daily at bedtime.       Briviact 50 mg tablet tablet  Generic drug: brivaracetam      Take 1 tablet (50 mg) by mouth 2 times a day.       clopidogrel 75 mg tablet  Commonly known as: Plavix      Take 1 tablet (75 mg) by mouth once daily.       divalproex 500 mg EC tablet  Commonly known as:  Depakote      Take 1 tablet (500 mg) by mouth 2 times a day. Do not crush, chew, or split.       finasteride 5 mg tablet  Commonly known as: Proscar      Take 1 tablet (5 mg) by mouth once daily at bedtime.       FreeStyle Doug 2 Sensor kit  Generic drug: flash glucose sensor kit      Use as instructed       Lantus U-100 Insulin 100 unit/mL injection  Generic drug: insulin glargine      Inject 12 Units under the skin once daily at bedtime. Take as directed per insulin instructions.       levothyroxine 100 mcg tablet  Commonly known as: Synthroid, Levoxyl      Take 1 tablet (100 mcg) by mouth once daily in the morning. Take before meals.       losartan 25 mg tablet  Commonly known as: Cozaar      Take 1 tablet (25 mg) by mouth once daily.       melatonin 3 mg tablet      Take 1 tablet (3 mg) by mouth once daily at bedtime.       metFORMIN 500 mg tablet  Commonly known as: Glucophage      Take 1 tablet (500 mg) by mouth 2 times a day with meals.       metoprolol tartrate 25 mg tablet  Commonly known as: Lopressor      Take 1 tablet (25 mg) by mouth 2 times a day.                 Where to Get Your Medications        Information about where to get these medications is not yet available    Ask your nurse or doctor about these medications  haloperidol lactate 5 mg/mL injection  pantoprazole 40 mg EC tablet  risperiDONE 2 mg tablet         Test Results Pending At Discharge  Pending Labs       No current pending labs.            Hospital Course   Jose Guardado is a 83 y.o. male presenting with a fall from the stairs approximately 10-12 steps striking his head and sustaining superficial laceration of the left forearm and the top of the scalp.  No loss of consciousness reported.  Patient has dementia he does not remember what happened.  He also has history of expressive aphasia prior stroke.  Apparently family were trying to get him to assisted living due to advanced dementia and poor ambulation.     ER course: Patient was  awake, alert, pleasantly confused due to dementia.  His vitals were stable.  His labs were basically unremarkable.  CT head reported negative for acute intracranial pathology.  CT cervical spine also negative for fracture.  CT chest/abdomen/pelvis did not show internal organ trauma but has a small left gluteal hematoma.    Patient is admitted for further workup and management.    The patient was noted to have thrombocytopenia, it was thought to be due to Depakote which was discontinued and his platelet count improved  The patient was evaluated by psychiatry who increased his Risperdal dose.  The patient was accepted at memory care unit and was discharged in stable medical condition    Pertinent Physical Exam At Time of Discharge  Physical Exam  Respiratory: Equal chest rise, no retractions  Abdomen: Soft, nontender, nondistended  Extremities: No cyanosis or clubbing appreciated  Neurological: Disoriented, spontaneously moves all extremities     Outpatient Follow-Up  Future Appointments   Date Time Provider Department Center   5/24/2024 10:00 AM Harmony Phillips MD [id number] [facility]   5/29/2024 12:30 PM Reid Diana MD [id number] [facility]   7/11/2024 12:00 PM Reid Diana MD [id number] [facility]   11/7/2024  3:30 PM June Simon MD [id number] [facility]   1/7/2025  4:00 PM [facility] [id number] [facility]   1/7/2025  4:20 PM Vinayak Christopher MD [id number] [facility]         Harjit De La Rosa MD

## 2024-05-23 NOTE — PROGRESS NOTES
Jose Guardado is a 83 y.o. male on day 4 of admission who has been followed by psychiatry for recent geriatric psychiatry admission and agitation had another night of restlessness where he was trying to get out of bed ended up being in soft restraints because of his physical aggression.  He denied remembering any of this this morning and appeared more confused than his usual self at this time in the morning.  On aims examination no dystonia no parkinsonism no tardive dyskinesia.  Risperidone dose to be adjusted but also moved up from bedtime to 5 PM with the dinner so that the sundowning can have an earlier intervention with this antipsychotic medication.  Possibility of Depakote being out of his system and although it may have been helping his mood instability his thrombocytopenia has resolved and the platelet count is at 155 K.  Likely associated to valproic acid, labs Reviewed.  Vitals Reviewed.   Nursing Notes Reviewed.   No EPS, TD, vitals stable.      MSE: Patient was alert and not oriented to time, place, person although he is aware that he is in the hospital and wishes to go home . Patient appears well groomed and clad in hospital gown. Patient is pleasant on approach. Recent and remote memory poor. Memory registration and recall poor. Attention and concentration restricted. Speech low in volume and slow in rate.  Fair eye contact. Thought process poverty of thoughts. Impaired associations. Limited fund of knowledge. Mood ok affect flat. Patient did not endorse any delusions. Patient denied any auditory visual hallucinations. Patient has denied any active suicidal  ideations and is future oriented.  Patient has limited insight, limited judgment and during the time of the assessment this morning fair impulse control.       Musculoskeletal: Patient bedbound and gait not assessed, no Parkinsonism, no Dystonia, no Akathisia, no TD. Psychomotor activity within normal limits.     Diagnosis: Dementia    "  Assessment and Plan:      Increase risperidone 2 mg at 5 PM increase dose of risperidone to 2 mg at 5 PM instead of bedtime  Platelet count normalized  Psychiatry will follow  Continue with current treatment and medication adjustments. Patient is agreeable with continued medication management and adjustments discussed.      Last Recorded Vitals  /90   Pulse 77   Temp 36.6 °C (97.9 °F)   Resp 18   Ht 1.702 m (5' 7.01\")   Wt 67.9 kg (149 lb 12.8 oz)   SpO2 97%   BMI 23.46 kg/m²      Recent Results (from the past 24 hour(s))   POCT GLUCOSE    Collection Time: 05/22/24  4:41 PM   Result Value Ref Range    POCT Glucose 198 (H) 74 - 99 mg/dL   Magnesium    Collection Time: 05/23/24  5:23 AM   Result Value Ref Range    Magnesium 1.43 (L) 1.60 - 2.40 mg/dL   Basic Metabolic Panel    Collection Time: 05/23/24  5:23 AM   Result Value Ref Range    Glucose 185 (H) 74 - 99 mg/dL    Sodium 140 136 - 145 mmol/L    Potassium 3.7 3.5 - 5.3 mmol/L    Chloride 106 98 - 107 mmol/L    Bicarbonate 28 21 - 32 mmol/L    Anion Gap 10 10 - 20 mmol/L    Urea Nitrogen 8 6 - 23 mg/dL    Creatinine 0.66 0.50 - 1.30 mg/dL    eGFR >90 >60 mL/min/1.73m*2    Calcium 7.7 (L) 8.6 - 10.3 mg/dL   CBC and Auto Differential    Collection Time: 05/23/24  5:23 AM   Result Value Ref Range    WBC 5.0 4.4 - 11.3 x10*3/uL    nRBC 0.0 0.0 - 0.0 /100 WBCs    RBC 3.62 (L) 4.50 - 5.90 x10*6/uL    Hemoglobin 10.9 (L) 13.5 - 17.5 g/dL    Hematocrit 33.0 (L) 41.0 - 52.0 %    MCV 91 80 - 100 fL    MCH 30.1 26.0 - 34.0 pg    MCHC 33.0 32.0 - 36.0 g/dL    RDW 14.1 11.5 - 14.5 %    Platelets 155 150 - 450 x10*3/uL    Neutrophils % 51.9 40.0 - 80.0 %    Immature Granulocytes %, Automated 0.4 0.0 - 0.9 %    Lymphocytes % 28.2 13.0 - 44.0 %    Monocytes % 16.5 2.0 - 10.0 %    Eosinophils % 2.8 0.0 - 6.0 %    Basophils % 0.2 0.0 - 2.0 %    Neutrophils Absolute 2.58 1.60 - 5.50 x10*3/uL    Immature Granulocytes Absolute, Automated 0.02 0.00 - 0.50 x10*3/uL    " Lymphocytes Absolute 1.40 0.80 - 3.00 x10*3/uL    Monocytes Absolute 0.82 (H) 0.05 - 0.80 x10*3/uL    Eosinophils Absolute 0.14 0.00 - 0.40 x10*3/uL    Basophils Absolute 0.01 0.00 - 0.10 x10*3/uL          Current Facility-Administered Medications:     acetaminophen (Tylenol) tablet 650 mg, 650 mg, oral, q4h PRN **OR** acetaminophen (Tylenol) oral liquid 650 mg, 650 mg, nasogastric tube, q4h PRN **OR** acetaminophen (Tylenol) suppository 650 mg, 650 mg, rectal, q4h PRN, Isa Hedrick MD    atorvastatin (Lipitor) tablet 80 mg, 80 mg, oral, Nightly, Isa Hedrick MD, 80 mg at 05/22/24 2042    brivaracetam (Briviact) tablet 50 mg, 50 mg, oral, q12h HAYDEE, Harjit De La Rosa MD    clopidogrel (Plavix) tablet 75 mg, 75 mg, oral, Daily, Isa Hedrick MD, 75 mg at 05/23/24 0848    dextrose 50 % injection 12.5 g, 12.5 g, intravenous, q15 min PRN, Isa Hedrick MD    dextrose 50 % injection 25 g, 25 g, intravenous, q15 min PRN, Isa Hedrick MD    finasteride (Proscar) tablet 5 mg, 5 mg, oral, Nightly, Isa Hedrick MD, 5 mg at 05/22/24 2042    glucagon (Glucagen) injection 1 mg, 1 mg, intramuscular, q15 min PRN, Isa Hedrick MD    glucagon (Glucagen) injection 1 mg, 1 mg, intramuscular, q15 min PRN, Isa Hedrick MD    haloperidol lactate (Haldol) injection 2 mg, 2 mg, intramuscular, q6h PRN, Alisson Royal MD, 2 mg at 05/23/24 1022    insulin lispro (HumaLOG) injection 0-5 Units, 0-5 Units, subcutaneous, TID, Isa Hedrick MD, 1 Units at 05/22/24 1724    levothyroxine (Synthroid, Levoxyl) tablet 100 mcg, 100 mcg, oral, Daily before breakfast, Isa Hedrick MD, 100 mcg at 05/23/24 0848    losartan (Cozaar) tablet 25 mg, 25 mg, oral, Daily, Isa Hedrick MD, 25 mg at 05/23/24 0848    magnesium oxide (Mag-Ox) tablet 400 mg, 400 mg, oral, Once, Harjit De La Rosa MD    melatonin tablet 3 mg, 3 mg, oral, Nightly PRN, Isa Hedrick MD, 3 mg at 05/22/24 2049    metFORMIN (Glucophage) tablet 500 mg, 500 mg, oral, BID, Isa CLARKE  MD Floridalma, 500 mg at 05/23/24 0848    metoprolol tartrate (Lopressor) tablet 25 mg, 25 mg, oral, BID, Isa Hedrick MD, 25 mg at 05/23/24 0848    ondansetron (Zofran) tablet 4 mg, 4 mg, oral, q8h PRN **OR** ondansetron (Zofran) injection 4 mg, 4 mg, intravenous, q8h PRN, Isa Hedrick MD    pantoprazole (ProtoNix) EC tablet 40 mg, 40 mg, oral, Daily before breakfast, 40 mg at 05/23/24 0848 **OR** pantoprazole (ProtoNix) injection 40 mg, 40 mg, intravenous, Daily before breakfast, Isa Hedrick MD, 40 mg at 05/19/24 0702    polyethylene glycol (Glycolax, Miralax) packet 17 g, 17 g, oral, Daily PRN, Isa Hedrick MD    polymyxin B sulf-trimethoprim (Polytrim) ophthalmic solution 1 drop, 1 drop, Both Eyes, q4h HAYDEE, Popeye Henderson MD, 1 drop at 05/23/24 1021    risperiDONE (RisperDAL) tablet 2 mg, 2 mg, oral, Daily with evening meal, MD Ruy Perez MD

## 2024-05-24 ENCOUNTER — OFFICE VISIT (OUTPATIENT)
Dept: BEHAVIORAL HEALTH | Facility: CLINIC | Age: 84
End: 2024-05-24
Payer: MEDICARE

## 2024-05-24 VITALS
HEIGHT: 67 IN | WEIGHT: 150 LBS | BODY MASS INDEX: 23.54 KG/M2 | HEART RATE: 78 BPM | SYSTOLIC BLOOD PRESSURE: 162 MMHG | DIASTOLIC BLOOD PRESSURE: 73 MMHG

## 2024-05-24 DIAGNOSIS — R41.3 MEMORY IMPAIRMENT: ICD-10-CM

## 2024-05-24 DIAGNOSIS — R56.9 SEIZURE (MULTI): ICD-10-CM

## 2024-05-24 DIAGNOSIS — F01.B18: ICD-10-CM

## 2024-05-24 PROCEDURE — 3078F DIAST BP <80 MM HG: CPT | Performed by: PSYCHIATRY & NEUROLOGY

## 2024-05-24 PROCEDURE — 3077F SYST BP >= 140 MM HG: CPT | Performed by: PSYCHIATRY & NEUROLOGY

## 2024-05-24 PROCEDURE — 1111F DSCHRG MED/CURRENT MED MERGE: CPT | Performed by: PSYCHIATRY & NEUROLOGY

## 2024-05-24 PROCEDURE — 1160F RVW MEDS BY RX/DR IN RCRD: CPT | Performed by: PSYCHIATRY & NEUROLOGY

## 2024-05-24 PROCEDURE — 1123F ACP DISCUSS/DSCN MKR DOCD: CPT | Performed by: PSYCHIATRY & NEUROLOGY

## 2024-05-24 PROCEDURE — 1159F MED LIST DOCD IN RCRD: CPT | Performed by: PSYCHIATRY & NEUROLOGY

## 2024-05-24 PROCEDURE — 99215 OFFICE O/P EST HI 40 MIN: CPT | Performed by: PSYCHIATRY & NEUROLOGY

## 2024-05-24 RX ORDER — RISPERIDONE 2 MG/1
2 TABLET ORAL
Qty: 30 TABLET | Refills: 2 | Status: SHIPPED | OUTPATIENT
Start: 2024-05-24 | End: 2024-06-07 | Stop reason: ALTCHOICE

## 2024-05-24 ASSESSMENT — ENCOUNTER SYMPTOMS
DECREASED CONCENTRATION: 1
HYPERACTIVE: 0
NERVOUS/ANXIOUS: 0
TREMORS: 1
DIARRHEA: 0
CONFUSION: 0
HALLUCINATIONS: 0
SLEEP DISTURBANCE: 1
DYSPHORIC MOOD: 0

## 2024-05-24 NOTE — PATIENT INSTRUCTIONS
Continue Risperidone 2 mg po daily in the evening, around 5 pm. The dose was adjusted yesterday so will hold on further med changes especially that he just moved to a memory care unit so will be an adjustment for him   May add an SSRI next qing. Depakote was recently stopped due to falls and thrombocytopenia   Recommended to wear his hearing aids, that should help with mood and behavior   Follow up in 2 weeks or sooner if needed

## 2024-05-24 NOTE — PROGRESS NOTES
Subjective   Patient ID: Jose Guardado is a 83 y.o. male who presents for post discharge follow up   Son in law was with him.   Daughter  attempted to participate in the conversation over the phone, we attempted to recall, no success. Diagnosis of vascular dementia with behavior  Admitted to geriatric psych last month, Risperidone was started and Depakote was increased from 250 bid to 500 bid. Prior to that was managed by neurology for paranoia and agitation. The reason for admission to geriatric psych   He feels worried about his wife`s health, and his health.   MoCA: 11+  Visuospatial:0  Naming: 3/3  Attention:   Language: 0/3  Abstraction:   Delayed recall:   Orientation:   History of CVA, over a year ago. Behavioral changes was noticed before the stroke ( had many TIAs before though ), he would confuse some people with someone else   Needs assistance and reminders to take his meds, no longer driving  He needs assistance in his basic activities of daily living, part of it was due to physical limitations following the stroke         Past Psychiatric History:  One inpatient geriatric admission recently for increased agitation   Substance Abuse History:  None  Family History:  Family History   Problem Relation Name Age of Onset    No Known Problems Mother      Arthritis Father      Hypertension Father      Diabetes Brother      Other (Stroke syndrome) Brother        Social History:  Social History     Socioeconomic History    Marital status:      Spouse name: Not on file    Number of children: Not on file    Years of education: Not on file    Highest education level: Not on file   Occupational History    Not on file   Tobacco Use    Smoking status: Former     Current packs/day: 0.00     Types: Cigarettes     Quit date:      Years since quittin.4    Smokeless tobacco: Never   Substance and Sexual Activity    Alcohol use: Not Currently    Drug use: Not Currently    Sexual  activity: Not on file   Other Topics Concern    Not on file   Social History Narrative    Not on file     Social Determinants of Health     Financial Resource Strain: Low Risk  (5/17/2024)    Overall Financial Resource Strain (CARDIA)     Difficulty of Paying Living Expenses: Not hard at all   Food Insecurity: No Food Insecurity (4/24/2024)    Hunger Vital Sign     Worried About Running Out of Food in the Last Year: Never true     Ran Out of Food in the Last Year: Never true   Transportation Needs: No Transportation Needs (5/17/2024)    PRAPARE - Transportation     Lack of Transportation (Medical): No     Lack of Transportation (Non-Medical): No   Physical Activity: Inactive (4/24/2024)    Exercise Vital Sign     Days of Exercise per Week: 0 days     Minutes of Exercise per Session: 0 min   Stress: No Stress Concern Present (4/24/2024)    [employer] [place] [employer] - Occupational Stress Questionnaire     Feeling of Stress : Only a little   Social Connections: Moderately Integrated (4/24/2024)    Social Connection and Isolation Panel [NHANES]     Frequency of Communication with Friends and Family: More than three times a week     Frequency of Social Gatherings with Friends and Family: More than three times a week     Attends Rastafari Services: 1 to 4 times per year     Active Member of Clubs or Organizations: No     Attends Club or Organization Meetings: Never     Marital Status:    Intimate Partner Violence: Not At Risk (4/24/2024)    Humiliation, Afraid, Rape, and Kick questionnaire     Fear of Current or Ex-Partner: No     Emotionally Abused: No     Physically Abused: No     Sexually Abused: No   Housing Stability: Low Risk  (5/17/2024)    Housing Stability Vital Sign     Unable to Pay for Housing in the Last Year: No     Number of Places Lived in the Last Year: 1     Unstable Housing in the Last Year: No         for 50 years, going to memory care soon   Wife is his POA      Review of  Systems   HENT:  Positive for hearing loss.    Cardiovascular:  Negative for chest pain.   Gastrointestinal:  Negative for diarrhea.   Musculoskeletal:  Positive for gait problem.   Neurological:  Positive for tremors.        Tremor seems to be chronic per son in law    Psychiatric/Behavioral:  Positive for behavioral problems, decreased concentration and sleep disturbance. Negative for confusion, dysphoric mood, hallucinations, self-injury and suicidal ideas. The patient is not nervous/anxious and is not hyperactive.        Objective   Vitals:    05/24/24 1004   BP: 162/73   Pulse: 78      Physical Exam  Psychiatric:         Attention and Perception: He is inattentive.         Mood and Affect: Mood normal.         Speech: He is communicative. Speech is not rapid and pressured, delayed, slurred or tangential.         Behavior: Behavior is not agitated or aggressive. Behavior is cooperative.         Thought Content: Thought content normal.         Cognition and Memory: Cognition is impaired.         Judgment: Judgment is not inappropriate.      Comments: Expressive aphasia          Lab Review:   No results displayed because visit has over 200 results.      Admission on 04/24/2024, Discharged on 05/02/2024   Component Date Value    POCT Glucose 04/25/2024 190 (H)     POCT Glucose 04/25/2024 322 (H)     POCT Glucose 04/25/2024 202 (H)     POCT Glucose 04/25/2024 163 (H)     POCT Glucose 04/26/2024 105 (H)     POCT Glucose 04/26/2024 163 (H)     POCT Glucose 04/24/2024 340 (H)     POCT Glucose 04/26/2024 185 (H)     POCT Glucose 04/26/2024 191 (H)     POCT Glucose 04/27/2024 68 (L)     POCT Glucose 04/27/2024 68 (L)     POCT Glucose 04/27/2024 85     POCT Glucose 04/27/2024 251 (H)     POCT Glucose 04/27/2024 265 (H)     POCT Glucose 04/27/2024 228 (H)     POCT Glucose 04/28/2024 91     POCT Glucose 04/28/2024 155 (H)     POCT Glucose 04/28/2024 224 (H)     POCT Glucose 04/28/2024 186 (H)     POCT Glucose 04/29/2024 138  (H)     Valproic Acid 04/30/2024 29 (L)     POCT Glucose 04/29/2024 190 (H)     POCT Glucose 04/30/2024 82     POCT Glucose 04/30/2024 251 (H)     POCT Glucose 04/30/2024 212 (H)     POCT Glucose 05/01/2024 87     POCT Glucose 05/01/2024 173 (H)     POCT Glucose 05/01/2024 93     POCT Glucose 05/01/2024 192 (H)     POCT Glucose 05/02/2024 75     POCT Glucose 05/02/2024 163 (H)    Admission on 04/23/2024, Discharged on 04/24/2024   Component Date Value    Color, Urine 04/24/2024 Yellow     Appearance, Urine 04/24/2024 Clear     Specific Gravity, Urine 04/24/2024 1.021     pH, Urine 04/24/2024 5.0     Protein, Urine 04/24/2024 NEGATIVE     Glucose, Urine 04/24/2024 50 (1+) (A)     Blood, Urine 04/24/2024 NEGATIVE     Ketones, Urine 04/24/2024 NEGATIVE     Bilirubin, Urine 04/24/2024 NEGATIVE     Urobilinogen, Urine 04/24/2024 <2.0     Nitrite, Urine 04/24/2024 NEGATIVE     Leukocyte Esterase, Urine 04/24/2024 NEGATIVE     Extra Tube 04/24/2024 Hold for add-ons.     Amphetamine Screen, Urine 04/24/2024 Presumptive Negative     Barbiturate Screen, Urine 04/24/2024 Presumptive Negative     Benzodiazepines Screen, * 04/24/2024 Presumptive Negative     Cannabinoid Screen, Urine 04/24/2024 Presumptive Negative     Cocaine Metabolite Scree* 04/24/2024 Presumptive Negative     Fentanyl Screen, Urine 04/24/2024 Presumptive Negative     Opiate Screen, Urine 04/24/2024 Presumptive Negative     Oxycodone Screen, Urine 04/24/2024 Presumptive Negative     PCP Screen, Urine 04/24/2024 Presumptive Negative     Methadone Screen, Urine 04/24/2024 Presumptive Negative     POCT Glucose 04/24/2024 141 (H)     POCT Glucose 04/24/2024 369 (H)    Admission on 04/23/2024, Discharged on 04/23/2024   Component Date Value    WBC 04/23/2024 6.3     nRBC 04/23/2024 0.0     RBC 04/23/2024 5.03     Hemoglobin 04/23/2024 14.8     Hematocrit 04/23/2024 45.9     MCV 04/23/2024 91     MCH 04/23/2024 29.4     MCHC 04/23/2024 32.2     RDW 04/23/2024 14.6  (H)     Platelets 04/23/2024 217     Neutrophils % 04/23/2024 54.8     Immature Granulocytes %,* 04/23/2024 0.2     Lymphocytes % 04/23/2024 28.5     Monocytes % 04/23/2024 8.3     Eosinophils % 04/23/2024 8.0     Basophils % 04/23/2024 0.2     Neutrophils Absolute 04/23/2024 3.43     Immature Granulocytes Ab* 04/23/2024 0.01     Lymphocytes Absolute 04/23/2024 1.78     Monocytes Absolute 04/23/2024 0.52     Eosinophils Absolute 04/23/2024 0.50 (H)     Basophils Absolute 04/23/2024 0.01     Glucose 04/23/2024 114 (H)     Sodium 04/23/2024 139     Potassium 04/23/2024 4.3     Chloride 04/23/2024 101     Bicarbonate 04/23/2024 31     Anion Gap 04/23/2024 11     Urea Nitrogen 04/23/2024 20     Creatinine 04/23/2024 0.87     eGFR 04/23/2024 85     Calcium 04/23/2024 9.4     Albumin 04/23/2024 4.6     Alkaline Phosphatase 04/23/2024 68     Total Protein 04/23/2024 7.3     AST 04/23/2024 20     Bilirubin, Total 04/23/2024 0.6     ALT 04/23/2024 15     Magnesium 04/23/2024 1.76     Acetaminophen 04/23/2024 <10.0     Salicylate  04/23/2024 <3     Alcohol 04/23/2024 <10     Flu A Result 04/23/2024 Not Detected     Flu B Result 04/23/2024 Not Detected     Coronavirus 2019, PCR 04/23/2024 Not Detected     Ventricular Rate 04/23/2024 67     Atrial Rate 04/23/2024 67     FL Interval 04/23/2024 150     QRS Duration 04/23/2024 80     QT Interval 04/23/2024 384     QTC Calculation(Bazett) 04/23/2024 405     P [place] 04/23/2024 54     R Axis 04/23/2024 38     T Axis 04/23/2024 35     QRS Count 04/23/2024 11     Q Onset 04/23/2024 229     P Onset 04/23/2024 154     P Offset 04/23/2024 209     T Offset 04/23/2024 421     QTC Fredericia 04/23/2024 398    Admission on 03/18/2024, Discharged on 03/19/2024   Component Date Value    Glucose 03/18/2024 97     Sodium 03/18/2024 136     Potassium 03/18/2024 5.6 (H)     Chloride 03/18/2024 104     Bicarbonate 03/18/2024 25     Anion Gap 03/18/2024 13     Urea Nitrogen 03/18/2024 16      Creatinine 03/18/2024 0.91     eGFR 03/18/2024 83     Calcium 03/18/2024 8.6     Albumin 03/18/2024 3.6     Alkaline Phosphatase 03/18/2024 53     Total Protein 03/18/2024 6.5     AST 03/18/2024 24     Bilirubin, Total 03/18/2024 0.4     ALT 03/18/2024 11     WBC 03/18/2024 6.1     nRBC 03/18/2024 0.0     RBC 03/18/2024 4.49 (L)     Hemoglobin 03/18/2024 13.1 (L)     Hematocrit 03/18/2024 41.8     MCV 03/18/2024 93     MCH 03/18/2024 29.2     MCHC 03/18/2024 31.3 (L)     RDW 03/18/2024 14.6 (H)     Platelets 03/18/2024 186     Neutrophils % 03/18/2024 42.2     Immature Granulocytes %,* 03/18/2024 0.2     Lymphocytes % 03/18/2024 40.8     Monocytes % 03/18/2024 9.4     Eosinophils % 03/18/2024 7.2     Basophils % 03/18/2024 0.2     Neutrophils Absolute 03/18/2024 2.57     Immature Granulocytes Ab* 03/18/2024 0.01     Lymphocytes Absolute 03/18/2024 2.48     Monocytes Absolute 03/18/2024 0.57     Eosinophils Absolute 03/18/2024 0.44 (H)     Basophils Absolute 03/18/2024 0.01     Troponin I, High Sensiti* 03/18/2024 4     Protime 03/18/2024 11.2     INR 03/18/2024 1.0     aPTT 03/18/2024 27     Ventricular Rate 03/18/2024 71     Atrial Rate 03/18/2024 71     FL Interval 03/18/2024 142     QRS Duration 03/18/2024 74     QT Interval 03/18/2024 382     QTC Calculation(Bazett) 03/18/2024 415     P Axis 03/18/2024 74     R Axis 03/18/2024 26     T Axis 03/18/2024 49     QRS Count 03/18/2024 12     Q Onset 03/18/2024 217     P Onset 03/18/2024 146     P Offset 03/18/2024 182     T Offset 03/18/2024 408     QTC Fredericia 03/18/2024 404     Albumin 03/18/2024 3.9     Bilirubin, Total 03/18/2024 0.4     Bilirubin, Direct 03/18/2024 0.1     Alkaline Phosphatase 03/18/2024 62     ALT 03/18/2024 13     AST 03/18/2024 17     Total Protein 03/18/2024 6.6     BNP 03/18/2024 26     Cholesterol 03/18/2024 151     HDL-Cholesterol 03/18/2024 33.9     Cholesterol/HDL Ratio 03/18/2024 4.5     LDL Calculated 03/18/2024 88     VLDL  03/18/2024 29     Triglycerides 03/18/2024 145     Non HDL Cholesterol 03/18/2024 117     AV pk fidencio 03/18/2024 1.02     LV EF 03/18/2024 66     LVOT diam 03/18/2024 2.10     MV E/A ratio 03/18/2024 0.42     MV avg E/e' ratio 03/18/2024 5.44     Tricuspid annular plane * 03/18/2024 1.9     AV mn grad 03/18/2024 2.0     RV free wall pk S' 03/18/2024 13.30     RVSP 03/18/2024 7.8     LVIDd 03/18/2024 3.96     Aortic Valve Area by Con* 03/18/2024 2.86     AV pk grad 03/18/2024 4.2     Aortic Valve Area by Con* 03/18/2024 2.58     LV A4C EF 03/18/2024 67.1     POCT Glucose 03/18/2024 206 (H)     Hemoglobin A1C 03/18/2024 7.9 (H)     Estimated Average Glucose 03/18/2024 180     POCT Glucose 03/19/2024 90     POCT Glucose 03/19/2024 210 (H)     POCT Glucose 03/18/2024 146 (H)    Admission on 02/02/2024, Discharged on 02/03/2024   Component Date Value    WBC 02/02/2024 4.1 (L)     nRBC 02/02/2024 0.0     RBC 02/02/2024 4.50     Hemoglobin 02/02/2024 13.2 (L)     Hematocrit 02/02/2024 41.3     MCV 02/02/2024 92     MCH 02/02/2024 29.3     MCHC 02/02/2024 32.0     RDW 02/02/2024 14.2     Platelets 02/02/2024 189     Neutrophils % 02/02/2024 54.8     Immature Granulocytes %,* 02/02/2024 0.2     Lymphocytes % 02/02/2024 30.0     Monocytes % 02/02/2024 6.7     Eosinophils % 02/02/2024 8.1     Basophils % 02/02/2024 0.2     Neutrophils Absolute 02/02/2024 2.22     Immature Granulocytes Ab* 02/02/2024 0.01     Lymphocytes Absolute 02/02/2024 1.22     Monocytes Absolute 02/02/2024 0.27     Eosinophils Absolute 02/02/2024 0.33     Basophils Absolute 02/02/2024 0.01     Glucose 02/02/2024 169 (H)     Sodium 02/02/2024 138     Potassium 02/02/2024 4.2     Chloride 02/02/2024 101     Bicarbonate 02/02/2024 30     Anion Gap 02/02/2024 11     Urea Nitrogen 02/02/2024 17     Creatinine 02/02/2024 0.93     eGFR 02/02/2024 81     Calcium 02/02/2024 9.2     Albumin 02/02/2024 4.1     Alkaline Phosphatase 02/02/2024 66     Total Protein  02/02/2024 6.6     AST 02/02/2024 19     Bilirubin, Total 02/02/2024 0.7     ALT 02/02/2024 12     Magnesium 02/02/2024 1.63     Ventricular Rate 02/02/2024 69     Atrial Rate 02/02/2024 69     ME Interval 02/02/2024 154     QRS Duration 02/02/2024 82     QT Interval 02/02/2024 384     QTC Calculation(Bazett) 02/02/2024 411     P Axis 02/02/2024 43     R Axis 02/02/2024 40     T Axis 02/02/2024 48     QRS Count 02/02/2024 11     Q Onset 02/02/2024 217     P Onset 02/02/2024 140     P Offset 02/02/2024 195     T Offset 02/02/2024 409     QTC Fredericia 02/02/2024 402     Ventricular Rate 02/02/2024 61     Atrial Rate 02/02/2024 61     ME Interval 02/02/2024 156     QRS Duration 02/02/2024 78     QT Interval 02/02/2024 398     QTC Calculation(Bazett) 02/02/2024 400     P Axis 02/02/2024 67     R Axis 02/02/2024 35     T [place] 02/02/2024 42     QRS Count 02/02/2024 10     Q Onset 02/02/2024 217     P Onset 02/02/2024 139     P Offset 02/02/2024 192     T Offset 02/02/2024 416     QTC Fredericia 02/02/2024 399     Troponin I, High Sensiti* 02/02/2024 4     Troponin I, High Sensiti* 02/02/2024 4     D-Dimer Non VTE, Quant (* 02/02/2024 460     POCT Glucose 02/02/2024 150 (H)     WBC 02/03/2024 4.7     nRBC 02/03/2024 0.0     RBC 02/03/2024 4.23 (L)     Hemoglobin 02/03/2024 12.5 (L)     Hematocrit 02/03/2024 38.6 (L)     MCV 02/03/2024 91     MCH 02/03/2024 29.6     MCHC 02/03/2024 32.4     RDW 02/03/2024 14.3     Platelets 02/03/2024 179     Glucose 02/03/2024 107 (H)     Sodium 02/03/2024 139     Potassium 02/03/2024 3.8     Chloride 02/03/2024 105     Bicarbonate 02/03/2024 28     Anion Gap 02/03/2024 10     Urea Nitrogen 02/03/2024 18     Creatinine 02/03/2024 0.85     eGFR 02/03/2024 86     Calcium 02/03/2024 8.5 (L)     POCT Glucose 02/02/2024 152 (H)     POCT Glucose 02/03/2024 115 (H)      Lab Results   Component Value Date     05/23/2024     05/22/2024     05/21/2024    K 3.7 05/23/2024    K 3.7  05/22/2024    K 3.9 05/21/2024    CO2 28 05/23/2024    CO2 30 05/22/2024    CO2 28 05/21/2024    BUN 8 05/23/2024    BUN 7 05/22/2024    BUN 6 05/21/2024    CREATININE 0.66 05/23/2024    CREATININE 0.70 05/22/2024    CREATININE 0.67 05/21/2024    GLUCOSE 185 (H) 05/23/2024    GLUCOSE 226 (H) 05/22/2024    GLUCOSE 208 (H) 05/21/2024    CALCIUM 7.7 (L) 05/23/2024    CALCIUM 7.2 (L) 05/22/2024    CALCIUM 7.5 (L) 05/21/2024     Lab Results   Component Value Date    WBC 5.0 05/23/2024    HGB 10.9 (L) 05/23/2024    HCT 33.0 (L) 05/23/2024    MCV 91 05/23/2024     05/23/2024     Lab Results   Component Value Date    CHOL 151 03/18/2024    TRIG 145 03/18/2024    HDL 33.9 03/18/2024       Assessment/Plan   Problem List Items Addressed This Visit       Memory impairment    Moderate late onset Alzheimer's dementia with agitation (Multi)    Relevant Medications    risperiDONE (RisperDAL) 2 mg tablet     Continue Risperidone 2 mg po daily in the evening, around 5 pm. The dose was adjusted yesterday so will hold on further med changes especially that he just moved to a memory care unit so will be an adjustment for him   May add an SSRI next qing. Depakote was recently stopped due to falls and thrombocytopenia   Recommended to wear his hearing aids, that should help with mood and behavior   Follow up in 2 weeks or sooner if needed       Harmony Phillips MD

## 2024-05-27 LAB
ATRIAL RATE: 67 BPM
P AXIS: 54 DEGREES
P OFFSET: 209 MS
P ONSET: 154 MS
PR INTERVAL: 150 MS
Q ONSET: 229 MS
QRS COUNT: 11 BEATS
QRS DURATION: 80 MS
QT INTERVAL: 384 MS
QTC CALCULATION(BAZETT): 405 MS
QTC FREDERICIA: 398 MS
R AXIS: 38 DEGREES
T AXIS: 35 DEGREES
T OFFSET: 421 MS
VENTRICULAR RATE: 67 BPM

## 2024-05-28 ENCOUNTER — TELEPHONE (OUTPATIENT)
Dept: NEUROLOGY | Facility: CLINIC | Age: 84
End: 2024-05-28
Payer: MEDICARE

## 2024-05-28 NOTE — TELEPHONE ENCOUNTER
ALEXI: Khadra from the [facility] was calling in r/g to pts not answering calls to be scheduled for home health behavioral. They wanted to advise that they have had no contact with the pt or his wife and have left several voicemail's.

## 2024-05-29 ENCOUNTER — APPOINTMENT (OUTPATIENT)
Dept: CARDIOLOGY | Facility: HOSPITAL | Age: 84
End: 2024-05-29
Payer: MEDICARE

## 2024-05-29 ENCOUNTER — HOSPITAL ENCOUNTER (OUTPATIENT)
Facility: HOSPITAL | Age: 84
Setting detail: OBSERVATION
Discharge: HOME | End: 2024-05-30
Attending: EMERGENCY MEDICINE | Admitting: STUDENT IN AN ORGANIZED HEALTH CARE EDUCATION/TRAINING PROGRAM
Payer: MEDICARE

## 2024-05-29 ENCOUNTER — APPOINTMENT (OUTPATIENT)
Dept: RADIOLOGY | Facility: HOSPITAL | Age: 84
End: 2024-05-29
Payer: MEDICARE

## 2024-05-29 ENCOUNTER — APPOINTMENT (OUTPATIENT)
Dept: PRIMARY CARE | Facility: CLINIC | Age: 84
End: 2024-05-29
Payer: MEDICARE

## 2024-05-29 DIAGNOSIS — R41.82 ALTERED MENTAL STATUS, UNSPECIFIED ALTERED MENTAL STATUS TYPE: Primary | ICD-10-CM

## 2024-05-29 LAB
ALBUMIN SERPL BCP-MCNC: 3.8 G/DL (ref 3.4–5)
ALP SERPL-CCNC: 51 U/L (ref 33–136)
ALT SERPL W P-5'-P-CCNC: 16 U/L (ref 10–52)
ANION GAP SERPL CALC-SCNC: 13 MMOL/L (ref 10–20)
APPEARANCE UR: CLEAR
AST SERPL W P-5'-P-CCNC: 26 U/L (ref 9–39)
BASOPHILS # BLD AUTO: 0.02 X10*3/UL (ref 0–0.1)
BASOPHILS NFR BLD AUTO: 0.3 %
BILIRUB SERPL-MCNC: 0.8 MG/DL (ref 0–1.2)
BILIRUB UR STRIP.AUTO-MCNC: NEGATIVE MG/DL
BUN SERPL-MCNC: 13 MG/DL (ref 6–23)
CALCIUM SERPL-MCNC: 8.9 MG/DL (ref 8.6–10.3)
CARDIAC TROPONIN I PNL SERPL HS: 11 NG/L (ref 0–20)
CARDIAC TROPONIN I PNL SERPL HS: 12 NG/L (ref 0–20)
CHLORIDE SERPL-SCNC: 101 MMOL/L (ref 98–107)
CO2 SERPL-SCNC: 29 MMOL/L (ref 21–32)
COLOR UR: ABNORMAL
CREAT SERPL-MCNC: 0.94 MG/DL (ref 0.5–1.3)
EGFRCR SERPLBLD CKD-EPI 2021: 80 ML/MIN/1.73M*2
EOSINOPHIL # BLD AUTO: 0.08 X10*3/UL (ref 0–0.4)
EOSINOPHIL NFR BLD AUTO: 1.1 %
ERYTHROCYTE [DISTWIDTH] IN BLOOD BY AUTOMATED COUNT: 15.5 % (ref 11.5–14.5)
GLUCOSE BLD MANUAL STRIP-MCNC: 169 MG/DL (ref 74–99)
GLUCOSE BLD MANUAL STRIP-MCNC: 197 MG/DL (ref 74–99)
GLUCOSE SERPL-MCNC: 208 MG/DL (ref 74–99)
GLUCOSE UR STRIP.AUTO-MCNC: ABNORMAL MG/DL
HCT VFR BLD AUTO: 36.4 % (ref 41–52)
HGB BLD-MCNC: 11.6 G/DL (ref 13.5–17.5)
IMM GRANULOCYTES # BLD AUTO: 0.03 X10*3/UL (ref 0–0.5)
IMM GRANULOCYTES NFR BLD AUTO: 0.4 % (ref 0–0.9)
KETONES UR STRIP.AUTO-MCNC: ABNORMAL MG/DL
LEUKOCYTE ESTERASE UR QL STRIP.AUTO: NEGATIVE
LYMPHOCYTES # BLD AUTO: 1.12 X10*3/UL (ref 0.8–3)
LYMPHOCYTES NFR BLD AUTO: 15.2 %
MAGNESIUM SERPL-MCNC: 1.56 MG/DL (ref 1.6–2.4)
MCH RBC QN AUTO: 29.1 PG (ref 26–34)
MCHC RBC AUTO-ENTMCNC: 31.9 G/DL (ref 32–36)
MCV RBC AUTO: 92 FL (ref 80–100)
MONOCYTES # BLD AUTO: 0.59 X10*3/UL (ref 0.05–0.8)
MONOCYTES NFR BLD AUTO: 8 %
MUCOUS THREADS #/AREA URNS AUTO: ABNORMAL /LPF
NEUTROPHILS # BLD AUTO: 5.53 X10*3/UL (ref 1.6–5.5)
NEUTROPHILS NFR BLD AUTO: 75 %
NITRITE UR QL STRIP.AUTO: NEGATIVE
NRBC BLD-RTO: 0 /100 WBCS (ref 0–0)
PH UR STRIP.AUTO: 6.5 [PH]
PLATELET # BLD AUTO: 246 X10*3/UL (ref 150–450)
POTASSIUM SERPL-SCNC: 4.6 MMOL/L (ref 3.5–5.3)
PROT SERPL-MCNC: 6.4 G/DL (ref 6.4–8.2)
PROT UR STRIP.AUTO-MCNC: NEGATIVE MG/DL
RBC # BLD AUTO: 3.98 X10*6/UL (ref 4.5–5.9)
RBC # UR STRIP.AUTO: ABNORMAL /UL
RBC #/AREA URNS AUTO: ABNORMAL /HPF
SODIUM SERPL-SCNC: 138 MMOL/L (ref 136–145)
SP GR UR STRIP.AUTO: 1.02
TSH SERPL-ACNC: 1.09 MIU/L (ref 0.44–3.98)
UROBILINOGEN UR STRIP.AUTO-MCNC: NORMAL MG/DL
WBC # BLD AUTO: 7.4 X10*3/UL (ref 4.4–11.3)
WBC #/AREA URNS AUTO: ABNORMAL /HPF

## 2024-05-29 PROCEDURE — G0378 HOSPITAL OBSERVATION PER HR: HCPCS

## 2024-05-29 PROCEDURE — 70450 CT HEAD/BRAIN W/O DYE: CPT | Performed by: RADIOLOGY

## 2024-05-29 PROCEDURE — P9612 CATHETERIZE FOR URINE SPEC: HCPCS

## 2024-05-29 PROCEDURE — 36415 COLL VENOUS BLD VENIPUNCTURE: CPT | Performed by: EMERGENCY MEDICINE

## 2024-05-29 PROCEDURE — 96366 THER/PROPH/DIAG IV INF ADDON: CPT

## 2024-05-29 PROCEDURE — 81003 URINALYSIS AUTO W/O SCOPE: CPT | Performed by: EMERGENCY MEDICINE

## 2024-05-29 PROCEDURE — 71045 X-RAY EXAM CHEST 1 VIEW: CPT

## 2024-05-29 PROCEDURE — 2550000001 HC RX 255 CONTRASTS: Performed by: EMERGENCY MEDICINE

## 2024-05-29 PROCEDURE — 72125 CT NECK SPINE W/O DYE: CPT | Performed by: RADIOLOGY

## 2024-05-29 PROCEDURE — 82947 ASSAY GLUCOSE BLOOD QUANT: CPT | Mod: 59

## 2024-05-29 PROCEDURE — 96372 THER/PROPH/DIAG INJ SC/IM: CPT

## 2024-05-29 PROCEDURE — 72125 CT NECK SPINE W/O DYE: CPT

## 2024-05-29 PROCEDURE — 2500000004 HC RX 250 GENERAL PHARMACY W/ HCPCS (ALT 636 FOR OP/ED): Performed by: EMERGENCY MEDICINE

## 2024-05-29 PROCEDURE — 96361 HYDRATE IV INFUSION ADD-ON: CPT

## 2024-05-29 PROCEDURE — 74177 CT ABD & PELVIS W/CONTRAST: CPT | Performed by: RADIOLOGY

## 2024-05-29 PROCEDURE — 93005 ELECTROCARDIOGRAM TRACING: CPT

## 2024-05-29 PROCEDURE — 84484 ASSAY OF TROPONIN QUANT: CPT | Mod: 91 | Performed by: EMERGENCY MEDICINE

## 2024-05-29 PROCEDURE — 82746 ASSAY OF FOLIC ACID SERUM: CPT | Mod: STJLAB

## 2024-05-29 PROCEDURE — 83735 ASSAY OF MAGNESIUM: CPT | Performed by: EMERGENCY MEDICINE

## 2024-05-29 PROCEDURE — 2500000004 HC RX 250 GENERAL PHARMACY W/ HCPCS (ALT 636 FOR OP/ED)

## 2024-05-29 PROCEDURE — 71045 X-RAY EXAM CHEST 1 VIEW: CPT | Performed by: RADIOLOGY

## 2024-05-29 PROCEDURE — 82947 ASSAY GLUCOSE BLOOD QUANT: CPT

## 2024-05-29 PROCEDURE — 99285 EMERGENCY DEPT VISIT HI MDM: CPT | Performed by: EMERGENCY MEDICINE

## 2024-05-29 PROCEDURE — 2500000001 HC RX 250 WO HCPCS SELF ADMINISTERED DRUGS (ALT 637 FOR MEDICARE OP)

## 2024-05-29 PROCEDURE — 84443 ASSAY THYROID STIM HORMONE: CPT | Performed by: EMERGENCY MEDICINE

## 2024-05-29 PROCEDURE — 93010 ELECTROCARDIOGRAM REPORT: CPT | Performed by: EMERGENCY MEDICINE

## 2024-05-29 PROCEDURE — 84484 ASSAY OF TROPONIN QUANT: CPT | Performed by: EMERGENCY MEDICINE

## 2024-05-29 PROCEDURE — 93010 ELECTROCARDIOGRAM REPORT: CPT | Performed by: INTERNAL MEDICINE

## 2024-05-29 PROCEDURE — 99285 EMERGENCY DEPT VISIT HI MDM: CPT

## 2024-05-29 PROCEDURE — 85025 COMPLETE CBC W/AUTO DIFF WBC: CPT | Performed by: EMERGENCY MEDICINE

## 2024-05-29 PROCEDURE — 70450 CT HEAD/BRAIN W/O DYE: CPT

## 2024-05-29 PROCEDURE — 82607 VITAMIN B-12: CPT | Mod: STJLAB

## 2024-05-29 PROCEDURE — 96365 THER/PROPH/DIAG IV INF INIT: CPT | Mod: 59

## 2024-05-29 PROCEDURE — 74177 CT ABD & PELVIS W/CONTRAST: CPT

## 2024-05-29 PROCEDURE — 80053 COMPREHEN METABOLIC PANEL: CPT | Performed by: EMERGENCY MEDICINE

## 2024-05-29 RX ORDER — TALC
3 POWDER (GRAM) TOPICAL NIGHTLY
Status: DISCONTINUED | OUTPATIENT
Start: 2024-05-29 | End: 2024-05-30 | Stop reason: HOSPADM

## 2024-05-29 RX ORDER — MAGNESIUM SULFATE HEPTAHYDRATE 40 MG/ML
2 INJECTION, SOLUTION INTRAVENOUS ONCE
Status: COMPLETED | OUTPATIENT
Start: 2024-05-29 | End: 2024-05-29

## 2024-05-29 RX ORDER — DEXTROSE 50 % IN WATER (D50W) INTRAVENOUS SYRINGE
25
Status: DISCONTINUED | OUTPATIENT
Start: 2024-05-29 | End: 2024-05-30 | Stop reason: HOSPADM

## 2024-05-29 RX ORDER — DEXTROSE 50 % IN WATER (D50W) INTRAVENOUS SYRINGE
12.5
Status: DISCONTINUED | OUTPATIENT
Start: 2024-05-29 | End: 2024-05-30 | Stop reason: HOSPADM

## 2024-05-29 RX ORDER — ENOXAPARIN SODIUM 100 MG/ML
40 INJECTION SUBCUTANEOUS EVERY 24 HOURS
Status: DISCONTINUED | OUTPATIENT
Start: 2024-05-29 | End: 2024-05-30 | Stop reason: HOSPADM

## 2024-05-29 RX ORDER — HALOPERIDOL 5 MG/ML
INJECTION INTRAMUSCULAR
Status: COMPLETED
Start: 2024-05-29 | End: 2024-05-29

## 2024-05-29 RX ORDER — FINASTERIDE 5 MG/1
5 TABLET, FILM COATED ORAL NIGHTLY
Status: DISCONTINUED | OUTPATIENT
Start: 2024-05-29 | End: 2024-05-30 | Stop reason: HOSPADM

## 2024-05-29 RX ORDER — CLOPIDOGREL BISULFATE 75 MG/1
75 TABLET ORAL DAILY
Status: DISCONTINUED | OUTPATIENT
Start: 2024-05-30 | End: 2024-05-30 | Stop reason: HOSPADM

## 2024-05-29 RX ORDER — PANTOPRAZOLE SODIUM 40 MG/1
40 TABLET, DELAYED RELEASE ORAL DAILY
Status: DISCONTINUED | OUTPATIENT
Start: 2024-05-30 | End: 2024-05-30 | Stop reason: HOSPADM

## 2024-05-29 RX ORDER — LEVOTHYROXINE SODIUM 100 UG/1
100 TABLET ORAL
Status: DISCONTINUED | OUTPATIENT
Start: 2024-05-30 | End: 2024-05-30 | Stop reason: HOSPADM

## 2024-05-29 RX ORDER — ATORVASTATIN CALCIUM 80 MG/1
80 TABLET, FILM COATED ORAL NIGHTLY
Status: DISCONTINUED | OUTPATIENT
Start: 2024-05-29 | End: 2024-05-30 | Stop reason: HOSPADM

## 2024-05-29 RX ORDER — METOPROLOL TARTRATE 25 MG/1
25 TABLET, FILM COATED ORAL 2 TIMES DAILY
Status: DISCONTINUED | OUTPATIENT
Start: 2024-05-29 | End: 2024-05-30 | Stop reason: HOSPADM

## 2024-05-29 RX ORDER — LOSARTAN POTASSIUM 25 MG/1
25 TABLET ORAL DAILY
Status: DISCONTINUED | OUTPATIENT
Start: 2024-05-30 | End: 2024-05-30 | Stop reason: HOSPADM

## 2024-05-29 RX ORDER — HALOPERIDOL 5 MG/ML
2 INJECTION INTRAMUSCULAR ONCE
Status: COMPLETED | OUTPATIENT
Start: 2024-05-29 | End: 2024-05-29

## 2024-05-29 RX ORDER — INSULIN LISPRO 100 [IU]/ML
0-10 INJECTION, SOLUTION INTRAVENOUS; SUBCUTANEOUS
Status: DISCONTINUED | OUTPATIENT
Start: 2024-05-30 | End: 2024-05-30 | Stop reason: HOSPADM

## 2024-05-29 RX ORDER — PEN NEEDLE, DIABETIC, SAFETY 30 GX3/16"
NEEDLE, DISPOSABLE MISCELLANEOUS
COMMUNITY
Start: 2024-05-24

## 2024-05-29 RX ORDER — RISPERIDONE 2 MG/1
2 TABLET ORAL
Status: DISCONTINUED | OUTPATIENT
Start: 2024-05-29 | End: 2024-05-30 | Stop reason: HOSPADM

## 2024-05-29 RX ADMIN — HALOPERIDOL LACTATE 2 MG: 5 INJECTION, SOLUTION INTRAMUSCULAR at 18:12

## 2024-05-29 RX ADMIN — Medication 3 MG: at 21:49

## 2024-05-29 RX ADMIN — METOPROLOL TARTRATE 25 MG: 25 TABLET, FILM COATED ORAL at 21:49

## 2024-05-29 RX ADMIN — BRIVARACETAM 50 MG: 50 TABLET, FILM COATED ORAL at 23:34

## 2024-05-29 RX ADMIN — FINASTERIDE 5 MG: 5 TABLET, FILM COATED ORAL at 21:49

## 2024-05-29 RX ADMIN — MAGNESIUM SULFATE HEPTAHYDRATE 2 G: 40 INJECTION, SOLUTION INTRAVENOUS at 14:04

## 2024-05-29 RX ADMIN — ATORVASTATIN CALCIUM 80 MG: 80 TABLET, FILM COATED ORAL at 21:49

## 2024-05-29 RX ADMIN — IOHEXOL 75 ML: 350 INJECTION, SOLUTION INTRAVENOUS at 15:23

## 2024-05-29 RX ADMIN — SODIUM CHLORIDE, POTASSIUM CHLORIDE, SODIUM LACTATE AND CALCIUM CHLORIDE 1000 ML: 600; 310; 30; 20 INJECTION, SOLUTION INTRAVENOUS at 18:53

## 2024-05-29 RX ADMIN — ENOXAPARIN SODIUM 40 MG: 40 INJECTION SUBCUTANEOUS at 18:51

## 2024-05-29 RX ADMIN — HALOPERIDOL 2 MG: 5 INJECTION INTRAMUSCULAR at 18:12

## 2024-05-29 RX ADMIN — RISPERIDONE 2 MG: 2 TABLET ORAL at 18:51

## 2024-05-29 SDOH — SOCIAL STABILITY: SOCIAL INSECURITY: ABUSE: ADULT

## 2024-05-29 SDOH — SOCIAL STABILITY: SOCIAL INSECURITY: WERE YOU ABLE TO COMPLETE ALL THE BEHAVIORAL HEALTH SCREENINGS?: NO

## 2024-05-29 SDOH — SOCIAL STABILITY: SOCIAL INSECURITY: ARE THERE ANY APPARENT SIGNS OF INJURIES/BEHAVIORS THAT COULD BE RELATED TO ABUSE/NEGLECT?: UNABLE TO ASSESS

## 2024-05-29 SDOH — SOCIAL STABILITY: SOCIAL INSECURITY: HAVE YOU HAD ANY THOUGHTS OF HARMING ANYONE ELSE?: UNABLE TO ASSESS

## 2024-05-29 SDOH — SOCIAL STABILITY: SOCIAL INSECURITY: DO YOU FEEL UNSAFE GOING BACK TO THE PLACE WHERE YOU ARE LIVING?: UNABLE TO ASSESS

## 2024-05-29 SDOH — SOCIAL STABILITY: SOCIAL INSECURITY: DOES ANYONE TRY TO KEEP YOU FROM HAVING/CONTACTING OTHER FRIENDS OR DOING THINGS OUTSIDE YOUR HOME?: UNABLE TO ASSESS

## 2024-05-29 SDOH — SOCIAL STABILITY: SOCIAL INSECURITY: HAVE YOU HAD THOUGHTS OF HARMING ANYONE ELSE?: NO

## 2024-05-29 SDOH — SOCIAL STABILITY: SOCIAL INSECURITY: ARE YOU OR HAVE YOU BEEN THREATENED OR ABUSED PHYSICALLY, EMOTIONALLY, OR SEXUALLY BY ANYONE?: UNABLE TO ASSESS

## 2024-05-29 SDOH — SOCIAL STABILITY: SOCIAL INSECURITY: HAS ANYONE EVER THREATENED TO HURT YOUR FAMILY OR YOUR PETS?: UNABLE TO ASSESS

## 2024-05-29 SDOH — SOCIAL STABILITY: SOCIAL INSECURITY: DO YOU FEEL ANYONE HAS EXPLOITED OR TAKEN ADVANTAGE OF YOU FINANCIALLY OR OF YOUR PERSONAL PROPERTY?: UNABLE TO ASSESS

## 2024-05-29 ASSESSMENT — ACTIVITIES OF DAILY LIVING (ADL)
LACK_OF_TRANSPORTATION: NO
HEARING - LEFT EAR: FUNCTIONAL
ADEQUATE_TO_COMPLETE_ADL: UNABLE TO ASSESS
PATIENT'S MEMORY ADEQUATE TO SAFELY COMPLETE DAILY ACTIVITIES?: UNABLE TO ASSESS
JUDGMENT_ADEQUATE_SAFELY_COMPLETE_DAILY_ACTIVITIES: UNABLE TO ASSESS
DRESSING YOURSELF: NEEDS ASSISTANCE
BATHING: DEPENDENT
FEEDING YOURSELF: NEEDS ASSISTANCE
WALKS IN HOME: NEEDS ASSISTANCE
TOILETING: DEPENDENT
ASSISTIVE_DEVICE: WALKER;TRANSFER BELT
HEARING - RIGHT EAR: FUNCTIONAL
GROOMING: NEEDS ASSISTANCE

## 2024-05-29 ASSESSMENT — COGNITIVE AND FUNCTIONAL STATUS - GENERAL
MOVING TO AND FROM BED TO CHAIR: A LOT
DRESSING REGULAR UPPER BODY CLOTHING: A LITTLE
DRESSING REGULAR UPPER BODY CLOTHING: A LOT
TOILETING: TOTAL
HELP NEEDED FOR BATHING: TOTAL
EATING MEALS: A LITTLE
DRESSING REGULAR LOWER BODY CLOTHING: A LITTLE
MOBILITY SCORE: 14
STANDING UP FROM CHAIR USING ARMS: A LOT
TOILETING: TOTAL
MOBILITY SCORE: 11
WALKING IN HOSPITAL ROOM: A LOT
MOVING FROM LYING ON BACK TO SITTING ON SIDE OF FLAT BED WITH BEDRAILS: A LITTLE
EATING MEALS: A LOT
PERSONAL GROOMING: TOTAL
HELP NEEDED FOR BATHING: A LOT
MOVING FROM LYING ON BACK TO SITTING ON SIDE OF FLAT BED WITH BEDRAILS: A LITTLE
WALKING IN HOSPITAL ROOM: TOTAL
PERSONAL GROOMING: A LITTLE
TURNING FROM BACK TO SIDE WHILE IN FLAT BAD: A LITTLE
DRESSING REGULAR LOWER BODY CLOTHING: A LOT
MOVING TO AND FROM BED TO CHAIR: A LOT
TURNING FROM BACK TO SIDE WHILE IN FLAT BAD: A LOT
DAILY ACTIVITIY SCORE: 14
CLIMB 3 TO 5 STEPS WITH RAILING: A LOT
CLIMB 3 TO 5 STEPS WITH RAILING: TOTAL
DAILY ACTIVITIY SCORE: 10
PATIENT BASELINE BEDBOUND: NO
STANDING UP FROM CHAIR USING ARMS: A LOT

## 2024-05-29 ASSESSMENT — LIFESTYLE VARIABLES
HAVE PEOPLE ANNOYED YOU BY CRITICIZING YOUR DRINKING: NO
AUDIT-C TOTAL SCORE: 0
HOW OFTEN DO YOU HAVE A DRINK CONTAINING ALCOHOL: NEVER
EVER HAD A DRINK FIRST THING IN THE MORNING TO STEADY YOUR NERVES TO GET RID OF A HANGOVER: NO
SUBSTANCE_ABUSE_PAST_12_MONTHS: NO
SKIP TO QUESTIONS 9-10: 1
PRESCIPTION_ABUSE_PAST_12_MONTHS: NO
TOTAL SCORE: 0
HOW OFTEN DO YOU HAVE 6 OR MORE DRINKS ON ONE OCCASION: NEVER
AUDIT-C TOTAL SCORE: 0
HAVE YOU EVER FELT YOU SHOULD CUT DOWN ON YOUR DRINKING: NO
HOW MANY STANDARD DRINKS CONTAINING ALCOHOL DO YOU HAVE ON A TYPICAL DAY: PATIENT DOES NOT DRINK
EVER FELT BAD OR GUILTY ABOUT YOUR DRINKING: NO

## 2024-05-29 ASSESSMENT — PAIN SCALES - PAIN ASSESSMENT IN ADVANCED DEMENTIA (PAINAD)
BREATHING: NORMAL
BODYLANGUAGE: RELAXED
CONSOLABILITY: NO NEED TO CONSOLE
FACIALEXPRESSION: SMILING OR INEXPRESSIVE
BODYLANGUAGE: RELAXED
CONSOLABILITY: NO NEED TO CONSOLE
TOTALSCORE: 0
TOTALSCORE: 0
FACIALEXPRESSION: SMILING OR INEXPRESSIVE
BREATHING: NORMAL

## 2024-05-29 ASSESSMENT — PAIN - FUNCTIONAL ASSESSMENT
PAIN_FUNCTIONAL_ASSESSMENT: PAINAD (PAIN ASSESSMENT IN ADVANCED DEMENTIA SCALE)
PAIN_FUNCTIONAL_ASSESSMENT: WONG-BAKER FACES

## 2024-05-29 ASSESSMENT — PAIN SCALES - GENERAL: PAINLEVEL_OUTOF10: 0 - NO PAIN

## 2024-05-29 ASSESSMENT — PAIN SCALES - WONG BAKER: WONGBAKER_NUMERICALRESPONSE: HURTS LITTLE BIT

## 2024-05-29 NOTE — H&P
History Of Present Illness  Jose Guardado is a 83 y.o. 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  Upon my encounter, patient is ANO x 1 and just says his name whenever he is asked any question.  Wife and daughter at bedside, able to answer questions for the patient.  Patient is reportedly ANO x 2-3 at baseline with mild confusion for which he recently started residing at [facility] memory care unit.  Earlier today, staff started noticing the patient was more altered compared to baseline, he was noted to be very confused and ANO x 1, and he was noted to be walking into the wall.  He was brought to the ER for further evaluation.  No reports of recent coughing, bowel movement changes, appetite changes, nausea, vomiting, fevers, chills.    Of note, patient has history of dementia with behavioral disturbances and mild aphasia s/p hemispheric stroke which did somewhat improve and started to have cognitive decline in February 2024.  He had been on Depakote for some point due to agitated behavior, admitted to inpatient psych unit and late April for some time.  Patient was hospitalized at [place] earlier in May after having lethargy, gait instability, and fall.  Depakote was stopped in the setting of falls and thrombocytopenia, was uptitrated on risperidone regimen to 2 mg p.o. daily per recent geriatric psychiatry appointment (Dr. Harmony Phillips).  He recently was moved to memory care unit this week and was initially doing well until yesterday.    On arrival to the ED, vital signs were within normal limits.  CBC shows hemoglobin 11.6 (similar to baseline), no leukocytosis, CMP unremarkable.  Magnesium decreased at 1.56.  Troponin negative x 2.  Urinalysis unremarkable.  Chest  x-ray, CT head/C-spine, CT abdomen/pelvis all unremarkable for acute pathology.  EKG showed normal sinus rhythm.  Patient received IV magnesium and was admitted for altered mental status that is worse than baseline.     Past Medical History  Hypertension, insulin-dependent type 2 diabetes, seizure disorder, dementia with behavioral disturbances, left hemispheric stroke, expressive aphasia    Surgical History  He has a past surgical history that includes Other surgical history (05/14/2018); Tonsillectomy (05/14/2018); Other surgical history (11/30/2021); Other surgical history (11/30/2021); Other surgical history (11/30/2021); Other surgical history (11/30/2021); Other surgical history (11/30/2021); MR angio head wo IV contrast (2/24/2021); MR angio neck wo IV contrast (2/24/2021); MR angio head wo IV contrast (4/24/2021); MR angio neck wo IV contrast (4/24/2021); MR angio head wo IV contrast (11/18/2022); MR angio neck wo IV contrast (11/18/2022); MR angio neck wo IV contrast (3/22/2023); and MR angio head wo IV contrast (3/22/2023).     Social History  He reports that he quit smoking about 40 years ago. His smoking use included cigarettes. He has never used smokeless tobacco. He reports that he does not currently use alcohol. He reports that he does not currently use drugs.    Family History  Family History   Problem Relation Name Age of Onset    No Known Problems Mother      Arthritis Father      Hypertension Father      Diabetes Brother      Other (Stroke syndrome) Brother          Allergies  Keppra [levetiracetam] and Celery    Review of Systems    12 point review of systems was completed.  All pertinent positives and negatives are listed above.     Physical Exam    Constitutional: Confused, only responds with his name when asked questions  CV: Regular rate and rhythm, no murmurs or gallops appreciated, distal pulses intact   Pulmonary: Breath sounds clear to auscultation bilaterally, no rales, rhonchi,  wheezing  GI: Abdomen soft, nondistended, nontender to palpation.  No guarding or rebound tenderness noted.  MSK: Range of motion intact in all 4 extremities  Extremities: 2+ pitting edema lower extremities bilaterally  Neuro: ANO x 1, no focal neurologic deficits appreciated.  Only responds with his name  Otherwise not able to follow commands.  Psych: Confusion, slight agitation  Skin: Warm and dry, no erythema skin lesions noted.      Last Recorded Vitals  /79 (BP Location: Right arm, Patient Position: Sitting)   Pulse 82   Temp 36.4 °C (97.5 °F) (Temporal)   Resp 20   Wt 68 kg (150 lb)   SpO2 95%     Relevant Results    Results for orders placed or performed during the hospital encounter of 05/29/24 (from the past 24 hour(s))   POCT GLUCOSE   Result Value Ref Range    POCT Glucose 197 (H) 74 - 99 mg/dL   CBC and Auto Differential   Result Value Ref Range    WBC 7.4 4.4 - 11.3 x10*3/uL    nRBC 0.0 0.0 - 0.0 /100 WBCs    RBC 3.98 (L) 4.50 - 5.90 x10*6/uL    Hemoglobin 11.6 (L) 13.5 - 17.5 g/dL    Hematocrit 36.4 (L) 41.0 - 52.0 %    MCV 92 80 - 100 fL    MCH 29.1 26.0 - 34.0 pg    MCHC 31.9 (L) 32.0 - 36.0 g/dL    RDW 15.5 (H) 11.5 - 14.5 %    Platelets 246 150 - 450 x10*3/uL    Neutrophils % 75.0 40.0 - 80.0 %    Immature Granulocytes %, Automated 0.4 0.0 - 0.9 %    Lymphocytes % 15.2 13.0 - 44.0 %    Monocytes % 8.0 2.0 - 10.0 %    Eosinophils % 1.1 0.0 - 6.0 %    Basophils % 0.3 0.0 - 2.0 %    Neutrophils Absolute 5.53 (H) 1.60 - 5.50 x10*3/uL    Immature Granulocytes Absolute, Automated 0.03 0.00 - 0.50 x10*3/uL    Lymphocytes Absolute 1.12 0.80 - 3.00 x10*3/uL    Monocytes Absolute 0.59 0.05 - 0.80 x10*3/uL    Eosinophils Absolute 0.08 0.00 - 0.40 x10*3/uL    Basophils Absolute 0.02 0.00 - 0.10 x10*3/uL   Comprehensive Metabolic Panel   Result Value Ref Range    Glucose 208 (H) 74 - 99 mg/dL    Sodium 138 136 - 145 mmol/L    Potassium 4.6 3.5 - 5.3 mmol/L    Chloride 101 98 - 107 mmol/L     Bicarbonate 29 21 - 32 mmol/L    Anion Gap 13 10 - 20 mmol/L    Urea Nitrogen 13 6 - 23 mg/dL    Creatinine 0.94 0.50 - 1.30 mg/dL    eGFR 80 >60 mL/min/1.73m*2    Calcium 8.9 8.6 - 10.3 mg/dL    Albumin 3.8 3.4 - 5.0 g/dL    Alkaline Phosphatase 51 33 - 136 U/L    Total Protein 6.4 6.4 - 8.2 g/dL    AST 26 9 - 39 U/L    Bilirubin, Total 0.8 0.0 - 1.2 mg/dL    ALT 16 10 - 52 U/L   Magnesium   Result Value Ref Range    Magnesium 1.56 (L) 1.60 - 2.40 mg/dL   Troponin I, High Sensitivity, Initial   Result Value Ref Range    Troponin I, High Sensitivity 11 0 - 20 ng/L   Troponin, High Sensitivity, 1 Hour   Result Value Ref Range    Troponin I, High Sensitivity 12 0 - 20 ng/L   TSH with reflex to Free T4 if abnormal   Result Value Ref Range    Thyroid Stimulating Hormone 1.09 0.44 - 3.98 mIU/L   Urinalysis with Reflex Culture and Microscopic   Result Value Ref Range    Color, Urine Light-Yellow Light-Yellow, Yellow, Dark-Yellow    Appearance, Urine Clear Clear    Specific Gravity, Urine 1.025 1.005 - 1.035    pH, Urine 6.5 5.0, 5.5, 6.0, 6.5, 7.0, 7.5, 8.0    Protein, Urine NEGATIVE NEGATIVE, 10 (TRACE), 20 (TRACE) mg/dL    Glucose, Urine 100 (1+) (A) Normal mg/dL    Blood, Urine 0.06 (1+) (A) NEGATIVE    Ketones, Urine 20 (1+) (A) NEGATIVE mg/dL    Bilirubin, Urine NEGATIVE NEGATIVE    Urobilinogen, Urine Normal Normal mg/dL    Nitrite, Urine NEGATIVE NEGATIVE    Leukocyte Esterase, Urine NEGATIVE NEGATIVE   Urinalysis Microscopic   Result Value Ref Range    WBC, Urine 1-5 1-5, NONE /HPF    RBC, Urine 11-20 (A) NONE, 1-2, 3-5 /HPF    Mucus, Urine FEW Reference range not established. /LPF      CT abdomen pelvis w IV contrast    Result Date: 5/29/2024  Interpreted By:  Feliz Edward, STUDY: CT ABDOMEN PELVIS W IV CONTRAST; 5/29/2024 3:22 pm   INDICATION: Signs/Symptoms:ams, abdominal pain LLQ   COMPARISON: 05/17/2024.   ACCESSION NUMBER(S): [id number]   ORDERING CLINICIAN: SHRADDHA COLEMAN   TECHNIQUE: Following  intravenous administration of nonionic contrast, spiral axial images were obtained from the dome of the diaphragm through the symphysis pubis. Oral contrast was not administered. Soft tissue and lung windows were evaluated. Sagittal and Coronal reformatted images were also assessed.   All CT examinations are performed with one or more of the following dose reduction techniques: Automated Exposure Control, adjustment of mA and/or kV according to patient size, or use of iterative reconstruction techniques.   FINDINGS: There are limitations due to patient's motion and due to artifacts from patient's arms. Lung bases: Interstitial prominence and bibasilar atelectatic changes are noted. Liver: Normal in size without focal lesions. Gallbladder: Grossly unremarkable. Spleen: Normal in size without focal lesions. Pancreas: Atrophic pancreas without focal lesions. Adrenal glands: No focal lesions. Kidneys: Both kidneys excrete contrast symmetrically, without hydronephrosis or solid enhancing masses. Evaluation for renal calculi is limited due to presence of IV contrast.     The stomach, small and large bowel are unremarkable. The appendix is normal. The bladder is well distended without wall thickening. Atherosclerotic calcifications involve the tortuous aorta without focal aneurysm. The SMA, SMV and portal vein are patent. Pelvic reproductive organs: Mildly prominent prostate gland, projecting in the bladder floor. Marked degenerative changes at multiple levels disc disease involve the spine. Hemangioma is again present involving the L1 vertebral body.       Limited study. No acute process in the abdomen and pelvis. Stable multiple chronic findings as detailed above..   Signed by: Feliz Edward 5/29/2024 3:47 PM Dictation workstation:   [id number]    CT cervical spine wo IV contrast    Result Date: 5/29/2024  Interpreted By:  Feliz Edward, STUDY: CT CERVICAL SPINE WO IV CONTRAST; 5/29/2024 3:22 pm   INDICATION:  Signs/Symptoms:ams ?fall   COMPARISON: 05/17/2020.   ACCESSION NUMBER(S): [id number]   ORDERING CLINICIAN: SHRADDHA COLEMAN   TECHNIQUE: Axial unenhanced images were obtained through the cervical spine. Sagittal and coronal post processing reconstruction images were obtained.   All CT examinations are performed with one or more of the following dose reduction techniques: Automated Exposure Control, adjustment of mA and/or kV according to patient size, or use of iterative reconstruction techniques.   FINDINGS: Images from the skull base redemonstrate postsurgical changes of right mastoid cells in the right middle ear cavity. No fracture is seen. The vertebral body heights are maintained. The vertebral alignment is anatomic; this is confirmed on the sagittal reconstruction images. Degenerative changes involve the articulation between the odontoid process and anterior arch of the C1 vertebra. Erosive changes remain stable in the odontoid process. There is narrowing of the C2-C3, C3-C4 and mostly the C4-C5, C5-C6, C6-C7 and C7-T1 discs with marginal osteophytes. There are also hypertrophic changes of facet joints bilaterally with resultant bilateral foraminal stenosis. Images from the thoracic inlet are unremarkable.       Cervical spondylosis again present without acute fracture or facet subluxation.   Signed by: Feliz Edward 5/29/2024 3:39 PM Dictation workstation:   [id number]    CT head wo IV contrast    Result Date: 5/29/2024  Interpreted By:  Feliz Edward, STUDY: CT HEAD WO IV CONTRAST; 5/29/2024 3:22 pm   INDICATION: Signs/Symptoms:ams   COMPARISON: 05/17/2020   ACCESSION NUMBER(S): [id number]   ORDERING CLINICIAN: SHRADDHA COLEMAN   TECHNIQUE: Axial images were obtained through the brain. No IV contrast was administered.   All CT examinations are performed with one or more of the following dose reduction techniques: Automated Exposure Control, adjustment of mA and/or kV according to patient size, or use of  iterative reconstruction techniques.   FINDINGS: The ventricles are enlarged but midline in position, with proportional prominence of the sulci, due to atrophy. Patchy periventricular hypodensities are present suggestive of small vessel ischemic white matter disease. Remote left parietal infarct is again present. There is no intracranial hemorrhage. No mass or mass effect is seen. The osseus structures are unchanged. Postsurgical changes are again noted involving right mastoid cells in the right middle ear cavity.       Redemonstration of age related changes without acute intracranial process. Postsurgical changes again present involving right mastoid cells in the right middle ear cavity.   Signed by: Feliz Edward 5/29/2024 3:35 PM Dictation workstation:   [id number]    XR chest 1 view    Result Date: 5/29/2024  Interpreted By:  Schoenberger, Joseph, STUDY: XR CHEST 1 VIEW;  5/29/2024 12:54 pm   INDICATION: Signs/Symptoms:ams.   COMPARISON: 05/17/2024   ACCESSION NUMBER(S): [id number]   ORDERING CLINICIAN: SHRADDHA COLEMAN   FINDINGS:         CARDIOMEDIASTINAL SILHOUETTE: Cardiomediastinal silhouette is normal in size and configuration.   LUNGS: Considerable calcific pleural disease both hemithoraces unchanged from prior. No new focal lung opacities are identified.   ABDOMEN: No remarkable upper abdominal findings.   BONES: No acute osseous changes.       1.  Stable chest. See discussion above.       MACRO: None   Signed by: Joseph Schoenberger 5/29/2024 1:00 PM Dictation workstation:   [id number]       Assessment/Plan     83-year-old male with a history of hypertension, insulin-dependent type 2 diabetes, seizure disorder, dementia with behavioral disturbances, left hemispheric stroke, expressive aphasia was presented to the hospital with acutely worsened mentation.  Suspect likely in the setting of worsening dementia and cognitive decline.  Admitted for overnight observation, will involve palliative care team  to discuss possible need for hospice vs return to memory care unit.     Moderate late onset Alzheimer's dementia with agitation  Worsening mentation likely 2/2 above  History of CVA with expressive aphasia  Seizure disorder  No labs/electrolyte abnormalities, urinalysis unremarkable and no infectious source for acutely worsened mentation.  No focal deficits and patient appears to be more encephalopathic in nature.  CVA, seizure less likely.  Can look for other vitamin deficiencies or endocrine abnormalities, however it appears that most likely patient has worsening cognitive decline that acute decrease in mentation today continues to be a part of patient's worsening dementia.  Will monitor patient overnight.  -Patient appears slightly dry, will order 1 L IVF  -Will monitor patient overnight tonight  -B12, folate, TSH levels ordered  -Will continue home risperidone 2 mg to be given daily at 5 PM.  -Bedside swallow evaluation ordered, if passes then patient can continue on mechanical soft diet which he been receiving at memory care unit  -Palliative care consulted.  Patient to return to memory care unit versus considering hospice.    Insulin-dependent type 2 diabetes  -Mild insulin sliding scale in place  -ACHS glucose checks    Hypertension  -Continue home meds once med rec is complete    Diet: Diabetic, mechanical soft   DVT prophylaxis: Lovenox   Consults: None   CODE STATUS: DNR DNI     Disposition: Admitted for overnight observation in the setting of likely worsening dementia.  Palliative care consulted, likely discharge in the next 24 hours pending further goals of care discussion.    To be discussed with attending,    Palma Major DO  Internal Medicine, PGY-3

## 2024-05-29 NOTE — TELEPHONE ENCOUNTER
Spoke with pt daughter and she stated that when she went to  [place] his memory was declining and and she took him to [facility] ER where he is at now. They are going to run scans on pt.     She would also like to know if you could prescribe hearing aids for pt. He can not hear and has lost his and daughter states that she would pay for them out of pocket.     Please Advise.

## 2024-05-29 NOTE — ED TRIAGE NOTES
Patient comes from [facility]. Patient is normally A&O X 2-3, staff reports today he is A&O X1. Staff also reports he is having difficulty walking. Patient currently alert to self only but follows commands    Fluconazole Counseling:  Patient counseled regarding adverse effects of fluconazole including but not limited to headache, diarrhea, nausea, upset stomach, liver function test abnormalities, taste disturbance, and stomach pain.  There is a rare possibility of liver failure that can occur when taking fluconazole.  The patient understands that monitoring of LFTs and kidney function test may be required, especially at baseline. The patient verbalized understanding of the proper use and possible adverse effects of fluconazole.  All of the patient's questions and concerns were addressed.

## 2024-05-29 NOTE — ED PROVIDER NOTES
HPI   Chief Complaint   Patient presents with    Altered Mental Status     Normally A&O X 2-3, today SNF reports patient is only oriented X1. Patient having gait changes as well.        83-year-old male with past medical history of hypertension, diabetes, hypothyroidism, dementia presenting to the ED for change in mental status.  History is obtained by daughter and EMS.  They report last known well was last night.  This morning nursing staff noticed he was confused and was walking with a gait abnormality.  He ran into a wall.  Last night he knew who he was, his wife's name, his home address and this morning would only repeat his own name.  He did have a fall 1 week ago.  Daughter states he was admitted at that time and she has noticed a decline in his mental status since he was admitted to the memory care unit this past week.                          Iron Coma Scale Score: 14                     Patient History   Past Medical History:   Diagnosis Date    Anxiety     Dizziness and giddiness 12/21/2021    Episodic lightheadedness    Other chest pain 12/21/2021    Atypical chest pain    Other chest pain 12/21/2021    Chest pain, musculoskeletal    Personal history of other diseases of male genital organs 11/30/2021    History of erectile dysfunction    Personal history of other specified conditions 05/14/2018    History of urinary incontinence    Seizures (Multi)     Stroke (Multi)      Past Surgical History:   Procedure Laterality Date    MR HEAD ANGIO WO IV CONTRAST  2/24/2021    MR HEAD ANGIO WO IV CONTRAST 2/24/2021 [facility]    MR HEAD ANGIO WO IV CONTRAST  4/24/2021    MR HEAD ANGIO WO IV CONTRAST 4/24/2021 [facility]    MR HEAD ANGIO WO IV CONTRAST  11/18/2022    MR HEAD ANGIO WO IV CONTRAST 11/18/2022 [facility]    MR HEAD ANGIO WO IV CONTRAST  3/22/2023    MR HEAD ANGIO WO IV CONTRAST [facility]    MR NECK ANGIO WO IV CONTRAST  2/24/2021    MR NECK ANGIO WO IV CONTRAST 2/24/2021 [facility]    MR NECK ANGIO WO IV CONTRAST  2021    MR NECK ANGIO WO IV CONTRAST 2021 [facility]    MR NECK ANGIO WO IV CONTRAST  2022    MR NECK ANGIO WO IV CONTRAST 2022 [facility]    MR NECK ANGIO WO IV CONTRAST  3/22/2023    MR NECK ANGIO WO IV CONTRAST [facility]    OTHER SURGICAL HISTORY  2018    Incision Of Mastoid    OTHER SURGICAL HISTORY  2021    Complete colonoscopy    OTHER SURGICAL HISTORY  2021    Esophagogastroduodenoscopy    OTHER SURGICAL HISTORY  2021    Mastoidectomy    OTHER SURGICAL HISTORY  2021    Renal lithotripsy    OTHER SURGICAL HISTORY  2021    Tonsillectomy with adenoidectomy    TONSILLECTOMY  2018    Tonsillectomy     Family History   Problem Relation Name Age of Onset    No Known Problems Mother      Arthritis Father      Hypertension Father      Diabetes Brother      Other (Stroke syndrome) Brother       Social History     Tobacco Use    Smoking status: Former     Current packs/day: 0.00     Types: Cigarettes     Quit date:      Years since quittin.4    Smokeless tobacco: Never   Substance Use Topics    Alcohol use: Not Currently    Drug use: Not Currently       Physical Exam   ED Triage Vitals [24 1153]   Temperature Heart Rate Respirations BP   36.1 °C (97 °F) 80 17 173/81      Pulse Ox Temp Source Heart Rate Source Patient Position   98 % Tympanic Monitor Sitting      BP Location FiO2 (%)     Right arm --       Physical Exam  Vitals and nursing note reviewed.   Constitutional:       General: He is not in acute distress.     Appearance: He is ill-appearing. He is not toxic-appearing.   HENT:      Head: Normocephalic and atraumatic.      Nose: Nose normal.      Mouth/Throat:      Mouth: Mucous membranes are moist.   Eyes:      Extraocular Movements: Extraocular movements intact.      Conjunctiva/sclera: Conjunctivae normal.      Pupils: Pupils are equal, round, and reactive to light.       Comments: Bilateral yellow eye drainage   Cardiovascular:      Rate and Rhythm: Normal rate and regular rhythm.      Pulses: Normal pulses.      Heart sounds: Normal heart sounds.   Pulmonary:      Effort: Pulmonary effort is normal.      Breath sounds: Normal breath sounds.   Abdominal:      General: There is no distension.      Palpations: Abdomen is soft.      Tenderness: There is no abdominal tenderness.   Musculoskeletal:         General: Normal range of motion.      Cervical back: Normal range of motion and neck supple. No rigidity.      Right lower leg: No edema.      Left lower leg: No edema.   Skin:     General: Skin is warm and dry.      Capillary Refill: Capillary refill takes less than 2 seconds.   Neurological:      General: No focal deficit present.      Mental Status: He is alert. He is confused.      Comments: Moving all extremities         ED Course & MDM   ED Course as of 05/29/24 1941   Wed May 29, 2024   1538 CT head wo IV contrast  Redemonstration of age related changes without acute intracranial  process. Postsurgical changes again present involving right mastoid  cells in the right middle ear cavity.   [CB]   1538 XR chest 1 view  IMPRESSION:  1.  Stable chest. See discussion above.   [CB]   1617 CT cervical spine wo IV contrast  Cervical spondylosis again present without acute fracture or facet  subluxation.   [CB]   1618 CT abdomen pelvis w IV contrast  Limited study.  No acute process in the abdomen and pelvis.  Stable multiple chronic findings   [CB]   1635 Urinalysis with Reflex Culture and Microscopic(!)  No bacteria nitrite or leukocyte esterase to suggest UTI [CB]   1635 Shared decision making for disposition  Patient and/or patient´s representative was counseled regarding labs, imaging, likely diagnosis. All questions were answered. Recommendation was made   for Admission given the need for further escalation of care to inpatient management. Patient agreed and was admitted in stable  condition. Admitting team was notified of any pending labs or imaging to ensure continuity of care.    [CB]      ED Course User Index  [CB] Andres Ferrer DO         Diagnoses as of 05/29/24 1941   Altered mental status, unspecified altered mental status type       Medical Decision Making  =================Attending note===============    The patient was seen by the resident/fellow.  I have personally performed a substantive portion of the encounter.  I have seen and examined the patient; agree with the workup, evaluation, MDM,   management and diagnosis.  The care plan has been discussed with the resident; I have reviewed the resident's note and agree with the documented findings.      This is a 83 y.o. male who presents to ER with altered mentation.  He is normally ANO x 2-3 and I is only alert times person.  He is also been more fidgeting and agitated.  Been having difficulty walking.  He does have a family member and here he states he is normally confused with some dementia, but is not normally agitated like this.  He just went to a nursing home a week ago.  There is a disconjugate gaze which is family member states is chronic.  Heart is regular.  Lungs are clear.  Abdomen for me is soft and nontender.  For the resident there was some abdominal tenderness.    EKG: Normal sinus rhythm with a ventricular rate of 78.  .  QTc 417.  No ST changes.  EKG: Normal sinus rhythm with a ventricular rate of 82.  .  QTc 408.  No ST changes.    TSH is normal.  No definitive UTI.  Troponin negative.  Repeat troponin negative.  Chemistry shows mildly elevated glucose.  CBC shows normal white count.  Mild anemia with a hemoglobin of 11.6.  CT of the head: There is postsurgical changes.  There is also chronic age-related changes.  C-spine has no acute findings.  CT of the abdomen pelvis have no acute findings.  Chest x-ray is stable without acute findings.  There is calcific pleural disease which is  unchanged.    Patient is brought to the hospital for further evaluation.        ==========================================          Procedure  Procedures     Rajiv Joy DO  05/29/24 194

## 2024-05-29 NOTE — PROGRESS NOTES
Emergency Medicine Transition of Care Note.    I received Jose Guardado in signout from Dr. Koenig.  Please see the previous ED provider note for all HPI, PE and MDM up to the time of signout at 1530. This is in addition to the primary record.    In brief Jose Guardado is an 83 y.o. male presenting for   Chief Complaint   Patient presents with    Altered Mental Status     Normally A&O X 2-3, today SNF reports patient is only oriented X1. Patient having gait changes as well.      At the time of signout we were awaiting: CT results, UA results    ED Course as of 05/30/24 0013   Wed May 29, 2024   1538 CT head wo IV contrast  Redemonstration of age related changes without acute intracranial  process. Postsurgical changes again present involving right mastoid  cells in the right middle ear cavity.   [CB]   1538 XR chest 1 view  IMPRESSION:  1.  Stable chest. See discussion above.   [CB]   1617 CT cervical spine wo IV contrast  Cervical spondylosis again present without acute fracture or facet  subluxation.   [CB]   1618 CT abdomen pelvis w IV contrast  Limited study.  No acute process in the abdomen and pelvis.  Stable multiple chronic findings   [CB]   1635 Urinalysis with Reflex Culture and Microscopic(!)  No bacteria nitrite or leukocyte esterase to suggest UTI [CB]   1635 Shared decision making for disposition  Patient and/or patient´s representative was counseled regarding labs, imaging, likely diagnosis. All questions were answered. Recommendation was made   for Admission given the need for further escalation of care to inpatient management. Patient agreed and was admitted in stable condition. Admitting team was notified of any pending labs or imaging to ensure continuity of care.    [CB]      ED Course User Index  [CB] Andres Ferrer DO         Diagnoses as of 05/30/24 0013   Altered mental status, unspecified altered mental status type       Medical Decision Making      Final diagnoses:   [R41.82] Altered  mental status, unspecified altered mental status type           Procedure  Procedures    Andres Ferrer DO     Reviewed and approved by ANDRES FERRER on 5/30/24 at 12:13 AM.

## 2024-05-30 VITALS
RESPIRATION RATE: 19 BRPM | BODY MASS INDEX: 24.19 KG/M2 | HEIGHT: 67 IN | WEIGHT: 154.1 LBS | OXYGEN SATURATION: 94 % | SYSTOLIC BLOOD PRESSURE: 125 MMHG | DIASTOLIC BLOOD PRESSURE: 59 MMHG | TEMPERATURE: 97.7 F | HEART RATE: 69 BPM

## 2024-05-30 LAB
ANION GAP SERPL CALC-SCNC: 10 MMOL/L (ref 10–20)
BUN SERPL-MCNC: 9 MG/DL (ref 6–23)
CALCIUM SERPL-MCNC: 7.6 MG/DL (ref 8.6–10.3)
CHLORIDE SERPL-SCNC: 104 MMOL/L (ref 98–107)
CO2 SERPL-SCNC: 27 MMOL/L (ref 21–32)
CREAT SERPL-MCNC: 0.7 MG/DL (ref 0.5–1.3)
EGFRCR SERPLBLD CKD-EPI 2021: >90 ML/MIN/1.73M*2
ERYTHROCYTE [DISTWIDTH] IN BLOOD BY AUTOMATED COUNT: 15.4 % (ref 11.5–14.5)
FOLATE SERPL-MCNC: 16.3 NG/ML
GLUCOSE BLD MANUAL STRIP-MCNC: 160 MG/DL (ref 74–99)
GLUCOSE BLD MANUAL STRIP-MCNC: 160 MG/DL (ref 74–99)
GLUCOSE BLD MANUAL STRIP-MCNC: 176 MG/DL (ref 74–99)
GLUCOSE SERPL-MCNC: 150 MG/DL (ref 74–99)
HCT VFR BLD AUTO: 29.5 % (ref 41–52)
HGB BLD-MCNC: 9.8 G/DL (ref 13.5–17.5)
HOLD SPECIMEN: NORMAL
MAGNESIUM SERPL-MCNC: 1.74 MG/DL (ref 1.6–2.4)
MCH RBC QN AUTO: 30.3 PG (ref 26–34)
MCHC RBC AUTO-ENTMCNC: 33.2 G/DL (ref 32–36)
MCV RBC AUTO: 91 FL (ref 80–100)
NRBC BLD-RTO: 0 /100 WBCS (ref 0–0)
PLATELET # BLD AUTO: 197 X10*3/UL (ref 150–450)
POTASSIUM SERPL-SCNC: 3.2 MMOL/L (ref 3.5–5.3)
RBC # BLD AUTO: 3.23 X10*6/UL (ref 4.5–5.9)
SODIUM SERPL-SCNC: 138 MMOL/L (ref 136–145)
VIT B12 SERPL-MCNC: 434 PG/ML (ref 211–911)
WBC # BLD AUTO: 5.7 X10*3/UL (ref 4.4–11.3)

## 2024-05-30 PROCEDURE — 99235 HOSP IP/OBS SAME DATE MOD 70: CPT

## 2024-05-30 PROCEDURE — 83735 ASSAY OF MAGNESIUM: CPT

## 2024-05-30 PROCEDURE — 82947 ASSAY GLUCOSE BLOOD QUANT: CPT | Mod: 59

## 2024-05-30 PROCEDURE — 2500000004 HC RX 250 GENERAL PHARMACY W/ HCPCS (ALT 636 FOR OP/ED)

## 2024-05-30 PROCEDURE — 96372 THER/PROPH/DIAG INJ SC/IM: CPT

## 2024-05-30 PROCEDURE — 97161 PT EVAL LOW COMPLEX 20 MIN: CPT | Mod: GP

## 2024-05-30 PROCEDURE — 97165 OT EVAL LOW COMPLEX 30 MIN: CPT | Mod: GO

## 2024-05-30 PROCEDURE — 80048 BASIC METABOLIC PNL TOTAL CA: CPT

## 2024-05-30 PROCEDURE — G0378 HOSPITAL OBSERVATION PER HR: HCPCS

## 2024-05-30 PROCEDURE — 2500000001 HC RX 250 WO HCPCS SELF ADMINISTERED DRUGS (ALT 637 FOR MEDICARE OP)

## 2024-05-30 PROCEDURE — 85027 COMPLETE CBC AUTOMATED: CPT

## 2024-05-30 PROCEDURE — 97535 SELF CARE MNGMENT TRAINING: CPT | Mod: GO

## 2024-05-30 PROCEDURE — 36415 COLL VENOUS BLD VENIPUNCTURE: CPT

## 2024-05-30 PROCEDURE — 2500000002 HC RX 250 W HCPCS SELF ADMINISTERED DRUGS (ALT 637 FOR MEDICARE OP, ALT 636 FOR OP/ED): Mod: MUE

## 2024-05-30 RX ORDER — HALOPERIDOL 5 MG/ML
3 INJECTION INTRAMUSCULAR ONCE
Status: COMPLETED | OUTPATIENT
Start: 2024-05-30 | End: 2024-05-30

## 2024-05-30 RX ORDER — POTASSIUM CHLORIDE 20 MEQ/1
40 TABLET, EXTENDED RELEASE ORAL ONCE
Status: COMPLETED | OUTPATIENT
Start: 2024-05-30 | End: 2024-05-30

## 2024-05-30 RX ORDER — HALOPERIDOL 5 MG/ML
2 INJECTION INTRAMUSCULAR ONCE
Status: DISCONTINUED | OUTPATIENT
Start: 2024-05-30 | End: 2024-05-30

## 2024-05-30 RX ADMIN — PANTOPRAZOLE SODIUM 40 MG: 40 TABLET, DELAYED RELEASE ORAL at 09:07

## 2024-05-30 RX ADMIN — INSULIN LISPRO 2 UNITS: 100 INJECTION, SOLUTION INTRAVENOUS; SUBCUTANEOUS at 12:55

## 2024-05-30 RX ADMIN — HALOPERIDOL LACTATE 3 MG: 5 INJECTION, SOLUTION INTRAMUSCULAR at 01:22

## 2024-05-30 RX ADMIN — LEVOTHYROXINE SODIUM 100 MCG: 0.1 TABLET ORAL at 06:23

## 2024-05-30 RX ADMIN — INSULIN LISPRO 2 UNITS: 100 INJECTION, SOLUTION INTRAVENOUS; SUBCUTANEOUS at 09:10

## 2024-05-30 RX ADMIN — POTASSIUM CHLORIDE 40 MEQ: 1500 TABLET, EXTENDED RELEASE ORAL at 09:07

## 2024-05-30 RX ADMIN — LOSARTAN POTASSIUM 25 MG: 25 TABLET, FILM COATED ORAL at 09:07

## 2024-05-30 RX ADMIN — METOPROLOL TARTRATE 25 MG: 25 TABLET, FILM COATED ORAL at 09:07

## 2024-05-30 RX ADMIN — CLOPIDOGREL 75 MG: 75 TABLET ORAL at 09:07

## 2024-05-30 RX ADMIN — BRIVARACETAM 50 MG: 50 TABLET, FILM COATED ORAL at 12:27

## 2024-05-30 ASSESSMENT — PAIN SCALES - PAIN ASSESSMENT IN ADVANCED DEMENTIA (PAINAD)
BREATHING: NORMAL
BODYLANGUAGE: RELAXED
CONSOLABILITY: NO NEED TO CONSOLE
BODYLANGUAGE: RELAXED
TOTALSCORE: 0
TOTALSCORE: 0
FACIALEXPRESSION: SMILING OR INEXPRESSIVE
FACIALEXPRESSION: SMILING OR INEXPRESSIVE
CONSOLABILITY: NO NEED TO CONSOLE
BREATHING: NORMAL

## 2024-05-30 ASSESSMENT — COGNITIVE AND FUNCTIONAL STATUS - GENERAL
EATING MEALS: A LITTLE
PERSONAL GROOMING: A LITTLE
DRESSING REGULAR LOWER BODY CLOTHING: A LOT
DAILY ACTIVITIY SCORE: 14
DRESSING REGULAR UPPER BODY CLOTHING: A LOT
STANDING UP FROM CHAIR USING ARMS: A LITTLE
TURNING FROM BACK TO SIDE WHILE IN FLAT BAD: A LITTLE
MOBILITY SCORE: 15
TOILETING: A LOT
MOVING TO AND FROM BED TO CHAIR: A LITTLE
HELP NEEDED FOR BATHING: A LOT
CLIMB 3 TO 5 STEPS WITH RAILING: TOTAL
WALKING IN HOSPITAL ROOM: A LOT
MOVING FROM LYING ON BACK TO SITTING ON SIDE OF FLAT BED WITH BEDRAILS: A LITTLE

## 2024-05-30 ASSESSMENT — ACTIVITIES OF DAILY LIVING (ADL)
HOME_MANAGEMENT_TIME_ENTRY: 10
BATHING_ASSISTANCE: MAXIMAL

## 2024-05-30 ASSESSMENT — PAIN - FUNCTIONAL ASSESSMENT
PAIN_FUNCTIONAL_ASSESSMENT: 0-10
PAIN_FUNCTIONAL_ASSESSMENT: 0-10

## 2024-05-30 ASSESSMENT — PAIN SCALES - GENERAL
PAINLEVEL_OUTOF10: 0 - NO PAIN
PAINLEVEL_OUTOF10: 0 - NO PAIN

## 2024-05-30 NOTE — DISCHARGE SUMMARY
"Discharge Diagnosis  Altered mental status    Issues Requiring Follow-Up  Patient to return to memory care unit with outpatient follow-up with [facility].    Discharge Meds     Your medication list        CHANGE how you take these medications        Instructions Last Dose Given Next Dose Due   pantoprazole 40 mg EC tablet  Commonly known as: ProtoNix  What changed: Another medication with the same name was removed. Continue taking this medication, and follow the directions you see here.      Take 1 tablet (40 mg) by mouth once daily.              CONTINUE taking these medications        Instructions Last Dose Given Next Dose Due   atorvastatin 80 mg tablet  Commonly known as: Lipitor      Take 1 tablet (80 mg) by mouth once daily at bedtime.        AutoShield Duo Pen Needle 30 gauge x 3/16\" needle  Generic drug: pen needle,diabetic dual safty           Briviact 50 mg tablet tablet  Generic drug: brivaracetam      Take 1 tablet (50 mg) by mouth 2 times a day.       clopidogrel 75 mg tablet  Commonly known as: Plavix      Take 1 tablet (75 mg) by mouth once daily.       finasteride 5 mg tablet  Commonly known as: Proscar      Take 1 tablet (5 mg) by mouth once daily at bedtime.       FreeStyle Doug 2 Sensor kit  Generic drug: flash glucose sensor kit      Use as instructed       Lantus U-100 Insulin 100 unit/mL injection  Generic drug: insulin glargine      Inject 12 Units under the skin once daily at bedtime. Take as directed per insulin instructions.       levothyroxine 100 mcg tablet  Commonly known as: Synthroid, Levoxyl      Take 1 tablet (100 mcg) by mouth once daily in the morning. Take before meals.       losartan 25 mg tablet  Commonly known as: Cozaar      Take 1 tablet (25 mg) by mouth once daily.       melatonin 3 mg tablet      Take 1 tablet (3 mg) by mouth once daily at bedtime.       metFORMIN 500 mg tablet  Commonly known as: Glucophage      Take 1 tablet (500 mg) by mouth 2 times a " day with meals.       metoprolol tartrate 25 mg tablet  Commonly known as: Lopressor      Take 1 tablet (25 mg) by mouth 2 times a day.       risperiDONE 2 mg tablet  Commonly known as: RisperDAL      Take 1 tablet (2 mg) by mouth once daily in the evening. Take with meals.                Test Results Pending At Discharge  Pending Labs       No current pending labs.            Hospital Course   83-year-old male with a history of hypertension, insulin-dependent type 2 diabetes, seizure disorder, dementia with behavioral disturbances, left hemispheric stroke, expressive aphasia who presented to the hospital with acutely worsened mentation.  Patient was ANO x 1, normal baseline is apparently ANO x 2-3 at baseline per family.  No focal neurologic deficits and initial head CT negative.  Suspect likely in the setting of worsening dementia and cognitive decline.  Received 1 L of IVF, lab work and urinalysis otherwise unremarkable.  TSH, B12, folate unremarkable.  Patient's mentation slightly improved this morning, suspect etiology likely due to progressively worsening dementia patient will continue to have likely waxing/waning.  Palliative care consulted for goals of care conversation and family elected for the patient to go back to memory care unit with [facility] palliative care follow-up on discharge.  Stable for discharge at this time.    Pertinent Physical Exam At Time of Discharge  Physical Exam    Constitutional: Generally confused, frail appearing  CV: Regular rate and rhythm, no murmurs or gallops appreciated, distal pulses intact   Pulmonary: Breath sounds clear to auscultation bilaterally, no rales, rhonchi, wheezing  GI: Abdomen soft, nondistended, nontender to palpation.  No guarding or rebound tenderness noted.  MSK: Range of motion intact in all 4 extremities  Extremities: No edema noted  Neuro: ANO x2, no focal neurologic deficits  Psych: Generally confused  Skin: Warm and dry, no erythema skin lesions noted.      Outpatient Follow-Up  Future Appointments   Date Time Provider Department Center   7/11/2024 12:00 PM Reid Diana MD [id number] [place]   11/7/2024  3:30 PM June Simon MD [id number] [place]   1/7/2025  4:00 PM [facility] [id number] [place]   1/7/2025  4:20 PM Vinayak Christopher MD [id number] [place]         Palma Major DO

## 2024-05-30 NOTE — PROGRESS NOTES
Physical Therapy    Physical Therapy Evaluation    Patient Name: Jose Guardado  MRN: [id number]  Today's Date: 5/30/2024   Time Calculation  Start Time: 1024  Stop Time: 1040  Time Calculation (min): 16 min  3110/3110-A    Assessment/Plan   PT Assessment  PT Assessment Results: Decreased strength, Decreased range of motion, Decreased endurance, Impaired balance, Decreased mobility, Decreased cognition, Impaired judgement, Decreased safety awareness  Rehab Prognosis: Fair  Evaluation/Treatment Tolerance: Patient tolerated treatment well  Medical Staff Made Aware: Yes  End of Session Communication: Bedside nurse  Assessment Comment: Pt is a 84 y/o male admitted for AMS. Pt presents with cognitive deficits, decreased strength, endurance and balance. Pt able to tolerate bed mobility, transfers and short ambulation in room with assistance. Pt is functioning below baseline level of function and will benefit from skilled therapy during stay to improve overall functional mobility and safety awareness. Upon discharge pt will benefit from moderate intensity therapy with 24 hr assist 2/2 cognitive decline for continued improvement in functional mobility.     End of Session Patient Position: Bed, 3 rail up, Alarm on (call light within reach)  IP OR SWING BED PT PLAN  Inpatient or Swing Bed: Inpatient  PT Plan  Treatment/Interventions: Bed mobility, Transfer training, Gait training, Balance training, Neuromuscular re-education, Strengthening, Endurance training, Range of motion, Therapeutic exercise, Therapeutic activity, Home exercise program, Positioning  PT Plan: Skilled PT  PT Frequency: 3 times per week  PT Discharge Recommendations: Moderate intensity level of continued care, 24 hr supervision due to cognition  PT Recommended Transfer Status: Assist x1 (min A)  PT - OK to Discharge: Yes (Once medically cleared.)    Subjective     Current Problem:  1. Altered mental status, unspecified altered mental status type           Patient Active Problem List   Diagnosis    Focal seizure (Multi)    Arthritis    Osteoarthritis    Pleural plaque due to asbestos exposure    Benign essential hypertension    Benign prostatic hyperplasia    Bursitis of right hip    Calcium kidney stone    Cerebrovascular small vessel disease    Cervical radiculopathy    Spondylosis of cervical spine without myelopathy    Chronic low back pain    Colon polyp    Diverticulitis, colon    Confusion and disorientation    Diastolic dysfunction    HTN (hypertension)    History of TIA (transient ischemic attack)    History of orthostatic hypotension    Hemangioma of skin and subcutaneous tissue    Gastroesophageal reflux disease    Expressive aphasia    Erectile dysfunction    Type 2 diabetes mellitus with hyperglycemia, with long-term current use of insulin (Multi)    Disorder of bursae of shoulder region    Memory impairment    Hyperlipidemia    Hypothyroidism    Melanocytic nevi of trunk    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    Other seborrheic keratosis    Other melanin hyperpigmentation    Seizure disorder (Multi)    Shingles    Shortness of breath    Tremor, essential    Varicose veins of leg with swelling    Vitamin D deficiency    White matter disease, unspecified    Overweight with body mass index (BMI) of 25 to 25.9 in adult    Anxiety    Other chest pain    Chest pain    Transient ischemic attack (TIA)    Acute pharyngitis    Allergic rhinitis    Altered mental status    Aphasia    Ataxia    Chills    Contact with and (suspected) exposure to covid-19    Cramps of lower extremity    Diarrhea    Headache    History of cardiovascular surgery    Hypokalemia    Implantable loop recorder present    Insect bite of hand    Knee pain    Neurological symptoms    Oropharyngeal dysphagia    Urinary incontinence    Skin lesion of face    Word finding difficulty    Acute conjunctivitis    Mood disorder (CMS-HCC)    Hemiplegia and hemiparesis following cerebral  infarction affecting right dominant side (Multi)    Paroxysmal atrial fibrillation (Multi)    Peripheral vascular disease of foot (CMS-[facility])    Type 2 diabetes mellitus with diabetic peripheral angiopathy without gangrene, with long-term current use of insulin (Multi)    Falls, initial encounter    Hematoma of left buttock    Moderate late onset Alzheimer's dementia with agitation (Multi)    Fall, initial encounter       General Visit Information:  General  Reason for Referral: P/w AMS. Staff noticed he was confused and was walking with a gait abnormality.  He ran into a wall.  He did have a fall 1 week ago. Daughter states he was admitted at that time and she has noticed a decline in his mental status since he was admitted to the memory care.  Referred By: Dr. Mccauley  Past Medical History Relevant to Rehab: HTN, hypothyroidism, dementia,  insulin-dependent type 2 diabetes, seizure disorder, moderate late onset Alzheimer's dementia with behavioral disturbances, left hemispheric stroke, expressive aphasia  Family/Caregiver Present: No  Co-Treatment: OT  Co-Treatment Reason: To maximize pt safety with functional mobility.  Prior to Session Communication: Bedside nurse  Patient Position Received: Bed, 3 rail up, Alarm on  Preferred Learning Style: verbal  General Comment: Pt pleasant and agreeable to work with therapy. Pt confused throughout session and has baseline dementia.    Home Living/Prior Level of Function:  Home Living  Home Living Comments: Pt poor historian and has dementia at baseline. Per EMR pt admitted from [facility]. Per EMR 3/19/24 pt Independent with mobility and ADLs.    Precautions:  Precautions  Medical Precautions: Fall precautions    Vital Signs:     Objective     Pain:  Pain Assessment  Pain Assessment: 0-10  Pain Score: 0 - No pain    Cognition:  Cognition  Overall Cognitive Status: Impaired at baseline (Baseline dementia)  Orientation Level: Disoriented to place, Disoriented to time  (Pt oriented to name and  with verbal cues for .)  Following Commands: Follows one step commands with repetition  Insight: Moderate    General Assessments:      Activity Tolerance  Endurance: Tolerates 10 - 20 min exercise with multiple rests  Sensation  Sensation Comment: Pt reported numbness in feet; however, able to feel light touch.     Static Sitting Balance  Static Sitting-Balance Support: Bilateral upper extremity supported, Feet supported  Static Sitting-Level of Assistance: Contact guard  Dynamic Sitting Balance  Dynamic Sitting-Balance Support: Bilateral upper extremity supported, Feet supported  Dynamic Sitting-Comments: min A to scoot glutes towards EOB  Static Standing Balance  Static Standing-Balance Support: Bilateral upper extremity supported  Static Standing-Level of Assistance: Minimum assistance (HH assist x2)  Dynamic Standing Balance  Dynamic Standing-Balance Support: Bilateral upper extremity supported  Dynamic Standing-Comments: min A HH assist x2    Functional Assessments:     Bed Mobility  Bed Mobility: Yes  Bed Mobility 1  Bed Mobility 1: Supine to sitting, Sitting to supine  Level of Assistance 1: Minimum assistance, Moderate verbal cues, Moderate tactile cues  Bed Mobility Comments 1: HOB elevated and use of bed HR. Pt required assist with trunk and LEs. Verbal and tactile cues used to assist pt with hand placement.    Transfers  Transfer: Yes  Transfer 1  Transfer From 1: Bed to  Transfer to 1: Stand  Technique 1: Sit to stand  Transfer Device 1: Gait belt  Transfer Level of Assistance 1: Hand held assistance, Minimum assistance, +2, Maximum verbal cues  Transfers 2  Transfer From 2: Stand to, Commode-standard to  Transfer to 2: Commode-standard, Stand  Technique 2: Stand to sit, Sit to stand  Transfer Device 2: Gait belt  Transfer Level of Assistance 2: Hand held assistance, Minimum assistance, Moderate verbal cues, +2  Transfers 3  Transfer From 3: Stand to  Transfer to 3:  Bed  Technique 3: Stand to sit  Transfer Device 3: Gait belt  Transfer Level of Assistance 3: Hand held assistance, Minimum assistance, Moderate verbal cues, +2    Ambulation/Gait Training  Ambulation/Gait Training Performed: Yes  Ambulation/Gait Training 1  Surface 1: Level tile  Device 1: No device  Gait Support Devices: Gait belt  Assistance 1: Hand held assistance, Minimum assistance, Maximum verbal cues (x2)  Quality of Gait 1: Wide base of support, Diminished heel strike, Shuffling gait, Listing (decreased andres, evidence of instability)  Comments/Distance (ft) 1: ~3 ft x2 trials bed [place] [facility]     Extremity/Trunk Assessments:        RLE   RLE : Within Functional Limits  LLE   LLE : Within Functional Limits    Outcome Measures:  [facility] Basic Mobility  Turning from your back to your side while in a flat bed without using bedrails: A little  Moving from lying on your back to sitting on the side of a flat bed without using bedrails: A little  Moving to and from bed to chair (including a wheelchair): A little  Standing up from a chair using your arms (e.g. wheelchair or bedside chair): A little  To walk in hospital room: A lot  Climbing 3-5 steps with railing: Total  Basic Mobility - Total Score: 15     Goals:  Encounter Problems       Encounter Problems (Active)       Balance       Pt will demonstrate static/dynamic standing balance for >/=3 min CGA without evidence of instability or LOB with functional mobility tasks.         Start:  05/30/24    Expected End:  06/13/24               Mobility       Pt will complete supine, seated and standing exercises to maintain overall strength with minimal verbal cues.         Start:  05/30/24    Expected End:  06/13/24            Pt will be able to ambulate >/= 25 ft with least restrictive device CGA with good safety awareness.        Start:  05/30/24    Expected End:  06/13/24               PT Transfers       Pt will be able to complete all bed mobility tasks with use of bed  HR mod I.       Start:  05/30/24    Expected End:  06/13/24            Pt will be able to complete all transfers with least restrictive device CGA demonstrating good safety awareness and proper body mechanics.        Start:  05/30/24    Expected End:  06/13/24                 Education Documentation  Body Mechanics, taught by Fernanda Varma, PT at 5/30/2024 11:12 AM.  Learner: Patient  Readiness: Acceptance  Method: Explanation  Response: Verbalizes Understanding, Needs Reinforcement    Mobility Training, taught by Fernanda Varma PT at 5/30/2024 11:12 AM.  Learner: Patient  Readiness: Acceptance  Method: Explanation  Response: Verbalizes Understanding, Needs Reinforcement    Education Comments  No comments found.

## 2024-05-30 NOTE — PROGRESS NOTES
Occupational Therapy    Occupational Therapy    Evaluation    Patient Name: Jose Guardado  MRN: [id number]  Today's Date: 5/30/2024  Time Calculation  Start Time: 1025  Stop Time: 1041  Time Calculation (min): 16 min  3110/3110-A    Assessment  IP OT Assessment  OT Assessment: Pt confused, but can follow one step, simple directions. Pt requies increased assist with ADL's and mobility. Recommend SNF to increase safety and independence with ADL's  Prognosis: Fair  End of Session Communication: Bedside nurse  End of Session Patient Position: Bed, 3 rail up, Alarm on (call light within reach)    Plan:  Treatment Interventions: ADL retraining, Functional transfer training, UE strengthening/ROM, Endurance training, Neuromuscular reeducation  OT Frequency: 3 times per week  OT Discharge Recommendations: Moderate intensity level of continued care (SNF)  OT Recommended Transfer Status: Minimal assist, Assist of 2  OT - OK to Discharge: Yes (to next level of care)    Subjective     Current Problem:  1. Altered mental status, unspecified altered mental status type            General:  General  Reason for Referral: ADL's; Safety Assessment  Referred By: Dr. Mccauley  Past Medical History Relevant to Rehab: HTN, hypothyroidism, dementia,  insulin-dependent type 2 diabetes, seizure disorder, moderate late onset Alzheimer's dementia with behavioral disturbances, left hemispheric stroke, expressive aphasia  Family/Caregiver Present: No  Co-Treatment: PT  Co-Treatment Reason: To maximize pt safety with functional mobility.  Prior to Session Communication: Bedside nurse  Patient Position Received: Bed, 3 rail up, Alarm on  Preferred Learning Style: verbal  General Comment: Pt pleasant and agreeable to work with therapy. Pt confused throughout session and has baseline dementia.    Per EMR:   History Of Present Illness  Jose Guardado is a 83 y.o. male with a past medical history of Hypertension, insulin-dependent type 2 diabetes,  seizure disorder, moderate late onset Alzheimer's dementia with behavioral disturbances, left hemispheric stroke, expressive aphasia which presented to the hospital from memory care unit for altered mentation.  Upon my encounter, patient is ANO x 1 and just says his name whenever he is asked any question.  Wife and daughter at bedside, able to answer questions for the patient.  Patient is reportedly ANO x 2-3 at baseline with mild confusion for which he recently started residing at [facility] memory care unit.  Earlier today, staff started noticing the patient was more altered compared to baseline, he was noted to be very confused and ANO x 1, and he was noted to be walking into the wall.  He was brought to the ER for further evaluation.  No reports of recent coughing, bowel movement changes, appetite changes, nausea, vomiting, fevers, chills.     Of note, patient has history of dementia with behavioral disturbances and mild aphasia s/p hemispheric stroke which did somewhat improve and started to have cognitive decline in February 2024.  He had been on Depakote for some point due to agitated behavior, admitted to inpatient psych unit and late April for some time.  Patient was hospitalized at [place] earlier in May after having lethargy, gait instability, and fall.  Depakote was stopped in the setting of falls and thrombocytopenia, was uptitrated on risperidone regimen to 2 mg p.o. daily per recent geriatric psychiatry appointment (Dr. Harmony Phillips).  He recently was moved to memory care unit this week and was initially doing well until yesterday.     On arrival to the ED, vital signs were within normal limits.  CBC shows hemoglobin 11.6 (similar to baseline), no leukocytosis, CMP unremarkable.  Magnesium decreased at 1.56.  Troponin negative x 2.  Urinalysis unremarkable.  Chest x-ray, CT head/C-spine, CT abdomen/pelvis all unremarkable for acute pathology.  EKG showed normal sinus rhythm.  Patient received IV  magnesium and was admitted for altered mental status that is worse than baseline.     Past Medical History  Hypertension, insulin-dependent type 2 diabetes, seizure disorder, dementia with behavioral disturbances, left hemispheric stroke, expressive aphasia    Precautions:  Medical Precautions: Fall precautions  Precautions Comment: bed/chair alarm    Pain:  Pain Assessment  Pain Assessment: 0-10  Pain Score: 0 - No pain    Objective     Cognition:  Overall Cognitive Status: Impaired at baseline (dementia; pt is a poor historian; pt yelling out from bed, and not using call light)  Orientation Level: Disoriented to place, Disoriented to time (Pt oriented to name and  with verbal cues for .)  Insight:  (Pt with poor insight into deficits)  Impulsive:  (Pt with decreased safety)     Home Living:  Home Living Comments: Pt poor historian and has dementia at baseline. Per EMR pt admitted from [facility]. Per EMR 3/19/24 pt Independent with mobility and ADLs.     Prior Function:  Prior Function Comments: Pt appears to require assist with all ADL's and mobility    IADL History:  IADL Comments: Pt requires assist with all IADL's    ADL:  Eating Assistance: Moderate  Grooming Assistance: Moderate  Bathing Assistance: Maximal  UE Dressing Assistance: Moderate  LE Dressing Assistance: Maximal  Toileting Assistance with Device: Maximal    Activity Tolerance:  Endurance: Tolerates 10 - 20 min exercise with multiple rests    Bed Mobility/Transfers:   Bed Mobility  Bed Mobility: Yes  Bed Mobility 1  Bed Mobility 1: Supine to sitting, Sitting to supine  Level of Assistance 1: Minimum assistance, Moderate verbal cues, Moderate tactile cues  Bed Mobility Comments 1: HOB elevated and use of bed HR. Pt required assist with trunk and LEs. Verbal and tactile cues used to assist pt with hand placement.  Transfers  Transfer: Yes  Transfer 1  Transfer From 1: Bed to  Transfer to 1: Stand  Technique 1: Sit to stand  Transfer  Device 1: Gait belt  Transfer Level of Assistance 1: Hand held assistance, Minimum assistance, +2, Maximum verbal cues  Transfers 2  Transfer From 2: Stand to, Commode-standard to  Transfer to 2: Commode-standard, Stand  Technique 2: Stand to sit, Sit to stand  Transfer Device 2: Gait belt  Transfer Level of Assistance 2: Hand held assistance, Minimum assistance, Moderate verbal cues, +2  Transfers 3  Transfer From 3: Stand to  Transfer to 3: Bed  Technique 3: Stand to sit  Transfer Device 3: Gait belt  Transfer Level of Assistance 3: Hand held assistance, Minimum assistance, Moderate verbal cues, +2    Sitting Balance:  Static Sitting Balance  Static Sitting-Comment/Number of Minutes: fair  Dynamic Sitting Balance  Dynamic Sitting-Comments: fair-    Standing Balance:  Static Standing Balance  Static Standing-Comment/Number of Minutes: fair-  Dynamic Standing Balance  Dynamic Standing-Comments: poor    Sensation:  Sensation Comment: Pt reported numbness in feet; however, able to feel light touch.    Strength:  Strength Comments: B UE's WFL    Extremities: RUE   RUE : Within Functional Limits and LUE   LUE: Within Functional Limits    Outcome Measures: [facility] Daily Activity  Putting on and taking off regular lower body clothing: A lot  Bathing (including washing, rinsing, drying): A lot  Putting on and taking off regular upper body clothing: A lot  Toileting, which includes using toilet, bedpan or urinal: A lot  Taking care of personal grooming such as brushing teeth: A little  Eating Meals: A little  Daily Activity - Total Score: 14       EDUCATION:  Education  Individual(s) Educated: Patient  Education Provided:  (safety)  Education Comment: Pt was not able to retain education  Education Documentation  Body Mechanics, taught by Alivia Conner OT at 5/30/2024  2:35 PM.  Learner: Patient  Readiness: Acceptance  Method: Explanation  Response: Verbalizes Understanding    Precautions, taught by Alivia Conner OT at  5/30/2024  2:35 PM.  Learner: Patient  Readiness: Acceptance  Method: Explanation  Response: Verbalizes Understanding    Education Comments  No comments found.        Goals:   Encounter Problems       Encounter Problems (Active)       OT Goals       OT Goal 1 (Progressing)       Start:  05/30/24    Expected End:  06/13/24       Pt will complete all bed mobility with SBA safely            OT Goal 2 (Progressing)       Start:  05/30/24    Expected End:  06/13/24       Pt with complete all transfers safely with CGA           OT Goal 3 (Progressing)       Start:  05/30/24    Expected End:  06/13/24       Pt will complete ADL's and mobility with good sit balance and fair+ stand balance            OT Goal 4 (Progressing)       Start:  05/30/24    Expected End:  06/13/24       Pt will complete UB dressing ADL's with SBA and min verbal cues           OT Goal 5 (Progressing)       Start:  05/30/24    Expected End:  06/13/24       Pt with complete grooming ADL's with supervision after setup                 Treatment: Pt was able to complete bed mobility with Min A. Pt with fair+ sit balance on EOB. Pt completed all transfers with Min A. Pt transferred to BSC with Min arm in arm assist of 2. Pt stood from BSC with Min A and transferred back to EOB. Pt remained in bed with bed alarm on and call light within reach.

## 2024-05-30 NOTE — PROGRESS NOTES
Per MD, patient and family to meet with palliative today. TCC to follow for d/c plans    1230: Per palliative patient will return to [facility] assisted living memory care unit with [facility].     1400: Attempt to call [facility] to verify they are aware patient will be d/c back today. Left on hold and then disconnected. Bedside nurse will attempt to call report as well.

## 2024-05-30 NOTE — PROGRESS NOTES
Jose Guardado is a 83 y.o. male on day 0 of admission presenting with Altered mental status.      Subjective   Patient seen and examined at bedside's morning.  ANO x 2-3 with mild confusion.  Palliative care discussion with family.-       Objective     Last Recorded Vitals  /73   Pulse 65   Temp 36.1 °C (97 °F) (Temporal)   Resp 20   Wt 69.9 kg (154 lb 1.6 oz)   SpO2 95%   Intake/Output last 3 Shifts:    Intake/Output Summary (Last 24 hours) at 5/30/2024 0930  Last data filed at 5/30/2024 0600  Gross per 24 hour   Intake 1050 ml   Output 600 ml   Net 450 ml       Admission Weight  Weight: 68 kg (150 lb) (05/29/24 1153)    Daily Weight  05/29/24 : 69.9 kg (154 lb 1.6 oz)    Image Results  CT abdomen pelvis w IV contrast  Narrative: Interpreted By:  Feliz Edward,   STUDY:  CT ABDOMEN PELVIS W IV CONTRAST; 5/29/2024 3:22 pm      INDICATION:  Signs/Symptoms:ams, abdominal pain LLQ      COMPARISON:  05/17/2024.      ACCESSION NUMBER(S):  [id number]      ORDERING CLINICIAN:  SHRADDHA COLEMAN      TECHNIQUE:  Following intravenous administration of nonionic contrast, spiral  axial images were obtained from the dome of the diaphragm through the  symphysis pubis. Oral contrast was not administered.  Soft tissue and lung windows were evaluated.  Sagittal and Coronal reformatted images were also assessed.      All CT examinations are performed with one or more of the following  dose reduction techniques: Automated Exposure Control, adjustment of  mA and/or kV according to patient size, or use of iterative  reconstruction techniques.      FINDINGS:  There are limitations due to patient's motion and due to artifacts  from patient's arms. Lung bases: Interstitial prominence and  bibasilar atelectatic changes are noted. Liver: Normal in size  without focal lesions. Gallbladder: Grossly unremarkable.  Spleen: Normal in size without focal lesions.  Pancreas: Atrophic pancreas without focal lesions.  Adrenal glands: No  focal lesions.  Kidneys: Both kidneys excrete contrast symmetrically, without  hydronephrosis or solid enhancing masses. Evaluation for renal  calculi is limited due to presence of IV contrast.          The stomach, small and large bowel are unremarkable.  The appendix is normal.  The bladder is well distended without wall thickening.  Atherosclerotic calcifications involve the tortuous aorta without  focal aneurysm. The SMA, SMV and portal vein are patent.  Pelvic reproductive organs: Mildly prominent prostate gland,  projecting in the bladder floor. Marked degenerative changes at  multiple levels disc disease involve the spine. Hemangioma is again  present involving the L1 vertebral body.      Impression: Limited study.  No acute process in the abdomen and pelvis.  Stable multiple chronic findings as detailed above..      Signed by: Feliz Edward 5/29/2024 3:47 PM  Dictation workstation:   [id number]  CT cervical spine wo IV contrast  Narrative: Interpreted By:  Feliz Edward,   STUDY:  CT CERVICAL SPINE WO IV CONTRAST; 5/29/2024 3:22 pm      INDICATION:  Signs/Symptoms:ams ?fall      COMPARISON:  05/17/2020.      ACCESSION NUMBER(S):  [id number]      ORDERING CLINICIAN:  SHRADDHA COLEMAN      TECHNIQUE:  Axial unenhanced images were obtained through the cervical spine.  Sagittal and coronal post processing reconstruction images were  obtained.      All CT examinations are performed with one or more of the following  dose reduction techniques: Automated Exposure Control, adjustment of  mA and/or kV according to patient size, or use of iterative  reconstruction techniques.      FINDINGS:  Images from the skull base redemonstrate postsurgical changes of  right mastoid cells in the right middle ear cavity. No fracture is  seen. The vertebral body heights are maintained.  The vertebral alignment is anatomic; this is confirmed on the  sagittal reconstruction images. Degenerative changes involve the  articulation  between the odontoid process and anterior arch of the C1  vertebra. Erosive changes remain stable in the odontoid process.  There is narrowing of the C2-C3, C3-C4 and mostly the C4-C5, C5-C6,  C6-C7 and C7-T1 discs with marginal osteophytes. There are also  hypertrophic changes of facet joints bilaterally with resultant  bilateral foraminal stenosis. Images from the thoracic inlet are  unremarkable.      Impression: Cervical spondylosis again present without acute fracture or facet  subluxation.      Signed by: Feliz Edward 5/29/2024 3:39 PM  Dictation workstation:   [id number]  CT head wo IV contrast  Narrative: Interpreted By:  Feliz Edward,   STUDY:  CT HEAD WO IV CONTRAST; 5/29/2024 3:22 pm      INDICATION:  Signs/Symptoms:ams      COMPARISON:  05/17/2020      ACCESSION NUMBER(S):  [id number]      ORDERING CLINICIAN:  SHARDDHA COLEMAN      TECHNIQUE:  Axial images were obtained through the brain. No IV contrast was  administered.      All CT examinations are performed with one or more of the following  dose reduction techniques: Automated Exposure Control, adjustment of  mA and/or kV according to patient size, or use of iterative  reconstruction techniques.      FINDINGS:  The ventricles are enlarged but midline in position, with  proportional prominence of the sulci, due to atrophy. Patchy  periventricular hypodensities are present suggestive of small vessel  ischemic white matter disease. Remote left parietal infarct is again  present. There is no intracranial hemorrhage.  No mass or mass effect is seen.  The osseus structures are unchanged.  Postsurgical changes are again noted involving right mastoid cells in  the right middle ear cavity.      Impression: Redemonstration of age related changes without acute intracranial  process. Postsurgical changes again present involving right mastoid  cells in the right middle ear cavity.      Signed by: Feliz Edward 5/29/2024 3:35 PM  Dictation workstation:    [id number]  XR chest 1 view  Narrative: Interpreted By:  Schoenberger, Joseph,   STUDY:  XR CHEST 1 VIEW;  5/29/2024 12:54 pm      INDICATION:  Signs/Symptoms:ams.      COMPARISON:  05/17/2024      ACCESSION NUMBER(S):  [id number]      ORDERING CLINICIAN:  SHRADDHA COLEMAN      FINDINGS:                  CARDIOMEDIASTINAL SILHOUETTE:  Cardiomediastinal silhouette is normal in size and configuration.      LUNGS:  Considerable calcific pleural disease both hemithoraces unchanged  from prior. No new focal lung opacities are identified.      ABDOMEN:  No remarkable upper abdominal findings.      BONES:  No acute osseous changes.      Impression: 1.  Stable chest. See discussion above.              MACRO:  None      Signed by: Joseph Schoenberger 5/29/2024 1:00 PM  Dictation workstation:   [id number]      Physical Exam    Constitutional: Generally confused, frail appearing  CV: Regular rate and rhythm, no murmurs or gallops appreciated, distal pulses intact   Pulmonary: Breath sounds clear to auscultation bilaterally, no rales, rhonchi, wheezing  GI: Abdomen soft, nondistended, nontender to palpation.  No guarding or rebound tenderness noted.  MSK: Range of motion intact in all 4 extremities  Extremities: No edema noted  Neuro: ANO x2, no focal neurologic deficits  Psych: Generally confused  Skin: Warm and dry, no erythema skin lesions noted.     Relevant Results    Results for orders placed or performed during the hospital encounter of 05/29/24 (from the past 24 hour(s))   POCT GLUCOSE   Result Value Ref Range    POCT Glucose 197 (H) 74 - 99 mg/dL   CBC and Auto Differential   Result Value Ref Range    WBC 7.4 4.4 - 11.3 x10*3/uL    nRBC 0.0 0.0 - 0.0 /100 WBCs    RBC 3.98 (L) 4.50 - 5.90 x10*6/uL    Hemoglobin 11.6 (L) 13.5 - 17.5 g/dL    Hematocrit 36.4 (L) 41.0 - 52.0 %    MCV 92 80 - 100 fL    MCH 29.1 26.0 - 34.0 pg    MCHC 31.9 (L) 32.0 - 36.0 g/dL    RDW 15.5 (H) 11.5 - 14.5 %    Platelets 246 150 - 450  x10*3/uL    Neutrophils % 75.0 40.0 - 80.0 %    Immature Granulocytes %, Automated 0.4 0.0 - 0.9 %    Lymphocytes % 15.2 13.0 - 44.0 %    Monocytes % 8.0 2.0 - 10.0 %    Eosinophils % 1.1 0.0 - 6.0 %    Basophils % 0.3 0.0 - 2.0 %    Neutrophils Absolute 5.53 (H) 1.60 - 5.50 x10*3/uL    Immature Granulocytes Absolute, Automated 0.03 0.00 - 0.50 x10*3/uL    Lymphocytes Absolute 1.12 0.80 - 3.00 x10*3/uL    Monocytes Absolute 0.59 0.05 - 0.80 x10*3/uL    Eosinophils Absolute 0.08 0.00 - 0.40 x10*3/uL    Basophils Absolute 0.02 0.00 - 0.10 x10*3/uL   Comprehensive Metabolic Panel   Result Value Ref Range    Glucose 208 (H) 74 - 99 mg/dL    Sodium 138 136 - 145 mmol/L    Potassium 4.6 3.5 - 5.3 mmol/L    Chloride 101 98 - 107 mmol/L    Bicarbonate 29 21 - 32 mmol/L    Anion Gap 13 10 - 20 mmol/L    Urea Nitrogen 13 6 - 23 mg/dL    Creatinine 0.94 0.50 - 1.30 mg/dL    eGFR 80 >60 mL/min/1.73m*2    Calcium 8.9 8.6 - 10.3 mg/dL    Albumin 3.8 3.4 - 5.0 g/dL    Alkaline Phosphatase 51 33 - 136 U/L    Total Protein 6.4 6.4 - 8.2 g/dL    AST 26 9 - 39 U/L    Bilirubin, Total 0.8 0.0 - 1.2 mg/dL    ALT 16 10 - 52 U/L   Magnesium   Result Value Ref Range    Magnesium 1.56 (L) 1.60 - 2.40 mg/dL   Troponin I, High Sensitivity, Initial   Result Value Ref Range    Troponin I, High Sensitivity 11 0 - 20 ng/L   Vitamin B12   Result Value Ref Range    Vitamin B12 434 211 - 911 pg/mL   Folate   Result Value Ref Range    Folate, Serum 16.3 >5.0 ng/mL   Troponin, High Sensitivity, 1 Hour   Result Value Ref Range    Troponin I, High Sensitivity 12 0 - 20 ng/L   TSH with reflex to Free T4 if abnormal   Result Value Ref Range    Thyroid Stimulating Hormone 1.09 0.44 - 3.98 mIU/L   Urinalysis with Reflex Culture and Microscopic   Result Value Ref Range    Color, Urine Light-Yellow Light-Yellow, Yellow, Dark-Yellow    Appearance, Urine Clear Clear    Specific Gravity, Urine 1.025 1.005 - 1.035    pH, Urine 6.5 5.0, 5.5, 6.0, 6.5, 7.0, 7.5,  8.0    Protein, Urine NEGATIVE NEGATIVE, 10 (TRACE), 20 (TRACE) mg/dL    Glucose, Urine 100 (1+) (A) Normal mg/dL    Blood, Urine 0.06 (1+) (A) NEGATIVE    Ketones, Urine 20 (1+) (A) NEGATIVE mg/dL    Bilirubin, Urine NEGATIVE NEGATIVE    Urobilinogen, Urine Normal Normal mg/dL    Nitrite, Urine NEGATIVE NEGATIVE    Leukocyte Esterase, Urine NEGATIVE NEGATIVE   Extra Urine Gray Tube   Result Value Ref Range    Extra Tube Hold for add-ons.    Urinalysis Microscopic   Result Value Ref Range    WBC, Urine 1-5 1-5, NONE /HPF    RBC, Urine 11-20 (A) NONE, 1-2, 3-5 /HPF    Mucus, Urine FEW Reference range not established. /LPF   POCT GLUCOSE   Result Value Ref Range    POCT Glucose 169 (H) 74 - 99 mg/dL   CBC   Result Value Ref Range    WBC 5.7 4.4 - 11.3 x10*3/uL    nRBC 0.0 0.0 - 0.0 /100 WBCs    RBC 3.23 (L) 4.50 - 5.90 x10*6/uL    Hemoglobin 9.8 (L) 13.5 - 17.5 g/dL    Hematocrit 29.5 (L) 41.0 - 52.0 %    MCV 91 80 - 100 fL    MCH 30.3 26.0 - 34.0 pg    MCHC 33.2 32.0 - 36.0 g/dL    RDW 15.4 (H) 11.5 - 14.5 %    Platelets 197 150 - 450 x10*3/uL   Basic metabolic panel   Result Value Ref Range    Glucose 150 (H) 74 - 99 mg/dL    Sodium 138 136 - 145 mmol/L    Potassium 3.2 (L) 3.5 - 5.3 mmol/L    Chloride 104 98 - 107 mmol/L    Bicarbonate 27 21 - 32 mmol/L    Anion Gap 10 10 - 20 mmol/L    Urea Nitrogen 9 6 - 23 mg/dL    Creatinine 0.70 0.50 - 1.30 mg/dL    eGFR >90 >60 mL/min/1.73m*2    Calcium 7.6 (L) 8.6 - 10.3 mg/dL   Magnesium   Result Value Ref Range    Magnesium 1.74 1.60 - 2.40 mg/dL   POCT GLUCOSE   Result Value Ref Range    POCT Glucose 160 (H) 74 - 99 mg/dL      CT abdomen pelvis w IV contrast    Result Date: 5/29/2024  Interpreted By:  Feliz Edward, STUDY: CT ABDOMEN PELVIS W IV CONTRAST; 5/29/2024 3:22 pm   INDICATION: Signs/Symptoms:ams, abdominal pain LLQ   COMPARISON: 05/17/2024.   ACCESSION NUMBER(S): [id number]   ORDERING CLINICIAN: SHRADDHA COLEMAN   TECHNIQUE: Following intravenous administration  of nonionic contrast, spiral axial images were obtained from the dome of the diaphragm through the symphysis pubis. Oral contrast was not administered. Soft tissue and lung windows were evaluated. Sagittal and Coronal reformatted images were also assessed.   All CT examinations are performed with one or more of the following dose reduction techniques: Automated Exposure Control, adjustment of mA and/or kV according to patient size, or use of iterative reconstruction techniques.   FINDINGS: There are limitations due to patient's motion and due to artifacts from patient's arms. Lung bases: Interstitial prominence and bibasilar atelectatic changes are noted. Liver: Normal in size without focal lesions. Gallbladder: Grossly unremarkable. Spleen: Normal in size without focal lesions. Pancreas: Atrophic pancreas without focal lesions. Adrenal glands: No focal lesions. Kidneys: Both kidneys excrete contrast symmetrically, without hydronephrosis or solid enhancing masses. Evaluation for renal calculi is limited due to presence of IV contrast.     The stomach, small and large bowel are unremarkable. The appendix is normal. The bladder is well distended without wall thickening. Atherosclerotic calcifications involve the tortuous aorta without focal aneurysm. The SMA, SMV and portal vein are patent. Pelvic reproductive organs: Mildly prominent prostate gland, projecting in the bladder floor. Marked degenerative changes at multiple levels disc disease involve the spine. Hemangioma is again present involving the L1 vertebral body.       Limited study. No acute process in the abdomen and pelvis. Stable multiple chronic findings as detailed above..   Signed by: Feliz Edward 5/29/2024 3:47 PM Dictation workstation:   [id number]    CT cervical spine wo IV contrast    Result Date: 5/29/2024  Interpreted By:  Feliz Edward, STUDY: CT CERVICAL SPINE WO IV CONTRAST; 5/29/2024 3:22 pm   INDICATION: Signs/Symptoms:ams ?fall    COMPARISON: 05/17/2020.   ACCESSION NUMBER(S): [id number]   ORDERING CLINICIAN: SHRADDHA COLEMAN   TECHNIQUE: Axial unenhanced images were obtained through the cervical spine. Sagittal and coronal post processing reconstruction images were obtained.   All CT examinations are performed with one or more of the following dose reduction techniques: Automated Exposure Control, adjustment of mA and/or kV according to patient size, or use of iterative reconstruction techniques.   FINDINGS: Images from the skull base redemonstrate postsurgical changes of right mastoid cells in the right middle ear cavity. No fracture is seen. The vertebral body heights are maintained. The vertebral alignment is anatomic; this is confirmed on the sagittal reconstruction images. Degenerative changes involve the articulation between the odontoid process and anterior arch of the C1 vertebra. Erosive changes remain stable in the odontoid process. There is narrowing of the C2-C3, C3-C4 and mostly the C4-C5, C5-C6, C6-C7 and C7-T1 discs with marginal osteophytes. There are also hypertrophic changes of facet joints bilaterally with resultant bilateral foraminal stenosis. Images from the thoracic inlet are unremarkable.       Cervical spondylosis again present without acute fracture or facet subluxation.   Signed by: Feliz Edward 5/29/2024 3:39 PM Dictation workstation:   [id number]    CT head wo IV contrast    Result Date: 5/29/2024  Interpreted By:  Feliz Edward, STUDY: CT HEAD WO IV CONTRAST; 5/29/2024 3:22 pm   INDICATION: Signs/Symptoms:ams   COMPARISON: 05/17/2020   ACCESSION NUMBER(S): [id number]   ORDERING CLINICIAN: SHRADDHA COLEMAN   TECHNIQUE: Axial images were obtained through the brain. No IV contrast was administered.   All CT examinations are performed with one or more of the following dose reduction techniques: Automated Exposure Control, adjustment of mA and/or kV according to patient size, or use of iterative reconstruction  techniques.   FINDINGS: The ventricles are enlarged but midline in position, with proportional prominence of the sulci, due to atrophy. Patchy periventricular hypodensities are present suggestive of small vessel ischemic white matter disease. Remote left parietal infarct is again present. There is no intracranial hemorrhage. No mass or mass effect is seen. The osseus structures are unchanged. Postsurgical changes are again noted involving right mastoid cells in the right middle ear cavity.       Redemonstration of age related changes without acute intracranial process. Postsurgical changes again present involving right mastoid cells in the right middle ear cavity.   Signed by: Feliz Edward 5/29/2024 3:35 PM Dictation workstation:   [id number]    XR chest 1 view    Result Date: 5/29/2024  Interpreted By:  Schoenberger, Joseph, STUDY: XR CHEST 1 VIEW;  5/29/2024 12:54 pm   INDICATION: Signs/Symptoms:ams.   COMPARISON: 05/17/2024   ACCESSION NUMBER(S): [id number]   ORDERING CLINICIAN: SHRADDHA COLEMAN   FINDINGS:         CARDIOMEDIASTINAL SILHOUETTE: Cardiomediastinal silhouette is normal in size and configuration.   LUNGS: Considerable calcific pleural disease both hemithoraces unchanged from prior. No new focal lung opacities are identified.   ABDOMEN: No remarkable upper abdominal findings.   BONES: No acute osseous changes.       1.  Stable chest. See discussion above.       MACRO: None   Signed by: Joseph Schoenberger 5/29/2024 1:00 PM Dictation workstation:   [id number]     Assessment/Plan      83-year-old male with a history of hypertension, insulin-dependent type 2 diabetes, seizure disorder, dementia with behavioral disturbances, left hemispheric stroke, expressive aphasia who presented to the hospital with acutely worsened mentation.  Suspect likely in the setting of worsening dementia and cognitive decline.  Received 1 L of IVF, lab work and urinalysis otherwise unremarkable.  Patient's mentation  slightly improved this morning, suspect etiology likely due to progressively worsening dementia.  Palliative care consulted with goals of care conversation to be completed today regarding disposition of either return to memory care unit versus hospice.  Otherwise stable for discharge.     Moderate late onset Alzheimer's dementia with agitation  Worsening mentation likely 2/2 above  -Lab work and urinalysis unremarkable, TSH, B12, folate normal.  Suspect etiology is likely worsening dementia.  -Continue home risperidone, brivaraceam  -Palliative care consulted.  Patient to return to memory care unit versus considering hospice. Stable for discharge later today.     Seizure disorder  -Continue home brivaracetam    History of CVA with expressive aphasia  -Continue home aspirin, Plavix    Insulin-dependent type 2 diabetes  -Mild insulin sliding scale in place  -ACHS glucose checks    Hypertension  -Continue home losartan     Diet: Diabetic, mechanical soft   DVT prophylaxis: Lovenox   Consults: None   CODE STATUS: DNR DNI      Disposition: Likely discharge later today pending palliative care conversation.  Will be going back to memory care unit versus consideration for hospice.     To be discussed with attending,     Palma Major DO  Internal Medicine, PGY-3

## 2024-05-30 NOTE — NURSING NOTE
Patient discharged to [facility] via ambulance. Patients medication that was in pharmacy was given to transport drivers to be returned to [facility] staff.

## 2024-05-30 NOTE — CONSULTS
Reason For Consult  Goals of care, will need to determine if pt will return to [facility] care or hospice.    History Of Present Illness  Jose Guardado is a 83 y.o. male presenting with AMS. Pt is from [facility].  He had a recent fall and family feels he has been declining.      Past Medical History  He has a past medical history of Anxiety, Dizziness and giddiness (12/21/2021), Other chest pain (12/21/2021), Other chest pain (12/21/2021), Personal history of other diseases of male genital organs (11/30/2021), Personal history of other specified conditions (05/14/2018), Seizures (Multi), and Stroke (Multi).    Surgical History  He has a past surgical history that includes Other surgical history (05/14/2018); Tonsillectomy (05/14/2018); Other surgical history (11/30/2021); Other surgical history (11/30/2021); Other surgical history (11/30/2021); Other surgical history (11/30/2021); Other surgical history (11/30/2021); MR angio head wo IV contrast (2/24/2021); MR angio neck wo IV contrast (2/24/2021); MR angio head wo IV contrast (4/24/2021); MR angio neck wo IV contrast (4/24/2021); MR angio head wo IV contrast (11/18/2022); MR angio neck wo IV contrast (11/18/2022); MR angio neck wo IV contrast (3/22/2023); and MR angio head wo IV contrast (3/22/2023).     Social History  He reports that he quit smoking about 40 years ago. His smoking use included cigarettes. He has never used smokeless tobacco. He reports that he does not currently use alcohol. He reports that he does not currently use drugs.    Family History  Family History   Problem Relation Name Age of Onset    No Known Problems Mother      Arthritis Father      Hypertension Father      Diabetes Brother      Other (Stroke syndrome) Brother          Allergies  Keppra [levetiracetam] and Celery    Review of Systems       Physical Exam       Last Recorded Vitals  Blood pressure 141/73, pulse 65, temperature 36.1 °C (97 °F), temperature source  "Temporal, resp. rate 20, height 1.702 m (5' 7\"), weight 69.9 kg (154 lb 1.6 oz), SpO2 95%.    Relevant Results       Assessment/Plan   Pt in bed and unable to carry on a meaningful conversation regarding palliative care services. Spoke with pts spouse and daughter via telephone. Palliative care services vs Hospice services discussed.  Family is not interested in hospice services but would like outpatient palliative care to follow at [facility] Plan is for pt to return to [facility] with [facility]. Referral placed in [facility]. Written information left at the bedside for [facility] services.  EMEKA Barrow notifed            Shanelle Lundberg RN    "

## 2024-05-30 NOTE — PROGRESS NOTES
"Nutrition Initial Assessment:   Nutrition Assessment    Reason for Assessment: Admission nursing screening (MST=2; unsure weight loss)    Patient is a 83 y.o. male presenting from memory care unit with altered mentation.     Past Medical History  Hypertension, insulin-dependent type 2 diabetes, seizure disorder, dementia with behavioral disturbances, left hemispheric stroke, expressive aphasia    Nutrition History:  Energy Intake:  (Pt remains NPO.)  Food and Nutrient History: Per chart, pt started to have cognitive decline this past February and was recently moved to the memory care unit at [facility] this week and was doing well up until the day before admit. Pt seen by RD during last hospitalization at OSH stay 4/24-5/02 where he was noted to have unintended weight loss from 3/19-4/24 due to psychological causes. Pt remains at 154 lb - the last weight documented in chart upon admit 4/24. Placed call to [facility] to obtain more recent eating history, but no one was available to take my call. Pt currently NPO and states he is hungry, but cannot really tell me any recent history either.  Vitamin/Herbal Supplement Use: None per chart.  Food Allergies/Intolerances:   Celery  GI Symptoms: None  Oral Problems: None and pt wears dentures   Pain Score: 0 - No pain  PAINAD Score:  [0]       Anthropometrics:  Height: 170.2 cm (5' 7\")   Weight: 69.9 kg (154 lb 1.6 oz)   BMI (Calculated): 24.13  IBW/kg (Dietitian Calculated): 67.13 kg  Percent of IBW: 104.12 %       Weight History:   Wt Readings from Last 10 Encounters:   05/29/24 69.9 kg (154 lb 1.6 oz)   05/24/24 68 kg (150 lb)   05/17/24 67.9 kg (149 lb 12.8 oz)   05/07/24 69.6 kg (153 lb 6.4 oz)   05/01/24 70.5 kg (155 lb 6.8 oz)   04/23/24 74.8 kg (165 lb)   04/23/24 72.6 kg (160 lb)   04/04/24 73.9 kg (163 lb)   03/19/24 74.7 kg (164 lb 10.9 oz)   02/02/24 73.4 kg (161 lb 13.1 oz)      Weight Change %:  Weight History / % Weight Change: Based on Dietitian's " last note from OSH, pt weighs the same amount this admit which is one month later.  Significant Weight Loss: No    Nutrition Focused Physical Exam Findings:    Subcutaneous Fat Loss:   Orbital Fat Pads: Well nourished (slightly bulging fat pads)  Buccal Fat Pads: Well nourished (full, rounded cheeks)  Triceps: Mild-Moderate (less than ample fat tissue)  Ribs: Defer  Muscle Wasting:  Temporalis: Mild-Moderate (slight depression)  Pectoralis (Clavicular Region): Well nourished (clavicle not visible)  Deltoid/Trapezius: Well nourished (rounded appearance at arm, shoulder, neck)  Interosseous: Well nourished (muscle bulges)  Trapezius/Infraspinatus/Supraspinatus (Scapular Region): Defer  Quadriceps: Well nourished (well developed, well rounded)  Gastrocnemius: Well nourished (well developed bulbous muscle)  Edema:  Edema: +2 mild  Edema Location: bilateral lower extremities  Physical Findings:  Hair: Negative  Eyes: Negative (Pt squints eyes)  Mouth: Negative (no teeth)  Nails: Negative  Skin: Negative (pt has history of skin debridement 12/2023 and noted to have severe toenail deformities)    Nutrition Significant Labs:  CBC Trend:   Results from last 7 days   Lab Units 05/30/24  0544 05/29/24  1229   WBC AUTO x10*3/uL 5.7 7.4   RBC AUTO x10*6/uL 3.23* 3.98*   HEMOGLOBIN g/dL 9.8* 11.6*   HEMATOCRIT % 29.5* 36.4*   MCV fL 91 92   PLATELETS AUTO x10*3/uL 197 246    , BMP Trend:   Results from last 7 days   Lab Units 05/30/24  0544 05/29/24  1229   GLUCOSE mg/dL 150* 208*   CALCIUM mg/dL 7.6* 8.9   SODIUM mmol/L 138 138   POTASSIUM mmol/L 3.2* 4.6   CO2 mmol/L 27 29   CHLORIDE mmol/L 104 101   BUN mg/dL 9 13   CREATININE mg/dL 0.70 0.94    , A1C:  Lab Results   Component Value Date    HGBA1C 7.9 (H) 03/18/2024   , BG POCT trend:   Results from last 7 days   Lab Units 05/30/24  1154 05/30/24  0848 05/29/24  2139 05/29/24  1156   POCT GLUCOSE mg/dL 160* 160* 169* 197*    , Vit D:   Lab Results   Component Value Date     "VITD25 54 06/07/2021    , Vit B12:   Lab Results   Component Value Date    OEOKUOGN77 434 05/29/2024    , Iron Panel: No results found for: \"IRON\", \"TIBC\", \"FERRITIN\" , Folate:   Lab Results   Component Value Date    FOLATE 16.3 05/29/2024    , CF Vitamin Labs:   Lab Results   Component Value Date    VITD25 54 06/07/2021        Nutrition Specific Medications:  Current Outpatient Medications   Medication Instructions    atorvastatin (LIPITOR) 80 mg, oral, Nightly    BD AutoShield Duo Pen Needle 30 gauge x 3/16\" needle     Briviact 50 mg, oral, 2 times daily    clopidogrel (PLAVIX) 75 mg, oral, Daily    finasteride (PROSCAR) 5 mg, oral, Nightly    FreeStyle Doug 2 Sensor kit Use as instructed    Lantus U-100 Insulin 12 Units, subcutaneous, Nightly, Take as directed per insulin instructions.    levothyroxine (SYNTHROID, LEVOXYL) 100 mcg, oral, Daily before breakfast    losartan (COZAAR) 25 mg, oral, Daily    melatonin 3 mg, oral, Nightly    metFORMIN (GLUCOPHAGE) 500 mg, oral, 2 times daily (morning and late afternoon)    metoprolol tartrate (LOPRESSOR) 25 mg, oral, 2 times daily    pantoprazole (PROTONIX) 40 mg, oral, Daily    pantoprazole (PROTONIX) 40 mg, oral, Daily before breakfast, Do not crush, chew, or split.    risperiDONE (RISPERDAL) 2 mg, oral, Daily with evening meal         I/O:    ;             Estimated Needs:   Total Energy Estimated Needs (kCal):  (2586-1439 kcal (25-30 kcal/kg))     Total Protein Estimated Needs (g):  (70-77g protein (1.0-1.1g/kg))     Total Fluid Estimated Needs (mL):  (1800-2100mL (1mL/kcal))           Nutrition Diagnosis   Malnutrition Diagnosis  Patient has Malnutrition Diagnosis: No    Nutrition Diagnosis  Patient has Nutrition Diagnosis: Yes  Diagnosis Status (1): New  Nutrition Diagnosis 1: Predicted inadequate nutrient intake  Related to (1): cognitive decline  As Evidenced by (1): change in mental status requiring hospital admit.       Nutrition Interventions/Recommendations "         Nutrition Prescription:  Individualized Nutrition Prescription Provided for : Oral Diet        Nutrition Interventions:   Interventions: Meals and snacks  Meals and Snacks: General healthful diet  Goal: Suggest liberalized diet to encourage intakes.  Additional Interventions: Consult SLP if any trouble chewing or swallowing suspected.    Goal: Case discussed with SELENA Morales    Nutrition Education:   Pt not appropriate - lives in memory care unit.       Nutrition Monitoring and Evaluation   Food/Nutrient Related History Monitoring  Monitoring and Evaluation Plan: Energy intake  Energy Intake: Estimated energy intake  Criteria: Pt to consume >75% of 1800 kcal and 70g protein daily to meet estimated energy needs.  Additional Plans: Offer oral nutrition supplement if oral intakes <75% of estimated nutrition needs.    Body Composition/Growth/Weight History  Monitoring and Evaluation Plan: Weight  Weight: Measured weight  Criteria: Maintain stable weight.    Biochemical Data, Medical Tests and Procedures  Monitoring and Evaluation Plan: Glucose/endocrine profile  Glucose/Endocrine Profile: Glucose, casual, Glucose, fasting, Hemoglobin A1c (HgbA1c)  Criteria: Glycemic goal 70-180mg/dL and A1C <7            Time Spent/Follow-up Reminder:   Time Spent (min): 45 minutes  Last Date of Nutrition Visit: 05/30/24  Nutrition Follow-Up Needed?: 3-8 days  Follow up Comment: LILI

## 2024-05-30 NOTE — CARE PLAN
The patient's goals for the shift include having a restful night.    The clinical goals for the shift include maintaining safety and hemodynamic stability.    Over the shift, the patient did not make progress toward the following goals. Barriers to progression include n/a. Recommendations to address these barriers include n/a.

## 2024-05-31 ENCOUNTER — PATIENT OUTREACH (OUTPATIENT)
Dept: PRIMARY CARE | Facility: CLINIC | Age: 84
End: 2024-05-31
Payer: MEDICARE

## 2024-05-31 ENCOUNTER — DOCUMENTATION (OUTPATIENT)
Dept: PRIMARY CARE | Facility: CLINIC | Age: 84
End: 2024-05-31
Payer: MEDICARE

## 2024-05-31 NOTE — PROGRESS NOTES
Discharge Facility:  [facility]    Discharge Diagnosis:  Altered mental status     Admission Date:5/29/24  Discharge Date: 5/30/24    PCP Appointment Date:Messaged clinical staff  7/11/2024 12:00 PM Reid Diana MD [id number] [place]     Specialist Appointment Date:   11/7/2024  3:30 PM June Simon MD [id number] [place]   1/7/2025  4:00 PM [facility] [id number] [place]   1/7/2025  4:20 PM Vinayak Christopher MD [id number] [facility] Encounter and Summary: Linked  Two attempts were made to reach patient within two business days after discharge. I left voicemail for pt's son, Jose. Patient was enrolled in TCM program in the past. I encouraged son to contact office or myself for follow up appt. I gave my contact information to return my call so we can go over discharge instructions and see if I can assist in anyway.

## 2024-06-03 LAB
ATRIAL RATE: 78 BPM
ATRIAL RATE: 82 BPM
P AXIS: 79 DEGREES
P AXIS: 79 DEGREES
P OFFSET: 205 MS
P OFFSET: 207 MS
P ONSET: 159 MS
P ONSET: 161 MS
PR INTERVAL: 134 MS
PR INTERVAL: 138 MS
Q ONSET: 228 MS
Q ONSET: 228 MS
QRS COUNT: 13 BEATS
QRS COUNT: 14 BEATS
QRS DURATION: 68 MS
QRS DURATION: 70 MS
QT INTERVAL: 350 MS
QT INTERVAL: 366 MS
QTC CALCULATION(BAZETT): 408 MS
QTC CALCULATION(BAZETT): 417 MS
QTC FREDERICIA: 388 MS
QTC FREDERICIA: 399 MS
R AXIS: -14 DEGREES
R AXIS: 17 DEGREES
T AXIS: 59 DEGREES
T AXIS: 61 DEGREES
T OFFSET: 403 MS
T OFFSET: 411 MS
VENTRICULAR RATE: 78 BPM
VENTRICULAR RATE: 82 BPM

## 2024-06-04 NOTE — TELEPHONE ENCOUNTER
Informed Sridevi at [facility] pharmacy that the medication was prescribed by Dr. Simon on 5/29/2024 and sent to [facility].     Can you please remove the Briviant from the refill request.

## 2024-06-05 ENCOUNTER — TELEPHONE (OUTPATIENT)
Dept: PRIMARY CARE | Facility: CLINIC | Age: 84
End: 2024-06-05
Payer: MEDICARE

## 2024-06-05 RX ORDER — BRIVARACETAM 50 MG/1
50 TABLET, FILM COATED ORAL 2 TIMES DAILY
Qty: 60 TABLET | Refills: 4 | OUTPATIENT
Start: 2024-06-05

## 2024-06-05 NOTE — TELEPHONE ENCOUNTER
Lisa would like to know if you received the fax, of orders and medications that was sent over for you to sign off on.     Also, Jose fell today and no new injuries and that he does take Plavix and constantly restless. She would like to know if you wanted to give him any medication for this restlessness. She stated he always feels like he has to something and  around.     [phone]

## 2024-06-06 ENCOUNTER — HOSPITAL ENCOUNTER (EMERGENCY)
Facility: HOSPITAL | Age: 84
Discharge: HOME | End: 2024-06-07
Attending: STUDENT IN AN ORGANIZED HEALTH CARE EDUCATION/TRAINING PROGRAM
Payer: MEDICARE

## 2024-06-06 ENCOUNTER — APPOINTMENT (OUTPATIENT)
Dept: CARDIOLOGY | Facility: HOSPITAL | Age: 84
End: 2024-06-06
Payer: MEDICARE

## 2024-06-06 ENCOUNTER — APPOINTMENT (OUTPATIENT)
Dept: RADIOLOGY | Facility: HOSPITAL | Age: 84
End: 2024-06-06
Payer: MEDICARE

## 2024-06-06 DIAGNOSIS — R07.9 ACUTE CHEST PAIN: Primary | ICD-10-CM

## 2024-06-06 LAB
ALBUMIN SERPL BCP-MCNC: 3.6 G/DL (ref 3.4–5)
ALP SERPL-CCNC: 55 U/L (ref 33–136)
ALT SERPL W P-5'-P-CCNC: 14 U/L (ref 10–52)
ANION GAP SERPL CALC-SCNC: 11 MMOL/L (ref 10–20)
AST SERPL W P-5'-P-CCNC: 31 U/L (ref 9–39)
BASOPHILS # BLD AUTO: 0.01 X10*3/UL (ref 0–0.1)
BASOPHILS NFR BLD AUTO: 0.3 %
BILIRUB SERPL-MCNC: 0.7 MG/DL (ref 0–1.2)
BUN SERPL-MCNC: 12 MG/DL (ref 6–23)
CALCIUM SERPL-MCNC: 8.7 MG/DL (ref 8.6–10.3)
CARDIAC TROPONIN I PNL SERPL HS: 9 NG/L (ref 0–20)
CHLORIDE SERPL-SCNC: 101 MMOL/L (ref 98–107)
CO2 SERPL-SCNC: 27 MMOL/L (ref 21–32)
CREAT SERPL-MCNC: 0.85 MG/DL (ref 0.5–1.3)
D DIMER PPP FEU-MCNC: 503 NG/ML FEU
EGFRCR SERPLBLD CKD-EPI 2021: 86 ML/MIN/1.73M*2
EOSINOPHIL # BLD AUTO: 0.14 X10*3/UL (ref 0–0.4)
EOSINOPHIL NFR BLD AUTO: 3.5 %
ERYTHROCYTE [DISTWIDTH] IN BLOOD BY AUTOMATED COUNT: 16.1 % (ref 11.5–14.5)
GLUCOSE SERPL-MCNC: 204 MG/DL (ref 74–99)
HCT VFR BLD AUTO: 36.3 % (ref 41–52)
HGB BLD-MCNC: 11.4 G/DL (ref 13.5–17.5)
IMM GRANULOCYTES # BLD AUTO: 0.01 X10*3/UL (ref 0–0.5)
IMM GRANULOCYTES NFR BLD AUTO: 0.3 % (ref 0–0.9)
LYMPHOCYTES # BLD AUTO: 1.21 X10*3/UL (ref 0.8–3)
LYMPHOCYTES NFR BLD AUTO: 30.6 %
MCH RBC QN AUTO: 29.6 PG (ref 26–34)
MCHC RBC AUTO-ENTMCNC: 31.4 G/DL (ref 32–36)
MCV RBC AUTO: 94 FL (ref 80–100)
MONOCYTES # BLD AUTO: 0.43 X10*3/UL (ref 0.05–0.8)
MONOCYTES NFR BLD AUTO: 10.9 %
NEUTROPHILS # BLD AUTO: 2.16 X10*3/UL (ref 1.6–5.5)
NEUTROPHILS NFR BLD AUTO: 54.4 %
NRBC BLD-RTO: 0 /100 WBCS (ref 0–0)
PLATELET # BLD AUTO: 190 X10*3/UL (ref 150–450)
POTASSIUM SERPL-SCNC: 4.7 MMOL/L (ref 3.5–5.3)
PROT SERPL-MCNC: 6.5 G/DL (ref 6.4–8.2)
RBC # BLD AUTO: 3.85 X10*6/UL (ref 4.5–5.9)
SODIUM SERPL-SCNC: 134 MMOL/L (ref 136–145)
WBC # BLD AUTO: 4 X10*3/UL (ref 4.4–11.3)

## 2024-06-06 PROCEDURE — 93005 ELECTROCARDIOGRAM TRACING: CPT

## 2024-06-06 PROCEDURE — 84484 ASSAY OF TROPONIN QUANT: CPT | Performed by: STUDENT IN AN ORGANIZED HEALTH CARE EDUCATION/TRAINING PROGRAM

## 2024-06-06 PROCEDURE — 36415 COLL VENOUS BLD VENIPUNCTURE: CPT | Performed by: STUDENT IN AN ORGANIZED HEALTH CARE EDUCATION/TRAINING PROGRAM

## 2024-06-06 PROCEDURE — 99285 EMERGENCY DEPT VISIT HI MDM: CPT | Performed by: STUDENT IN AN ORGANIZED HEALTH CARE EDUCATION/TRAINING PROGRAM

## 2024-06-06 PROCEDURE — 99283 EMERGENCY DEPT VISIT LOW MDM: CPT

## 2024-06-06 PROCEDURE — 80053 COMPREHEN METABOLIC PANEL: CPT | Performed by: STUDENT IN AN ORGANIZED HEALTH CARE EDUCATION/TRAINING PROGRAM

## 2024-06-06 PROCEDURE — 85379 FIBRIN DEGRADATION QUANT: CPT | Performed by: STUDENT IN AN ORGANIZED HEALTH CARE EDUCATION/TRAINING PROGRAM

## 2024-06-06 PROCEDURE — 85025 COMPLETE CBC W/AUTO DIFF WBC: CPT | Performed by: STUDENT IN AN ORGANIZED HEALTH CARE EDUCATION/TRAINING PROGRAM

## 2024-06-06 ASSESSMENT — LIFESTYLE VARIABLES
HAVE YOU EVER FELT YOU SHOULD CUT DOWN ON YOUR DRINKING: NO
EVER FELT BAD OR GUILTY ABOUT YOUR DRINKING: NO
TOTAL SCORE: 0
HAVE PEOPLE ANNOYED YOU BY CRITICIZING YOUR DRINKING: NO
EVER HAD A DRINK FIRST THING IN THE MORNING TO STEADY YOUR NERVES TO GET RID OF A HANGOVER: NO

## 2024-06-06 ASSESSMENT — COLUMBIA-SUICIDE SEVERITY RATING SCALE - C-SSRS
2. HAVE YOU ACTUALLY HAD ANY THOUGHTS OF KILLING YOURSELF?: NO
6. HAVE YOU EVER DONE ANYTHING, STARTED TO DO ANYTHING, OR PREPARED TO DO ANYTHING TO END YOUR LIFE?: NO
1. IN THE PAST MONTH, HAVE YOU WISHED YOU WERE DEAD OR WISHED YOU COULD GO TO SLEEP AND NOT WAKE UP?: NO

## 2024-06-06 ASSESSMENT — PAIN SCALES - GENERAL
PAINLEVEL_OUTOF10: 0 - NO PAIN

## 2024-06-06 ASSESSMENT — PAIN - FUNCTIONAL ASSESSMENT: PAIN_FUNCTIONAL_ASSESSMENT: 0-10

## 2024-06-06 NOTE — TELEPHONE ENCOUNTER
Spoke with Neva  at [facility] inquiring about a report for pt and she stated that she does not have anything in the way of reports. Just incident reports.     She did say that Jose was complaining of shortness of breath this morning and she stated that his oxygen was at 97 then checked a little bit later and it was 95. She stated that he has not been complaining after that.     Neva also would like to know if you could send him in Imodium and something for his anxiety. She stated that he is having [place] really bad.     Please Advise.

## 2024-06-07 ENCOUNTER — OFFICE VISIT (OUTPATIENT)
Dept: BEHAVIORAL HEALTH | Facility: CLINIC | Age: 84
End: 2024-06-07
Payer: MEDICARE

## 2024-06-07 VITALS
OXYGEN SATURATION: 95 % | HEIGHT: 67 IN | DIASTOLIC BLOOD PRESSURE: 72 MMHG | WEIGHT: 155 LBS | RESPIRATION RATE: 20 BRPM | BODY MASS INDEX: 24.33 KG/M2 | HEART RATE: 96 BPM | SYSTOLIC BLOOD PRESSURE: 150 MMHG | TEMPERATURE: 97.9 F

## 2024-06-07 VITALS — HEART RATE: 95 BPM | SYSTOLIC BLOOD PRESSURE: 150 MMHG | DIASTOLIC BLOOD PRESSURE: 74 MMHG

## 2024-06-07 DIAGNOSIS — F01.B18: Primary | ICD-10-CM

## 2024-06-07 DIAGNOSIS — R56.9 SEIZURE (MULTI): ICD-10-CM

## 2024-06-07 DIAGNOSIS — F32.3: ICD-10-CM

## 2024-06-07 PROCEDURE — 1111F DSCHRG MED/CURRENT MED MERGE: CPT | Performed by: PSYCHIATRY & NEUROLOGY

## 2024-06-07 PROCEDURE — 3078F DIAST BP <80 MM HG: CPT | Performed by: PSYCHIATRY & NEUROLOGY

## 2024-06-07 PROCEDURE — 1123F ACP DISCUSS/DSCN MKR DOCD: CPT | Performed by: PSYCHIATRY & NEUROLOGY

## 2024-06-07 PROCEDURE — 3077F SYST BP >= 140 MM HG: CPT | Performed by: PSYCHIATRY & NEUROLOGY

## 2024-06-07 PROCEDURE — 1157F ADVNC CARE PLAN IN RCRD: CPT | Performed by: PSYCHIATRY & NEUROLOGY

## 2024-06-07 PROCEDURE — 99214 OFFICE O/P EST MOD 30 MIN: CPT | Performed by: PSYCHIATRY & NEUROLOGY

## 2024-06-07 RX ORDER — RISPERIDONE 1 MG/1
TABLET ORAL
Qty: 30 TABLET | Refills: 1 | Status: SHIPPED | OUTPATIENT
Start: 2024-06-07

## 2024-06-07 RX ORDER — MIRTAZAPINE 7.5 MG/1
7.5 TABLET, FILM COATED ORAL NIGHTLY
Qty: 30 TABLET | Refills: 1 | Status: SHIPPED | OUTPATIENT
Start: 2024-06-07 | End: 2024-08-06

## 2024-06-07 RX ORDER — BRIVARACETAM 50 MG/1
50 TABLET, FILM COATED ORAL 2 TIMES DAILY
Qty: 180 TABLET | Refills: 1 | Status: CANCELLED | OUTPATIENT
Start: 2024-06-07

## 2024-06-07 ASSESSMENT — PAIN SCALES - GENERAL: PAINLEVEL_OUTOF10: 0 - NO PAIN

## 2024-06-07 ASSESSMENT — ENCOUNTER SYMPTOMS
SLEEP DISTURBANCE: 0
CONFUSION: 1
NERVOUS/ANXIOUS: 0
FATIGUE: 1
DECREASED CONCENTRATION: 1
HALLUCINATIONS: 0
TREMORS: 0
APPETITE CHANGE: 1
HYPERACTIVE: 0
SEIZURES: 0
DYSPHORIC MOOD: 0
AGITATION: 0

## 2024-06-07 ASSESSMENT — PAIN - FUNCTIONAL ASSESSMENT: PAIN_FUNCTIONAL_ASSESSMENT: 0-10

## 2024-06-07 NOTE — PROGRESS NOTES
"Subjective   Patient ID: Jose Guardado is a 83 y.o. male who presents for follow up  Son in law was present. Then , his daughter was present.   He was hospitalized since last qing due to altered mental status. Lat night, he was in ED for chest pain.   Oriented to month and year, not to date   Stuttering, words finding difficulties, some aphasia   Note from yesterday, [facility], seem like he is comfort care. Also recommendations was to follow up with hospice   He does not show symptoms of psychosis, but anxiety at night, he keeps trying to get up, says he got stuff to do, sleeps on the floor, was calling every body including 911. Not eating much   He gets up at night   No agitation, no aggression, still thinks his mom is cheating on him   Daughter thinks he is depressed, anxious especially at night       Current Outpatient Medications on File Prior to Visit   Medication Sig Dispense Refill    atorvastatin (Lipitor) 80 mg tablet Take 1 tablet (80 mg) by mouth once daily at bedtime. 30 tablet 0    BD AutoShield Duo Pen Needle 30 gauge x 3/16\" needle       Briviact 50 mg tablet tablet Take 1 tablet (50 mg) by mouth 2 times a day. 180 tablet 1    [] clopidogrel (Plavix) 75 mg tablet Take 1 tablet (75 mg) by mouth once daily. 30 tablet 0    [] finasteride (Proscar) 5 mg tablet Take 1 tablet (5 mg) by mouth once daily at bedtime. 30 tablet 0    FreeStyle Doug 2 Sensor kit Use as instructed 1 each 11    insulin glargine (Lantus U-100 Insulin) 100 unit/mL injection Inject 12 Units under the skin once daily at bedtime. Take as directed per insulin instructions. 10 mL 0    levothyroxine (Synthroid, Levoxyl) 100 mcg tablet Take 1 tablet (100 mcg) by mouth once daily in the morning. Take before meals. 30 tablet 0    losartan (Cozaar) 25 mg tablet Take 1 tablet (25 mg) by mouth once daily. 30 tablet 0    melatonin 3 mg tablet Take 1 tablet (3 mg) by mouth once daily at bedtime. 60 tablet 0    metFORMIN " (Glucophage) 500 mg tablet Take 1 tablet (500 mg) by mouth 2 times a day with meals. 60 tablet 0    metoprolol tartrate (Lopressor) 25 mg tablet Take 1 tablet (25 mg) by mouth 2 times a day. 60 tablet 0    pantoprazole (ProtoNix) 40 mg EC tablet Take 1 tablet (40 mg) by mouth once daily. 30 tablet 0    risperiDONE (RisperDAL) 2 mg tablet Take 1 tablet (2 mg) by mouth once daily in the evening. Take with meals. 30 tablet 2     No current facility-administered medications on file prior to visit.      Patient Active Problem List   Diagnosis    Focal seizure (Multi)    Arthritis    Osteoarthritis    Pleural plaque due to asbestos exposure    Benign essential hypertension    Benign prostatic hyperplasia    Bursitis of right hip    Calcium kidney stone    Cerebrovascular small vessel disease    Cervical radiculopathy    Spondylosis of cervical spine without myelopathy    Chronic low back pain    Colon polyp    Diverticulitis, colon    Confusion and disorientation    Diastolic dysfunction    HTN (hypertension)    History of TIA (transient ischemic attack)    History of orthostatic hypotension    Hemangioma of skin and subcutaneous tissue    Gastroesophageal reflux disease    Expressive aphasia    Erectile dysfunction    Type 2 diabetes mellitus with hyperglycemia, with long-term current use of insulin (Multi)    Disorder of bursae of shoulder region    Memory impairment    Hyperlipidemia    Hypothyroidism    Melanocytic nevi of trunk    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    Other seborrheic keratosis    Other melanin hyperpigmentation    Seizure disorder (Multi)    Shingles    Shortness of breath    Tremor, essential    Varicose veins of leg with swelling    Vitamin D deficiency    White matter disease, unspecified    Overweight with body mass index (BMI) of 25 to 25.9 in adult    Anxiety    Other chest pain    Chest pain    Transient ischemic attack (TIA)    Acute pharyngitis    Allergic rhinitis    Altered mental  status    Aphasia    Ataxia    Chills    Contact with and (suspected) exposure to covid-19    Cramps of lower extremity    Diarrhea    Headache    History of cardiovascular surgery    Hypokalemia    Implantable loop recorder present    Insect bite of hand    Knee pain    Neurological symptoms    Oropharyngeal dysphagia    Urinary incontinence    Skin lesion of face    Word finding difficulty    Acute conjunctivitis    Mood disorder (CMS-[facility])    Hemiplegia and hemiparesis following cerebral infarction affecting right dominant side (Multi)    Paroxysmal atrial fibrillation (Multi)    Peripheral vascular disease of foot (CMS-HCC)    Type 2 diabetes mellitus with diabetic peripheral angiopathy without gangrene, with long-term current use of insulin (Multi)    Falls, initial encounter    Hematoma of left buttock    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   Musculoskeletal:  Positive for gait problem.   Neurological:  Negative for tremors and seizures.   Psychiatric/Behavioral:  Positive for behavioral problems, confusion and decreased concentration. Negative for agitation, dysphoric mood, hallucinations, self-injury, sleep disturbance and suicidal ideas. The patient is not nervous/anxious and is not hyperactive.        Objective   Physical Exam  Psychiatric:         Attention and Perception: He is inattentive.         Mood and Affect: Affect is flat.         Speech: Speech is not tangential.         Behavior: Behavior is slowed.         Thought Content: Thought content is delusional.         Cognition and Memory: Cognition is impaired.      Comments: Stuttering   Words finding difficulties        There were no vitals filed for this visit.   Admission on 06/06/2024, Discharged on 06/07/2024   Component Date Value    WBC 06/06/2024 4.0 (L)     nRBC 06/06/2024 0.0     RBC 06/06/2024 3.85 (L)      Hemoglobin 06/06/2024 11.4 (L)     Hematocrit 06/06/2024 36.3 (L)     MCV 06/06/2024 94     MCH 06/06/2024 29.6     MCHC 06/06/2024 31.4 (L)     RDW 06/06/2024 16.1 (H)     Platelets 06/06/2024 190     Neutrophils % 06/06/2024 54.4     Immature Granulocytes %,* 06/06/2024 0.3     Lymphocytes % 06/06/2024 30.6     Monocytes % 06/06/2024 10.9     Eosinophils % 06/06/2024 3.5     Basophils % 06/06/2024 0.3     Neutrophils Absolute 06/06/2024 2.16     Immature Granulocytes Ab* 06/06/2024 0.01     Lymphocytes Absolute 06/06/2024 1.21     Monocytes Absolute 06/06/2024 0.43     Eosinophils Absolute 06/06/2024 0.14     Basophils Absolute 06/06/2024 0.01     Glucose 06/06/2024 204 (H)     Sodium 06/06/2024 134 (L)     Potassium 06/06/2024 4.7     Chloride 06/06/2024 101     Bicarbonate 06/06/2024 27     Anion Gap 06/06/2024 11     Urea Nitrogen 06/06/2024 12     Creatinine 06/06/2024 0.85     eGFR 06/06/2024 86     Calcium 06/06/2024 8.7     Albumin 06/06/2024 3.6     Alkaline Phosphatase 06/06/2024 55     Total Protein 06/06/2024 6.5     AST 06/06/2024 31     Bilirubin, Total 06/06/2024 0.7     ALT 06/06/2024 14     Troponin I, High Sensiti* 06/06/2024 9     Ventricular Rate 06/06/2024 70     Atrial Rate 06/06/2024 70     [place] Interval 06/06/2024 136     QRS Duration 06/06/2024 72     QT Interval 06/06/2024 392     QTC Calculation(Bazett) 06/06/2024 423     P Axis 06/06/2024 23     R [place] 06/06/2024 12     T Axis 06/06/2024 25     QRS Count 06/06/2024 12     Q Onset 06/06/2024 230     P Onset 06/06/2024 162     P Offset 06/06/2024 209     T Offset 06/06/2024 426     QTC Fredericia 06/06/2024 412     D-Dimer, Quantitative VT* 06/06/2024 503 (H)    Admission on 05/29/2024, Discharged on 05/30/2024   Component Date Value    POCT Glucose 05/29/2024 197 (H)     WBC 05/29/2024 7.4     nRBC 05/29/2024 0.0     RBC 05/29/2024 3.98 (L)     Hemoglobin 05/29/2024 11.6 (L)     Hematocrit 05/29/2024 36.4 (L)     MCV 05/29/2024 92      MCH 05/29/2024 29.1     MCHC 05/29/2024 31.9 (L)     RDW 05/29/2024 15.5 (H)     Platelets 05/29/2024 246     Neutrophils % 05/29/2024 75.0     Immature Granulocytes %,* 05/29/2024 0.4     Lymphocytes % 05/29/2024 15.2     Monocytes % 05/29/2024 8.0     Eosinophils % 05/29/2024 1.1     Basophils % 05/29/2024 0.3     Neutrophils Absolute 05/29/2024 5.53 (H)     Immature Granulocytes Ab* 05/29/2024 0.03     Lymphocytes Absolute 05/29/2024 1.12     Monocytes Absolute 05/29/2024 0.59     Eosinophils Absolute 05/29/2024 0.08     Basophils Absolute 05/29/2024 0.02     Glucose 05/29/2024 208 (H)     Sodium 05/29/2024 138     Potassium 05/29/2024 4.6     Chloride 05/29/2024 101     Bicarbonate 05/29/2024 29     Anion Gap 05/29/2024 13     Urea Nitrogen 05/29/2024 13     Creatinine 05/29/2024 0.94     eGFR 05/29/2024 80     Calcium 05/29/2024 8.9     Albumin 05/29/2024 3.8     Alkaline Phosphatase 05/29/2024 51     Total Protein 05/29/2024 6.4     AST 05/29/2024 26     Bilirubin, Total 05/29/2024 0.8     ALT 05/29/2024 16     Magnesium 05/29/2024 1.56 (L)     Ventricular Rate 05/29/2024 78     Atrial Rate 05/29/2024 78     [place] Interval 05/29/2024 134     QRS Duration 05/29/2024 68     QT Interval 05/29/2024 366     QTC Calculation(Bazett) 05/29/2024 417     P Axis 05/29/2024 79     R [place] 05/29/2024 -14     T [place] 05/29/2024 59     QRS Count 05/29/2024 13     Q Onset 05/29/2024 228     P Onset 05/29/2024 161     P Offset 05/29/2024 205     T Offset 05/29/2024 411     QTC Fredericia 05/29/2024 399     Ventricular Rate 05/29/2024 82     Atrial Rate 05/29/2024 82     [place] Interval 05/29/2024 138     QRS Duration 05/29/2024 70     QT Interval 05/29/2024 350     QTC Calculation(Bazett) 05/29/2024 408     P Axis 05/29/2024 79     R [place] 05/29/2024 17     T Axis 05/29/2024 61     QRS Count 05/29/2024 14     Q Onset 05/29/2024 228     P Onset 05/29/2024 159     P Offset 05/29/2024 207     T Offset 05/29/2024 403     QTC Fredericia  05/29/2024 388     Troponin I, High Sensiti* 05/29/2024 11     Color, Urine 05/29/2024 Light-Yellow     Appearance, Urine 05/29/2024 Clear     Specific Gravity, Urine 05/29/2024 1.025     pH, Urine 05/29/2024 6.5     Protein, Urine 05/29/2024 NEGATIVE     Glucose, Urine 05/29/2024 100 (1+) (A)     Blood, Urine 05/29/2024 0.06 (1+) (A)     Ketones, Urine 05/29/2024 20 (1+) (A)     Bilirubin, Urine 05/29/2024 NEGATIVE     Urobilinogen, Urine 05/29/2024 Normal     Nitrite, Urine 05/29/2024 NEGATIVE     Leukocyte Esterase, Urine 05/29/2024 NEGATIVE     Extra Tube 05/29/2024 Hold for add-ons.     Troponin I, High Sensiti* 05/29/2024 12     WBC, Urine 05/29/2024 1-5     RBC, Urine 05/29/2024 11-20 (A)     Mucus, Urine 05/29/2024 FEW     Thyroid Stimulating Horm* 05/29/2024 1.09     Vitamin B12 05/29/2024 434     Folate, Serum 05/29/2024 16.3     WBC 05/30/2024 5.7     nRBC 05/30/2024 0.0     RBC 05/30/2024 3.23 (L)     Hemoglobin 05/30/2024 9.8 (L)     Hematocrit 05/30/2024 29.5 (L)     MCV 05/30/2024 91     MCH 05/30/2024 30.3     MCHC 05/30/2024 33.2     RDW 05/30/2024 15.4 (H)     Platelets 05/30/2024 197     Glucose 05/30/2024 150 (H)     Sodium 05/30/2024 138     Potassium 05/30/2024 3.2 (L)     Chloride 05/30/2024 104     Bicarbonate 05/30/2024 27     Anion Gap 05/30/2024 10     Urea Nitrogen 05/30/2024 9     Creatinine 05/30/2024 0.70     eGFR 05/30/2024 >90     Calcium 05/30/2024 7.6 (L)     Magnesium 05/30/2024 1.74     POCT Glucose 05/29/2024 169 (H)     POCT Glucose 05/30/2024 160 (H)     POCT Glucose 05/30/2024 160 (H)     POCT Glucose 05/30/2024 176 (H)    No results displayed because visit has over 200 results.      Admission on 04/24/2024, Discharged on 05/02/2024   Component Date Value    POCT Glucose 04/25/2024 190 (H)     POCT Glucose 04/25/2024 322 (H)     POCT Glucose 04/25/2024 202 (H)     POCT Glucose 04/25/2024 163 (H)     POCT Glucose 04/26/2024 105 (H)     POCT Glucose 04/26/2024 163 (H)     POCT  Glucose 04/24/2024 340 (H)     POCT Glucose 04/26/2024 185 (H)     POCT Glucose 04/26/2024 191 (H)     POCT Glucose 04/27/2024 68 (L)     POCT Glucose 04/27/2024 68 (L)     POCT Glucose 04/27/2024 85     POCT Glucose 04/27/2024 251 (H)     POCT Glucose 04/27/2024 265 (H)     POCT Glucose 04/27/2024 228 (H)     POCT Glucose 04/28/2024 91     POCT Glucose 04/28/2024 155 (H)     POCT Glucose 04/28/2024 224 (H)     POCT Glucose 04/28/2024 186 (H)     POCT Glucose 04/29/2024 138 (H)     Valproic Acid 04/30/2024 29 (L)     POCT Glucose 04/29/2024 190 (H)     POCT Glucose 04/30/2024 82     POCT Glucose 04/30/2024 251 (H)     POCT Glucose 04/30/2024 212 (H)     POCT Glucose 05/01/2024 87     POCT Glucose 05/01/2024 173 (H)     POCT Glucose 05/01/2024 93     POCT Glucose 05/01/2024 192 (H)     POCT Glucose 05/02/2024 75     POCT Glucose 05/02/2024 163 (H)    Admission on 04/23/2024, Discharged on 04/24/2024   Component Date Value    Color, Urine 04/24/2024 Yellow     Appearance, Urine 04/24/2024 Clear     Specific Gravity, Urine 04/24/2024 1.021     pH, Urine 04/24/2024 5.0     Protein, Urine 04/24/2024 NEGATIVE     Glucose, Urine 04/24/2024 50 (1+) (A)     Blood, Urine 04/24/2024 NEGATIVE     Ketones, Urine 04/24/2024 NEGATIVE     Bilirubin, Urine 04/24/2024 NEGATIVE     Urobilinogen, Urine 04/24/2024 <2.0     Nitrite, Urine 04/24/2024 NEGATIVE     Leukocyte Esterase, Urine 04/24/2024 NEGATIVE     Extra Tube 04/24/2024 Hold for add-ons.     Amphetamine Screen, Urine 04/24/2024 Presumptive Negative     Barbiturate Screen, Urine 04/24/2024 Presumptive Negative     Benzodiazepines Screen, * 04/24/2024 Presumptive Negative     Cannabinoid Screen, Urine 04/24/2024 Presumptive Negative     Cocaine Metabolite Scree* 04/24/2024 Presumptive Negative     Fentanyl Screen, Urine 04/24/2024 Presumptive Negative     Opiate Screen, Urine 04/24/2024 Presumptive Negative     Oxycodone Screen, Urine 04/24/2024 Presumptive Negative     PCP  Screen, Urine 04/24/2024 Presumptive Negative     Methadone Screen, Urine 04/24/2024 Presumptive Negative     POCT Glucose 04/24/2024 141 (H)     POCT Glucose 04/24/2024 369 (H)    Admission on 04/23/2024, Discharged on 04/23/2024   Component Date Value    WBC 04/23/2024 6.3     nRBC 04/23/2024 0.0     RBC 04/23/2024 5.03     Hemoglobin 04/23/2024 14.8     Hematocrit 04/23/2024 45.9     MCV 04/23/2024 91     MCH 04/23/2024 29.4     MCHC 04/23/2024 32.2     RDW 04/23/2024 14.6 (H)     Platelets 04/23/2024 217     Neutrophils % 04/23/2024 54.8     Immature Granulocytes %,* 04/23/2024 0.2     Lymphocytes % 04/23/2024 28.5     Monocytes % 04/23/2024 8.3     Eosinophils % 04/23/2024 8.0     Basophils % 04/23/2024 0.2     Neutrophils Absolute 04/23/2024 3.43     Immature Granulocytes Ab* 04/23/2024 0.01     Lymphocytes Absolute 04/23/2024 1.78     Monocytes Absolute 04/23/2024 0.52     Eosinophils Absolute 04/23/2024 0.50 (H)     Basophils Absolute 04/23/2024 0.01     Glucose 04/23/2024 114 (H)     Sodium 04/23/2024 139     Potassium 04/23/2024 4.3     Chloride 04/23/2024 101     Bicarbonate 04/23/2024 31     Anion Gap 04/23/2024 11     Urea Nitrogen 04/23/2024 20     Creatinine 04/23/2024 0.87     eGFR 04/23/2024 85     Calcium 04/23/2024 9.4     Albumin 04/23/2024 4.6     Alkaline Phosphatase 04/23/2024 68     Total Protein 04/23/2024 7.3     AST 04/23/2024 20     Bilirubin, Total 04/23/2024 0.6     ALT 04/23/2024 15     Magnesium 04/23/2024 1.76     Acetaminophen 04/23/2024 <10.0     Salicylate  04/23/2024 <3     Alcohol 04/23/2024 <10     Flu A Result 04/23/2024 Not Detected     Flu B Result 04/23/2024 Not Detected     Coronavirus 2019, PCR 04/23/2024 Not Detected     Ventricular Rate 04/23/2024 67     Atrial Rate 04/23/2024 67     [place] Interval 04/23/2024 150     QRS Duration 04/23/2024 80     QT Interval 04/23/2024 384     QTC Calculation(Bazett) 04/23/2024 405     P [place] 04/23/2024 54     R Axis 04/23/2024 38     T  Axis 04/23/2024 35     QRS Count 04/23/2024 11     Q Onset 04/23/2024 229     P Onset 04/23/2024 154     P Offset 04/23/2024 209     T Offset 04/23/2024 421     QTC Fredericia 04/23/2024 398    Admission on 03/18/2024, Discharged on 03/19/2024   Component Date Value    Glucose 03/18/2024 97     Sodium 03/18/2024 136     Potassium 03/18/2024 5.6 (H)     Chloride 03/18/2024 104     Bicarbonate 03/18/2024 25     Anion Gap 03/18/2024 13     Urea Nitrogen 03/18/2024 16     Creatinine 03/18/2024 0.91     eGFR 03/18/2024 83     Calcium 03/18/2024 8.6     Albumin 03/18/2024 3.6     Alkaline Phosphatase 03/18/2024 53     Total Protein 03/18/2024 6.5     AST 03/18/2024 24     Bilirubin, Total 03/18/2024 0.4     ALT 03/18/2024 11     WBC 03/18/2024 6.1     nRBC 03/18/2024 0.0     RBC 03/18/2024 4.49 (L)     Hemoglobin 03/18/2024 13.1 (L)     Hematocrit 03/18/2024 41.8     MCV 03/18/2024 93     MCH 03/18/2024 29.2     MCHC 03/18/2024 31.3 (L)     RDW 03/18/2024 14.6 (H)     Platelets 03/18/2024 186     Neutrophils % 03/18/2024 42.2     Immature Granulocytes %,* 03/18/2024 0.2     Lymphocytes % 03/18/2024 40.8     Monocytes % 03/18/2024 9.4     Eosinophils % 03/18/2024 7.2     Basophils % 03/18/2024 0.2     Neutrophils Absolute 03/18/2024 2.57     Immature Granulocytes Ab* 03/18/2024 0.01     Lymphocytes Absolute 03/18/2024 2.48     Monocytes Absolute 03/18/2024 0.57     Eosinophils Absolute 03/18/2024 0.44 (H)     Basophils Absolute 03/18/2024 0.01     Troponin I, High Sensiti* 03/18/2024 4     Protime 03/18/2024 11.2     INR 03/18/2024 1.0     aPTT 03/18/2024 27     Ventricular Rate 03/18/2024 71     Atrial Rate 03/18/2024 71     [place] Interval 03/18/2024 142     QRS Duration 03/18/2024 74     QT Interval 03/18/2024 382     QTC Calculation(Bazett) 03/18/2024 415     P Axis 03/18/2024 74     R Axis 03/18/2024 26     T Axis 03/18/2024 49     QRS Count 03/18/2024 12     Q Onset 03/18/2024 217     P Onset 03/18/2024 146     P Offset  03/18/2024 182     T Offset 03/18/2024 408     QTC Fredericia 03/18/2024 404     Albumin 03/18/2024 3.9     Bilirubin, Total 03/18/2024 0.4     Bilirubin, Direct 03/18/2024 0.1     Alkaline Phosphatase 03/18/2024 62     ALT 03/18/2024 13     AST 03/18/2024 17     Total Protein 03/18/2024 6.6     BNP 03/18/2024 26     Cholesterol 03/18/2024 151     HDL-Cholesterol 03/18/2024 33.9     Cholesterol/HDL Ratio 03/18/2024 4.5     LDL Calculated 03/18/2024 88     VLDL 03/18/2024 29     Triglycerides 03/18/2024 145     Non HDL Cholesterol 03/18/2024 117     AV pk fidencio 03/18/2024 1.02     LV EF 03/18/2024 66     LVOT diam 03/18/2024 2.10     MV E/A ratio 03/18/2024 0.42     MV avg E/e' ratio 03/18/2024 5.44     Tricuspid annular plane * 03/18/2024 1.9     AV mn grad 03/18/2024 2.0     RV free wall pk S' 03/18/2024 13.30     RVSP 03/18/2024 7.8     LVIDd 03/18/2024 3.96     Aortic Valve Area by Con* 03/18/2024 2.86     AV pk grad 03/18/2024 4.2     Aortic Valve Area by Con* 03/18/2024 2.58     LV A4C EF 03/18/2024 67.1     POCT Glucose 03/18/2024 206 (H)     Hemoglobin A1C 03/18/2024 7.9 (H)     Estimated Average Glucose 03/18/2024 180     POCT Glucose 03/19/2024 90     POCT Glucose 03/19/2024 210 (H)     POCT Glucose 03/18/2024 146 (H)    Admission on 02/02/2024, Discharged on 02/03/2024   Component Date Value    WBC 02/02/2024 4.1 (L)     nRBC 02/02/2024 0.0     RBC 02/02/2024 4.50     Hemoglobin 02/02/2024 13.2 (L)     Hematocrit 02/02/2024 41.3     MCV 02/02/2024 92     MCH 02/02/2024 29.3     MCHC 02/02/2024 32.0     RDW 02/02/2024 14.2     Platelets 02/02/2024 189     Neutrophils % 02/02/2024 54.8     Immature Granulocytes %,* 02/02/2024 0.2     Lymphocytes % 02/02/2024 30.0     Monocytes % 02/02/2024 6.7     Eosinophils % 02/02/2024 8.1     Basophils % 02/02/2024 0.2     Neutrophils Absolute 02/02/2024 2.22     Immature Granulocytes Ab* 02/02/2024 0.01     Lymphocytes Absolute 02/02/2024 1.22     Monocytes Absolute  02/02/2024 0.27     Eosinophils Absolute 02/02/2024 0.33     Basophils Absolute 02/02/2024 0.01     Glucose 02/02/2024 169 (H)     Sodium 02/02/2024 138     Potassium 02/02/2024 4.2     Chloride 02/02/2024 101     Bicarbonate 02/02/2024 30     Anion Gap 02/02/2024 11     Urea Nitrogen 02/02/2024 17     Creatinine 02/02/2024 0.93     eGFR 02/02/2024 81     Calcium 02/02/2024 9.2     Albumin 02/02/2024 4.1     Alkaline Phosphatase 02/02/2024 66     Total Protein 02/02/2024 6.6     AST 02/02/2024 19     Bilirubin, Total 02/02/2024 0.7     ALT 02/02/2024 12     Magnesium 02/02/2024 1.63     Ventricular Rate 02/02/2024 69     Atrial Rate 02/02/2024 69     [place] Interval 02/02/2024 154     QRS Duration 02/02/2024 82     QT Interval 02/02/2024 384     QTC Calculation(Bazett) 02/02/2024 411     P Axis 02/02/2024 43     R Axis 02/02/2024 40     T Axis 02/02/2024 48     QRS Count 02/02/2024 11     Q Onset 02/02/2024 217     P Onset 02/02/2024 140     P Offset 02/02/2024 195     T Offset 02/02/2024 409     QTC Fredericia 02/02/2024 402     Ventricular Rate 02/02/2024 61     Atrial Rate 02/02/2024 61     [place] Interval 02/02/2024 156     QRS Duration 02/02/2024 78     QT Interval 02/02/2024 398     QTC Calculation(Bazett) 02/02/2024 400     P Axis 02/02/2024 67     R Axis 02/02/2024 35     T [place] 02/02/2024 42     QRS Count 02/02/2024 10     Q Onset 02/02/2024 217     P Onset 02/02/2024 139     P Offset 02/02/2024 192     T Offset 02/02/2024 416     QTC Fredericia 02/02/2024 399     Troponin I, High Sensiti* 02/02/2024 4     Troponin I, High Sensiti* 02/02/2024 4     D-Dimer Non VTE, Quant (* 02/02/2024 460     POCT Glucose 02/02/2024 150 (H)     WBC 02/03/2024 4.7     nRBC 02/03/2024 0.0     RBC 02/03/2024 4.23 (L)     Hemoglobin 02/03/2024 12.5 (L)     Hematocrit 02/03/2024 38.6 (L)     MCV 02/03/2024 91     MCH 02/03/2024 29.6     MCHC 02/03/2024 32.4     RDW 02/03/2024 14.3     Platelets 02/03/2024 179     Glucose 02/03/2024 107  (H)     Sodium 02/03/2024 139     Potassium 02/03/2024 3.8     Chloride 02/03/2024 105     Bicarbonate 02/03/2024 28     Anion Gap 02/03/2024 10     Urea Nitrogen 02/03/2024 18     Creatinine 02/03/2024 0.85     eGFR 02/03/2024 86     Calcium 02/03/2024 8.5 (L)     POCT Glucose 02/02/2024 152 (H)     POCT Glucose 02/03/2024 115 (H)      Lab Results   Component Value Date     (L) 06/06/2024     05/30/2024     05/29/2024    K 4.7 06/06/2024    K 3.2 (L) 05/30/2024    K 4.6 05/29/2024    CO2 27 06/06/2024    CO2 27 05/30/2024    CO2 29 05/29/2024    BUN 12 06/06/2024    BUN 9 05/30/2024    BUN 13 05/29/2024    CREATININE 0.85 06/06/2024    CREATININE 0.70 05/30/2024    CREATININE 0.94 05/29/2024    GLUCOSE 204 (H) 06/06/2024    GLUCOSE 150 (H) 05/30/2024    GLUCOSE 208 (H) 05/29/2024    CALCIUM 8.7 06/06/2024    CALCIUM 7.6 (L) 05/30/2024    CALCIUM 8.9 05/29/2024     Lab Results   Component Value Date    WBC 4.0 (L) 06/06/2024    HGB 11.4 (L) 06/06/2024    HCT 36.3 (L) 06/06/2024    MCV 94 06/06/2024     06/06/2024     Lab Results   Component Value Date    CHOL 151 03/18/2024    TRIG 145 03/18/2024    HDL 33.9 03/18/2024     Assessment/Plan   Problem List Items Addressed This Visit             ICD-10-CM    Major 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 We are glad to discuss our recommendations to him/her.

## 2024-06-07 NOTE — DISCHARGE INSTRUCTIONS
Patient is DNR/DNI comfort care and was accompanied with respective paperwork.  CODE STATUS was discussed with family bedside and they reconfirmed comfort care orders.  Please follow-up with hospice after admission to ensure comfort care orders are in place and nursing home staff are instructed on patient's wishes.

## 2024-06-07 NOTE — ED PROVIDER NOTES
EMERGENCY DEPARTMENT ENCOUNTER      Pt Name: Jose Guardado  MRN: [id number]  Birthdate 1940  Date of evaluation: 6/6/2024  Provider: Taylor Flores DO    CHIEF COMPLAINT       Chief Complaint   Patient presents with    Chest Pain    Nausea         HISTORY OF PRESENT ILLNESS    HPI    Jose Guardado is a 83 year old male presenting with reported chest pain and nausea from his nursing home.  Past medical history is significant for hypertension, insulin-dependent type 2 diabetes, seizure disorder, Alzheimer's dementia with behavioral disturbances, left hemispheric stroke, expressive aphasia.  It was reported that patient had complained of chest pain and nausea.  He is unable to answer most questions but did endorse that he did have chest pain which has now resolved.  It was reported by EMS he is at his baseline mentation which is ANO x 1.  Several attempts were made to discuss his condition with the nursing home and calls were unanswered.  Paperwork came in with patient stating that he was comfort care, family was initially confused by this and awaited the patient's daughter to make decisions.    Of note the patient was discharged 5/29 for altered mental status and workup was negative.  It was suggested to follow-up with palliative care after discharge.    Nursing Notes were reviewed.    PAST MEDICAL HISTORY     Past Medical History:   Diagnosis Date    Anxiety     Dizziness and giddiness 12/21/2021    Episodic lightheadedness    Other chest pain 12/21/2021    Atypical chest pain    Other chest pain 12/21/2021    Chest pain, musculoskeletal    Personal history of other diseases of male genital organs 11/30/2021    History of erectile dysfunction    Personal history of other specified conditions 05/14/2018    History of urinary incontinence    Seizures (Multi)     Stroke (Multi)          SURGICAL HISTORY       Past Surgical History:   Procedure Laterality Date    MR HEAD ANGIO WO IV CONTRAST  2/24/2021    MR HEAD  "ANGIO WO IV CONTRAST 2/24/2021 [facility]    MR HEAD ANGIO WO IV CONTRAST  4/24/2021    MR HEAD ANGIO WO IV CONTRAST 4/24/2021 [facility]    MR HEAD ANGIO WO IV CONTRAST  11/18/2022    MR HEAD ANGIO WO IV CONTRAST 11/18/2022 [facility]    MR HEAD ANGIO WO IV CONTRAST  3/22/2023    MR HEAD ANGIO WO IV CONTRAST [facility]    MR NECK ANGIO WO IV CONTRAST  2/24/2021    MR NECK ANGIO WO IV CONTRAST 2/24/2021 [facility]    MR NECK ANGIO WO IV CONTRAST  4/24/2021    MR NECK ANGIO WO IV CONTRAST 4/24/2021 [facility]    MR NECK ANGIO WO IV CONTRAST  11/18/2022    MR NECK ANGIO WO IV CONTRAST 11/18/2022 [facility]    MR NECK ANGIO WO IV CONTRAST  3/22/2023    MR NECK ANGIO WO IV CONTRAST [facility]    OTHER SURGICAL HISTORY  05/14/2018    Incision Of Mastoid    OTHER SURGICAL HISTORY  11/30/2021    Complete colonoscopy    OTHER SURGICAL HISTORY  11/30/2021    Esophagogastroduodenoscopy    OTHER SURGICAL HISTORY  11/30/2021    Mastoidectomy    OTHER SURGICAL HISTORY  11/30/2021    Renal lithotripsy    OTHER SURGICAL HISTORY  11/30/2021    Tonsillectomy with adenoidectomy    TONSILLECTOMY  05/14/2018    Tonsillectomy         CURRENT MEDICATIONS       Previous Medications    ATORVASTATIN (LIPITOR) 80 MG TABLET    Take 1 tablet (80 mg) by mouth once daily at bedtime.    BD AUTOSHIELD DUO PEN NEEDLE 30 GAUGE X 3/16\" NEEDLE        BRIVIACT 50 MG TABLET TABLET    Take 1 tablet (50 mg) by mouth 2 times a day.    FREESTYLE BUSHRA 2 SENSOR KIT    Use as instructed    INSULIN GLARGINE (LANTUS U-100 INSULIN) 100 UNIT/ML INJECTION    Inject 12 Units under the skin once daily at bedtime. Take as directed per insulin instructions.    LEVOTHYROXINE (SYNTHROID, LEVOXYL) 100 MCG TABLET    Take 1 tablet (100 mcg) by mouth once daily in the morning. Take before meals.    LOSARTAN (COZAAR) 25 MG TABLET    Take 1 tablet (25 mg) by mouth once daily.    MELATONIN 3 MG TABLET    Take 1 tablet (3 mg) by mouth " once daily at bedtime.    METFORMIN (GLUCOPHAGE) 500 MG TABLET    Take 1 tablet (500 mg) by mouth 2 times a day with meals.    METOPROLOL TARTRATE (LOPRESSOR) 25 MG TABLET    Take 1 tablet (25 mg) by mouth 2 times a day.    PANTOPRAZOLE (PROTONIX) 40 MG EC TABLET    Take 1 tablet (40 mg) by mouth once daily.    RISPERIDONE (RISPERDAL) 2 MG TABLET    Take 1 tablet (2 mg) by mouth once daily in the evening. Take with meals.       ALLERGIES     Keppra [levetiracetam] and Celery    FAMILY HISTORY       Family History   Problem Relation Name Age of Onset    No Known Problems Mother      Arthritis Father      Hypertension Father      Diabetes Brother      Other (Stroke syndrome) Brother            SOCIAL HISTORY       Social History     Socioeconomic History    Marital status:      Spouse name: None    Number of children: None    Years of education: None    Highest education level: None   Occupational History    None   Tobacco Use    Smoking status: Former     Current packs/day: 0.00     Types: Cigarettes     Quit date:      Years since quittin.4    Smokeless tobacco: Never   Substance and Sexual Activity    Alcohol use: Not Currently    Drug use: Not Currently    Sexual activity: None   Other Topics Concern    None   Social History Narrative    None     Social Determinants of Health     Financial Resource Strain: Low Risk  (2024)    Overall Financial Resource Strain (CARDIA)     Difficulty of Paying Living Expenses: Not hard at all   Food Insecurity: No Food Insecurity (2024)    Hunger Vital Sign     Worried About Running Out of Food in the Last Year: Never true     Ran Out of Food in the Last Year: Never true   Transportation Needs: No Transportation Needs (2024)    PRAPARE - Transportation     Lack of Transportation (Medical): No     Lack of Transportation (Non-Medical): No   Physical Activity: Inactive (2024)    Exercise Vital Sign     Days of Exercise per Week: 0 days     Minutes  of Exercise per Session: 0 min   Stress: No Stress Concern Present (4/24/2024)    [employer] [place] [employer] - Occupational Stress Questionnaire     Feeling of Stress : Only a little   Social Connections: Moderately Integrated (4/24/2024)    Social Connection and Isolation Panel [NHANES]     Frequency of Communication with Friends and Family: More than three times a week     Frequency of Social Gatherings with Friends and Family: More than three times a week     Attends Bahai Services: 1 to 4 times per year     Active Member of Clubs or Organizations: No     Attends Club or Organization Meetings: Never     Marital Status:    Intimate Partner Violence: Not At Risk (4/24/2024)    Humiliation, Afraid, Rape, and Kick questionnaire     Fear of Current or Ex-Partner: No     Emotionally Abused: No     Physically Abused: No     Sexually Abused: No   Housing Stability: Low Risk  (5/29/2024)    Housing Stability Vital Sign     Unable to Pay for Housing in the Last Year: No     Number of Places Lived in the Last Year: 1     Unstable Housing in the Last Year: No       SCREENINGS                        PHYSICAL EXAM    (up to 7 for level 4, 8 or more for level 5)     ED Triage Vitals [06/06/24 2109]   Temperature Heart Rate Respirations BP   36.6 °C (97.9 °F) 71 20 127/65      Pulse Ox Temp Source Heart Rate Source Patient Position   98 % Temporal Monitor Sitting      BP Location FiO2 (%)     Right arm --       Physical Exam  Constitutional:       Appearance: He is ill-appearing.   HENT:      Mouth/Throat:      Comments: Edematous, dentures not in place   Cardiovascular:      Rate and Rhythm: Normal rate and regular rhythm.      Heart sounds: Normal heart sounds.   Pulmonary:      Effort: Pulmonary effort is normal.      Breath sounds: Normal breath sounds.   Abdominal:      General: Abdomen is flat. Bowel sounds are normal.      Palpations: Abdomen is soft.   Musculoskeletal:         General: No  swelling.   Skin:     General: Skin is dry.   Neurological:      General: No focal deficit present.      Mental Status: He is confused.      Comments: AO x 1, self (reported to be his baseline)   Psychiatric:      Comments: Reportedly at baseline          DIAGNOSTIC RESULTS     LABS:  Labs Reviewed   CBC WITH AUTO DIFFERENTIAL - Abnormal       Result Value    WBC 4.0 (*)     nRBC 0.0      RBC 3.85 (*)     Hemoglobin 11.4 (*)     Hematocrit 36.3 (*)     MCV 94      MCH 29.6      MCHC 31.4 (*)     RDW 16.1 (*)     Platelets 190      Neutrophils % 54.4      Immature Granulocytes %, Automated 0.3      Lymphocytes % 30.6      Monocytes % 10.9      Eosinophils % 3.5      Basophils % 0.3      Neutrophils Absolute 2.16      Immature Granulocytes Absolute, Automated 0.01      Lymphocytes Absolute 1.21      Monocytes Absolute 0.43      Eosinophils Absolute 0.14      Basophils Absolute 0.01     COMPREHENSIVE METABOLIC PANEL - Abnormal    Glucose 204 (*)     Sodium 134 (*)     Potassium 4.7      Chloride 101      Bicarbonate 27      Anion Gap 11      Urea Nitrogen 12      Creatinine 0.85      eGFR 86      Calcium 8.7      Albumin 3.6      Alkaline Phosphatase 55      Total Protein 6.5      AST 31      Bilirubin, Total 0.7      ALT 14     TROPONIN I, HIGH SENSITIVITY - Normal    Troponin I, High Sensitivity 9      Narrative:     Less than 99th percentile of normal range cutoff-  Female and children under 18 years old <14 ng/L; Male <21 ng/L: Negative  Repeat testing should be performed if clinically indicated.     Female and children under 18 years old 14-50 ng/L; Male 21-50 ng/L:  Consistent with possible cardiac damage and possible increased clinical   risk. Serial measurements may help to assess extent of myocardial damage.     >50 ng/L: Consistent with cardiac damage, increased clinical risk and  myocardial infarction. Serial measurements may help assess extent of   myocardial damage.      NOTE: Children less than 1 year old  may have higher baseline troponin   levels and results should be interpreted in conjunction with the overall   clinical context.     NOTE: Troponin I testing is performed using a different   testing methodology at [facility] than at other   [facility] hospitals. Direct result comparisons should only   be made within the same method.   D-DIMER, VTE EXCLUSION       All other labs were within normal range or not returned as of this dictation.    Imaging  XR chest 1 view    (Results Pending)        Procedures  Procedures     EMERGENCY DEPARTMENT COURSE/MDM:     Diagnoses as of 06/10/24 1941   Acute chest pain        Medical Decision Making    Jose Guardado is an 83-year-old male presenting with reported chest pain and nausea from his memory care facility.  Past medical history is significant for Alzheimer's dementia and left hemispheric stroke and expressive aphasia.  Patient was afebrile on arrival normotensive, heart rate was 71, and he is saturating appropriately on room air.  Patient did come in with paperwork stating he was comfort care therefore workup was halted while awaiting family members arrival for code clarification.  An extensive conversation was had bedside with the patient's wife and daughter and it was concluded to move forward with a comfort care orders as originally agreed.  Per the workup that has already been completed, troponin is negative, sodium is 134, white blood cell count 4.0, hemoglobin is above baseline at 11.4.  EKG is normal sinus rhythm no signs of ischemia.  On physical exam patient appears chronically ill and is ANO x 1, cardiopulmonary exam is unremarkable.  Family has opted for no further workup at this time. It is strongly recommended to follow-up with hospice after discharge. This will ensure comfort care orders and instruction for nursing home staff are in place to prevent future readmissions to the emergency department that are unwanted by the family.    Patient and or  family in agreement and understanding of treatment plan.  All questions answered.      I reviewed the case with the attending ED physician. The attending ED physician agrees with the plan. Patient and/or patient´s representative was counseled regarding labs, imaging, likely diagnosis, and plan. All questions were answered.    ED Medications administered this visit:  Medications - No data to display    New Prescriptions from this visit:    New Prescriptions    No medications on file       Follow-up:  No follow-up provider specified.      Final Impression: No diagnosis found.      (Please note that portions of this note were completed with a voice recognition program.  Efforts were made to edit the dictations but occasionally words are mis-transcribed.)     Taylor Flores DO  Resident  06/06/24 0319       Taylor Flores DO  Resident  06/06/24 [id number]       Taylor Flores DO  Resident  06/10/24 [id number]

## 2024-06-11 NOTE — TELEPHONE ENCOUNTER
Zora from [facility] would like to start OT  1 x Week for 5 Weeks.     Phone: [phone]

## 2024-06-13 ENCOUNTER — HOSPITAL ENCOUNTER (OUTPATIENT)
Dept: CARDIOLOGY | Facility: HOSPITAL | Age: 84
Discharge: HOME | End: 2024-06-13
Payer: MEDICARE

## 2024-06-13 ENCOUNTER — TELEPHONE (OUTPATIENT)
Dept: PRIMARY CARE | Facility: CLINIC | Age: 84
End: 2024-06-13
Payer: MEDICARE

## 2024-06-13 DIAGNOSIS — Z95.818 STATUS POST PLACEMENT OF IMPLANTABLE LOOP RECORDER: ICD-10-CM

## 2024-06-13 DIAGNOSIS — Z86.73 HISTORY OF TIA (TRANSIENT ISCHEMIC ATTACK): ICD-10-CM

## 2024-06-13 PROCEDURE — 93298 REM INTERROG DEV EVAL SCRMS: CPT

## 2024-06-13 RX ORDER — BRIVARACETAM 50 MG/1
50 TABLET, FILM COATED ORAL 2 TIMES DAILY
Qty: 60 TABLET | Refills: 0 | OUTPATIENT
Start: 2024-06-13

## 2024-06-13 RX ORDER — CLOPIDOGREL BISULFATE 75 MG/1
TABLET ORAL
COMMUNITY
Start: 2024-06-06

## 2024-06-13 RX ORDER — HYDROXYZINE HYDROCHLORIDE 25 MG/1
TABLET, FILM COATED ORAL
COMMUNITY
Start: 2024-06-11

## 2024-06-13 RX ORDER — LORAZEPAM 0.5 MG/1
0.5 TABLET ORAL EVERY 4 HOURS PRN
COMMUNITY
Start: 2024-06-12 | End: 2027-03-08

## 2024-06-13 RX ORDER — VENLAFAXINE 25 MG/1
TABLET ORAL
COMMUNITY
Start: 2024-06-11

## 2024-06-13 RX ORDER — HALOPERIDOL 1 MG/1
TABLET ORAL
COMMUNITY
Start: 2024-06-12

## 2024-06-13 NOTE — TELEPHONE ENCOUNTER
Genevieve from [facility] called for a verbal for pt.   PT  OT  Speech    Phone: [phone]

## 2024-06-14 LAB
ATRIAL RATE: 70 BPM
P AXIS: 23 DEGREES
P OFFSET: 209 MS
P ONSET: 162 MS
PR INTERVAL: 136 MS
Q ONSET: 230 MS
QRS COUNT: 12 BEATS
QRS DURATION: 72 MS
QT INTERVAL: 392 MS
QTC CALCULATION(BAZETT): 423 MS
QTC FREDERICIA: 412 MS
R AXIS: 12 DEGREES
T AXIS: 25 DEGREES
T OFFSET: 426 MS
VENTRICULAR RATE: 70 BPM

## 2024-06-21 ENCOUNTER — PATIENT OUTREACH (OUTPATIENT)
Dept: PRIMARY CARE | Facility: CLINIC | Age: 84
End: 2024-06-21
Payer: MEDICARE

## 2024-06-21 NOTE — PROGRESS NOTES
Outreach as TCM program continues. I spoke to family member that answered the phone. Patient is currently at hospice and expected to pass in next 24 hrs .

## 2024-07-11 ENCOUNTER — APPOINTMENT (OUTPATIENT)
Dept: PRIMARY CARE | Facility: CLINIC | Age: 84
End: 2024-07-11
Payer: MEDICARE

## 2024-11-07 ENCOUNTER — APPOINTMENT (OUTPATIENT)
Dept: NEUROLOGY | Facility: CLINIC | Age: 84
End: 2024-11-07
Payer: MEDICARE

## 2025-01-07 ENCOUNTER — APPOINTMENT (OUTPATIENT)
Dept: CARDIOLOGY | Facility: CLINIC | Age: 85
End: 2025-01-07
Payer: MEDICARE

## 2025-09-04 ENCOUNTER — PATIENT OUTREACH (OUTPATIENT)
Dept: PRIMARY CARE | Facility: CLINIC | Age: 85
End: 2025-09-04
Payer: MEDICARE

## 2025-09-04 DIAGNOSIS — Z00.00 WELL ADULT EXAM: ICD-10-CM
